# Patient Record
Sex: FEMALE | Race: BLACK OR AFRICAN AMERICAN | NOT HISPANIC OR LATINO | Employment: FULL TIME | URBAN - METROPOLITAN AREA
[De-identification: names, ages, dates, MRNs, and addresses within clinical notes are randomized per-mention and may not be internally consistent; named-entity substitution may affect disease eponyms.]

---

## 2017-01-04 ENCOUNTER — HOSPITAL ENCOUNTER (OUTPATIENT)
Dept: MAMMOGRAPHY | Facility: HOSPITAL | Age: 67
Discharge: HOME | End: 2017-01-04
Attending: OBSTETRICS & GYNECOLOGY | Admitting: OBSTETRICS & GYNECOLOGY

## 2017-01-04 DIAGNOSIS — Z13.9 SCREENING: ICD-10-CM

## 2017-01-04 PROCEDURE — G0202 SCR MAMMO BI INCL CAD: HCPCS

## 2017-01-06 ENCOUNTER — TELEPHONE (OUTPATIENT)
Dept: OBSTETRICS AND GYNECOLOGY | Facility: CLINIC | Age: 67
End: 2017-01-06

## 2017-01-06 NOTE — TELEPHONE ENCOUNTER
----- Message from Salvador Mensah MD sent at 1/4/2017 10:59 AM EST -----  Call patient: Normal mammogram

## 2017-02-24 ENCOUNTER — OFFICE VISIT (OUTPATIENT)
Dept: FAMILY MEDICINE CLINIC | Facility: CLINIC | Age: 67
End: 2017-02-24

## 2017-02-24 VITALS
HEART RATE: 76 BPM | BODY MASS INDEX: 27.47 KG/M2 | OXYGEN SATURATION: 98 % | HEIGHT: 67 IN | DIASTOLIC BLOOD PRESSURE: 76 MMHG | SYSTOLIC BLOOD PRESSURE: 130 MMHG | WEIGHT: 175 LBS | TEMPERATURE: 97.6 F | RESPIRATION RATE: 16 BRPM

## 2017-02-24 DIAGNOSIS — E83.52 SERUM CALCIUM ELEVATED: ICD-10-CM

## 2017-02-24 DIAGNOSIS — Z85.118 HISTORY OF LUNG CANCER: ICD-10-CM

## 2017-02-24 DIAGNOSIS — E78.2 MIXED HYPERLIPIDEMIA: ICD-10-CM

## 2017-02-24 DIAGNOSIS — Z85.038 HISTORY OF COLON CANCER: ICD-10-CM

## 2017-02-24 DIAGNOSIS — R79.89 LFT ELEVATION: Primary | ICD-10-CM

## 2017-02-24 DIAGNOSIS — E01.0 THYROMEGALY: ICD-10-CM

## 2017-02-24 PROCEDURE — 99213 OFFICE O/P EST LOW 20 MIN: CPT | Performed by: PHYSICIAN ASSISTANT

## 2017-02-24 NOTE — PROGRESS NOTES
Subjective   Faviola Issa is a 66 y.o. female.     History of Present Illness   Faviola Issa 66 y.o. female who presents today for f/u labs  she has a history of   Patient Active Problem List   Diagnosis   • Well female exam with routine gynecological exam   • Malignant neoplasm of sigmoid colon   .      Alk phos was done at work and elevated in 200s    I salina do fractionated alk phos    Has hx lung and colon cancer  Based on 2016 lipids, her 2013 AHA (American Heart Association) Cholesterol Risk Ratio Score Goal is <=7.5% and your score is:    7.9% and will see what new labs show.  She wants diet and exercise trial.    Gets yearly CXR with work for + ppd  Also sees Dr Romeo    I do want to have her do thyroid u/s--she will wait until June  The following portions of the patient's history were reviewed and updated as appropriate: allergies, current medications, past family history, past medical history, past social history, past surgical history and problem list.    Review of Systems   Constitutional: Negative for activity change, appetite change and unexpected weight change.   HENT: Positive for voice change. Negative for nosebleeds and trouble swallowing.    Eyes: Negative for pain and visual disturbance.   Respiratory: Positive for shortness of breath (not new). Negative for chest tightness and wheezing.    Cardiovascular: Negative for chest pain and palpitations.   Gastrointestinal: Negative for abdominal pain and blood in stool.   Endocrine: Negative.    Genitourinary: Negative for difficulty urinating and hematuria.   Musculoskeletal: Positive for back pain. Negative for joint swelling.   Skin: Negative for color change and rash.   Allergic/Immunologic: Negative.    Neurological: Negative for syncope and speech difficulty.   Hematological: Negative for adenopathy.   Psychiatric/Behavioral: Negative for agitation and confusion.   All other systems reviewed and are negative.      Objective   Physical  Exam   Constitutional: She is oriented to person, place, and time. She appears well-developed and well-nourished. No distress.   HENT:   Head: Normocephalic and atraumatic.   Eyes: Conjunctivae and EOM are normal. Pupils are equal, round, and reactive to light. Right eye exhibits no discharge. Left eye exhibits no discharge. No scleral icterus.   Neck: Normal range of motion. Neck supple. No tracheal deviation present. Thyromegaly present.   Cardiovascular: Normal rate, regular rhythm, normal heart sounds, intact distal pulses and normal pulses.  Exam reveals no gallop.    No murmur heard.  Pulmonary/Chest: Effort normal and breath sounds normal. No respiratory distress. She has no wheezes. She has no rales.   Musculoskeletal: Normal range of motion.   Neurological: She is alert and oriented to person, place, and time. She exhibits normal muscle tone. Coordination normal.   Skin: Skin is warm. No rash noted. No erythema. No pallor.   Psychiatric: She has a normal mood and affect. Her behavior is normal. Judgment and thought content normal.   Nursing note and vitals reviewed.      Assessment/Plan   Problems Addressed this Visit        Cardiovascular and Mediastinum    Mixed hyperlipidemia       Other    History of lung cancer    History of colon cancer    LFT elevation - Primary

## 2017-02-24 NOTE — PATIENT INSTRUCTIONS
Low glycemic index diet  Exercise 30 minutes most days of the week  Make sure you get results on any labs or tests we ordered today  We discussed medications and how to take them as prescribed  Sleep 6-8 hours each night if possible  If you have not signed up for [employer], please activate your code ASAP from your After Visit Summary today    LDL goal <100  LDL goal if heart disease <70  HDL goal >60  Triglyceride goal <150  BP goal =<130/80  Fasting glucose <100    Needs thyroid u/s

## 2017-02-28 DIAGNOSIS — Z85.118 HISTORY OF LUNG CANCER: ICD-10-CM

## 2017-02-28 DIAGNOSIS — Z85.038 HISTORY OF COLON CANCER: ICD-10-CM

## 2017-02-28 DIAGNOSIS — C18.7 MALIGNANT NEOPLASM OF SIGMOID COLON (HCC): Primary | ICD-10-CM

## 2017-02-28 LAB
25(OH)D3+25(OH)D2 SERPL-MCNC: 7.9 NG/ML (ref 30–100)
ALBUMIN SERPL-MCNC: 4.6 G/DL (ref 3.5–5.2)
ALBUMIN/GLOB SERPL: 1.5 G/DL
ALP BONE CFR SERPL: 58 % (ref 14–68)
ALP INTEST CFR SERPL: 0 % (ref 0–18)
ALP LIVER CFR SERPL: 42 % (ref 18–85)
ALP SERPL-CCNC: 118 U/L (ref 39–117)
ALT SERPL-CCNC: 9 U/L (ref 1–33)
AST SERPL-CCNC: 14 U/L (ref 1–32)
BASOPHILS # BLD AUTO: 0.03 10*3/MM3 (ref 0–0.2)
BASOPHILS NFR BLD AUTO: 0.4 % (ref 0–1.5)
BILIRUB SERPL-MCNC: 0.5 MG/DL (ref 0.1–1.2)
BUN SERPL-MCNC: 12 MG/DL (ref 8–23)
BUN/CREAT SERPL: 16.9 (ref 7–25)
CALCIUM SERPL-MCNC: 10.6 MG/DL (ref 8.6–10.5)
CHLORIDE SERPL-SCNC: 101 MMOL/L (ref 98–107)
CHOLEST SERPL-MCNC: 201 MG/DL (ref 0–200)
CO2 SERPL-SCNC: 25.8 MMOL/L (ref 22–29)
CREAT SERPL-MCNC: 0.71 MG/DL (ref 0.57–1)
EOSINOPHIL # BLD AUTO: 0.04 10*3/MM3 (ref 0–0.7)
EOSINOPHIL NFR BLD AUTO: 0.6 % (ref 0.3–6.2)
ERYTHROCYTE [DISTWIDTH] IN BLOOD BY AUTOMATED COUNT: 14.2 % (ref 11.7–13)
GLOBULIN SER CALC-MCNC: 3 GM/DL
GLUCOSE SERPL-MCNC: 84 MG/DL (ref 65–99)
HCT VFR BLD AUTO: 42.9 % (ref 35.6–45.5)
HDLC SERPL-MCNC: 74 MG/DL (ref 40–60)
HGB BLD-MCNC: 13.7 G/DL (ref 11.9–15.5)
IMM GRANULOCYTES # BLD: 0 10*3/MM3 (ref 0–0.03)
IMM GRANULOCYTES NFR BLD: 0 % (ref 0–0.5)
LDLC SERPL CALC-MCNC: 115 MG/DL (ref 0–100)
LYMPHOCYTES # BLD AUTO: 1.64 10*3/MM3 (ref 0.9–4.8)
LYMPHOCYTES NFR BLD AUTO: 23.8 % (ref 19.6–45.3)
MCH RBC QN AUTO: 28.4 PG (ref 26.9–32)
MCHC RBC AUTO-ENTMCNC: 31.9 G/DL (ref 32.4–36.3)
MCV RBC AUTO: 88.8 FL (ref 80.5–98.2)
MONOCYTES # BLD AUTO: 0.33 10*3/MM3 (ref 0.2–1.2)
MONOCYTES NFR BLD AUTO: 4.8 % (ref 5–12)
NEUTROPHILS # BLD AUTO: 4.85 10*3/MM3 (ref 1.9–8.1)
NEUTROPHILS NFR BLD AUTO: 70.4 % (ref 42.7–76)
PLATELET # BLD AUTO: 319 10*3/MM3 (ref 140–500)
POTASSIUM SERPL-SCNC: 4.5 MMOL/L (ref 3.5–5.2)
PROT SERPL-MCNC: 7.6 G/DL (ref 6–8.5)
RBC # BLD AUTO: 4.83 10*6/MM3 (ref 3.9–5.2)
SODIUM SERPL-SCNC: 141 MMOL/L (ref 136–145)
T4 FREE SERPL-MCNC: 1.28 NG/DL (ref 0.93–1.7)
TRIGL SERPL-MCNC: 62 MG/DL (ref 0–150)
TSH SERPL DL<=0.005 MIU/L-ACNC: 0.75 MIU/ML (ref 0.27–4.2)
VLDLC SERPL CALC-MCNC: 12.4 MG/DL (ref 5–40)
WBC # BLD AUTO: 6.89 10*3/MM3 (ref 4.5–10.7)

## 2017-03-03 LAB
CA-I SERPL ISE-MCNC: 6 MG/DL (ref 4.5–5.6)
PTH-INTACT SERPL-MCNC: 62 PG/ML (ref 15–65)

## 2017-03-06 DIAGNOSIS — C18.7 MALIGNANT NEOPLASM OF SIGMOID COLON (HCC): Primary | ICD-10-CM

## 2017-03-07 ENCOUNTER — CONSULT (OUTPATIENT)
Dept: ONCOLOGY | Facility: CLINIC | Age: 67
End: 2017-03-07

## 2017-03-07 ENCOUNTER — LAB (OUTPATIENT)
Dept: ONCOLOGY | Facility: HOSPITAL | Age: 67
End: 2017-03-07

## 2017-03-07 VITALS
HEIGHT: 67 IN | WEIGHT: 180 LBS | OXYGEN SATURATION: 96 % | BODY MASS INDEX: 28.25 KG/M2 | HEART RATE: 70 BPM | DIASTOLIC BLOOD PRESSURE: 76 MMHG | SYSTOLIC BLOOD PRESSURE: 177 MMHG | TEMPERATURE: 97.7 F | RESPIRATION RATE: 18 BRPM

## 2017-03-07 DIAGNOSIS — C18.7 MALIGNANT NEOPLASM OF SIGMOID COLON (HCC): Primary | ICD-10-CM

## 2017-03-07 DIAGNOSIS — C18.7 MALIGNANT NEOPLASM OF SIGMOID COLON (HCC): ICD-10-CM

## 2017-03-07 LAB
ALBUMIN SERPL-MCNC: 4.3 G/DL (ref 3.5–5.2)
ALBUMIN/GLOB SERPL: 1.2 G/DL
ALP SERPL-CCNC: 124 U/L (ref 39–117)
ALT SERPL W P-5'-P-CCNC: 8 U/L (ref 1–33)
ANION GAP SERPL CALCULATED.3IONS-SCNC: 9.9 MMOL/L
AST SERPL-CCNC: 14 U/L (ref 1–32)
BASOPHILS # BLD AUTO: 0.06 10*3/MM3 (ref 0–0.2)
BASOPHILS NFR BLD AUTO: 0.8 % (ref 0–1.5)
BILIRUB SERPL-MCNC: 0.5 MG/DL (ref 0.1–1.2)
BUN BLD-MCNC: 8 MG/DL (ref 8–23)
BUN/CREAT SERPL: 11.6 (ref 7–25)
CALCIUM SPEC-SCNC: 10.3 MG/DL (ref 8.6–10.5)
CHLORIDE SERPL-SCNC: 103 MMOL/L (ref 98–107)
CO2 SERPL-SCNC: 27.1 MMOL/L (ref 22–29)
CREAT BLD-MCNC: 0.69 MG/DL (ref 0.57–1)
DEPRECATED RDW RBC AUTO: 44.4 FL (ref 37–54)
EOSINOPHIL # BLD AUTO: 0.03 10*3/MM3 (ref 0–0.7)
EOSINOPHIL NFR BLD AUTO: 0.4 % (ref 0.3–6.2)
ERYTHROCYTE [DISTWIDTH] IN BLOOD BY AUTOMATED COUNT: 13.7 % (ref 11.7–13)
GFR SERPL CREATININE-BSD FRML MDRD: 85 ML/MIN/1.73
GLOBULIN UR ELPH-MCNC: 3.5 GM/DL
GLUCOSE BLD-MCNC: 80 MG/DL (ref 65–99)
HCT VFR BLD AUTO: 44.8 % (ref 35.6–45.5)
HGB BLD-MCNC: 14.6 G/DL (ref 11.9–15.5)
HOLD SPECIMEN: NORMAL
IMM GRANULOCYTES # BLD: 0.01 10*3/MM3 (ref 0–0.03)
IMM GRANULOCYTES NFR BLD: 0.1 % (ref 0–0.5)
LYMPHOCYTES # BLD AUTO: 1.4 10*3/MM3 (ref 0.9–4.8)
LYMPHOCYTES NFR BLD AUTO: 19.3 % (ref 19.6–45.3)
MCH RBC QN AUTO: 28.4 PG (ref 26.9–32)
MCHC RBC AUTO-ENTMCNC: 32.6 G/DL (ref 32.4–36.3)
MCV RBC AUTO: 87.2 FL (ref 80.5–98.2)
MONOCYTES # BLD AUTO: 0.42 10*3/MM3 (ref 0.2–1.2)
MONOCYTES NFR BLD AUTO: 5.8 % (ref 5–12)
NEUTROPHILS # BLD AUTO: 5.32 10*3/MM3 (ref 1.9–8.1)
NEUTROPHILS NFR BLD AUTO: 73.6 % (ref 42.7–76)
NRBC BLD MANUAL-RTO: 0 /100 WBC (ref 0–0)
PLATELET # BLD AUTO: 277 10*3/MM3 (ref 140–500)
PMV BLD AUTO: 9.9 FL (ref 6–12)
POTASSIUM BLD-SCNC: 4.3 MMOL/L (ref 3.5–5.2)
PROT SERPL-MCNC: 7.8 G/DL (ref 6–8.5)
RBC # BLD AUTO: 5.14 10*6/MM3 (ref 3.9–5.2)
SODIUM BLD-SCNC: 140 MMOL/L (ref 136–145)
WBC NRBC COR # BLD: 7.24 10*3/MM3 (ref 4.5–10.7)
WHOLE BLOOD HOLD SPECIMEN: NORMAL

## 2017-03-07 PROCEDURE — 80053 COMPREHEN METABOLIC PANEL: CPT | Performed by: INTERNAL MEDICINE

## 2017-03-07 PROCEDURE — 99204 OFFICE O/P NEW MOD 45 MIN: CPT | Performed by: INTERNAL MEDICINE

## 2017-03-07 PROCEDURE — 85025 COMPLETE CBC W/AUTO DIFF WBC: CPT | Performed by: INTERNAL MEDICINE

## 2017-03-07 PROCEDURE — 36415 COLL VENOUS BLD VENIPUNCTURE: CPT

## 2017-03-07 RX ORDER — ACETAMINOPHEN 500 MG
500 TABLET ORAL EVERY 6 HOURS PRN
COMMUNITY
End: 2019-08-02

## 2017-03-07 NOTE — PROGRESS NOTES
Subjective .     REASONS FOR FOLLOWUP:  History of colon cancer and history of rectal cancer    HISTORY OF PRESENT ILLNESS:  The patient is a 66 y.o. year old female  who is here for follow-up with the above-mentioned history.    Last in our office 12/20/11.  Patient decided at that time to follow up as needed only.  She was asked to come back by her PCP due to an elevated calcium.  Calcium 10.6 2/24/17.  Ionized calcium 6 on 3/2/17.  Patient stopped taking calcium supplements.    Alkaline phosphatase mildly elevated at 118, 2/24/17.  Bone/liver alkaline phosphatase fractionation unremarkable.    Denies neurological symptoms.  Denies nausea   Denies weight loss.  Denies pain.      Past Medical History   Diagnosis Date   • Colon cancer 2005     with mets to left lung (resected)   • Eye exam normal 2013   • Hypertension    • Lymphoma 1998     Eyelid, low-grade, treated with radiation   • Post-menopausal    • Rectal cancer 1999     Past Surgical History   Procedure Laterality Date   • Colon surgery       1999 and 11/2011   • Hysterectomy  1999   • Lung lobectomy       ANSELMO 08/2006 and RLL partial 09/2001   • Hemicoloectomy w/ anastomosis Right 11/2011     Adenocarcinoma of cecum       HEMATOLOGIC/ONCOLOGIC HISTORY:  (History from previous dates can be found in the separate document.)    MEDICATIONS    Current Outpatient Prescriptions:   •  acetaminophen (TYLENOL) 500 MG tablet, Take 500 mg by mouth. Take 1/2 tab, Disp: , Rfl:     ALLERGIES:     Allergies   Allergen Reactions   • Adhesive Tape Hives and Itching     BANDAIDS   • Amoxicillin Hives and Itching   • Latex    • Red Dye    • Shellfish-Derived Products        SOCIAL HISTORY:       Social History     Social History   • Marital status:      Spouse name: KEN   • Number of children: 3   • Years of education: College     Occupational History   • Nurse [employer]     Social History Main Topics   • Smoking status: Former  "Smoker     Packs/day: 1.00     Types: Cigarettes     Quit date: 9/11/2001   • Smokeless tobacco: Never Used      Comment: 1ppd x20 yrs   • Alcohol use No   • Drug use: No   • Sexual activity: Yes     Partners: Male     Birth control/ protection: None     Other Topics Concern   • Not on file     Social History Narrative    GYN PATIENT SINCE 2001.         FAMILY HISTORY:  Family History   Problem Relation Age of Onset   • Cancer Sister      \"FEMALE\"   • Hypertension Father    • Breast cancer Daughter 45       REVIEW OF SYSTEMS:  GENERAL: No change in appetite or weight;   No fevers, chills, sweats.    SKIN: No nonhealing lesions.   No rashes.  HEME/LYMPH: No easy bruising, bleeding.   No swollen nodes.   EYES: No vision changes or diplopia.   ENT: No tinnitus, hearing loss, gum bleeding, epistaxis, hoarseness or dysphagia.   RESPIRATORY: No cough, shortness of breath, hemoptysis or wheezing.   CVS: No chest pain, palpitations, orthopnea, dyspnea on exertion or PND.   GI: No melena or hematochezia.   No abdominal pain.  No nausea, vomiting, constipation, diarrhea  : No lower tract obstructive symptoms, dysuria or hematuria.   MUSCULOSKELETAL: No bone pain.  No joint stiffness.   NEUROLOGICAL: No global weakness, loss of consciousness or seizures.   PSYCHIATRIC: No increased nervousness, mood changes or depression.     Objective    Vitals:    03/07/17 1442   BP: 177/76   Pulse: 70   Resp: 18   Temp: 97.7 °F (36.5 °C)   SpO2: 96%   Weight: 180 lb (81.6 kg)   Height: 66.93\" (170 cm)  Comment: new pt   PainSc:   2   PainLoc: Generalized     Current Status 3/7/2017   ECOG score 0      PHYSICAL EXAM:    GENERAL:  Well-developed, well-nourished in no acute distress.   SKIN:  Warm, dry without rashes, purpura or petechiae.  EYES:  Pupils equal, round and reactive to light.  EOMs intact.  Conjunctivae normal.  EARS:  Hearing intact.  NOSE:  Septum midline.  No excoriations or nasal discharge.  MOUTH:  Tongue is " well-papillated; no stomatitis or ulcers.  Lips normal.  THROAT:  Oropharynx without lesions or exudates.  NECK:  Supple with good range of motion; no thyromegaly or masses, no JVD.  LYMPHATICS:  No cervical, supraclavicular, axillary or inguinal adenopathy.  CHEST:  Lungs clear to auscultation. Good airflow.  CARDIAC:  Regular rate and rhythm without murmurs, rubs or gallops. Normal S1,S2.  ABDOMEN:  Soft, nontender with no hepatosplenomegaly or masses.  EXTREMITIES:  No clubbing, cyanosis or edema.  NEUROLOGICAL:  Cranial Nerves II-XII grossly intact.  No focal neurological deficits.  PSYCHIATRIC:  Normal affect and mood.     RECENT LABS:        WBC   Date/Time Value Ref Range Status   03/07/2017 02:24 PM 7.24 4.50 - 10.70 10*3/mm3 Final   02/24/2017 12:33 PM 6.89 4.50 - 10.70 10*3/mm3 Final     HEMOGLOBIN   Date/Time Value Ref Range Status   03/07/2017 02:24 PM 14.6 11.9 - 15.5 g/dL Final   02/24/2017 12:33 PM 13.7 11.9 - 15.5 g/dL Final     PLATELETS   Date/Time Value Ref Range Status   03/07/2017 02:24  140 - 500 10*3/mm3 Final   02/24/2017 12:33  140 - 500 10*3/mm3 Final       Assessment/Plan   Malignant neoplasm of sigmoid colon  - Comprehensive Metabolic Panel   1.  Stage I (pT2,pN0,M0) colon adenocarcinoma. Resected with a right hemicolectomy 11/11/11. Therefore no adjuvant chemotherapy recommended. 12 lymph nodes checked and negative.      2.  Rectal cancer. APR 1999. Adjuvant 5FU and radiation.   Right lower lobectomy 9/12/01 for a solitary recurrence of the rectal cancer.   Left upper lobectomy 8/16/05 for a solitary recurrence of the rectal cancer.     3.  Low grade lymphoma of the eyelid, treated with radiation in 1988. She has had no signs of recurrence of lymphoma.     4.  Recent mild hypercalcemia.  Recent mild elevated alkaline phosphatase.  I discussed this with patient and .  I offered CT scans to evaluate for recurrence.  She declines at this time to avoid paying her high  deductible.  Instead, she preferred I recheck the lab tests.  Calcium today normal at 10.3.  Alkaline phosphatase remains minimally elevated at 124.  It has been almost 12 years since her last recurrence of rectal cancer.  I think it is unlikely recurrence cancer is causing these mild lab abnormalities.  However, I did tell the patient and  recurrence cancer is a possibility.  They voiced understanding.  After discussing this, they would like no further follow-up with us or testing for cancer which I think is reasonable.  She maintains colonoscopies with Dr. Romeo every 2 years per her report.  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  These were new issues for me to address today.  I reviewed PCP records.      PLAN:  No follow-up with us

## 2017-04-05 ENCOUNTER — TELEPHONE (OUTPATIENT)
Dept: FAMILY MEDICINE CLINIC | Facility: CLINIC | Age: 67
End: 2017-04-05

## 2017-04-05 NOTE — TELEPHONE ENCOUNTER
Pt wanted to let you know that she thought about the u/s of thyroid and she does not want to do it at this time.

## 2017-07-11 ENCOUNTER — APPOINTMENT (OUTPATIENT)
Dept: ULTRASOUND IMAGING | Facility: HOSPITAL | Age: 67
End: 2017-07-11

## 2017-09-26 ENCOUNTER — OFFICE VISIT (OUTPATIENT)
Dept: FAMILY MEDICINE CLINIC | Facility: CLINIC | Age: 67
End: 2017-09-26

## 2017-09-26 VITALS
HEIGHT: 67 IN | RESPIRATION RATE: 16 BRPM | DIASTOLIC BLOOD PRESSURE: 76 MMHG | HEART RATE: 67 BPM | TEMPERATURE: 98.4 F | SYSTOLIC BLOOD PRESSURE: 114 MMHG | BODY MASS INDEX: 28.25 KG/M2 | OXYGEN SATURATION: 95 % | WEIGHT: 180 LBS

## 2017-09-26 DIAGNOSIS — Z00.00 ROUTINE GENERAL MEDICAL EXAMINATION AT A HEALTH CARE FACILITY: Primary | ICD-10-CM

## 2017-09-26 PROCEDURE — 90670 PCV13 VACCINE IM: CPT | Performed by: PHYSICIAN ASSISTANT

## 2017-09-26 PROCEDURE — G0009 ADMIN PNEUMOCOCCAL VACCINE: HCPCS | Performed by: PHYSICIAN ASSISTANT

## 2017-09-26 PROCEDURE — 99397 PER PM REEVAL EST PAT 65+ YR: CPT | Performed by: PHYSICIAN ASSISTANT

## 2017-09-26 NOTE — PATIENT INSTRUCTIONS
Low glycemic index diet  Exercise 30 minutes most days of the week  Make sure you get results on any labs or tests we ordered today  We discussed medications and how to take them as prescribed  Sleep 6-8 hours each night if possible  If you have not signed up for [email or web address], please activate your code ASAP from your After Visit Summary today    LDL goal <100  LDL goal if heart disease <70  HDL goal >60  Triglyceride goal <150  BP goal =<130/80  Fasting glucose <100

## 2017-09-26 NOTE — PROGRESS NOTES
Subjective   Faviola Issa is a 67 y.o. female.     History of Present Illness   Faviola Issa 67 y.o. female who presents today for check up and did see DR Code for her elevated Ca+ and slightly elevated alk phos and did repeat labs and he added more labs and were negative.  She is in remission from lung and colon cancer.  Sees Dr Romeo every other year.  2013 AHA (American Heart Association) Cholesterol Risk Ratio Score Goal is <=7.5% and your score is:  Better at 5.5%    she has a history of   Patient Active Problem List   Diagnosis   • Well female exam with routine gynecological exam   • Malignant neoplasm of sigmoid colon   • History of lung cancer   • History of colon cancer   • LFT elevation   • Mixed hyperlipidemia   .    Had labs at work 9-11-17:  Chol 167, HDL 63, glucose 89    Lab Results   Component Value Date    GLUCOSE 80 03/07/2017    BUN 8 03/07/2017    CREATININE 0.69 03/07/2017    EGFRIFNONA 85 03/07/2017    EGFRIFAFRI 100 02/24/2017    BCR 11.6 03/07/2017    K 4.3 03/07/2017    CO2 27.1 03/07/2017    CALCIUM 10.3 03/07/2017    PROTENTOTREF 7.6 02/24/2017    ALBUMIN 4.30 03/07/2017    LABIL2 1.2 03/07/2017    AST 14 03/07/2017    ALT 8 03/07/2017     She is here for her yearly wellness check and labs are current and goals met. BP is great  Gets flu shot at work  Will do Prevnar today    She does exercise daily and walks >10,000 steps daily    The following portions of the patient's history were reviewed and updated as appropriate: allergies, current medications, past family history, past medical history, past social history, past surgical history and problem list.    Review of Systems   Constitutional: Negative for activity change, appetite change and unexpected weight change.   HENT: Negative for nosebleeds and trouble swallowing.    Eyes: Negative for pain and visual disturbance.   Respiratory: Negative for chest tightness, shortness of breath and wheezing.    Cardiovascular: Negative for  chest pain and palpitations.   Gastrointestinal: Negative for abdominal pain and blood in stool.   Endocrine: Negative.    Genitourinary: Negative for difficulty urinating and hematuria.   Musculoskeletal: Negative for joint swelling.   Skin: Negative for color change and rash.   Allergic/Immunologic: Positive for environmental allergies and food allergies.   Neurological: Negative for syncope and speech difficulty.   Hematological: Negative for adenopathy.   Psychiatric/Behavioral: Negative for agitation and confusion.   All other systems reviewed and are negative.      Objective   Physical Exam   Constitutional: She is oriented to person, place, and time. She appears well-developed and well-nourished. No distress.   HENT:   Head: Normocephalic and atraumatic.   Eyes: Conjunctivae and EOM are normal. Pupils are equal, round, and reactive to light. Right eye exhibits no discharge. Left eye exhibits no discharge. No scleral icterus.   Neck: Normal range of motion. Neck supple. No tracheal deviation present. No thyromegaly present.   Cardiovascular: Normal rate, regular rhythm, normal heart sounds, intact distal pulses and normal pulses.  Exam reveals no gallop.    No murmur heard.  Pulmonary/Chest: Effort normal and breath sounds normal. No respiratory distress. She has no wheezes. She has no rales.   Abdominal: Soft.   Musculoskeletal: Normal range of motion.   Ganglion cyst right dorsal wrist 2cm X 8mm   Neurological: She is alert and oriented to person, place, and time. She exhibits normal muscle tone. Coordination normal.   Skin: Skin is warm. No rash noted. No erythema. No pallor.   Psychiatric: She has a normal mood and affect. Her behavior is normal. Judgment and thought content normal.   Nursing note and vitals reviewed.      Assessment/Plan   Problems Addressed this Visit     None      Visit Diagnoses     Routine general medical examination at a health care facility    -  Primary

## 2017-10-02 ENCOUNTER — TELEPHONE (OUTPATIENT)
Dept: FAMILY MEDICINE CLINIC | Facility: CLINIC | Age: 67
End: 2017-10-02

## 2017-10-02 NOTE — TELEPHONE ENCOUNTER
Pt is having some congestion, coughing gray tinted mucus. Her throat is not sore but feels coated and is hoarse. She is wanting to know if you will call her something in. I did let her know that she may need to come in. She can not take levaquin due to joint pain.

## 2017-10-03 ENCOUNTER — OFFICE VISIT (OUTPATIENT)
Dept: FAMILY MEDICINE CLINIC | Facility: CLINIC | Age: 67
End: 2017-10-03

## 2017-10-03 VITALS
WEIGHT: 180 LBS | HEIGHT: 67 IN | TEMPERATURE: 98.5 F | DIASTOLIC BLOOD PRESSURE: 70 MMHG | BODY MASS INDEX: 28.25 KG/M2 | HEART RATE: 74 BPM | OXYGEN SATURATION: 98 % | RESPIRATION RATE: 16 BRPM | SYSTOLIC BLOOD PRESSURE: 110 MMHG

## 2017-10-03 DIAGNOSIS — J01.90 ACUTE SINUSITIS, RECURRENCE NOT SPECIFIED, UNSPECIFIED LOCATION: Primary | ICD-10-CM

## 2017-10-03 PROCEDURE — 99213 OFFICE O/P EST LOW 20 MIN: CPT | Performed by: PHYSICIAN ASSISTANT

## 2017-10-03 RX ORDER — DOXYCYCLINE HYCLATE 100 MG/1
100 CAPSULE ORAL 2 TIMES DAILY
Qty: 20 CAPSULE | Refills: 0 | Status: SHIPPED | OUTPATIENT
Start: 2017-10-03 | End: 2018-12-13

## 2017-10-03 RX ORDER — ALBUTEROL SULFATE 90 UG/1
2 AEROSOL, METERED RESPIRATORY (INHALATION) EVERY 4 HOURS PRN
Qty: 1 INHALER | Refills: 11 | Status: SHIPPED | OUTPATIENT
Start: 2017-10-03 | End: 2019-08-16

## 2017-10-03 NOTE — PROGRESS NOTES
Subjective   Faviola Issa is a 67 y.o. female.     History of Present Illness   Faviola Issa 67 y.o. female who presents for evaluation of upper respiratory congestion, cough, wheezing, shortness of air, fatigue. Symptoms include post nasal drip, cough described as productive of yellow sputum and hoarseness.  Onset of symptoms was 3 days ago, gradually worsening since that time. Patient denies fever.   Evaluation to date: none Treatment to date:  Tylenol.   Had to use Albuterol yesterday;  Has asthma as child  NO Levaquin:  Makes joints hurt  Allergy to PCN  The following portions of the patient's history were reviewed and updated as appropriate: allergies, current medications, past family history, past medical history, past social history, past surgical history and problem list.    Review of Systems   Constitutional: Positive for fatigue. Negative for activity change and unexpected weight change.   HENT: Positive for congestion and postnasal drip. Negative for ear pain and sore throat.    Eyes: Negative for pain and discharge.   Respiratory: Positive for cough, shortness of breath and wheezing. Negative for chest tightness.    Cardiovascular: Negative for chest pain and palpitations.   Gastrointestinal: Negative for abdominal pain, diarrhea and vomiting.   Endocrine: Negative.    Genitourinary: Negative.    Musculoskeletal: Negative for joint swelling.   Skin: Negative for color change, rash and wound.   Allergic/Immunologic: Negative.    Neurological: Negative for seizures and syncope.   Psychiatric/Behavioral: Negative.        Objective   Physical Exam   Constitutional: She is oriented to person, place, and time. She appears well-developed and well-nourished.  Non-toxic appearance. No distress.   HENT:   Head: Normocephalic and atraumatic. Hair is normal.   Right Ear: External ear normal. No drainage, swelling or tenderness. Tympanic membrane is retracted.   Left Ear: External ear normal. No drainage,  swelling or tenderness. Tympanic membrane is retracted.   Nose: Mucosal edema present. No epistaxis.   Mouth/Throat: Uvula is midline and mucous membranes are normal. No oral lesions. No uvula swelling. Posterior oropharyngeal erythema present. No oropharyngeal exudate.   Eyes: Conjunctivae and EOM are normal. Pupils are equal, round, and reactive to light. Right eye exhibits no discharge. Left eye exhibits no discharge. No scleral icterus.   Neck: Normal range of motion. Neck supple. No tracheal deviation present. No thyromegaly present.   Cardiovascular: Normal rate, regular rhythm, normal heart sounds, intact distal pulses and normal pulses.  Exam reveals no gallop.    No murmur heard.  Pulmonary/Chest: Effort normal and breath sounds normal. No stridor. No respiratory distress. She has no wheezes. She has no rales. She exhibits no tenderness.   Abdominal: Soft. There is no tenderness.   Musculoskeletal: Normal range of motion.   Lymphadenopathy:     She has cervical adenopathy.   Neurological: She is alert and oriented to person, place, and time. She exhibits normal muscle tone. Coordination normal.   Skin: Skin is warm and dry. No rash noted. She is not diaphoretic. No erythema. No pallor.   Psychiatric: She has a normal mood and affect. Her behavior is normal. Judgment and thought content normal.   Nursing note and vitals reviewed.      Assessment/Plan   Problems Addressed this Visit     None      Visit Diagnoses     Acute sinusitis, recurrence not specified, unspecified location    -  Primary

## 2017-10-04 ENCOUNTER — TELEPHONE (OUTPATIENT)
Dept: FAMILY MEDICINE CLINIC | Facility: CLINIC | Age: 67
End: 2017-10-04

## 2017-10-04 RX ORDER — METHYLPREDNISOLONE 4 MG/1
TABLET ORAL
Qty: 21 TABLET | Refills: 0 | Status: SHIPPED | OUTPATIENT
Start: 2017-10-04 | End: 2018-12-13

## 2017-10-04 NOTE — TELEPHONE ENCOUNTER
Pt states that since she started taking the doxycycline she has increase wheezing, tightness in the chest. She also woke up this morning vomiting. She did stop the medication.

## 2017-10-04 NOTE — TELEPHONE ENCOUNTER
Sounds like more of acute bronchitis and will send Medrol Dosepak for this and she can take it with Doxy    I called her and sent Medrol---she will start this and take doxy

## 2017-12-07 ENCOUNTER — TRANSCRIBE ORDERS (OUTPATIENT)
Dept: OBSTETRICS AND GYNECOLOGY | Facility: CLINIC | Age: 67
End: 2017-12-07

## 2017-12-07 DIAGNOSIS — Z12.31 VISIT FOR SCREENING MAMMOGRAM: Primary | ICD-10-CM

## 2017-12-22 NOTE — PATIENT INSTRUCTIONS
For throat pain:  Can gargle with 1/2 Maalox and 1/2 Benadryl liquid ---mix, gargle and spit Take Tylenol for fever or aches  Rest as needed  Call not better in 7 days  Increase fluids  Call if worse  Can use generic Afrin nasal spray up to 5 days for nasal congestion  Finish antibiotic course of therapy     Dr. Sanchez NSCU Dr. Isaac

## 2018-01-10 ENCOUNTER — HOSPITAL ENCOUNTER (OUTPATIENT)
Dept: MAMMOGRAPHY | Facility: HOSPITAL | Age: 68
Discharge: HOME | End: 2018-01-10
Attending: OBSTETRICS & GYNECOLOGY | Admitting: OBSTETRICS & GYNECOLOGY

## 2018-01-10 ENCOUNTER — PREP FOR SURGERY (OUTPATIENT)
Dept: OTHER | Facility: HOSPITAL | Age: 68
End: 2018-01-10

## 2018-01-10 ENCOUNTER — TELEPHONE (OUTPATIENT)
Dept: GASTROENTEROLOGY | Facility: CLINIC | Age: 68
End: 2018-01-10

## 2018-01-10 DIAGNOSIS — C18.9 COLON CANCER (HCC): Primary | ICD-10-CM

## 2018-01-10 DIAGNOSIS — Z12.31 VISIT FOR SCREENING MAMMOGRAM: ICD-10-CM

## 2018-01-10 PROCEDURE — 77067 SCR MAMMO BI INCL CAD: CPT

## 2018-01-10 NOTE — TELEPHONE ENCOUNTER
----- Message from Bre Morelos sent at 1/10/2018  9:00 AM EST -----  Regarding: PT CALLED - C/S QUESTION  Contact: [phone]  WHEN SHE HAVE HER NEXT PROCEDURE? PT LAST SCOPE IN 03/2015 IN [facility]. NOT IN RECALL.

## 2018-01-10 NOTE — TELEPHONE ENCOUNTER
Pt had last c/s 03/17/2015 per ed.  C/s and path from [facility] scanned under media tab. Message sent to Dr Romeo regarding when should pt have next c/s done.

## 2018-01-11 ENCOUNTER — TELEPHONE (OUTPATIENT)
Dept: OBSTETRICS AND GYNECOLOGY | Age: 68
End: 2018-01-11

## 2018-01-11 NOTE — TELEPHONE ENCOUNTER
She last had a colonoscopy thru her left abdominal colostomy on 3/17/15 because of her h/o two surgeries for rectal cancer and cecal cancer. At that time I had said that you should have a repeat colonscopy in 2-3 yrs. I think that she is due for a colonoscopy now. If she is OK with that then please schedule it. I ordered it in EP[facility]. [email or web address]

## 2018-01-11 NOTE — TELEPHONE ENCOUNTER
----- Message from Salvador Mensah MD sent at 1/11/2018 12:50 AM EST -----  Call patient: Normal mammogram

## 2018-01-11 NOTE — TELEPHONE ENCOUNTER
Called pt and advised per Dr Romeo that she has her last c/s thru her left abd colostomy on 03/17/2015 be cause of her h/o 2 surgeries for rectal cancer and cecal cancer.  At that point he recommend a repeat c/s in 2-3 yrs.  He recommends a c/s now and has ordered this.     Pt verb understanding and is agreeable to have c/s.  Advised pt that someone from scheduling would be calling her to arrange. Pt verb understanding.

## 2018-01-15 PROBLEM — C18.9 COLON CANCER: Status: ACTIVE | Noted: 2018-01-15

## 2018-05-08 ENCOUNTER — HOSPITAL ENCOUNTER (OUTPATIENT)
Facility: HOSPITAL | Age: 68
Setting detail: HOSPITAL OUTPATIENT SURGERY
Discharge: HOME | End: 2018-05-08
Attending: INTERNAL MEDICINE | Admitting: INTERNAL MEDICINE

## 2018-05-08 ENCOUNTER — ANESTHESIA (OUTPATIENT)
Dept: GASTROENTEROLOGY | Facility: HOSPITAL | Age: 68
End: 2018-05-08

## 2018-05-08 ENCOUNTER — ANESTHESIA EVENT (OUTPATIENT)
Dept: GASTROENTEROLOGY | Facility: HOSPITAL | Age: 68
End: 2018-05-08

## 2018-05-08 VITALS
TEMPERATURE: 97.6 F | DIASTOLIC BLOOD PRESSURE: 64 MMHG | HEART RATE: 63 BPM | HEIGHT: 66 IN | BODY MASS INDEX: 28.45 KG/M2 | SYSTOLIC BLOOD PRESSURE: 149 MMHG | RESPIRATION RATE: 16 BRPM | WEIGHT: 177 LBS | OXYGEN SATURATION: 98 %

## 2018-05-08 PROCEDURE — 45378 DIAGNOSTIC COLONOSCOPY: CPT | Performed by: INTERNAL MEDICINE

## 2018-05-08 PROCEDURE — 25010000002 PROPOFOL 10 MG/ML EMULSION: Performed by: ANESTHESIOLOGY

## 2018-05-08 RX ORDER — LIDOCAINE HYDROCHLORIDE 20 MG/ML
INJECTION, SOLUTION INFILTRATION; PERINEURAL AS NEEDED
Status: DISCONTINUED | OUTPATIENT
Start: 2018-05-08 | End: 2018-05-08 | Stop reason: SURG

## 2018-05-08 RX ORDER — PROPOFOL 10 MG/ML
VIAL (ML) INTRAVENOUS CONTINUOUS PRN
Status: DISCONTINUED | OUTPATIENT
Start: 2018-05-08 | End: 2018-05-08 | Stop reason: SURG

## 2018-05-08 RX ORDER — PROPOFOL 10 MG/ML
VIAL (ML) INTRAVENOUS AS NEEDED
Status: DISCONTINUED | OUTPATIENT
Start: 2018-05-08 | End: 2018-05-08 | Stop reason: SURG

## 2018-05-08 RX ORDER — LIDOCAINE HYDROCHLORIDE 10 MG/ML
0.5 INJECTION, SOLUTION INFILTRATION; PERINEURAL ONCE AS NEEDED
Status: DISCONTINUED | OUTPATIENT
Start: 2018-05-08 | End: 2018-05-08 | Stop reason: HOSPADM

## 2018-05-08 RX ORDER — SODIUM CHLORIDE 0.9 % (FLUSH) 0.9 %
3 SYRINGE (ML) INJECTION AS NEEDED
Status: DISCONTINUED | OUTPATIENT
Start: 2018-05-08 | End: 2018-05-08 | Stop reason: HOSPADM

## 2018-05-08 RX ORDER — SODIUM CHLORIDE, SODIUM LACTATE, POTASSIUM CHLORIDE, CALCIUM CHLORIDE 600; 310; 30; 20 MG/100ML; MG/100ML; MG/100ML; MG/100ML
1000 INJECTION, SOLUTION INTRAVENOUS CONTINUOUS
Status: DISCONTINUED | OUTPATIENT
Start: 2018-05-08 | End: 2018-05-08 | Stop reason: HOSPADM

## 2018-05-08 RX ADMIN — PROPOFOL 150 MG: 10 INJECTION, EMULSION INTRAVENOUS at 10:00

## 2018-05-08 RX ADMIN — SODIUM CHLORIDE, POTASSIUM CHLORIDE, SODIUM LACTATE AND CALCIUM CHLORIDE 1000 ML: 600; 310; 30; 20 INJECTION, SOLUTION INTRAVENOUS at 09:21

## 2018-05-08 RX ADMIN — LIDOCAINE HYDROCHLORIDE 50 MG: 20 INJECTION, SOLUTION INFILTRATION; PERINEURAL at 10:00

## 2018-05-08 RX ADMIN — PROPOFOL 140 MCG/KG/MIN: 10 INJECTION, EMULSION INTRAVENOUS at 10:00

## 2018-05-08 NOTE — ANESTHESIA PREPROCEDURE EVALUATION
Anesthesia Evaluation     Patient summary reviewed                Airway   Mallampati: III  TM distance: >3 FB  Neck ROM: full  No difficulty expected  Dental - normal exam     Pulmonary     breath sounds clear to auscultation  Cardiovascular   Exercise tolerance: good (4-7 METS)    Rhythm: regular  Rate: normal        Neuro/Psych  GI/Hepatic/Renal/Endo      Musculoskeletal     Abdominal    Substance History      OB/GYN          Other                        Anesthesia Plan    ASA 3     MAC     intravenous induction   Anesthetic plan and risks discussed with patient.

## 2018-05-08 NOTE — H&P
"[facility]  Pre Procedure History & Physical    Chief Complaint:   68 yo female with a h/o rectal cancer with abdominalperoneal resection and colostomy placement in 1999. In 11/2011 she had surgery to resect a cecal cancer. She has had two surgeries for bilateral lung cancers. She last had a colonoscopy thru her LLQ abdominal colostomy in 3/15.    Subjective     HPI:   67 y.o. female with a h/o rectal cancer with abdominalperoneal resection and colostomy placement in 1999. In 11/2011 she had surgery to resect a cecal cancer. She has had two surgeries for bilateral lung cancers. She last had a colonoscopy thru her LLQ abdominal colostomy in 3/15.        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 Her smoking use included Cigarettes. She smoked 1.00 pack per day. She has never used smokeless tobacco. She reports that she does not drink alcohol or use drugs.    Medications:   Prescriptions Prior to Admission   Medication Sig Dispense Refill Last Dose   • acetaminophen (TYLENOL) 500 MG tablet Take 500 mg by mouth Every 6 (Six) Hours As Needed. Take 1/2 tab    More than a month at Unknown time   • albuterol (PROVENTIL HFA;VENTOLIN HFA) 108 (90 Base) MCG/ACT inhaler Inhale 2 puffs Every 4 (Four) Hours As Needed for Wheezing. (put on file) 1 inhaler 11 More than a month at Unknown time   • doxycycline (VIBRAMYCIN) 100 MG capsule Take 1 capsule by mouth 2 (Two) Times a Day. For infection 20 capsule 0    • " "MethylPREDNISolone (MEDROL, AD,) 4 MG tablet follow package directions 21 tablet 0        Allergies:  Shellfish-derived products; Adhesive tape; Amoxicillin; Latex; and Red dye    ROS:    Pertinent items are noted in HPI     Objective     Blood pressure 159/75, pulse 68, temperature 98.1 °F (36.7 °C), temperature source Oral, resp. rate 16, height 167.6 cm (66\"), weight 80.3 kg (177 lb), SpO2 100 %, not currently breastfeeding.    Physical Exam   Constitutional: Pt is oriented to person, place, and time and well-developed, well-nourished, and in no distress.   HENT:   Mouth/Throat: Oropharynx is clear and moist.   Neck: Normal range of motion. Neck supple.   Cardiovascular: Normal rate, regular rhythm and normal heart sounds.    Pulmonary/Chest: Effort normal and breath sounds normal. No respiratory distress. No  wheezes.   Abdominal: Soft. Bowel sounds are normal.   Skin: Skin is warm and dry.   Psychiatric: Mood, memory, affect and judgment normal.     Assessment/Plan     Diagnosis:  67 y.o. female with a h/o rectal cancer with abdominalperoneal resection and colostomy placement in 1999. In 11/2011 she had surgery to resect a cecal cancer. She has had two surgeries for bilateral lung cancers. She last had a colonoscopy thru her LLQ abdominal colostomy in 3/15.    Anticipated Surgical Procedure:  Colonoscopy thru her LLQ abdominal colostomy.    The risks, benefits, and alternatives of this procedure have been discussed with the patient or the responsible party- the patient understands and agrees to proceed.                                                                  "

## 2018-05-08 NOTE — ANESTHESIA POSTPROCEDURE EVALUATION
Patient: Faviola Issa    Procedure Summary     Date:  05/08/18 Room / Location:  [facility] ENDOSCOPY 5 /  [facility]    Anesthesia Start:  0952 Anesthesia Stop:  1014    Procedure:  COLONOSCOPY into ileum (N/A ) Diagnosis:       Colon cancer      (Colon cancer [C18.9])    Surgeon:  James Romeo MD Provider:  Braden Vieira MD    Anesthesia Type:  MAC ASA Status:  3          Anesthesia Type: MAC  Last vitals  BP   125/68 (05/08/18 1027)   Temp   36.7 °C (98.1 °F) (05/08/18 0906)   Pulse   66 (05/08/18 1027)   Resp   15 (05/08/18 1027)     SpO2   100 % (05/08/18 1027)     Post Anesthesia Care and Evaluation    Patient location during evaluation: bedside  Patient participation: complete - patient participated  Level of consciousness: awake and alert  Pain score: 0  Pain management: adequate  Airway patency: patent  Anesthetic complications: No anesthetic complications  PONV Status: none  Cardiovascular status: acceptable  Respiratory status: acceptable  Hydration status: acceptable  Post Neuraxial Block status: Motor and sensory function returned to baseline

## 2018-07-31 ENCOUNTER — TELEPHONE (OUTPATIENT)
Dept: GASTROENTEROLOGY | Facility: CLINIC | Age: 68
End: 2018-07-31

## 2018-07-31 NOTE — TELEPHONE ENCOUNTER
Signed DME forms faxed to [facility] @ [phone] - confirmation received (see Media Tab),

## 2018-12-03 ENCOUNTER — TRANSCRIBE ORDERS (OUTPATIENT)
Dept: OBSTETRICS AND GYNECOLOGY | Age: 68
End: 2018-12-03

## 2018-12-03 DIAGNOSIS — Z12.31 VISIT FOR SCREENING MAMMOGRAM: Primary | ICD-10-CM

## 2018-12-13 ENCOUNTER — OFFICE VISIT (OUTPATIENT)
Dept: FAMILY MEDICINE CLINIC | Facility: CLINIC | Age: 68
End: 2018-12-13

## 2018-12-13 VITALS
WEIGHT: 174 LBS | HEIGHT: 66 IN | OXYGEN SATURATION: 98 % | SYSTOLIC BLOOD PRESSURE: 120 MMHG | TEMPERATURE: 97.6 F | RESPIRATION RATE: 16 BRPM | DIASTOLIC BLOOD PRESSURE: 72 MMHG | BODY MASS INDEX: 27.97 KG/M2 | HEART RATE: 76 BPM

## 2018-12-13 DIAGNOSIS — E55.9 VITAMIN D DEFICIENCY: ICD-10-CM

## 2018-12-13 DIAGNOSIS — R79.89 LFT ELEVATION: ICD-10-CM

## 2018-12-13 DIAGNOSIS — E01.0 THYROMEGALY: ICD-10-CM

## 2018-12-13 DIAGNOSIS — Z12.31 ENCOUNTER FOR SCREENING MAMMOGRAM FOR BREAST CANCER: ICD-10-CM

## 2018-12-13 DIAGNOSIS — Z85.118 HISTORY OF LUNG CANCER: ICD-10-CM

## 2018-12-13 DIAGNOSIS — C18.7 MALIGNANT NEOPLASM OF SIGMOID COLON (HCC): ICD-10-CM

## 2018-12-13 DIAGNOSIS — E78.2 MIXED HYPERLIPIDEMIA: Primary | ICD-10-CM

## 2018-12-13 DIAGNOSIS — R06.2 WHEEZING: ICD-10-CM

## 2018-12-13 PROCEDURE — 99214 OFFICE O/P EST MOD 30 MIN: CPT | Performed by: PHYSICIAN ASSISTANT

## 2018-12-13 RX ORDER — ALBUTEROL SULFATE 2.5 MG/3ML
2.5 SOLUTION RESPIRATORY (INHALATION) EVERY 4 HOURS PRN
Qty: 120 VIAL | Refills: 12 | Status: SHIPPED | OUTPATIENT
Start: 2018-12-13 | End: 2019-05-21 | Stop reason: ALTCHOICE

## 2018-12-13 NOTE — PATIENT INSTRUCTIONS
Low glycemic index diet  Exercise 30 minutes most days of the week  Make sure you get results on any labs or tests we ordered today  We discussed medications and how to take them as prescribed  Sleep 6-8 hours each night if possible  If you have not signed up for [employer], please activate your code ASAP from your After Visit Summary today    LDL goal <100  LDL goal if heart disease <70  HDL goal >60  Triglyceride goal <150  BP goal =<130/80  Fasting glucose <100

## 2018-12-13 NOTE — PROGRESS NOTES
Subjective   Faviola Issa is a 68 y.o. female.     History of Present Illness       Faviola Issa 68 y.o. female who presents today for routine follow up check and medication refills.  she has a history of   Patient Active Problem List   Diagnosis   • Well female exam with routine gynecological exam   • Malignant neoplasm of sigmoid colon (CMS/HCC)   • History of lung cancer   • History of colon cancer   • LFT elevation   • Mixed hyperlipidemia   • Colon cancer (CMS/HCC)   .  Since the last visit, she has overall felt well.  She has Hyperlipidemia and working on this with diet and exercise and Vitamin D deficiency and will update labs to confirm level is at goal >30.  she has been compliant with current medications have reviewed them.  The patient denies medication side effects.  Last chol ratio was 5.5%  Malignant neoplasm of sigmoid colon  - Comprehensive Metabolic Panel   1.  Stage I (pT2,pN0,M0) colon adenocarcinoma. Resected with a right hemicolectomy 11/11/11. Therefore no adjuvant chemotherapy recommended. 12 lymph nodes checked and negative.       2.  Rectal cancer. APR 1999. Adjuvant 5FU and radiation.   Right lower lobectomy 9/12/01 for a solitary recurrence of the rectal cancer.   Left upper lobectomy 8/16/05 for a solitary recurrence of the rectal cancer.      3.  Low grade lymphoma of the eyelid, treated with radiation in 1988. She has had no signs of recurrence of lymphoma.      4.  Recent mild hypercalcemia.  Recent mild elevated alkaline phosphatase.  I discussed this with patient and .  I offered CT scans to evaluate for recurrence.  She declines at this time to avoid paying her high deductible.  Instead, she preferred I recheck the lab tests.  Calcium today normal at 10.3.  Alkaline phosphatase remains minimally elevated at 124.  It has been almost 12 years since her last recurrence of rectal cancer.  I think it is unlikely recurrence cancer is causing these mild lab abnormalities.   However, I did tell the patient and  recurrence cancer is a possibility.  They voiced understanding.  After discussing this, they would like no further follow-up with us or testing for cancer which I think is reasonable.  She maintains colonoscopies with Dr. Romeo every 2 years per her report.  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  These were new issues for me to address today.  I reviewed PCP records.      PLAN:  No follow-up with us          Hx lung cancer; she is having wheezing and SOA; has nebulizer and needs solution; needs to see pulm;  Has had resection both lower lobes    Results for orders placed or performed in visit on 03/07/17   Comprehensive Metabolic Panel   Result Value Ref Range    Glucose 80 65 - 99 mg/dL    BUN 8 8 - 23 mg/dL    Creatinine 0.69 0.57 - 1.00 mg/dL    Sodium 140 136 - 145 mmol/L    Potassium 4.3 3.5 - 5.2 mmol/L    Chloride 103 98 - 107 mmol/L    CO2 27.1 22.0 - 29.0 mmol/L    Calcium 10.3 8.6 - 10.5 mg/dL    Total Protein 7.8 6.0 - 8.5 g/dL    Albumin 4.30 3.50 - 5.20 g/dL    ALT (SGPT) 8 1 - 33 U/L    AST (SGOT) 14 1 - 32 U/L    Alkaline Phosphatase 124 (H) 39 - 117 U/L    Total Bilirubin 0.5 0.1 - 1.2 mg/dL    eGFR Non African Amer 85 >60 mL/min/1.73    Globulin 3.5 gm/dL    A/G Ratio 1.2 g/dL    BUN/Creatinine Ratio 11.6 7.0 - 25.0    Anion Gap 9.9 mmol/L     Declines DEXA    She is still seeing Dr Leggett and I sent her there d/t elevated  Ca and alk phos on labs with hx colon cancer and breast cancer.  Dr Romeo still does her colonoscopies  The following portions of the patient's history were reviewed and updated as appropriate: allergies, current medications, past family history, past medical history, past social history, past surgical history and problem list.    Review of Systems   Constitutional: Negative for activity change, appetite change and unexpected weight change.   HENT: Negative for nosebleeds and trouble  swallowing.    Eyes: Negative for pain and visual disturbance.   Respiratory: Negative for chest tightness, shortness of breath and wheezing.    Cardiovascular: Negative for chest pain and palpitations.   Gastrointestinal: Negative for abdominal pain and blood in stool.   Endocrine: Negative.    Genitourinary: Negative for difficulty urinating and hematuria.   Musculoskeletal: Negative for joint swelling.   Skin: Negative for color change and rash.   Allergic/Immunologic: Negative.    Neurological: Negative for syncope and speech difficulty.   Hematological: Negative for adenopathy.   Psychiatric/Behavioral: Negative for agitation and confusion.   All other systems reviewed and are negative.      Objective   Physical Exam   Constitutional: She is oriented to person, place, and time. She appears well-developed and well-nourished. No distress.   HENT:   Head: Normocephalic and atraumatic.   Eyes: Conjunctivae and EOM are normal. Pupils are equal, round, and reactive to light. Right eye exhibits no discharge. Left eye exhibits no discharge. No scleral icterus.   Neck: Normal range of motion. Neck supple. No tracheal deviation present. Thyromegaly present.   Cardiovascular: Normal rate, regular rhythm, normal heart sounds, intact distal pulses and normal pulses. Exam reveals no gallop.   No murmur heard.  Pulmonary/Chest: Effort normal and breath sounds normal. No respiratory distress. She has no wheezes. She has no rales.   Abdominal: Soft.   Musculoskeletal: Normal range of motion.   Ganglion cyst right dorsal wrist 2cm X 8mm   Neurological: She is alert and oriented to person, place, and time. She exhibits normal muscle tone. Coordination normal.   Skin: Skin is warm. No rash noted. No erythema. No pallor.   Psychiatric: She has a normal mood and affect. Her behavior is normal. Judgment and thought content normal.   Nursing note and vitals reviewed.      Assessment/Plan   Faviola was seen today for annual  exam.    Diagnoses and all orders for this visit:    Mixed hyperlipidemia  -     Comprehensive metabolic panel  -     Lipid panel  -     CBC and Differential  -     TSH  -     T3, Free  -     T4, Free  -     Vitamin B12  -     Folate  -     Vitamin D 25 Hydroxy    History of lung cancer  -     Ambulatory Referral to Pulmonology  -     Comprehensive metabolic panel  -     Lipid panel  -     CBC and Differential  -     TSH  -     T3, Free  -     T4, Free  -     Vitamin B12  -     Folate  -     Vitamin D 25 Hydroxy    Encounter for screening mammogram for breast cancer  -     Mammo Screening Bilateral With CAD; Future  -     Comprehensive metabolic panel  -     Lipid panel  -     CBC and Differential  -     TSH  -     T3, Free  -     T4, Free  -     Vitamin B12  -     Folate  -     Vitamin D 25 Hydroxy    Malignant neoplasm of sigmoid colon (CMS/HCC)  -     Comprehensive metabolic panel  -     Lipid panel  -     CBC and Differential  -     TSH  -     T3, Free  -     T4, Free  -     Vitamin B12  -     Folate  -     Vitamin D 25 Hydroxy    LFT elevation  -     Ambulatory Referral to Pulmonology  -     Comprehensive metabolic panel  -     Lipid panel  -     CBC and Differential  -     TSH  -     T3, Free  -     T4, Free  -     Vitamin B12  -     Folate  -     Vitamin D 25 Hydroxy    Wheezing  -     Ambulatory Referral to Pulmonology  -     Comprehensive metabolic panel  -     Lipid panel  -     CBC and Differential  -     TSH  -     T3, Free  -     T4, Free  -     Vitamin B12  -     Folate  -     Vitamin D 25 Hydroxy    Vitamin D deficiency    Thyromegaly  -     Ambulatory Referral to ENT (Otolaryngology)    Other orders  -     albuterol (PROVENTIL) (2.5 MG/3ML) 0.083% nebulizer solution; Take 2.5 mg by nebulization Every 4 (Four) Hours As Needed for Wheezing or Shortness of Air.

## 2019-01-03 LAB
25(OH)D3+25(OH)D2 SERPL-MCNC: 13.2 NG/ML (ref 30–100)
ALBUMIN SERPL-MCNC: 4.4 G/DL (ref 3.5–5.2)
ALBUMIN/GLOB SERPL: 1.2 G/DL
ALP SERPL-CCNC: 126 U/L (ref 39–117)
ALT SERPL-CCNC: 15 U/L (ref 1–33)
AST SERPL-CCNC: 14 U/L (ref 1–32)
BASOPHILS # BLD AUTO: 0.04 10*3/MM3 (ref 0–0.2)
BASOPHILS NFR BLD AUTO: 0.6 % (ref 0–1.5)
BILIRUB SERPL-MCNC: 0.6 MG/DL (ref 0.1–1.2)
BUN SERPL-MCNC: 14 MG/DL (ref 8–23)
BUN/CREAT SERPL: 17.9 (ref 7–25)
CALCIUM SERPL-MCNC: 11 MG/DL (ref 8.6–10.5)
CHLORIDE SERPL-SCNC: 101 MMOL/L (ref 98–107)
CHOLEST SERPL-MCNC: 229 MG/DL (ref 0–200)
CO2 SERPL-SCNC: 29.3 MMOL/L (ref 22–29)
CREAT SERPL-MCNC: 0.78 MG/DL (ref 0.57–1)
EOSINOPHIL # BLD AUTO: 0.1 10*3/MM3 (ref 0–0.7)
EOSINOPHIL NFR BLD AUTO: 1.6 % (ref 0.3–6.2)
ERYTHROCYTE [DISTWIDTH] IN BLOOD BY AUTOMATED COUNT: 14 % (ref 11.7–13)
FOLATE SERPL-MCNC: 13.9 NG/ML (ref 4.78–24.2)
GLOBULIN SER CALC-MCNC: 3.7 GM/DL
GLUCOSE SERPL-MCNC: 88 MG/DL (ref 65–99)
HCT VFR BLD AUTO: 48.4 % (ref 35.6–45.5)
HDLC SERPL-MCNC: 70 MG/DL (ref 40–60)
HGB BLD-MCNC: 15.3 G/DL (ref 11.9–15.5)
IMM GRANULOCYTES # BLD: 0 10*3/MM3 (ref 0–0.03)
IMM GRANULOCYTES NFR BLD: 0 % (ref 0–0.5)
LDLC SERPL CALC-MCNC: 136 MG/DL (ref 0–100)
LYMPHOCYTES # BLD AUTO: 1.59 10*3/MM3 (ref 0.9–4.8)
LYMPHOCYTES NFR BLD AUTO: 25 % (ref 19.6–45.3)
MCH RBC QN AUTO: 29 PG (ref 26.9–32)
MCHC RBC AUTO-ENTMCNC: 31.6 G/DL (ref 32.4–36.3)
MCV RBC AUTO: 91.8 FL (ref 80.5–98.2)
MONOCYTES # BLD AUTO: 0.42 10*3/MM3 (ref 0.2–1.2)
MONOCYTES NFR BLD AUTO: 6.6 % (ref 5–12)
NEUTROPHILS # BLD AUTO: 4.2 10*3/MM3 (ref 1.9–8.1)
NEUTROPHILS NFR BLD AUTO: 66.2 % (ref 42.7–76)
PLATELET # BLD AUTO: 289 10*3/MM3 (ref 140–500)
POTASSIUM SERPL-SCNC: 4.4 MMOL/L (ref 3.5–5.2)
PROT SERPL-MCNC: 8.1 G/DL (ref 6–8.5)
RBC # BLD AUTO: 5.27 10*6/MM3 (ref 3.9–5.2)
SODIUM SERPL-SCNC: 140 MMOL/L (ref 136–145)
T3FREE SERPL-MCNC: 3.1 PG/ML (ref 2–4.4)
T4 FREE SERPL-MCNC: 1.39 NG/DL (ref 0.93–1.7)
TRIGL SERPL-MCNC: 113 MG/DL (ref 0–150)
TSH SERPL DL<=0.005 MIU/L-ACNC: 1.5 MIU/ML (ref 0.27–4.2)
VIT B12 SERPL-MCNC: 243 PG/ML (ref 211–946)
VLDLC SERPL CALC-MCNC: 22.6 MG/DL (ref 5–40)
WBC # BLD AUTO: 6.35 10*3/MM3 (ref 4.5–10.7)

## 2019-01-04 DIAGNOSIS — E83.52 SERUM CALCIUM ELEVATED: Primary | ICD-10-CM

## 2019-01-11 ENCOUNTER — HOSPITAL ENCOUNTER (OUTPATIENT)
Dept: MAMMOGRAPHY | Facility: HOSPITAL | Age: 69
Discharge: HOME | End: 2019-01-11
Attending: OBSTETRICS & GYNECOLOGY | Admitting: OBSTETRICS & GYNECOLOGY

## 2019-01-11 ENCOUNTER — TELEPHONE (OUTPATIENT)
Dept: OBSTETRICS AND GYNECOLOGY | Age: 69
End: 2019-01-11

## 2019-01-11 DIAGNOSIS — Z12.31 VISIT FOR SCREENING MAMMOGRAM: ICD-10-CM

## 2019-01-11 PROCEDURE — 77067 SCR MAMMO BI INCL CAD: CPT

## 2019-01-11 PROCEDURE — 77063 BREAST TOMOSYNTHESIS BI: CPT

## 2019-01-11 NOTE — TELEPHONE ENCOUNTER
----- Message from Salvador Mensah MD sent at 1/11/2019 10:33 AM EST -----  Notify patient:  Mammogram is benign

## 2019-01-19 LAB
CA-I SERPL ISE-MCNC: 6.1 MG/DL (ref 4.5–5.6)
PHOSPHATE SERPL-MCNC: 3.1 MG/DL (ref 2.5–4.5)
PTH-INTACT SERPL-MCNC: 63 PG/ML (ref 15–65)

## 2019-05-06 ENCOUNTER — HOSPITAL ENCOUNTER (INPATIENT)
Facility: HOSPITAL | Age: 69
LOS: 2 days | Discharge: HOME | End: 2019-05-08
Attending: OTOLARYNGOLOGY | Admitting: OTOLARYNGOLOGY

## 2019-05-06 ENCOUNTER — ANESTHESIA EVENT (OUTPATIENT)
Dept: PERIOP | Facility: HOSPITAL | Age: 69
End: 2019-05-06

## 2019-05-06 ENCOUNTER — ANESTHESIA (OUTPATIENT)
Dept: PERIOP | Facility: HOSPITAL | Age: 69
End: 2019-05-06

## 2019-05-06 DIAGNOSIS — E04.9 ENLARGED THYROID: ICD-10-CM

## 2019-05-06 DIAGNOSIS — E21.3 HYPERPARATHYROIDISM (HCC): ICD-10-CM

## 2019-05-06 PROBLEM — E21.0 PRIMARY HYPERPARATHYROIDISM (HCC): Chronic | Status: ACTIVE | Noted: 2019-05-06

## 2019-05-06 LAB
DEPRECATED RDW RBC AUTO: 43.2 FL (ref 37–54)
ERYTHROCYTE [DISTWIDTH] IN BLOOD BY AUTOMATED COUNT: 13.2 % (ref 12.3–15.4)
HCT VFR BLD AUTO: 43.9 % (ref 34–46.6)
HGB BLD-MCNC: 13.8 G/DL (ref 12–15.9)
MCH RBC QN AUTO: 27.9 PG (ref 26.6–33)
MCHC RBC AUTO-ENTMCNC: 31.4 G/DL (ref 31.5–35.7)
MCV RBC AUTO: 88.9 FL (ref 79–97)
PLATELET # BLD AUTO: 257 10*3/MM3 (ref 140–450)
PMV BLD AUTO: 10.3 FL (ref 6–12)
PTH-INTACT SERPL-SCNC: 114.8 PG/ML (ref 15–65)
PTH-INTACT SERPL-SCNC: 19.7 PG/ML (ref 15–65)
RBC # BLD AUTO: 4.94 10*6/MM3 (ref 3.77–5.28)
WBC NRBC COR # BLD: 5.85 10*3/MM3 (ref 3.4–10.8)

## 2019-05-06 PROCEDURE — 25010000002 DEXAMETHASONE PER 1 MG: Performed by: NURSE ANESTHETIST, CERTIFIED REGISTERED

## 2019-05-06 PROCEDURE — 0GTP0ZZ RESECTION OF LEFT INFERIOR PARATHYROID GLAND, OPEN APPROACH: ICD-10-PCS | Performed by: OTOLARYNGOLOGY

## 2019-05-06 PROCEDURE — 25010000002 ONDANSETRON PER 1 MG: Performed by: ANESTHESIOLOGY

## 2019-05-06 PROCEDURE — G0378 HOSPITAL OBSERVATION PER HR: HCPCS

## 2019-05-06 PROCEDURE — 25010000002 PROPOFOL 10 MG/ML EMULSION: Performed by: NURSE ANESTHETIST, CERTIFIED REGISTERED

## 2019-05-06 PROCEDURE — 0GTH0ZZ RESECTION OF RIGHT THYROID GLAND LOBE, OPEN APPROACH: ICD-10-PCS | Performed by: OTOLARYNGOLOGY

## 2019-05-06 PROCEDURE — 93005 ELECTROCARDIOGRAM TRACING: CPT | Performed by: OTOLARYNGOLOGY

## 2019-05-06 PROCEDURE — 31500 INSERT EMERGENCY AIRWAY: CPT

## 2019-05-06 PROCEDURE — 25010000002 ONDANSETRON PER 1 MG: Performed by: NURSE ANESTHETIST, CERTIFIED REGISTERED

## 2019-05-06 PROCEDURE — 0BH17EZ INSERTION OF ENDOTRACHEAL AIRWAY INTO TRACHEA, VIA NATURAL OR ARTIFICIAL OPENING: ICD-10-PCS | Performed by: ANESTHESIOLOGY

## 2019-05-06 PROCEDURE — 25010000002 SUCCINYLCHOLINE PER 20 MG: Performed by: NURSE ANESTHETIST, CERTIFIED REGISTERED

## 2019-05-06 PROCEDURE — 25010000002 DEXAMETHASONE PER 1 MG: Performed by: ANESTHESIOLOGY

## 2019-05-06 PROCEDURE — 94002 VENT MGMT INPAT INIT DAY: CPT

## 2019-05-06 PROCEDURE — 25010000002 PROPOFOL 10 MG/ML EMULSION: Performed by: ANESTHESIOLOGY

## 2019-05-06 PROCEDURE — 25010000002 PHENYLEPHRINE PER 1 ML: Performed by: ANESTHESIOLOGY

## 2019-05-06 PROCEDURE — 0W360ZZ CONTROL BLEEDING IN NECK, OPEN APPROACH: ICD-10-PCS | Performed by: OTOLARYNGOLOGY

## 2019-05-06 PROCEDURE — 94799 UNLISTED PULMONARY SVC/PX: CPT

## 2019-05-06 PROCEDURE — 85027 COMPLETE CBC AUTOMATED: CPT | Performed by: OTOLARYNGOLOGY

## 2019-05-06 PROCEDURE — 0GTG0ZZ RESECTION OF LEFT THYROID GLAND LOBE, OPEN APPROACH: ICD-10-PCS | Performed by: OTOLARYNGOLOGY

## 2019-05-06 PROCEDURE — 25010000002 PROPOFOL 1000 MG/ML EMULSION: Performed by: ANESTHESIOLOGY

## 2019-05-06 PROCEDURE — 25010000002 MIDAZOLAM PER 1 MG: Performed by: ANESTHESIOLOGY

## 2019-05-06 PROCEDURE — 25010000002 HYDROCORTISONE SODIUM SUCCINATE 100 MG RECONSTITUTED SOLUTION: Performed by: INTERNAL MEDICINE

## 2019-05-06 PROCEDURE — 25010000002 HYDROMORPHONE PER 4 MG: Performed by: NURSE ANESTHETIST, CERTIFIED REGISTERED

## 2019-05-06 PROCEDURE — 88307 TISSUE EXAM BY PATHOLOGIST: CPT | Performed by: OTOLARYNGOLOGY

## 2019-05-06 PROCEDURE — 88331 PATH CONSLTJ SURG 1 BLK 1SPC: CPT | Performed by: OTOLARYNGOLOGY

## 2019-05-06 PROCEDURE — 25010000002 PROMETHAZINE PER 50 MG: Performed by: NURSE ANESTHETIST, CERTIFIED REGISTERED

## 2019-05-06 PROCEDURE — 83970 ASSAY OF PARATHORMONE: CPT | Performed by: OTOLARYNGOLOGY

## 2019-05-06 PROCEDURE — 5A1935Z RESPIRATORY VENTILATION, LESS THAN 24 CONSECUTIVE HOURS: ICD-10-PCS | Performed by: ANESTHESIOLOGY

## 2019-05-06 PROCEDURE — 88305 TISSUE EXAM BY PATHOLOGIST: CPT | Performed by: OTOLARYNGOLOGY

## 2019-05-06 PROCEDURE — 93010 ELECTROCARDIOGRAM REPORT: CPT | Performed by: INTERNAL MEDICINE

## 2019-05-06 PROCEDURE — 25010000002 FENTANYL CITRATE (PF) 100 MCG/2ML SOLUTION: Performed by: NURSE ANESTHETIST, CERTIFIED REGISTERED

## 2019-05-06 RX ORDER — PROPOFOL 10 MG/ML
VIAL (ML) INTRAVENOUS AS NEEDED
Status: DISCONTINUED | OUTPATIENT
Start: 2019-05-06 | End: 2019-05-06 | Stop reason: SURG

## 2019-05-06 RX ORDER — ACETAMINOPHEN 325 MG/1
650 TABLET ORAL ONCE AS NEEDED
Status: DISCONTINUED | OUTPATIENT
Start: 2019-05-06 | End: 2019-05-06 | Stop reason: HOSPADM

## 2019-05-06 RX ORDER — HYDROMORPHONE HYDROCHLORIDE 1 MG/ML
0.5 INJECTION, SOLUTION INTRAMUSCULAR; INTRAVENOUS; SUBCUTANEOUS
Status: DISCONTINUED | OUTPATIENT
Start: 2019-05-06 | End: 2019-05-06 | Stop reason: HOSPADM

## 2019-05-06 RX ORDER — FLUMAZENIL 0.1 MG/ML
0.2 INJECTION INTRAVENOUS AS NEEDED
Status: DISCONTINUED | OUTPATIENT
Start: 2019-05-06 | End: 2019-05-06 | Stop reason: HOSPADM

## 2019-05-06 RX ORDER — FENTANYL CITRATE 50 UG/ML
50 INJECTION, SOLUTION INTRAMUSCULAR; INTRAVENOUS
Status: DISCONTINUED | OUTPATIENT
Start: 2019-05-06 | End: 2019-05-06 | Stop reason: HOSPADM

## 2019-05-06 RX ORDER — PROMETHAZINE HYDROCHLORIDE 25 MG/ML
6.25 INJECTION, SOLUTION INTRAMUSCULAR; INTRAVENOUS ONCE AS NEEDED
Status: COMPLETED | OUTPATIENT
Start: 2019-05-06 | End: 2019-05-06

## 2019-05-06 RX ORDER — SODIUM CHLORIDE 0.9 % (FLUSH) 0.9 %
3-10 SYRINGE (ML) INJECTION AS NEEDED
Status: DISCONTINUED | OUTPATIENT
Start: 2019-05-06 | End: 2019-05-08 | Stop reason: HOSPADM

## 2019-05-06 RX ORDER — PROMETHAZINE HYDROCHLORIDE 25 MG/1
25 SUPPOSITORY RECTAL ONCE AS NEEDED
Status: DISCONTINUED | OUTPATIENT
Start: 2019-05-06 | End: 2019-05-06 | Stop reason: HOSPADM

## 2019-05-06 RX ORDER — ROCURONIUM BROMIDE 10 MG/ML
INJECTION, SOLUTION INTRAVENOUS AS NEEDED
Status: DISCONTINUED | OUTPATIENT
Start: 2019-05-06 | End: 2019-05-06 | Stop reason: SURG

## 2019-05-06 RX ORDER — PROMETHAZINE HYDROCHLORIDE 25 MG/ML
12.5 INJECTION, SOLUTION INTRAMUSCULAR; INTRAVENOUS ONCE AS NEEDED
Status: DISCONTINUED | OUTPATIENT
Start: 2019-05-06 | End: 2019-05-06 | Stop reason: HOSPADM

## 2019-05-06 RX ORDER — HYDROCODONE BITARTRATE AND ACETAMINOPHEN 7.5; 325 MG/1; MG/1
1 TABLET ORAL ONCE AS NEEDED
Status: DISCONTINUED | OUTPATIENT
Start: 2019-05-06 | End: 2019-05-06 | Stop reason: HOSPADM

## 2019-05-06 RX ORDER — ONDANSETRON 2 MG/ML
INJECTION INTRAMUSCULAR; INTRAVENOUS AS NEEDED
Status: DISCONTINUED | OUTPATIENT
Start: 2019-05-06 | End: 2019-05-06 | Stop reason: SURG

## 2019-05-06 RX ORDER — MIDAZOLAM HYDROCHLORIDE 1 MG/ML
1 INJECTION INTRAMUSCULAR; INTRAVENOUS
Status: DISCONTINUED | OUTPATIENT
Start: 2019-05-06 | End: 2019-05-06 | Stop reason: HOSPADM

## 2019-05-06 RX ORDER — SODIUM CHLORIDE 0.9 % (FLUSH) 0.9 %
3 SYRINGE (ML) INJECTION EVERY 12 HOURS SCHEDULED
Status: DISCONTINUED | OUTPATIENT
Start: 2019-05-06 | End: 2019-05-08 | Stop reason: HOSPADM

## 2019-05-06 RX ORDER — SODIUM CHLORIDE 9 MG/ML
INJECTION, SOLUTION INTRAVENOUS CONTINUOUS PRN
Status: DISCONTINUED | OUTPATIENT
Start: 2019-05-06 | End: 2019-05-06 | Stop reason: SURG

## 2019-05-06 RX ORDER — MAGNESIUM HYDROXIDE 1200 MG/15ML
LIQUID ORAL AS NEEDED
Status: DISCONTINUED | OUTPATIENT
Start: 2019-05-06 | End: 2019-05-06 | Stop reason: HOSPADM

## 2019-05-06 RX ORDER — HYDRALAZINE HYDROCHLORIDE 20 MG/ML
5 INJECTION INTRAMUSCULAR; INTRAVENOUS
Status: DISCONTINUED | OUTPATIENT
Start: 2019-05-06 | End: 2019-05-06 | Stop reason: HOSPADM

## 2019-05-06 RX ORDER — HYDROMORPHONE HYDROCHLORIDE 1 MG/ML
0.5 INJECTION, SOLUTION INTRAMUSCULAR; INTRAVENOUS; SUBCUTANEOUS EVERY 4 HOURS PRN
Status: ACTIVE | OUTPATIENT
Start: 2019-05-06 | End: 2019-05-07

## 2019-05-06 RX ORDER — PROMETHAZINE HYDROCHLORIDE 25 MG/1
25 TABLET ORAL ONCE AS NEEDED
Status: COMPLETED | OUTPATIENT
Start: 2019-05-06 | End: 2019-05-06

## 2019-05-06 RX ORDER — FENTANYL CITRATE 50 UG/ML
75 INJECTION, SOLUTION INTRAMUSCULAR; INTRAVENOUS
Status: DISCONTINUED | OUTPATIENT
Start: 2019-05-06 | End: 2019-05-07

## 2019-05-06 RX ORDER — NALOXONE HCL 0.4 MG/ML
0.2 VIAL (ML) INJECTION AS NEEDED
Status: DISCONTINUED | OUTPATIENT
Start: 2019-05-06 | End: 2019-05-06 | Stop reason: HOSPADM

## 2019-05-06 RX ORDER — MIDAZOLAM HYDROCHLORIDE 1 MG/ML
2 INJECTION INTRAMUSCULAR; INTRAVENOUS
Status: DISCONTINUED | OUTPATIENT
Start: 2019-05-06 | End: 2019-05-06 | Stop reason: HOSPADM

## 2019-05-06 RX ORDER — PROMETHAZINE HYDROCHLORIDE 25 MG/1
25 TABLET ORAL ONCE AS NEEDED
Status: DISCONTINUED | OUTPATIENT
Start: 2019-05-06 | End: 2019-05-06 | Stop reason: HOSPADM

## 2019-05-06 RX ORDER — SUCCINYLCHOLINE CHLORIDE 20 MG/ML
INJECTION INTRAMUSCULAR; INTRAVENOUS AS NEEDED
Status: DISCONTINUED | OUTPATIENT
Start: 2019-05-06 | End: 2019-05-06 | Stop reason: SURG

## 2019-05-06 RX ORDER — LABETALOL HYDROCHLORIDE 5 MG/ML
5 INJECTION, SOLUTION INTRAVENOUS
Status: DISCONTINUED | OUTPATIENT
Start: 2019-05-06 | End: 2019-05-06 | Stop reason: HOSPADM

## 2019-05-06 RX ORDER — FENTANYL CITRATE 50 UG/ML
INJECTION, SOLUTION INTRAMUSCULAR; INTRAVENOUS AS NEEDED
Status: DISCONTINUED | OUTPATIENT
Start: 2019-05-06 | End: 2019-05-06 | Stop reason: SURG

## 2019-05-06 RX ORDER — EPHEDRINE SULFATE 50 MG/ML
5 INJECTION, SOLUTION INTRAVENOUS ONCE AS NEEDED
Status: DISCONTINUED | OUTPATIENT
Start: 2019-05-06 | End: 2019-05-06 | Stop reason: HOSPADM

## 2019-05-06 RX ORDER — FAMOTIDINE 10 MG/ML
20 INJECTION, SOLUTION INTRAVENOUS ONCE
Status: COMPLETED | OUTPATIENT
Start: 2019-05-06 | End: 2019-05-06

## 2019-05-06 RX ORDER — ALBUTEROL SULFATE 90 UG/1
2 AEROSOL, METERED RESPIRATORY (INHALATION) EVERY 4 HOURS PRN
Status: DISCONTINUED | OUTPATIENT
Start: 2019-05-06 | End: 2019-05-08 | Stop reason: HOSPADM

## 2019-05-06 RX ORDER — ALBUTEROL SULFATE 2.5 MG/3ML
2.5 SOLUTION RESPIRATORY (INHALATION) ONCE AS NEEDED
Status: DISCONTINUED | OUTPATIENT
Start: 2019-05-06 | End: 2019-05-06 | Stop reason: HOSPADM

## 2019-05-06 RX ORDER — PROPOFOL 10 MG/ML
VIAL (ML) INTRAVENOUS
Status: COMPLETED
Start: 2019-05-06 | End: 2019-05-06

## 2019-05-06 RX ORDER — LIDOCAINE HYDROCHLORIDE 20 MG/ML
INJECTION, SOLUTION INFILTRATION; PERINEURAL AS NEEDED
Status: DISCONTINUED | OUTPATIENT
Start: 2019-05-06 | End: 2019-05-06 | Stop reason: SURG

## 2019-05-06 RX ORDER — OXYCODONE AND ACETAMINOPHEN 7.5; 325 MG/1; MG/1
1 TABLET ORAL ONCE AS NEEDED
Status: DISCONTINUED | OUTPATIENT
Start: 2019-05-06 | End: 2019-05-06 | Stop reason: HOSPADM

## 2019-05-06 RX ORDER — LIDOCAINE HYDROCHLORIDE AND EPINEPHRINE 10; 10 MG/ML; UG/ML
INJECTION, SOLUTION INFILTRATION; PERINEURAL AS NEEDED
Status: DISCONTINUED | OUTPATIENT
Start: 2019-05-06 | End: 2019-05-06 | Stop reason: HOSPADM

## 2019-05-06 RX ORDER — DIPHENHYDRAMINE HYDROCHLORIDE 50 MG/ML
12.5 INJECTION INTRAMUSCULAR; INTRAVENOUS
Status: DISCONTINUED | OUTPATIENT
Start: 2019-05-06 | End: 2019-05-06 | Stop reason: HOSPADM

## 2019-05-06 RX ORDER — ONDANSETRON 2 MG/ML
4 INJECTION INTRAMUSCULAR; INTRAVENOUS ONCE AS NEEDED
Status: DISCONTINUED | OUTPATIENT
Start: 2019-05-06 | End: 2019-05-06 | Stop reason: HOSPADM

## 2019-05-06 RX ORDER — CLINDAMYCIN PHOSPHATE 900 MG/50ML
INJECTION INTRAVENOUS AS NEEDED
Status: DISCONTINUED | OUTPATIENT
Start: 2019-05-06 | End: 2019-05-06 | Stop reason: SURG

## 2019-05-06 RX ORDER — LIDOCAINE HYDROCHLORIDE 10 MG/ML
0.5 INJECTION, SOLUTION EPIDURAL; INFILTRATION; INTRACAUDAL; PERINEURAL ONCE AS NEEDED
Status: DISCONTINUED | OUTPATIENT
Start: 2019-05-06 | End: 2019-05-06 | Stop reason: HOSPADM

## 2019-05-06 RX ORDER — ALBUTEROL SULFATE 2.5 MG/3ML
2.5 SOLUTION RESPIRATORY (INHALATION) EVERY 4 HOURS PRN
Status: DISCONTINUED | OUTPATIENT
Start: 2019-05-06 | End: 2019-05-08 | Stop reason: HOSPADM

## 2019-05-06 RX ORDER — PROMETHAZINE HYDROCHLORIDE 25 MG/1
25 SUPPOSITORY RECTAL ONCE AS NEEDED
Status: COMPLETED | OUTPATIENT
Start: 2019-05-06 | End: 2019-05-06

## 2019-05-06 RX ORDER — EPHEDRINE SULFATE 50 MG/ML
INJECTION, SOLUTION INTRAVENOUS AS NEEDED
Status: DISCONTINUED | OUTPATIENT
Start: 2019-05-06 | End: 2019-05-06 | Stop reason: SURG

## 2019-05-06 RX ORDER — ACETAMINOPHEN 500 MG
500 TABLET ORAL EVERY 6 HOURS PRN
Status: DISCONTINUED | OUTPATIENT
Start: 2019-05-06 | End: 2019-05-08 | Stop reason: HOSPADM

## 2019-05-06 RX ORDER — FENTANYL CITRATE 50 UG/ML
INJECTION, SOLUTION INTRAMUSCULAR; INTRAVENOUS
Status: DISPENSED
Start: 2019-05-06 | End: 2019-05-07

## 2019-05-06 RX ORDER — DIPHENHYDRAMINE HCL 25 MG
25 CAPSULE ORAL
Status: DISCONTINUED | OUTPATIENT
Start: 2019-05-06 | End: 2019-05-06 | Stop reason: HOSPADM

## 2019-05-06 RX ORDER — ACETAMINOPHEN 650 MG/1
650 SUPPOSITORY RECTAL ONCE AS NEEDED
Status: DISCONTINUED | OUTPATIENT
Start: 2019-05-06 | End: 2019-05-06 | Stop reason: HOSPADM

## 2019-05-06 RX ORDER — DEXAMETHASONE SODIUM PHOSPHATE 10 MG/ML
INJECTION INTRAMUSCULAR; INTRAVENOUS AS NEEDED
Status: DISCONTINUED | OUTPATIENT
Start: 2019-05-06 | End: 2019-05-06 | Stop reason: SURG

## 2019-05-06 RX ORDER — SODIUM CHLORIDE 0.9 % (FLUSH) 0.9 %
1-10 SYRINGE (ML) INJECTION AS NEEDED
Status: DISCONTINUED | OUTPATIENT
Start: 2019-05-06 | End: 2019-05-06 | Stop reason: HOSPADM

## 2019-05-06 RX ORDER — ONDANSETRON 2 MG/ML
4 INJECTION INTRAMUSCULAR; INTRAVENOUS ONCE AS NEEDED
Status: COMPLETED | OUTPATIENT
Start: 2019-05-06 | End: 2019-05-06

## 2019-05-06 RX ORDER — SODIUM CHLORIDE, SODIUM LACTATE, POTASSIUM CHLORIDE, CALCIUM CHLORIDE 600; 310; 30; 20 MG/100ML; MG/100ML; MG/100ML; MG/100ML
9 INJECTION, SOLUTION INTRAVENOUS CONTINUOUS
Status: DISCONTINUED | OUTPATIENT
Start: 2019-05-06 | End: 2019-05-06

## 2019-05-06 RX ORDER — WOUND DRESSING ADHESIVE - LIQUID
LIQUID MISCELLANEOUS AS NEEDED
Status: DISCONTINUED | OUTPATIENT
Start: 2019-05-06 | End: 2019-05-06 | Stop reason: HOSPADM

## 2019-05-06 RX ORDER — METHYLPREDNISOLONE SODIUM SUCCINATE 125 MG/2ML
INJECTION, POWDER, LYOPHILIZED, FOR SOLUTION INTRAMUSCULAR; INTRAVENOUS
Status: DISPENSED
Start: 2019-05-06 | End: 2019-05-07

## 2019-05-06 RX ADMIN — ONDANSETRON 4 MG: 2 INJECTION INTRAMUSCULAR; INTRAVENOUS at 20:52

## 2019-05-06 RX ADMIN — SODIUM CHLORIDE, POTASSIUM CHLORIDE, SODIUM LACTATE AND CALCIUM CHLORIDE 9 ML/HR: 600; 310; 30; 20 INJECTION, SOLUTION INTRAVENOUS at 09:13

## 2019-05-06 RX ADMIN — FENTANYL CITRATE 25 MCG: 50 INJECTION INTRAMUSCULAR; INTRAVENOUS at 09:52

## 2019-05-06 RX ADMIN — PROPOFOL 40 MG: 10 INJECTION, EMULSION INTRAVENOUS at 10:04

## 2019-05-06 RX ADMIN — PROPOFOL 20 MCG/KG/MIN: 10 INJECTION, EMULSION INTRAVENOUS at 20:10

## 2019-05-06 RX ADMIN — MIDAZOLAM 1 MG: 1 INJECTION INTRAMUSCULAR; INTRAVENOUS at 09:13

## 2019-05-06 RX ADMIN — PROMETHAZINE HYDROCHLORIDE 6.25 MG: 25 INJECTION INTRAMUSCULAR; INTRAVENOUS at 15:49

## 2019-05-06 RX ADMIN — PHENYLEPHRINE HYDROCHLORIDE 100 MCG: 10 INJECTION INTRAVENOUS at 20:40

## 2019-05-06 RX ADMIN — PHENYLEPHRINE HYDROCHLORIDE 100 MCG: 10 INJECTION INTRAVENOUS at 20:36

## 2019-05-06 RX ADMIN — HYDROMORPHONE HYDROCHLORIDE 0.5 MG: 1 INJECTION, SOLUTION INTRAMUSCULAR; INTRAVENOUS; SUBCUTANEOUS at 12:56

## 2019-05-06 RX ADMIN — EPHEDRINE SULFATE 5 MG: 50 INJECTION INTRAMUSCULAR; INTRAVENOUS; SUBCUTANEOUS at 09:56

## 2019-05-06 RX ADMIN — PHENYLEPHRINE HYDROCHLORIDE 100 MCG: 10 INJECTION INTRAVENOUS at 20:35

## 2019-05-06 RX ADMIN — PROPOFOL 100 MG: 10 INJECTION, EMULSION INTRAVENOUS at 20:30

## 2019-05-06 RX ADMIN — CLINDAMYCIN PHOSPHATE 900 MG: 900 INJECTION INTRAVENOUS at 20:38

## 2019-05-06 RX ADMIN — SODIUM CHLORIDE, POTASSIUM CHLORIDE, SODIUM LACTATE AND CALCIUM CHLORIDE: 600; 310; 30; 20 INJECTION, SOLUTION INTRAVENOUS at 11:50

## 2019-05-06 RX ADMIN — DEXAMETHASONE SODIUM PHOSPHATE 8 MG: 10 INJECTION INTRAMUSCULAR; INTRAVENOUS at 09:42

## 2019-05-06 RX ADMIN — PROPOFOL 160 MG: 10 INJECTION, EMULSION INTRAVENOUS at 09:32

## 2019-05-06 RX ADMIN — FENTANYL CITRATE 25 MCG: 50 INJECTION INTRAMUSCULAR; INTRAVENOUS at 10:55

## 2019-05-06 RX ADMIN — ROCURONIUM BROMIDE 5 MG: 10 INJECTION, SOLUTION INTRAVENOUS at 09:32

## 2019-05-06 RX ADMIN — HYDROCORTISONE SODIUM SUCCINATE 100 MG: 100 INJECTION, POWDER, FOR SOLUTION INTRAMUSCULAR; INTRAVENOUS at 19:57

## 2019-05-06 RX ADMIN — EPHEDRINE SULFATE 10 MG: 50 INJECTION INTRAMUSCULAR; INTRAVENOUS; SUBCUTANEOUS at 09:48

## 2019-05-06 RX ADMIN — SUCCINYLCHOLINE CHLORIDE 140 MG: 20 INJECTION, SOLUTION INTRAMUSCULAR; INTRAVENOUS at 09:32

## 2019-05-06 RX ADMIN — DEXAMETHASONE SODIUM PHOSPHATE 8 MG: 10 INJECTION INTRAMUSCULAR; INTRAVENOUS at 20:45

## 2019-05-06 RX ADMIN — FENTANYL CITRATE 50 MCG: 50 INJECTION INTRAMUSCULAR; INTRAVENOUS at 09:30

## 2019-05-06 RX ADMIN — ONDANSETRON 4 MG: 2 INJECTION INTRAMUSCULAR; INTRAVENOUS at 11:35

## 2019-05-06 RX ADMIN — ONDANSETRON 4 MG: 2 INJECTION INTRAMUSCULAR; INTRAVENOUS at 15:48

## 2019-05-06 RX ADMIN — FAMOTIDINE 20 MG: 10 INJECTION INTRAVENOUS at 09:13

## 2019-05-06 RX ADMIN — FENTANYL CITRATE 25 MCG: 50 INJECTION INTRAMUSCULAR; INTRAVENOUS at 12:05

## 2019-05-06 RX ADMIN — LIDOCAINE HYDROCHLORIDE 100 MG: 20 INJECTION, SOLUTION INFILTRATION; PERINEURAL at 09:32

## 2019-05-06 RX ADMIN — FENTANYL CITRATE 25 MCG: 50 INJECTION INTRAMUSCULAR; INTRAVENOUS at 10:12

## 2019-05-06 RX ADMIN — PHENYLEPHRINE HYDROCHLORIDE 100 MCG: 10 INJECTION INTRAVENOUS at 20:42

## 2019-05-06 RX ADMIN — SODIUM CHLORIDE: 9 INJECTION, SOLUTION INTRAVENOUS at 20:30

## 2019-05-06 RX ADMIN — SODIUM CHLORIDE, PRESERVATIVE FREE 3 ML: 5 INJECTION INTRAVENOUS at 22:30

## 2019-05-06 RX ADMIN — HYDROMORPHONE HYDROCHLORIDE 0.5 MG: 1 INJECTION, SOLUTION INTRAMUSCULAR; INTRAVENOUS; SUBCUTANEOUS at 12:26

## 2019-05-06 RX ADMIN — HYDROMORPHONE HYDROCHLORIDE 0.5 MG: 1 INJECTION, SOLUTION INTRAMUSCULAR; INTRAVENOUS; SUBCUTANEOUS at 14:19

## 2019-05-06 RX ADMIN — PHENYLEPHRINE HYDROCHLORIDE 100 MCG: 10 INJECTION INTRAVENOUS at 20:37

## 2019-05-06 NOTE — ANESTHESIA POSTPROCEDURE EVALUATION
"Patient: Faviola Issa    Procedure Summary     Date:  05/06/19 Room / Location:   [facility]  /  [facility]    Anesthesia Start:  0927 Anesthesia Stop:  1207    Procedure:  Left PARATHYROIDECTOMY AND TOTAL THYROIDECTOMY (Bilateral Neck) Diagnosis:      Surgeon:  Maxi Daly MD Provider:  William Mckinnon MD    Anesthesia Type:  general ASA Status:  2          Anesthesia Type: general  Last vitals  BP   153/74 (05/06/19 1330)   Temp   36.5 °C (97.7 °F) (05/06/19 1202)   Pulse   89 (05/06/19 1330)   Resp   16 (05/06/19 1330)     SpO2   97 % (05/06/19 1330)     Post Anesthesia Care and Evaluation    Patient location during evaluation: bedside  Patient participation: complete - patient participated  Level of consciousness: awake  Pain management: adequate  Airway patency: patent  Anesthetic complications: No anesthetic complications    Cardiovascular status: acceptable  Respiratory status: acceptable  Hydration status: acceptable    Comments: /74   Pulse 89   Temp 36.5 °C (97.7 °F) (Oral)   Resp 16   Ht 170.1 cm (66.97\")   Wt 81.6 kg (180 lb)   LMP  (LMP Unknown)   SpO2 97%   BMI 28.22 kg/m²         "

## 2019-05-06 NOTE — ANESTHESIA PREPROCEDURE EVALUATION
Anesthesia Evaluation     history of anesthetic complications: PONV  NPO Solid Status: > 8 hours             Airway   Mallampati: II  Dental      Pulmonary - normal exam   (+) lung cancer, asthma,   Cardiovascular - normal exam    (-) angina      Neuro/Psych  GI/Hepatic/Renal/Endo      Musculoskeletal     Abdominal    Substance History      OB/GYN          Other                        Anesthesia Plan    ASA 2     general     Anesthetic plan, all risks, benefits, and alternatives have been provided, discussed and informed consent has been obtained with: patient.

## 2019-05-06 NOTE — OP NOTE
Total Thyroidectomy Operative Note    Surgeon: Maxi Daly PhD, MD    Assistant: Mitul Kovacs PA-C    Pre-operative Diagnosis:  Thyroid nodule    Post-operative Diagnosis:  Thyroid nodule    Indications: This patient presents with evidence of a thyroid enlargement.     Procedure Details   The risks, benefits, complications, treatment options, and expected outcomes were discussed with the patient. The possibilities of reaction to medication, pulmonary aspiration, bleeding, recurrent infection, possibility of normal findings, recurrent laryngeal nerve damage, the need for additional procedures, failure to diagnose a condition, and creating a complication requiring transfusion or operation were discussed with the patient. The patient concurred with the proposed plan, giving informed consent.  The site of surgery properly noted/marked.     The patient was taken to Operating Room [facility] OR 33, identified as Faviola Issa and staff verified the following Procedure(s) (LRB):  Left PARATHYROIDECTOMY AND TOTAL THYROIDECTOMY (Bilateral).  A Time Out was held and the above information confirmed.    The patient was placed supine.  After induction of a general anesthetic, the neck was placed in extension and a roll was placed between the scapulae and the neck was prepped and draped in standard fashion.  A transverse cervical incision was created within a natural skin fold.  Dissection was carried through the platysma and subplatysmal flaps were elevated. An Amado retractor was placed. The strap muscles were divided at the midline.  Retractors were placed to expose the left side of the neck.  Using meticulous dissection the thyroid lobe was mobilized in a medial direction, exposing the tracheoesophageal groove. The middle thyroid vein was ligated with the harmonic scalpel.  Further dissection posterior revealed a abnormal parathyroid gland along the identified nerve - this was removed and frozen section confirmed  adenoma. Preop  dropped to 25 at 15 minutes. The superior pedicle was mobilized and vessels were ligated with the harmonic scalpel. Parathyroid was maintained superiorly.  The inferior pole was freed with appropriate ligation of feeding vessels. Transection of Berrrys ligament was performed using bipolar cautery. The isthmus was then freed using harmonic scalpel.     Retractors were placed to expose the right side of the neck.  Using meticulous dissection the right  thyroid lobe was mobilized in a medial direction, exposing the tracheoesophageal groove. The middle thyroid vein was ligated with the harmonic scalpel.  Further dissection posterior revealed a normal parathyroid gland along the identified nerve. The superior pedicle was mobilized and vessels were ligated with the harmonic scalpel. Parathyroid was maintained superiorly.  The inferior pole was freed with appropriate ligation of feeding vessels. Transection of Berrrys ligament was performed using bipolar cautery. The isthmus was then freed using harmonic scalpel to complete the dissection and free the gland. Careful preservation of the recurrent laryngeal nerve and both parthyroids was undertaken on the right. The specimen was marked prior to sending for permanent pathologic analysis. Adequate hemostasis was assured.       Closure was performed by reapproximating the strap muscles in the midline with an interrupted 4-0 Vicryl suture.  The platysma was reapproximated with 4-0 Vicryl suture and the skin incision was closed with a 5-0 prolene runing subcuticular closure.  Steri-Strips were applied.    Instrument, sponge, and needle counts were correct prior to closure and at the conclusion of the case.     Findings:  thryoid gland enlargement    Estimated Blood Loss: 25 ml         Drains: none                  Specimens:   Specimens     ID Source Type Tests Collected By Collected At Frozen?      1 Blood, Venous Line Blood · PTH INTRAOPERATIVE   Forwith,  Maxi FITCH MD 5/6/19 1006      Description: 15 minute    Comment: Emilee Sethi CRNA ilan PTH    A Thyroid Tissue · TISSUE PATHOLOGY EXAM   Maxi Daly MD 5/6/19 1002      Description: Total thyroid     B Parathyroid Gland Tissue · TISSUE PATHOLOGY EXAM   Maxi Daly MD 5/6/19 1003 Yes     Description: Left inferior parathyroid    Comment: Ext [id number]                   Complications: none          Disposition: PACU         Condition: stable    Attending Attestation: I was present and scrubbed for the entire operation.

## 2019-05-06 NOTE — ANESTHESIA PROCEDURE NOTES
Airway  Urgency: elective    Airway not difficult    General Information and Staff    Patient location during procedure: OR  Anesthesiologist: Wisam Patton MD  CRNA: Emilee Varghese CRNA    Indications and Patient Condition  Indications for airway management: airway protection    Preoxygenated: yes (pt pre-O2 with 100% O2)  Mask difficulty assessment: 2 - vent by mask + OA or adjuvant +/- NMBA (easy BMV )    Final Airway Details  Final airway type: endotracheal airway      Successful airway: ETT and NIM tube  Cuffed: yes   Successful intubation technique: direct laryngoscopy  Facilitating devices/methods: intubating stylet  Endotracheal tube insertion site: oral  Blade: CMAC  Blade size: D  ETT size (mm): 7.0  Cormack-Lehane Classification: grade I - full view of glottis  Placement verified by: chest auscultation and capnometry   Cuff volume (mL): 7  Measured from: lips  ETT to lips (cm): 22  Number of attempts at approach: 1    Additional Comments  Appears ATOETx1. No change in dentition.

## 2019-05-06 NOTE — H&P
"HISTORY AND PHYSICAL UPDATE     Patient name: Faviola Issa  Age: 68 y.o.  Sex: female  YOB: 1950   MRN: [id number]    Ht 170.1 cm (66.97\")   Wt 81.6 kg (180 lb)   LMP  (LMP Unknown)   BMI 28.22 kg/m²   Patient Active Problem List   Diagnosis   • Well female exam with routine gynecological exam   • Malignant neoplasm of sigmoid colon (CMS/HCC)   • History of lung cancer   • History of colon cancer   • LFT elevation   • Mixed hyperlipidemia   • Colon cancer (CMS/HCC)   • Thyroid goiter   • Primary hyperparathyroidism (CMS/HCC)       Current Meds:   No current facility-administered medications for this encounter.   Allergies:  Allergies   Allergen Reactions   • Adhesive Tape Hives and Itching     BANDAIDS   • Amoxicillin Hives and Itching   • Latex Other (See Comments)     Lips and nose swelled   • Red Dye Nausea And Vomiting   • Shellfish-Derived Products Shortness Of Breath         Patient and family interviewed in pre-op room. All questions answered and wishes to proceed confirmed.     Office H&P updated and no significant changes noted.     Maxi Daly MD  [facility]  [email or web address]  5/6/2019        "

## 2019-05-06 NOTE — PLAN OF CARE
Problem: Patient Care Overview  Goal: Plan of Care Review  Outcome: Ongoing (interventions implemented as appropriate)   05/06/19 1750   Coping/Psychosocial   Plan of Care Reviewed With patient;spouse   Plan of Care Review   Progress no change   OTHER   Outcome Summary Rec'd from [facility] at 1710. Alert,  at bedside. O2 @ 2L, AARON x1, dsng on anterior neck dry and intact. Nausea on arrival from PACU. Will call to get PRN orders.     Goal: Individualization and Mutuality  Outcome: Ongoing (interventions implemented as appropriate)    Goal: Discharge Needs Assessment  Outcome: Ongoing (interventions implemented as appropriate)    Goal: Interprofessional Rounds/Family Conf  Outcome: Ongoing (interventions implemented as appropriate)      Problem: Surgery Nonspecified (Adult)  Goal: Signs and Symptoms of Listed Potential Problems Will be Absent, Minimized or Managed (Surgery Nonspecified)  Outcome: Outcome(s) achieved Date Met: 05/06/19    Goal: Anesthesia/Sedation Recovery  Outcome: Outcome(s) achieved Date Met: 05/06/19

## 2019-05-06 NOTE — SIGNIFICANT NOTE
INfo given to Dr Kovacs regarding rapid response,difficulty breathing and large amt of bleeding--need to go back to OR

## 2019-05-07 ENCOUNTER — APPOINTMENT (OUTPATIENT)
Dept: GENERAL RADIOLOGY | Facility: HOSPITAL | Age: 69
End: 2019-05-07

## 2019-05-07 LAB
ANION GAP SERPL CALCULATED.3IONS-SCNC: 8.8 MMOL/L
BASOPHILS # BLD AUTO: 0.01 10*3/MM3 (ref 0–0.2)
BASOPHILS NFR BLD AUTO: 0.1 % (ref 0–1.5)
BUN BLD-MCNC: 12 MG/DL (ref 8–23)
BUN/CREAT SERPL: 16.9 (ref 7–25)
CALCIUM SPEC-SCNC: 7.8 MG/DL (ref 8.6–10.5)
CALCIUM SPEC-SCNC: 7.9 MG/DL (ref 8.6–10.5)
CHLORIDE SERPL-SCNC: 99 MMOL/L (ref 98–107)
CO2 SERPL-SCNC: 26.2 MMOL/L (ref 22–29)
CREAT BLD-MCNC: 0.71 MG/DL (ref 0.57–1)
CYTO UR: NORMAL
DEPRECATED RDW RBC AUTO: 44.4 FL (ref 37–54)
EOSINOPHIL # BLD AUTO: 0 10*3/MM3 (ref 0–0.4)
EOSINOPHIL NFR BLD AUTO: 0 % (ref 0.3–6.2)
ERYTHROCYTE [DISTWIDTH] IN BLOOD BY AUTOMATED COUNT: 13.6 % (ref 12.3–15.4)
GFR SERPL CREATININE-BSD FRML MDRD: 100 ML/MIN/1.73
GLUCOSE BLD-MCNC: 131 MG/DL (ref 65–99)
GLUCOSE BLDC GLUCOMTR-MCNC: 101 MG/DL (ref 70–130)
GLUCOSE BLDC GLUCOMTR-MCNC: 122 MG/DL (ref 70–130)
GLUCOSE BLDC GLUCOMTR-MCNC: 89 MG/DL (ref 70–130)
GLUCOSE BLDC GLUCOMTR-MCNC: 90 MG/DL (ref 70–130)
HCT VFR BLD AUTO: 37.2 % (ref 34–46.6)
HGB BLD-MCNC: 11.7 G/DL (ref 12–15.9)
IMM GRANULOCYTES # BLD AUTO: 0.09 10*3/MM3 (ref 0–0.05)
IMM GRANULOCYTES NFR BLD AUTO: 0.7 % (ref 0–0.5)
LAB AP CASE REPORT: NORMAL
LAB AP CLINICAL INFORMATION: NORMAL
LYMPHOCYTES # BLD AUTO: 0.64 10*3/MM3 (ref 0.7–3.1)
LYMPHOCYTES NFR BLD AUTO: 4.7 % (ref 19.6–45.3)
Lab: NORMAL
MCH RBC QN AUTO: 28.1 PG (ref 26.6–33)
MCHC RBC AUTO-ENTMCNC: 31.5 G/DL (ref 31.5–35.7)
MCV RBC AUTO: 89.4 FL (ref 79–97)
MONOCYTES # BLD AUTO: 0.51 10*3/MM3 (ref 0.1–0.9)
MONOCYTES NFR BLD AUTO: 3.8 % (ref 5–12)
NEUTROPHILS # BLD AUTO: 12.35 10*3/MM3 (ref 1.7–7)
NEUTROPHILS NFR BLD AUTO: 90.7 % (ref 42.7–76)
NRBC BLD AUTO-RTO: 0 /100 WBC (ref 0–0.2)
PATH REPORT.FINAL DX SPEC: NORMAL
PATH REPORT.GROSS SPEC: NORMAL
PLATELET # BLD AUTO: 257 10*3/MM3 (ref 140–450)
PMV BLD AUTO: 11 FL (ref 6–12)
POTASSIUM BLD-SCNC: 4.3 MMOL/L (ref 3.5–5.2)
PTH-INTACT SERPL-MCNC: 17.1 PG/ML (ref 15–65)
RBC # BLD AUTO: 4.16 10*6/MM3 (ref 3.77–5.28)
SODIUM BLD-SCNC: 134 MMOL/L (ref 136–145)
WBC NRBC COR # BLD: 13.6 10*3/MM3 (ref 3.4–10.8)

## 2019-05-07 PROCEDURE — 25010000002 FENTANYL CITRATE (PF) 100 MCG/2ML SOLUTION: Performed by: INTERNAL MEDICINE

## 2019-05-07 PROCEDURE — 80048 BASIC METABOLIC PNL TOTAL CA: CPT | Performed by: INTERNAL MEDICINE

## 2019-05-07 PROCEDURE — 82962 GLUCOSE BLOOD TEST: CPT

## 2019-05-07 PROCEDURE — 25010000002 CALCIUM GLUCONATE PER 10 ML: Performed by: PHYSICIAN ASSISTANT

## 2019-05-07 PROCEDURE — 71045 X-RAY EXAM CHEST 1 VIEW: CPT

## 2019-05-07 PROCEDURE — 82310 ASSAY OF CALCIUM: CPT | Performed by: PHYSICIAN ASSISTANT

## 2019-05-07 PROCEDURE — 85025 COMPLETE CBC W/AUTO DIFF WBC: CPT | Performed by: INTERNAL MEDICINE

## 2019-05-07 PROCEDURE — 83970 ASSAY OF PARATHORMONE: CPT | Performed by: OTOLARYNGOLOGY

## 2019-05-07 RX ORDER — HYDROCODONE BITARTRATE AND ACETAMINOPHEN 5; 325 MG/1; MG/1
1 TABLET ORAL EVERY 4 HOURS PRN
Status: DISCONTINUED | OUTPATIENT
Start: 2019-05-07 | End: 2019-05-08 | Stop reason: HOSPADM

## 2019-05-07 RX ORDER — ONDANSETRON 2 MG/ML
4 INJECTION INTRAMUSCULAR; INTRAVENOUS EVERY 6 HOURS PRN
Status: DISCONTINUED | OUTPATIENT
Start: 2019-05-07 | End: 2019-05-08 | Stop reason: HOSPADM

## 2019-05-07 RX ORDER — LEVOTHYROXINE SODIUM 112 UG/1
112 TABLET ORAL
Status: COMPLETED | OUTPATIENT
Start: 2019-05-07 | End: 2019-05-08

## 2019-05-07 RX ORDER — CALCITRIOL 0.25 UG/1
0.5 CAPSULE, LIQUID FILLED ORAL EVERY 12 HOURS SCHEDULED
Status: DISCONTINUED | OUTPATIENT
Start: 2019-05-07 | End: 2019-05-07

## 2019-05-07 RX ADMIN — CALCIUM CARBONATE-VITAMIN D TAB 500 MG-200 UNIT 1000 MG: 500-200 TAB at 21:31

## 2019-05-07 RX ADMIN — SODIUM CHLORIDE, PRESERVATIVE FREE 3 ML: 5 INJECTION INTRAVENOUS at 08:51

## 2019-05-07 RX ADMIN — ACETAMINOPHEN 500 MG: 500 TABLET, FILM COATED ORAL at 14:02

## 2019-05-07 RX ADMIN — FENTANYL CITRATE 50 MCG: 50 INJECTION, SOLUTION INTRAMUSCULAR; INTRAVENOUS at 05:31

## 2019-05-07 RX ADMIN — FENTANYL CITRATE 25 MCG: 50 INJECTION, SOLUTION INTRAMUSCULAR; INTRAVENOUS at 01:46

## 2019-05-07 RX ADMIN — ACETAMINOPHEN 500 MG: 500 TABLET, FILM COATED ORAL at 08:49

## 2019-05-07 RX ADMIN — CALCIUM GLUCONATE 1 G: 98 INJECTION, SOLUTION INTRAVENOUS at 11:03

## 2019-05-07 RX ADMIN — LEVOTHYROXINE SODIUM 112 MCG: 112 TABLET ORAL at 08:49

## 2019-05-07 RX ADMIN — ACETAMINOPHEN 500 MG: 500 TABLET, FILM COATED ORAL at 19:19

## 2019-05-07 RX ADMIN — SODIUM CHLORIDE, PRESERVATIVE FREE 3 ML: 5 INJECTION INTRAVENOUS at 21:29

## 2019-05-07 NOTE — PLAN OF CARE
Problem: Patient Care Overview  Goal: Plan of Care Review   05/07/19 8138   Coping/Psychosocial   Plan of Care Reviewed With patient;spouse   OTHER   Outcome Summary recieved from PACU past repair of arterial bleed of thyroid. Sleepy, oriented x3. Voice a whisper, but, clear. Much stronger this morning with occ. hoarseness. Neck with no overt swelling. Breath sounds clear. dressing dry and intact. Nir with dark red drainage. VSS during night. Afebrile. Tolerates up to bsc. Low doses of fentanyl helpful for pain. De-sat O2 to 70's when initially falls asleep with pain medicine. O2 at 3-4L will bring quickly back up to 100%

## 2019-05-07 NOTE — ANESTHESIA PREPROCEDURE EVALUATION
Anesthesia Evaluation     Patient summary reviewed and Nursing notes reviewed   history of anesthetic complications: PONV  NPO Solid Status: > 8 hours  NPO Liquid Status: > 8 hours           Airway   Mallampati: III  TM distance: <3 FB  Neck ROM: limited  Difficult intubation highly probable  Dental      Pulmonary - normal exam    breath sounds clear to auscultation  (+) asthma,   Cardiovascular - normal exam    Rhythm: regular  Rate: normal    (+) hypertension, hyperlipidemia,       Neuro/Psych- negative ROS  GI/Hepatic/Renal/Endo      ROS Comment: S/p thyroidectomy earlier today.  Now with hematoma, increased WOB.    Musculoskeletal (-) negative ROS    Abdominal  - normal exam   Substance History - negative use     OB/GYN negative ob/gyn ROS         Other      history of cancer remission                    Anesthesia Plan    ASA 3 - emergent     general     intravenous induction   Anesthetic plan, all risks, benefits, and alternatives have been provided, discussed and informed consent has been obtained with: patient.

## 2019-05-07 NOTE — PROGRESS NOTES
Discharge Planning Assessment  [facility]     Patient Name: Faviola Issa  MRN: [id number]  Today's Date: 5/7/2019    Admit Date: 5/6/2019    Discharge Needs Assessment     Row Name 05/07/19 8105       Living Environment    Lives With  spouse;grandchild(claude)    Name(s) of Who Lives With Patient  Saul Issa, ; 2 granddtrs and 3 great grandchildren.      Current Living Arrangements  home/apartment/condo    Primary Care Provided by  self    Provides Primary Care For  grandchild(claude)    Family Caregiver if Needed  spouse;grandchild(claude), adult    Quality of Family Relationships  helpful;supportive    Able to Return to Prior Arrangements  yes       Transition Planning    Patient/Family Anticipates Transition to  home with family    Patient/Family Anticipated Services at Transition  none    Transportation Anticipated  family or friend will provide       Discharge Needs Assessment    Readmission Within the Last 30 Days  no previous admission in last 30 days    Concerns to be Addressed  denies needs/concerns at this time    Equipment Currently Used at Home  nebulizer        Discharge Plan     Row Name 05/07/19 5954       Plan    Plan  Plan is to return home with her family upon DC.  CCP will follow and assist as needed.     Plan Comments  Spoke with pt's , Saul - pt taking a bath.  Pt lives in a house with her , 2 granddtrs and and 3 great grandchildren.  She is IADLs and can drive, and works full time.  Her only home DME is a nebulizer, but she doesn't use it.  No hx of  or SNF.  Her pharmacy is [facility] in [facility].  Plan is to return home upon DC.  Denies any DC needs at this time.         Destination      No service coordination in this encounter.      Durable Medical Equipment      No service coordination in this encounter.      Dialysis/Infusion      No service coordination in this encounter.      Home Medical Care      No service coordination in this encounter.      Therapy      No  service coordination in this encounter.      Community Resources      No service coordination in this encounter.          Demographic Summary     Row Name 05/07/19 1554       General Information    Admission Type  inpatient    Arrived From  home    Referral Source  admission list    Reason for Consult  discharge planning    Preferred Language  English        Functional Status     Row Name 05/07/19 1555       Functional Status    Usual Activity Tolerance  good    Current Activity Tolerance  good       Functional Status, IADL    Medications  independent    Meal Preparation  independent    Housekeeping  independent    Laundry  independent    Shopping  independent       Mental Status    General Appearance WDL  WDL       Mental Status Summary    Recent Changes in Mental Status/Cognitive Functioning  no changes       Employment/    Employment Status  employed full time        Psychosocial    No documentation.       Abuse/Neglect    No documentation.       Legal    No documentation.       Substance Abuse    No documentation.       Patient Forms    No documentation.           Carri Cunningham RN

## 2019-05-07 NOTE — NURSING NOTE
Pt brought emergently to pacu to await OR ready for exploration of neck hemotama from prior surgery today.  RT, ICU nurses,  Chantell Lanza all with patient.  Bagging patient on 100% o2.  Connected to monitors.  Propofol started per MD order.  Pt neck with profuse bleeding noted on gauze pads. MD had removed stitches prior to arrival to pacu.  AARON drain remains in place.  Vital signs stable at present time.6.5 cm ETT in place and connected to vent settings 500/16/100%/5 peep.

## 2019-05-07 NOTE — CONSULTS
"Group: [facility]         CONSULT NOTE    Patient Identification:  Faviola Issa  68 y.o.  female  1950  [id number]            Requesting physician: Dr. Daly    Reason for Consultation:  Post op respiratory failure, ICU management    CC: Neck swelling, airway compromise    History of Present Illness:  69 y/o female presents with sudden bleeding after going to the bathroom approximateley 8 hours after completion of a total thyroidectomy.  She developed significant respiratory distress and neck swelling. These were severe, requiring 100% FiO2, required airway protection and immediate intubation on the floor by Anesthesiologist. She was then taken back to OR for revision and ligation of thyroid artery by ENT, Dr Daly. Now on ventilator, in CCU, sedated. No active bleeding.    I discussed case with Dr. Camarillo at bedside.  I reviewed records and summarized results.      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, low-grade, treated with radiation   • PONV (postoperative nausea and vomiting)    • Post-menopausal    • Rectal cancer (CMS/HCC) 1999       Past Surgical History:  Past Surgical History:   Procedure Laterality Date   • COLON SURGERY      1999 and 11/2011   • COLONOSCOPY  2016   • COLONOSCOPY N/A 5/8/2018    Procedure: COLONOSCOPY into ileum;  Surgeon: James Romeo MD;  Location: [facility] ENDOSCOPY;  Service: Gastroenterology   • HEMICOLOECTOMY W/ ANASTOMOSIS Right 11/2011    Adenocarcinoma of cecum   • HYSTERECTOMY  1999   • LUNG LOBECTOMY      ANSELMO 08/2006 and RLL partial 09/2001        Home Meds:  Medications Prior to Admission   Medication Sig Dispense Refill Last Dose   • acetaminophen (TYLENOL) " "500 MG tablet Take 500 mg by mouth Every 6 (Six) Hours As Needed. Take 1/2 tab    2019   • albuterol (PROVENTIL HFA;VENTOLIN HFA) 108 (90 Base) MCG/ACT inhaler Inhale 2 puffs Every 4 (Four) Hours As Needed for Wheezing. (put on file) 1 inhaler 11 More than a month at Unknown time   • albuterol (PROVENTIL) (2.5 MG/3ML) 0.083% nebulizer solution Take 2.5 mg by nebulization Every 4 (Four) Hours As Needed for Wheezing or Shortness of Air. 120 vial 12 More than a month at Unknown time       Allergies:  Allergies   Allergen Reactions   • Adhesive Tape Hives and Itching     BANDAIDS   • Amoxicillin Hives and Itching   • Latex Other (See Comments)     Lips and nose swelled   • Red Dye Nausea And Vomiting   • Shellfish-Derived Products Shortness Of Breath       Social History:   Social History     Socioeconomic History   • Marital status:      Spouse name: KEN   • Number of children: 3   • Years of education: College   • Highest education level: Not on file   Occupational History   • Occupation: Nurse     Employer: [employer]   Tobacco Use   • Smoking status: Former Smoker     Packs/day: 1.00     Types: Cigarettes     Last attempt to quit: 2001     Years since quittin.6   • Smokeless tobacco: Never Used   • Tobacco comment: 1ppd x20 yrs   Substance and Sexual Activity   • Alcohol use: No   • Drug use: No   • Sexual activity: Yes     Partners: Male     Birth control/protection: None   Social History Narrative    GYN PATIENT SINCE .       Family History:  Family History   Problem Relation Age of Onset   • Cancer Sister         \"FEMALE\"   • Hypertension Father    • Breast cancer Daughter 45   • Malig Hyperthermia Neg Hx        Physical Exam:  /64 (BP Location: Right arm, Patient Position: Lying)   Pulse 71   Temp 97.9 °F (36.6 °C) (Oral)   Resp 16   Ht 170.1 cm (66.97\")   Wt 81.6 kg (179 lb 16 oz)   LMP  (LMP Unknown)   SpO2 100%   BMI 28.22 kg/m²  " Body mass index is 28.22 kg/m². 100% 81.6 kg (179 lb 16 oz)  Physical Exam   Constitutional: No distress.   Intubated, sedated   HENT:   Head: Normocephalic.   ETT in place orally   Eyes: Conjunctivae and EOM are normal. No scleral icterus.   Neck:   Swelling anterior neck area, incision covered, drain in place.  Supple   Cardiovascular: Normal rate, regular rhythm and normal heart sounds.   Pulmonary/Chest: No respiratory distress. She has no wheezes. She exhibits no tenderness.   No dullness to perc   Abdominal: She exhibits no distension and no mass. There is no tenderness.   Musculoskeletal: She exhibits no deformity.   Neurological:   Sedated, on vent   Skin: Skin is warm. No rash noted. No erythema.   Psychiatric:   Intubated, sedated       LABS:  Lab Results   Component Value Date    CALCIUM 11.0 (H) 01/02/2019     Results from last 7 days   Lab Units 05/06/19  0829   WBC 10*3/mm3 5.85   HEMOGLOBIN g/dL 13.8   PLATELETS 10*3/mm3 257                         Lab Results   Component Value Date    TSH 1.500 01/02/2019     CrCl cannot be calculated (Patient's most recent lab result is older than the maximum 30 days allowed.).         Imaging: no images available for review. Portable CXR ordered for ETT placement      Assessment:  Acute respiratory failure  Airway compression due to neck hematoma  Thyroid artery hemorrhage causing hematoma neck  Thyroid goiter. S/p thyroidectomy  S/P evacuation of hematoma and control of hemorrhage          Recommendations:  We will provide mechanical ventilation and adjust as needed.  Anticipate decreased swelling in AM. Will try to extubate patient then.  Give IV narcotics and Propofol tonight for sedation.  Order X-ray for ET tube placement  Monitor hemoglobin, urine output and hemodynamics  Avoid anticoagulants or antiplatelets tonight.  One IV injection of Solumedrol given earlier for swelling.    CC 35 min, excluding separately billable procedures        Keegan Colvin,  MD  5/6/2019  11:56 PM      Much of this encounter note is an electronic transcription/translation of spoken language to printed text using Dragon Software.

## 2019-05-07 NOTE — PROGRESS NOTES
[facility]     Mar/chart reviewed  F/u respiratory failure  Extubated, awake, anxious and a little fearful.   Some nausea at times, pain is minimal  Voice good.    Vital Sign Min/Max for last 24 hours  Temp  Min: 96.7 °F (35.9 °C)  Max: 99.2 °F (37.3 °C)   BP  Min: 95/58  Max: 171/90   Pulse  Min: 65  Max: 97   Resp  Min: 12  Max: 18   SpO2  Min: 88 %  Max: 100 %   Flow (L/min)  Min: 2  Max: 4   Weight  Min: 81.4 kg (179 lb 7.3 oz)  Max: 82 kg (180 lb 12.4 oz)     Anxious, alter and oriented  perrl, eomi, no icterus,  mmm, no jvd,surgical dressing, some swelling, neck supple,  chest cta bilaterally, no crackles, no wheezes,   rrr,   soft, nt, nd +bs,  no c/c/ trace edema    Labs: 5/7 reviewed  Na 134  Cr 0.7  Bicarb 26  Wbc 13  hgb 11.7  plts 257    A/P:  1. Acute hypoxemic respiratory failure -- resolved, doing well  2. Airway compression 2/2 neck hematoma -- s/p or 5/6  3. thryoid artery hemorrhage causing hematoma of neck  4. Thyroid goiter s/p thyroidectomy  5. S/p evacuation of hematoma and control of hemorrhage    Doing well, anxious, can move out if ok with ENT

## 2019-05-07 NOTE — PLAN OF CARE
Problem: Patient Care Overview  Goal: Plan of Care Review  Outcome: Ongoing (interventions implemented as appropriate)   05/07/19 1552   Coping/Psychosocial   Plan of Care Reviewed With patient   Plan of Care Review   Progress improving   OTHER   Outcome Summary Pt admitted to CCU overnight to monitor airway following large neck hematoma after thyroid surgery. Vitals stable. Weaned to room air. Up to chair around lunchtime. Walking around room without problems. Good UOP. Tolerating liquid diet well. C/o discomfort around throat and lower back. Treated with tylenol PO. Small amounts of sanguinous drainage from AARON in neck. Neck steri-strips CDI. Orders to transfer to med-surg today. Hopefully home tomorrow. Continue to monitor.      Goal: Individualization and Mutuality  Outcome: Ongoing (interventions implemented as appropriate)   05/07/19 1552   Individualization   Patient Specific Goals (Include Timeframe) Increase mobility. Return to baseline strength/vitals.   Patient Specific Interventions Meds and treatments as ordered.       Problem: Surgery Nonspecified (Adult)  Goal: Signs and Symptoms of Listed Potential Problems Will be Absent, Minimized or Managed (Surgery Nonspecified)  Outcome: Ongoing (interventions implemented as appropriate)   05/07/19 1552   Goal/Outcome Evaluation   Problems Assessed (Surgery) all   Problems Present (Surgery) pain       Problem: Fall Risk (Adult)  Goal: Absence of Fall  Outcome: Ongoing (interventions implemented as appropriate)   05/07/19 1552   Fall Risk (Adult)   Absence of Fall making progress toward outcome       Problem: Skin Injury Risk (Adult)  Goal: Skin Health and Integrity  Outcome: Ongoing (interventions implemented as appropriate)   05/07/19 1552   Skin Injury Risk (Adult)   Skin Health and Integrity making progress toward outcome       Problem: Pain, Acute (Adult)  Goal: Acceptable Pain Control/Comfort Level  Outcome: Ongoing (interventions implemented as  appropriate)   05/07/19 1552   Pain, Acute (Adult)   Acceptable Pain Control/Comfort Level making progress toward outcome

## 2019-05-07 NOTE — ANESTHESIA POSTPROCEDURE EVALUATION
"Patient: Faviola Issa    Procedure Summary     Date:  05/06/19 Room / Location:  [facility] OR 06 / [facility] MAIN OR    Anesthesia Start:  2024 Anesthesia Stop:  2138    Procedure:  EVACUATION OF NECK  HEMATOMA (N/A Neck) Diagnosis:      Surgeon:  Maxi Daly MD Provider:  Ming Cook MD    Anesthesia Type:  general ASA Status:  3 - Emergent          Anesthesia Type: general  Last vitals  BP   133/64 (05/06/19 2205)   Temp   36.6 °C (97.9 °F) (05/06/19 2205)   Pulse   71 (05/06/19 2205)   Resp   16 (05/06/19 2205)     SpO2   100 % (05/06/19 2205)     Post Anesthesia Care and Evaluation    Patient location during evaluation: bedside  Patient participation: complete - patient participated  Level of consciousness: awake and alert  Pain management: adequate  Airway patency: patent  Anesthetic complications: No anesthetic complications    Cardiovascular status: acceptable  Respiratory status: acceptable  Hydration status: acceptable    Comments: /64 (BP Location: Right arm, Patient Position: Lying)   Pulse 71   Temp 36.6 °C (97.9 °F) (Oral)   Resp 16   Ht 170.1 cm (66.97\")   Wt 81.6 kg (179 lb 16 oz)   LMP  (LMP Unknown)   SpO2 100%   BMI 28.22 kg/m²       "

## 2019-05-07 NOTE — NURSING NOTE
Pt reports that she wants to be DNR/DNI. However, specific to this surgery event she wants to be a FULL code. Her  at bedside is in agreement.

## 2019-05-07 NOTE — PROCEDURES
Called to see pt emergently secondary to post-op bleeding after thyroidectomy.  Upon arrival, pt upright in bed.  Anterior neck hemtoma noted.  Nursing reports increased work of breathing and decreased LOC.  Upon recommendation of Dr. Torres, incision opened but no clot expressed. Deciion made to proceed with intubation under general anesthesia.  Pt pre-oxygentated with 100 % O2 per Ambu bag and mask.  In, duction with propofol 100 mg, licodaine 50 mg and succinylcholine 120 mg.  DL performed with CMAC and D blade,.  Unable to identify epiglottis or glottic opening.  DL with MAC 3 blade and epiglottis identified.  Bougie placed blindly.  Tracheal rings felt.  6.5 Hi-Lo Evac tube place over bougie.  + colorimetry.  BBS, equal..  rFederick Daly and Marii at bedside.  Pt transported to holding area with O2 per ETT, Ambu.  Accompanied by nursing and RT.      Thank you.

## 2019-05-07 NOTE — OP NOTE
Operative Procedure Note    Surgeon: Maxi Daly PhD, MD        Pre-operative Diagnosis:   Hematoma     Post-operative Diagnosis:   same    Procedure: evacuation of hematoma and control of hemorrhage    Indications: This patient presents with sudden bleeding after going to the bathroom approximateley 8 hours after completion of a total thyroidectomy. Pt was intubated in hospital room and I opened the wound there to allow for partial decompression of the hematoma.     FINDINGS: right inferior thyroid artery bleed    Procedure Details   The risks, benefits, complications, treatment options, and expected outcomes were discussed with the family. The spouse concurred with the proposed plan, giving informed verbal consent.      The patient was taken urgently to Operating Room [facility] OR , identified as Faviola Dengley and staff verified the following Procedure(s) (LRB):  EVACUATION OF NECK  HEMATOMA (N/A).  A Time Out was held and the above information confirmed.    The patient was placed supine. Adequate plane of anesthesia was already attained and the patient remained intubated. The wound was opened and explored. Several minor areas were bipolar cauterized but the source seemed to be from the right inferior thyroid artery whch was clamped and tied. No further bleeding was noted. Both recurrent nerves were easily seen and great care was taken to avoid damage to these exposed nerves.  Surgicell was liberally placed over the paratrachal gutters. A 15 Armenian suction drain was placed.      Closure was performed by reapproximating the strap muscles in the midline with an interrupted 4-0 Vicryl suture.  The platysma was reapproximated with 4-0 Vicryl suture and the skin incision was closed with a 5-0 prolene runing subcuticular closure.  Steri-Strips were applied.    Instrument, sponge, and needle counts were correct prior to closure and at the conclusion of the case.     Findings:      Estimated Blood Loss: 250 ml          Drains: none                  Specimens: None             Complications: none          Disposition: PACU         Condition: stable    Attending Attestation: I was present and scrubbed for the entire operation.

## 2019-05-08 VITALS
RESPIRATION RATE: 18 BRPM | BODY MASS INDEX: 28.17 KG/M2 | SYSTOLIC BLOOD PRESSURE: 144 MMHG | HEART RATE: 81 BPM | OXYGEN SATURATION: 97 % | TEMPERATURE: 99.2 F | DIASTOLIC BLOOD PRESSURE: 73 MMHG | WEIGHT: 179.45 LBS | HEIGHT: 67 IN

## 2019-05-08 PROBLEM — E04.9 ENLARGED THYROID: Status: RESOLVED | Noted: 2019-05-06 | Resolved: 2019-05-08

## 2019-05-08 PROBLEM — E04.9 THYROID GOITER: Chronic | Status: RESOLVED | Noted: 2019-05-06 | Resolved: 2019-05-08

## 2019-05-08 PROBLEM — E21.0 PRIMARY HYPERPARATHYROIDISM (HCC): Chronic | Status: RESOLVED | Noted: 2019-05-06 | Resolved: 2019-05-08

## 2019-05-08 LAB — PTH-INTACT SERPL-MCNC: 15.1 PG/ML (ref 15–65)

## 2019-05-08 PROCEDURE — 83970 ASSAY OF PARATHORMONE: CPT | Performed by: OTOLARYNGOLOGY

## 2019-05-08 RX ADMIN — CALCIUM CARBONATE-VITAMIN D TAB 500 MG-200 UNIT 1000 MG: 500-200 TAB at 05:49

## 2019-05-08 RX ADMIN — ACETAMINOPHEN 500 MG: 500 TABLET, FILM COATED ORAL at 05:35

## 2019-05-08 RX ADMIN — LEVOTHYROXINE SODIUM 112 MCG: 112 TABLET ORAL at 05:49

## 2019-05-08 NOTE — PROGRESS NOTES
Discharge Planning Assessment  [facility]     Patient Name: Faviola Issa  MRN: [id number]  Today's Date: 5/8/2019    Admit Date: 5/6/2019    Discharge Needs Assessment    No documentation.       Discharge Plan     Row Name 05/08/19 0751       Plan    Final Discharge Disposition Code  01 - home or self-care     Expected Discharge Date and Time     Expected Discharge Date Expected Discharge Time    May 8, 2019        Judy Hess RN

## 2019-05-08 NOTE — PLAN OF CARE
Problem: Patient Care Overview  Goal: Plan of Care Review  Outcome: Ongoing (interventions implemented as appropriate)   05/08/19 6426   Coping/Psychosocial   Plan of Care Reviewed With patient;spouse   OTHER   Outcome Summary temp max 99.2, neck insicion dry & intyact with steri-strips, mehul x 1 draining sero sangenous drainage, drain care & teaching reviewed with pt & , supplies given, no difficulty with breathing or swallowing but does state throat is sore, apprehensive about going home, afraid something may happen, reviewed signs & symptoms to report right away, verbal understanding, doing own colostomy care     Goal: Individualization and Mutuality  Outcome: Ongoing (interventions implemented as appropriate)    Goal: Discharge Needs Assessment  Outcome: Ongoing (interventions implemented as appropriate)    Goal: Interprofessional Rounds/Family Conf  Outcome: Ongoing (interventions implemented as appropriate)      Problem: Surgery Nonspecified (Adult)  Goal: Signs and Symptoms of Listed Potential Problems Will be Absent, Minimized or Managed (Surgery Nonspecified)  Outcome: Ongoing (interventions implemented as appropriate)      Problem: Fall Risk (Adult)  Goal: Identify Related Risk Factors and Signs and Symptoms  Outcome: Ongoing (interventions implemented as appropriate)    Goal: Absence of Fall  Outcome: Ongoing (interventions implemented as appropriate)      Problem: Skin Injury Risk (Adult)  Goal: Identify Related Risk Factors and Signs and Symptoms  Outcome: Ongoing (interventions implemented as appropriate)    Goal: Skin Health and Integrity  Outcome: Ongoing (interventions implemented as appropriate)      Problem: Pain, Acute (Adult)  Goal: Identify Related Risk Factors and Signs and Symptoms  Outcome: Ongoing (interventions implemented as appropriate)

## 2019-05-08 NOTE — BRIEF OP NOTE
Date of Discharge:  5/8/2019    Discharge Diagnosis: Primary hyperparathyroidism, resolved thyroid goiter    Presenting Problem/History of Present Illness  Active Hospital Problems   No active problems to display.      Resolved Hospital Problems    Diagnosis Date Resolved POA   • **Thyroid goiter [E04.9] 05/08/2019 Yes   • Primary hyperparathyroidism (CMS/HCC) [E21.0] 05/08/2019 Yes   • Enlarged thyroid [E04.9] 05/08/2019 Yes        Hospital Course  Patient is a 68 y.o. female presented with enlarging thyroid goiter which was causing tracheal compression as well as a parathyroid adenoma which was causing hyperparathyroidism. A total thyroidectomy and parathyroidectomy was performed successfully. Approximately 8 hours after surgery patient developed a neck hematoma at incision site which was causing dyspnea. Patient was prepped and taken to the OR for hematoma evacuation with success. Postoperatively patient's calcium levels have been low for which IV calcium and tablets have been given with improvement. Patient was instructed to take TUMS supplement while at home.      Procedures Performed    Procedure(s):  EVACUATION OF NECK  HEMATOMA  -------------------    Procedure(s):  EVACUATION OF NECK  HEMATOMA  -------------------  05/06 2004 ANESTHESIA INTUBATION    Consults:   Consults     Date and Time Order Name Status Description    5/6/2019 2321 Inpatient Pulmonology Consult            Pertinent Test Results: PTH 17.1 Ca 7.9 (trending up)     Condition on Discharge:  Patient is stable, no neck hematoma, resolved thyroid goiter    Vital Signs  Temp:  [97.3 °F (36.3 °C)-99.2 °F (37.3 °C)] 99.2 °F (37.3 °C)  Heart Rate:  [] 81  Resp:  [16-20] 18  BP: (126-144)/(55-73) 144/73    Physical Exam:     General Appearance:    Alert, cooperative, in no acute distress   Head:    Normocephalic, without obvious abnormality, atraumatic           Throat:   No oral lesions, no thrush, oral mucosa moist   Neck:   Surgical  incision evident, no hematoma       Lungs:     Clear to auscultation,respirations regular, even and                   unlabored    Heart:    Regular rhythm and normal rate, normal S1 and S2, no            murmur, no gallop, no rub, no click   Breast Exam:    Deferred           Extremities:   Moves all extremities well, no edema, no cyanosis, no              redness                       Discharge Disposition      Discharge Medications     Discharge Medications      ASK your doctor about these medications      Instructions Start Date   acetaminophen 500 MG tablet  Commonly known as:  TYLENOL   500 mg, Oral, Every 6 Hours PRN, Take 1/2 tab      albuterol sulfate  (90 Base) MCG/ACT inhaler  Commonly known as:  PROVENTIL HFA;VENTOLIN HFA;PROAIR HFA   2 puffs, Inhalation, Every 4 Hours PRN, (put on file)      albuterol (2.5 MG/3ML) 0.083% nebulizer solution  Commonly known as:  PROVENTIL   2.5 mg, Nebulization, Every 4 Hours PRN             Discharge Diet:     Activity at Discharge:     Follow-up Appointments  No future appointments.  [unfilled]    Test Results Pending at Discharge  [unfilled]     Maxi Daly MD  05/08/19  7:11 AM    Time: Discharge 45 min      Progress Note    Faviolapablo Issa  5/6/2019    Pre-op Diagnosis:   Hyperparathyroidism  Thyroid Goiter  Tracheal Compression       Post-Op Diagnosis Codes:  Resolved hyperparathyroidism  Resolved Neck Hematoma  Hypocalcemia      Procedure/CPT® Codes:      Procedure(s):  Total thyroidectomy  Parathyroidectomy  EVACUATION OF NECK  HEMATOMA    Surgeon(s):  Maxi Daly MD    Anesthesia: General    Staff:   Circulator: Yumiko Phipps, RN  Scrub Person: Peggy Green    Estimated Blood Loss: <500ml    Specimens:                Thyroid goiter and parathyroid adenoma          Drains:   Closed/Suction Drain 1 Left Neck Bulb 15 Fr. (Active)   Site Description Unable to view 5/8/2019  4:28 AM   Dressing Status Clean;Dry;Intact 5/8/2019   4:28 AM   Drainage Appearance Dark red 5/8/2019  4:28 AM   Status To bulb suction 5/8/2019  4:28 AM   Output (mL) 30 mL 5/7/2019  3:57 PM       Colostomy LLQ (Active)   Stomal Appliance 2 piece;Clean;Dry 5/7/2019  8:54 PM   Stoma Appearance rosebud appearance 5/7/2019 12:15 PM   Peristomal Assessment ELLIE 5/7/2019  8:54 PM   Stool Color brown 5/7/2019  8:54 PM   Output (mL) 5 mL 5/8/2019  5:26 AM       [REMOVED] Closed/Suction Drain 1 Left Neck Bulb 10 Fr. (Removed)   Site Description Reddened 5/6/2019  4:03 PM   Dressing Status New drainage 5/6/2019  8:09 PM   Drainage Appearance Serosanguineous 5/6/2019  8:09 PM   Status To bulb suction 5/6/2019  8:09 PM   Output (mL) 15 mL 5/6/2019  7:29 PM       Findings: Enlarged non toxic multinodular goiter causing tracheal compression, enlarged parathyroid gland representing a parathryoid adenoma.    Complications: Neck hematoma formed post op approx 8 hours for which it was evacuated in the OR.     Plan: Patient to f/u outpatient at our office for drain removal in 2 days. Patient will start calcium supplementation at 1000mg TID and labs checked to ensure proper calcium levels. Wound check and care will be continued. Patient will f/u outpatient for suture removal in one week.     OLENA Todd MD     Date: 5/8/2019  Time: 7:02 AM

## 2019-05-08 NOTE — DISCHARGE SUMMARY
Resolved Hospital Problems     Diagnosis Date Resolved POA   • **Thyroid goiter [E04.9] 05/08/2019 Yes   • Primary hyperparathyroidism (CMS/HCC) [E21.0] 05/08/2019 Yes   • Enlarged thyroid [E04.9] 05/08/2019 Yes                    Hospital Course  Patient is a 68 y.o. female presented with enlarging thyroid goiter which was causing tracheal compression as well as a parathyroid adenoma which was causing hyperparathyroidism. A total thyroidectomy and parathyroidectomy was performed successfully. Approximately 8 hours after surgery patient developed a neck hematoma at incision site which was causing dyspnea. Patient was prepped and taken to the OR for hematoma evacuation with success. Postoperatively patient's calcium levels have been low for which IV calcium and tablets have been given with improvement. Patient was instructed to take TUMS supplement while at home.       Procedures Performed     Procedure(s):  EVACUATION OF NECK  HEMATOMA  -------------------     Procedure(s):  EVACUATION OF NECK  HEMATOMA  -------------------  05/06 2004 ANESTHESIA INTUBATION     Consults:           Consults      Date and Time Order Name Status Description     5/6/2019 2321 Inpatient Pulmonology Consult                 Pertinent Test Results: PTH 17.1 Ca 7.9 (trending up)      Condition on Discharge:  Patient is stable, no neck hematoma, resolved thyroid goiter     Vital Signs  Temp:  [97.3 °F (36.3 °C)-99.2 °F (37.3 °C)] 99.2 °F (37.3 °C)  Heart Rate:  [] 81  Resp:  [16-20] 18  BP: (126-144)/(55-73) 144/73     Physical Exam:                General Appearance:    Alert, cooperative, in no acute distress   Head:    Normocephalic, without obvious abnormality, atraumatic               Throat:   No oral lesions, no thrush, oral mucosa moist   Neck:   Surgical incision evident, no hematoma         Lungs:     Clear to auscultation,respirations regular, even and                   unlabored    Heart:    Regular rhythm and normal rate,  normal S1 and S2, no            murmur, no gallop, no rub, no click   Breast Exam:    Deferred               Extremities:   Moves all extremities well, no edema, no cyanosis, no              redness                                 Discharge Disposition     Discharge Medications           Discharge Medications            ASK your doctor about these medications      Instructions Start Date   acetaminophen 500 MG tablet  Commonly known as:  TYLENOL    500 mg, Oral, Every 6 Hours PRN, Take 1/2 tab        albuterol sulfate  (90 Base) MCG/ACT inhaler  Commonly known as:  PROVENTIL HFA;VENTOLIN HFA;PROAIR HFA    2 puffs, Inhalation, Every 4 Hours PRN, (put on file)        albuterol (2.5 MG/3ML) 0.083% nebulizer solution  Commonly known as:  PROVENTIL    2.5 mg, Nebulization, Every 4 Hours PRN                  Discharge Diet:      Activity at Discharge:      Follow-up Appointments  No future appointments.  [unfilled]     Test Results Pending at Discharge  [unfilled]     Maxi Daly MD  05/08/19  7:11 AM     Time: Discharge 45 min        Progress Note     Faviola Issa  5/6/2019     Pre-op Diagnosis:   Hyperparathyroidism  Thyroid Goiter  Tracheal Compression       Post-Op Diagnosis Codes:  Resolved hyperparathyroidism  Resolved Neck Hematoma  Hypocalcemia        Procedure/CPT® Codes:     Procedure(s):  Total thyroidectomy  Parathyroidectomy  EVACUATION OF NECK  HEMATOMA     Surgeon(s):  Maxi Daly MD     Anesthesia: General     Staff:   Circulator: Yumiko Phipps, RN  Scrub Person: Peggy Green     Estimated Blood Loss: <500ml     Specimens:                Thyroid goiter and parathyroid adenoma            Drains:        Closed/Suction Drain 1 Left Neck Bulb 15 Fr. (Active)   Site Description Unable to view 5/8/2019  4:28 AM   Dressing Status Clean;Dry;Intact 5/8/2019  4:28 AM   Drainage Appearance Dark red 5/8/2019  4:28 AM   Status To bulb suction 5/8/2019  4:28 AM   Output (mL) 30  mL 5/7/2019  3:57 PM       Colostomy LLQ (Active)   Stomal Appliance 2 piece;Clean;Dry 5/7/2019  8:54 PM   Stoma Appearance rosebud appearance 5/7/2019 12:15 PM   Peristomal Assessment ELLIE 5/7/2019  8:54 PM   Stool Color brown 5/7/2019  8:54 PM   Output (mL) 5 mL 5/8/2019  5:26 AM       [REMOVED] Closed/Suction Drain 1 Left Neck Bulb 10 Fr. (Removed)   Site Description Reddened 5/6/2019  4:03 PM   Dressing Status New drainage 5/6/2019  8:09 PM   Drainage Appearance Serosanguineous 5/6/2019  8:09 PM   Status To bulb suction 5/6/2019  8:09 PM   Output (mL) 15 mL 5/6/2019  7:29 PM         Findings: Enlarged non toxic multinodular goiter causing tracheal compression, enlarged parathyroid gland representing a parathryoid adenoma.     Complications: Neck hematoma formed post op approx 8 hours for which it was evacuated in the OR.      Plan: Patient to f/u outpatient at our office for drain removal in 2 days. Patient will start calcium supplementation at 1000mg TID and labs checked to ensure proper calcium levels. Wound check and care will be continued. Patient will f/u outpatient for suture removal in one week.      OLENA Todd MD

## 2019-05-08 NOTE — NURSING NOTE
Transferred from [facility].  Oriented to room.  Neck dressing clean and dry. No swelling of neck, no difficulty swallowing or talking. Hob elevated. mehul patent.

## 2019-05-08 NOTE — PLAN OF CARE
Problem: Patient Care Overview  Goal: Plan of Care Review  Outcome: Ongoing (interventions implemented as appropriate)   05/08/19 0548   Plan of Care Review   Progress improving   OTHER   Outcome Summary mehul x1 with dark red drainage, neck drsg c/d/i, vdg, self c-ostomy care, no difficulty breathing, no numbness or tingling     Goal: Discharge Needs Assessment  Outcome: Ongoing (interventions implemented as appropriate)    Goal: Interprofessional Rounds/Family Conf  Outcome: Ongoing (interventions implemented as appropriate)      Problem: Surgery Nonspecified (Adult)  Goal: Signs and Symptoms of Listed Potential Problems Will be Absent, Minimized or Managed (Surgery Nonspecified)  Outcome: Ongoing (interventions implemented as appropriate)      Problem: Fall Risk (Adult)  Goal: Identify Related Risk Factors and Signs and Symptoms  Outcome: Ongoing (interventions implemented as appropriate)    Goal: Absence of Fall  Outcome: Ongoing (interventions implemented as appropriate)      Problem: Pain, Acute (Adult)  Goal: Identify Related Risk Factors and Signs and Symptoms  Outcome: Ongoing (interventions implemented as appropriate)    Goal: Acceptable Pain Control/Comfort Level  Outcome: Ongoing (interventions implemented as appropriate)

## 2019-05-09 ENCOUNTER — READMISSION MANAGEMENT (OUTPATIENT)
Dept: CALL CENTER | Facility: HOSPITAL | Age: 69
End: 2019-05-09

## 2019-05-09 NOTE — OUTREACH NOTE
Prep Survey      Responses   Facility patient discharged from?  [place]   Is patient eligible?  Yes   Discharge diagnosis  Thyroid goiter  hyperparathyroidism thyroidectomy and parathyroidectomy  OR for hematoma evacuation     Does the patient have one of the following disease processes/diagnoses(primary or secondary)?  General Surgery   Does the patient have Home health ordered?  No   Is there a DME ordered?  No   Prep survey completed?  Yes          Pearl Adams RN

## 2019-05-12 ENCOUNTER — READMISSION MANAGEMENT (OUTPATIENT)
Dept: CALL CENTER | Facility: HOSPITAL | Age: 69
End: 2019-05-12

## 2019-05-12 NOTE — OUTREACH NOTE
General Surgery Week 1 Survey      Responses   Facility patient discharged from?  [place]   Does the patient have one of the following disease processes/diagnoses(primary or secondary)?  General Surgery   Is there a successful TCM telephone encounter documented?  No   Week 1 attempt successful?  Yes   Call start time  0806   Call end time  0812   Discharge diagnosis  Thyroid goiter  hyperparathyroidism thyroidectomy and parathyroidectomy  OR for hematoma evacuation     Is patient permission given to speak with other caregiver?  Yes   List who call center can speak with  , Saul Ignacio reviewed with patient/caregiver?  Yes   Is the patient having any side effects they believe may be caused by any medication additions or changes?  No   Does the patient have all medications related to this admission filled (includes all antibiotics, pain medications, etc.)  Yes   Is the patient taking all medications as directed (includes completed medication regime)?  Yes   Does the patient have a follow up appointment scheduled with their surgeon?  Yes   Has the patient kept scheduled appointments due by today?  Yes   Comments  Had f/u with the surgeon   Has home health visited the patient within 72 hours of discharge?  N/A   Psychosocial issues?  No   Did the patient receive a copy of their discharge instructions?  Yes   Nursing interventions  Reviewed instructions with patient, Educated on MyChart   What is the patient's perception of their health status since discharge?  Improving   Is the patient /caregiver able to teach back basic post-op care?  Practice 'cough and deep breath', No tub bath, swimming, or hot tub until instructed by MD, Keep incision areas clean,dry and protected, Take showers only when approved by MD-sponge bathe until then, Continue use of incentive spirometry at least 1 week post discharge, Do not remove steri-strips, Lifting as instructed by MD in discharge instructions, Drive as instructed by MD  in discharge instructions   Is the patient/caregiver able to teach back signs and symptoms of incisional infection?  Increased redness, swelling or pain at the incisonal site, Increased drainage or bleeding, Incisional warmth, Pus or odor from incision, Fever   Is the patient/caregiver able to teach back steps to recovery at home?  Eat a well-balance diet, Set small, achievable goals for return to baseline health   Is the patient/caregiver able to teach back the hierarchy of who to call/visit for symptoms/problems? PCP, Specialist, Home health nurse, Urgent Care, ED, 911  Yes   Week 1 call completed?  Yes   Wrap up additional comments  Very complimentary of nursing staff on 6th floor          Marta Rodriguez RN

## 2019-05-21 ENCOUNTER — OFFICE VISIT (OUTPATIENT)
Dept: FAMILY MEDICINE CLINIC | Facility: CLINIC | Age: 69
End: 2019-05-21

## 2019-05-21 VITALS
SYSTOLIC BLOOD PRESSURE: 120 MMHG | TEMPERATURE: 98 F | BODY MASS INDEX: 28.41 KG/M2 | DIASTOLIC BLOOD PRESSURE: 80 MMHG | HEART RATE: 74 BPM | HEIGHT: 67 IN | RESPIRATION RATE: 16 BRPM | OXYGEN SATURATION: 98 % | WEIGHT: 181 LBS

## 2019-05-21 DIAGNOSIS — C18.7 MALIGNANT NEOPLASM OF SIGMOID COLON (HCC): ICD-10-CM

## 2019-05-21 DIAGNOSIS — E89.2 STATUS POST PARATHYROIDECTOMY (HCC): ICD-10-CM

## 2019-05-21 DIAGNOSIS — E89.0 POSTOPERATIVE HYPOTHYROIDISM: ICD-10-CM

## 2019-05-21 DIAGNOSIS — Z09 HOSPITAL DISCHARGE FOLLOW-UP: Primary | ICD-10-CM

## 2019-05-21 DIAGNOSIS — R06.00 DYSPNEA, UNSPECIFIED TYPE: ICD-10-CM

## 2019-05-21 DIAGNOSIS — I51.7: ICD-10-CM

## 2019-05-21 PROBLEM — Z98.890 STATUS POST PARATHYROIDECTOMY: Status: ACTIVE | Noted: 2019-05-21

## 2019-05-21 PROBLEM — Z90.89 STATUS POST PARATHYROIDECTOMY: Status: ACTIVE | Noted: 2019-05-21

## 2019-05-21 PROCEDURE — 71046 X-RAY EXAM CHEST 2 VIEWS: CPT | Performed by: PHYSICIAN ASSISTANT

## 2019-05-21 PROCEDURE — 99214 OFFICE O/P EST MOD 30 MIN: CPT | Performed by: PHYSICIAN ASSISTANT

## 2019-05-21 RX ORDER — LEVOTHYROXINE SODIUM 112 UG/1
TABLET ORAL
Refills: 3 | COMMUNITY
Start: 2019-05-06 | End: 2019-07-15 | Stop reason: ALTCHOICE

## 2019-05-21 NOTE — PATIENT INSTRUCTIONS
Low glycemic index diet  Exercise 30 minutes most days of the week  Make sure you get results on any labs or tests we ordered today  We discussed medications and how to take them as prescribed  Sleep 6-8 hours each night if possible  If you have not signed up for [employer], please activate your code ASAP from your After Visit Summary today    LDL goal <100  LDL goal if heart disease <70  HDL goal >60  Triglyceride goal <150  BP goal =<130/80  Fasting glucose <100

## 2019-05-22 ENCOUNTER — APPOINTMENT (OUTPATIENT)
Dept: CT IMAGING | Facility: HOSPITAL | Age: 69
End: 2019-05-22

## 2019-05-22 ENCOUNTER — HOSPITAL ENCOUNTER (INPATIENT)
Facility: HOSPITAL | Age: 69
LOS: 1 days | Discharge: HOME | End: 2019-05-24
Attending: EMERGENCY MEDICINE | Admitting: INTERNAL MEDICINE

## 2019-05-22 DIAGNOSIS — R77.8 ELEVATED TROPONIN: ICD-10-CM

## 2019-05-22 DIAGNOSIS — I26.99 BILATERAL PULMONARY EMBOLISM (HCC): Primary | ICD-10-CM

## 2019-05-22 LAB
25(OH)D3+25(OH)D2 SERPL-MCNC: 14.1 NG/ML (ref 30–100)
ALBUMIN SERPL-MCNC: 4.4 G/DL (ref 3.6–4.8)
ALBUMIN/GLOB SERPL: 1.6 {RATIO} (ref 1.2–2.2)
ALP SERPL-CCNC: 107 IU/L (ref 39–117)
ALT SERPL-CCNC: 8 IU/L (ref 0–32)
ANION GAP SERPL CALCULATED.3IONS-SCNC: 14.6 MMOL/L
APTT PPP: 28.3 SECONDS (ref 22.7–35.4)
AST SERPL-CCNC: 15 IU/L (ref 0–40)
BACTERIA UR QL AUTO: NORMAL /HPF
BASOPHILS # BLD AUTO: 0 X10E3/UL (ref 0–0.2)
BASOPHILS # BLD AUTO: 0.05 10*3/MM3 (ref 0–0.2)
BASOPHILS NFR BLD AUTO: 0.4 % (ref 0–1.5)
BASOPHILS NFR BLD AUTO: 1 %
BILIRUB SERPL-MCNC: 0.4 MG/DL (ref 0–1.2)
BILIRUB UR QL STRIP: NEGATIVE
BUN BLD-MCNC: 12 MG/DL (ref 8–23)
BUN SERPL-MCNC: 9 MG/DL (ref 8–27)
BUN/CREAT SERPL: 13 (ref 12–28)
BUN/CREAT SERPL: 17.6 (ref 7–25)
CA-I SERPL ISE-MCNC: 4.9 MG/DL (ref 4.5–5.6)
CALCIUM SERPL-MCNC: 9.2 MG/DL (ref 8.7–10.3)
CALCIUM SPEC-SCNC: 9.6 MG/DL (ref 8.6–10.5)
CHLORIDE SERPL-SCNC: 102 MMOL/L (ref 98–107)
CHLORIDE SERPL-SCNC: 104 MMOL/L (ref 96–106)
CLARITY UR: CLEAR
CO2 SERPL-SCNC: 23 MMOL/L (ref 20–29)
CO2 SERPL-SCNC: 24.4 MMOL/L (ref 22–29)
COLOR UR: YELLOW
CREAT BLD-MCNC: 0.68 MG/DL (ref 0.57–1)
CREAT SERPL-MCNC: 0.7 MG/DL (ref 0.57–1)
DEPRECATED RDW RBC AUTO: 46.1 FL (ref 37–54)
EOSINOPHIL # BLD AUTO: 0.03 10*3/MM3 (ref 0–0.4)
EOSINOPHIL # BLD AUTO: 0.1 X10E3/UL (ref 0–0.4)
EOSINOPHIL NFR BLD AUTO: 0.2 % (ref 0.3–6.2)
EOSINOPHIL NFR BLD AUTO: 2 %
ERYTHROCYTE [DISTWIDTH] IN BLOOD BY AUTOMATED COUNT: 14.1 % (ref 12.3–15.4)
ERYTHROCYTE [DISTWIDTH] IN BLOOD BY AUTOMATED COUNT: 14.5 % (ref 12.3–15.4)
GFR SERPL CREATININE-BSD FRML MDRD: 105 ML/MIN/1.73
GLOBULIN SER CALC-MCNC: 2.7 G/DL (ref 1.5–4.5)
GLUCOSE BLD-MCNC: 134 MG/DL (ref 65–99)
GLUCOSE SERPL-MCNC: 75 MG/DL (ref 65–99)
GLUCOSE UR STRIP-MCNC: NEGATIVE MG/DL
HCT VFR BLD AUTO: 37.4 % (ref 34–46.6)
HCT VFR BLD AUTO: 39.1 % (ref 34–46.6)
HGB BLD-MCNC: 12.1 G/DL (ref 11.1–15.9)
HGB BLD-MCNC: 12.2 G/DL (ref 12–15.9)
HGB UR QL STRIP.AUTO: ABNORMAL
HYALINE CASTS UR QL AUTO: NORMAL /LPF
IMM GRANULOCYTES # BLD AUTO: 0 X10E3/UL (ref 0–0.1)
IMM GRANULOCYTES # BLD AUTO: 0.05 10*3/MM3 (ref 0–0.05)
IMM GRANULOCYTES NFR BLD AUTO: 0 %
IMM GRANULOCYTES NFR BLD AUTO: 0.4 % (ref 0–0.5)
INR PPP: 1 (ref 0.9–1.1)
KETONES UR QL STRIP: NEGATIVE
LEUKOCYTE ESTERASE UR QL STRIP.AUTO: NEGATIVE
LYMPHOCYTES # BLD AUTO: 0.68 10*3/MM3 (ref 0.7–3.1)
LYMPHOCYTES # BLD AUTO: 1.4 X10E3/UL (ref 0.7–3.1)
LYMPHOCYTES NFR BLD AUTO: 16 %
LYMPHOCYTES NFR BLD AUTO: 5.5 % (ref 19.6–45.3)
MCH RBC QN AUTO: 27.9 PG (ref 26.6–33)
MCH RBC QN AUTO: 28 PG (ref 26.6–33)
MCHC RBC AUTO-ENTMCNC: 31.2 G/DL (ref 31.5–35.7)
MCHC RBC AUTO-ENTMCNC: 32.4 G/DL (ref 31.5–35.7)
MCV RBC AUTO: 87 FL (ref 79–97)
MCV RBC AUTO: 89.3 FL (ref 79–97)
MONOCYTES # BLD AUTO: 0.43 10*3/MM3 (ref 0.1–0.9)
MONOCYTES # BLD AUTO: 0.5 X10E3/UL (ref 0.1–0.9)
MONOCYTES NFR BLD AUTO: 3.5 % (ref 5–12)
MONOCYTES NFR BLD AUTO: 6 %
NEUTROPHILS # BLD AUTO: 11.12 10*3/MM3 (ref 1.7–7)
NEUTROPHILS # BLD AUTO: 6.6 X10E3/UL (ref 1.4–7)
NEUTROPHILS NFR BLD AUTO: 75 %
NEUTROPHILS NFR BLD AUTO: 90 % (ref 42.7–76)
NITRITE UR QL STRIP: NEGATIVE
NRBC BLD AUTO-RTO: 0 /100 WBC (ref 0–0.2)
PH UR STRIP.AUTO: 7.5 [PH] (ref 5–8)
PLATELET # BLD AUTO: 317 10*3/MM3 (ref 140–450)
PLATELET # BLD AUTO: 334 X10E3/UL (ref 150–450)
PMV BLD AUTO: 10.3 FL (ref 6–12)
POTASSIUM BLD-SCNC: 4.5 MMOL/L (ref 3.5–5.2)
POTASSIUM SERPL-SCNC: 4.3 MMOL/L (ref 3.5–5.2)
PROT SERPL-MCNC: 7.1 G/DL (ref 6–8.5)
PROT UR QL STRIP: NEGATIVE
PROTHROMBIN TIME: 12.9 SECONDS (ref 11.7–14.2)
PTH-INTACT SERPL-MCNC: 32 PG/ML (ref 15–65)
RBC # BLD AUTO: 4.32 X10E6/UL (ref 3.77–5.28)
RBC # BLD AUTO: 4.38 10*6/MM3 (ref 3.77–5.28)
RBC # UR: NORMAL /HPF
REF LAB TEST METHOD: NORMAL
SODIUM BLD-SCNC: 141 MMOL/L (ref 136–145)
SODIUM SERPL-SCNC: 143 MMOL/L (ref 134–144)
SP GR UR STRIP: 1.01 (ref 1–1.03)
SQUAMOUS #/AREA URNS HPF: NORMAL /HPF
T3FREE SERPL-MCNC: 2.7 PG/ML (ref 2–4.4)
T4 FREE SERPL-MCNC: 1.7 NG/DL (ref 0.82–1.77)
TROPONIN T SERPL-MCNC: 0.11 NG/ML (ref 0–0.03)
TSH SERPL DL<=0.005 MIU/L-ACNC: 0.99 UIU/ML (ref 0.45–4.5)
UROBILINOGEN UR QL STRIP: ABNORMAL
WBC # BLD AUTO: 8.6 X10E3/UL (ref 3.4–10.8)
WBC NRBC COR # BLD: 12.36 10*3/MM3 (ref 3.4–10.8)
WBC UR QL AUTO: NORMAL /HPF

## 2019-05-22 PROCEDURE — 99285 EMERGENCY DEPT VISIT HI MDM: CPT

## 2019-05-22 PROCEDURE — 85730 THROMBOPLASTIN TIME PARTIAL: CPT | Performed by: EMERGENCY MEDICINE

## 2019-05-22 PROCEDURE — 80048 BASIC METABOLIC PNL TOTAL CA: CPT | Performed by: EMERGENCY MEDICINE

## 2019-05-22 PROCEDURE — 93010 ELECTROCARDIOGRAM REPORT: CPT | Performed by: INTERNAL MEDICINE

## 2019-05-22 PROCEDURE — 81001 URINALYSIS AUTO W/SCOPE: CPT | Performed by: EMERGENCY MEDICINE

## 2019-05-22 PROCEDURE — 85025 COMPLETE CBC W/AUTO DIFF WBC: CPT | Performed by: EMERGENCY MEDICINE

## 2019-05-22 PROCEDURE — 84484 ASSAY OF TROPONIN QUANT: CPT | Performed by: EMERGENCY MEDICINE

## 2019-05-22 PROCEDURE — 71275 CT ANGIOGRAPHY CHEST: CPT

## 2019-05-22 PROCEDURE — 0 IOPAMIDOL PER 1 ML: Performed by: EMERGENCY MEDICINE

## 2019-05-22 PROCEDURE — 85610 PROTHROMBIN TIME: CPT | Performed by: EMERGENCY MEDICINE

## 2019-05-22 PROCEDURE — 93005 ELECTROCARDIOGRAM TRACING: CPT | Performed by: EMERGENCY MEDICINE

## 2019-05-22 PROCEDURE — 93005 ELECTROCARDIOGRAM TRACING: CPT

## 2019-05-22 RX ADMIN — SODIUM CHLORIDE 1000 ML: 9 INJECTION, SOLUTION INTRAVENOUS at 23:21

## 2019-05-22 RX ADMIN — IOPAMIDOL 95 ML: 755 INJECTION, SOLUTION INTRAVENOUS at 23:36

## 2019-05-23 ENCOUNTER — TELEPHONE (OUTPATIENT)
Dept: FAMILY MEDICINE CLINIC | Facility: CLINIC | Age: 69
End: 2019-05-23

## 2019-05-23 ENCOUNTER — APPOINTMENT (OUTPATIENT)
Dept: CARDIOLOGY | Facility: HOSPITAL | Age: 69
End: 2019-05-23

## 2019-05-23 PROBLEM — R77.8 ELEVATED TROPONIN: Status: ACTIVE | Noted: 2019-05-23

## 2019-05-23 PROBLEM — I26.99 BILATERAL PULMONARY EMBOLISM (HCC): Status: ACTIVE | Noted: 2019-05-23

## 2019-05-23 PROBLEM — R79.89 ELEVATED TROPONIN: Status: ACTIVE | Noted: 2019-05-23

## 2019-05-23 PROBLEM — R49.9 VOICE DISTURBANCE: Status: ACTIVE | Noted: 2019-05-23

## 2019-05-23 LAB
ALBUMIN SERPL-MCNC: 3.8 G/DL (ref 3.5–5.2)
ANION GAP SERPL CALCULATED.3IONS-SCNC: 9.4 MMOL/L
AORTIC DIMENSIONLESS INDEX: 0.6 (DI)
APTT PPP: 116.4 SECONDS (ref 22.7–35.4)
APTT PPP: 79 SECONDS (ref 22.7–35.4)
APTT PPP: >200 SECONDS (ref 22.7–35.4)
BH CV ECHO MEAS - AO MAX PG (FULL): 3.5 MMHG
BH CV ECHO MEAS - AO MAX PG: 7.7 MMHG
BH CV ECHO MEAS - AO MEAN PG (FULL): 3 MMHG
BH CV ECHO MEAS - AO MEAN PG: 5 MMHG
BH CV ECHO MEAS - AO ROOT AREA (BSA CORRECTED): 1.3
BH CV ECHO MEAS - AO ROOT AREA: 5.3 CM^2
BH CV ECHO MEAS - AO ROOT DIAM: 2.6 CM
BH CV ECHO MEAS - AO V2 MAX: 139 CM/SEC
BH CV ECHO MEAS - AO V2 MEAN: 99.3 CM/SEC
BH CV ECHO MEAS - AO V2 VTI: 31.1 CM
BH CV ECHO MEAS - ASC AORTA: 2.7 CM
BH CV ECHO MEAS - AVA(I,A): 1.7 CM^2
BH CV ECHO MEAS - AVA(I,D): 1.7 CM^2
BH CV ECHO MEAS - AVA(V,A): 2.1 CM^2
BH CV ECHO MEAS - AVA(V,D): 2.1 CM^2
BH CV ECHO MEAS - BSA(HAYCOCK): 2 M^2
BH CV ECHO MEAS - BSA: 2 M^2
BH CV ECHO MEAS - BZI_BMI: 29 KILOGRAMS/M^2
BH CV ECHO MEAS - BZI_METRIC_HEIGHT: 170 CM
BH CV ECHO MEAS - BZI_METRIC_WEIGHT: 83.9 KG
BH CV ECHO MEAS - EDV(CUBED): 125 ML
BH CV ECHO MEAS - EDV(MOD-SP2): 68 ML
BH CV ECHO MEAS - EDV(MOD-SP4): 53 ML
BH CV ECHO MEAS - EDV(TEICH): 118.2 ML
BH CV ECHO MEAS - EF(CUBED): 73.8 %
BH CV ECHO MEAS - EF(MOD-BP): 60 %
BH CV ECHO MEAS - EF(MOD-SP2): 60.3 %
BH CV ECHO MEAS - EF(MOD-SP4): 64.2 %
BH CV ECHO MEAS - EF(TEICH): 65.4 %
BH CV ECHO MEAS - ESV(CUBED): 32.8 ML
BH CV ECHO MEAS - ESV(MOD-SP2): 27 ML
BH CV ECHO MEAS - ESV(MOD-SP4): 19 ML
BH CV ECHO MEAS - ESV(TEICH): 41 ML
BH CV ECHO MEAS - FS: 36 %
BH CV ECHO MEAS - IVS/LVPW: 0.88
BH CV ECHO MEAS - IVSD: 0.7 CM
BH CV ECHO MEAS - LAT PEAK E' VEL: 10.7 CM/SEC
BH CV ECHO MEAS - LV DIASTOLIC VOL/BSA (35-75): 27.1 ML/M^2
BH CV ECHO MEAS - LV MASS(C)D: 125.1 GRAMS
BH CV ECHO MEAS - LV MASS(C)DI: 64 GRAMS/M^2
BH CV ECHO MEAS - LV MAX PG: 4.2 MMHG
BH CV ECHO MEAS - LV MEAN PG: 2 MMHG
BH CV ECHO MEAS - LV SYSTOLIC VOL/BSA (12-30): 9.7 ML/M^2
BH CV ECHO MEAS - LV V1 MAX: 103 CM/SEC
BH CV ECHO MEAS - LV V1 MEAN: 64.6 CM/SEC
BH CV ECHO MEAS - LV V1 VTI: 18.8 CM
BH CV ECHO MEAS - LVIDD: 5 CM
BH CV ECHO MEAS - LVIDS: 3.2 CM
BH CV ECHO MEAS - LVLD AP2: 7.1 CM
BH CV ECHO MEAS - LVLD AP4: 7.1 CM
BH CV ECHO MEAS - LVLS AP2: 5.9 CM
BH CV ECHO MEAS - LVLS AP4: 6.2 CM
BH CV ECHO MEAS - LVOT AREA (M): 2.8 CM^2
BH CV ECHO MEAS - LVOT AREA: 2.8 CM^2
BH CV ECHO MEAS - LVOT DIAM: 1.9 CM
BH CV ECHO MEAS - LVPWD: 0.8 CM
BH CV ECHO MEAS - MED PEAK E' VEL: 7.4 CM/SEC
BH CV ECHO MEAS - MV A DUR: 197 SEC
BH CV ECHO MEAS - MV A MAX VEL: 59.3 CM/SEC
BH CV ECHO MEAS - MV DEC SLOPE: 249 CM/SEC^2
BH CV ECHO MEAS - MV DEC TIME: 225 SEC
BH CV ECHO MEAS - MV E MAX VEL: 61 CM/SEC
BH CV ECHO MEAS - MV E/A: 1
BH CV ECHO MEAS - MV MAX PG: 2.1 MMHG
BH CV ECHO MEAS - MV MEAN PG: 1 MMHG
BH CV ECHO MEAS - MV P1/2T MAX VEL: 71.9 CM/SEC
BH CV ECHO MEAS - MV P1/2T: 84.6 MSEC
BH CV ECHO MEAS - MV V2 MAX: 72.8 CM/SEC
BH CV ECHO MEAS - MV V2 MEAN: 48.4 CM/SEC
BH CV ECHO MEAS - MV V2 VTI: 23.1 CM
BH CV ECHO MEAS - MVA P1/2T LCG: 3.1 CM^2
BH CV ECHO MEAS - MVA(P1/2T): 2.6 CM^2
BH CV ECHO MEAS - MVA(VTI): 2.3 CM^2
BH CV ECHO MEAS - PA ACC TIME: 0.2 SEC
BH CV ECHO MEAS - PA MAX PG (FULL): 0.63 MMHG
BH CV ECHO MEAS - PA MAX PG: 1.7 MMHG
BH CV ECHO MEAS - PA PR(ACCEL): -9.7 MMHG
BH CV ECHO MEAS - PA V2 MAX: 65.2 CM/SEC
BH CV ECHO MEAS - PULM A REVS DUR: 0.15 SEC
BH CV ECHO MEAS - PULM A REVS VEL: 28.1 CM/SEC
BH CV ECHO MEAS - PULM DIAS VEL: 26.4 CM/SEC
BH CV ECHO MEAS - PULM S/D: 1.4
BH CV ECHO MEAS - PULM SYS VEL: 38 CM/SEC
BH CV ECHO MEAS - PVA(V,A): 3.9 CM^2
BH CV ECHO MEAS - PVA(V,D): 3.9 CM^2
BH CV ECHO MEAS - QP/QS: 1.3
BH CV ECHO MEAS - RAP SYSTOLE: 8 MMHG
BH CV ECHO MEAS - RV MAX PG: 1.1 MMHG
BH CV ECHO MEAS - RV MEAN PG: 0 MMHG
BH CV ECHO MEAS - RV V1 MAX: 51.7 CM/SEC
BH CV ECHO MEAS - RV V1 MEAN: 31.1 CM/SEC
BH CV ECHO MEAS - RV V1 VTI: 14 CM
BH CV ECHO MEAS - RVOT AREA: 4.9 CM^2
BH CV ECHO MEAS - RVOT DIAM: 2.5 CM
BH CV ECHO MEAS - RVSP: 44 MMHG
BH CV ECHO MEAS - SI(AO): 84.5 ML/M^2
BH CV ECHO MEAS - SI(CUBED): 47.2 ML/M^2
BH CV ECHO MEAS - SI(LVOT): 27.3 ML/M^2
BH CV ECHO MEAS - SI(MOD-SP2): 21 ML/M^2
BH CV ECHO MEAS - SI(MOD-SP4): 17.4 ML/M^2
BH CV ECHO MEAS - SI(TEICH): 39.5 ML/M^2
BH CV ECHO MEAS - SV(AO): 165.1 ML
BH CV ECHO MEAS - SV(CUBED): 92.2 ML
BH CV ECHO MEAS - SV(LVOT): 53.3 ML
BH CV ECHO MEAS - SV(MOD-SP2): 41 ML
BH CV ECHO MEAS - SV(MOD-SP4): 34 ML
BH CV ECHO MEAS - SV(RVOT): 68.7 ML
BH CV ECHO MEAS - SV(TEICH): 77.3 ML
BH CV ECHO MEAS - TAPSE (>1.6): 1.9 CM2
BH CV ECHO MEAS - TR MAX VEL: 303 CM/SEC
BH CV ECHO MEASUREMENTS AVERAGE E/E' RATIO: 6.74
BH CV LOWER VASCULAR LEFT COMMON FEMORAL AUGMENT: NORMAL
BH CV LOWER VASCULAR LEFT COMMON FEMORAL COMPETENT: NORMAL
BH CV LOWER VASCULAR LEFT COMMON FEMORAL COMPRESS: NORMAL
BH CV LOWER VASCULAR LEFT COMMON FEMORAL PHASIC: NORMAL
BH CV LOWER VASCULAR LEFT COMMON FEMORAL SPONT: NORMAL
BH CV LOWER VASCULAR LEFT DISTAL FEMORAL COMPRESS: NORMAL
BH CV LOWER VASCULAR LEFT GASTRONEMIUS COMPRESS: NORMAL
BH CV LOWER VASCULAR LEFT GREATER SAPH AK COMPRESS: NORMAL
BH CV LOWER VASCULAR LEFT GREATER SAPH BK COMPRESS: NORMAL
BH CV LOWER VASCULAR LEFT MID FEMORAL AUGMENT: NORMAL
BH CV LOWER VASCULAR LEFT MID FEMORAL COMPETENT: NORMAL
BH CV LOWER VASCULAR LEFT MID FEMORAL COMPRESS: NORMAL
BH CV LOWER VASCULAR LEFT MID FEMORAL PHASIC: NORMAL
BH CV LOWER VASCULAR LEFT MID FEMORAL SPONT: NORMAL
BH CV LOWER VASCULAR LEFT PERONEAL COMPRESS: NORMAL
BH CV LOWER VASCULAR LEFT POPLITEAL AUGMENT: NORMAL
BH CV LOWER VASCULAR LEFT POPLITEAL COMPETENT: NORMAL
BH CV LOWER VASCULAR LEFT POPLITEAL COMPRESS: NORMAL
BH CV LOWER VASCULAR LEFT POPLITEAL PHASIC: NORMAL
BH CV LOWER VASCULAR LEFT POPLITEAL SPONT: NORMAL
BH CV LOWER VASCULAR LEFT POSTERIOR TIBIAL COMPRESS: NORMAL
BH CV LOWER VASCULAR LEFT PROXIMAL FEMORAL COMPRESS: NORMAL
BH CV LOWER VASCULAR LEFT SAPHENOFEMORAL JUNCTION AUGMENT: NORMAL
BH CV LOWER VASCULAR LEFT SAPHENOFEMORAL JUNCTION COMPETENT: NORMAL
BH CV LOWER VASCULAR LEFT SAPHENOFEMORAL JUNCTION COMPRESS: NORMAL
BH CV LOWER VASCULAR LEFT SAPHENOFEMORAL JUNCTION PHASIC: NORMAL
BH CV LOWER VASCULAR LEFT SAPHENOFEMORAL JUNCTION SPONT: NORMAL
BH CV LOWER VASCULAR RIGHT COMMON FEMORAL AUGMENT: NORMAL
BH CV LOWER VASCULAR RIGHT COMMON FEMORAL COMPETENT: NORMAL
BH CV LOWER VASCULAR RIGHT COMMON FEMORAL COMPRESS: NORMAL
BH CV LOWER VASCULAR RIGHT COMMON FEMORAL PHASIC: NORMAL
BH CV LOWER VASCULAR RIGHT COMMON FEMORAL SPONT: NORMAL
BH CV LOWER VASCULAR RIGHT DISTAL FEMORAL COMPRESS: NORMAL
BH CV LOWER VASCULAR RIGHT GASTRONEMIUS COMPRESS: NORMAL
BH CV LOWER VASCULAR RIGHT GREATER SAPH AK COMPRESS: NORMAL
BH CV LOWER VASCULAR RIGHT GREATER SAPH BK COMPRESS: NORMAL
BH CV LOWER VASCULAR RIGHT MID FEMORAL AUGMENT: NORMAL
BH CV LOWER VASCULAR RIGHT MID FEMORAL COMPETENT: NORMAL
BH CV LOWER VASCULAR RIGHT MID FEMORAL COMPRESS: NORMAL
BH CV LOWER VASCULAR RIGHT MID FEMORAL PHASIC: NORMAL
BH CV LOWER VASCULAR RIGHT MID FEMORAL SPONT: NORMAL
BH CV LOWER VASCULAR RIGHT PERONEAL COMPRESS: NORMAL
BH CV LOWER VASCULAR RIGHT POPLITEAL AUGMENT: NORMAL
BH CV LOWER VASCULAR RIGHT POPLITEAL COMPETENT: NORMAL
BH CV LOWER VASCULAR RIGHT POPLITEAL COMPRESS: NORMAL
BH CV LOWER VASCULAR RIGHT POPLITEAL PHASIC: NORMAL
BH CV LOWER VASCULAR RIGHT POPLITEAL SPONT: NORMAL
BH CV LOWER VASCULAR RIGHT POSTERIOR TIBIAL COMPRESS: NORMAL
BH CV LOWER VASCULAR RIGHT PROXIMAL FEMORAL COMPRESS: NORMAL
BH CV LOWER VASCULAR RIGHT SAPHENOFEMORAL JUNCTION AUGMENT: NORMAL
BH CV LOWER VASCULAR RIGHT SAPHENOFEMORAL JUNCTION COMPETENT: NORMAL
BH CV LOWER VASCULAR RIGHT SAPHENOFEMORAL JUNCTION COMPRESS: NORMAL
BH CV LOWER VASCULAR RIGHT SAPHENOFEMORAL JUNCTION PHASIC: NORMAL
BH CV LOWER VASCULAR RIGHT SAPHENOFEMORAL JUNCTION SPONT: NORMAL
BH CV XLRA - RV BASE: 2.95 CM
BH CV XLRA - TDI S': 8.59 CM/SEC
BUN BLD-MCNC: 8 MG/DL (ref 8–23)
BUN/CREAT SERPL: 12.1 (ref 7–25)
CALCIUM SPEC-SCNC: 8.2 MG/DL (ref 8.6–10.5)
CHLORIDE SERPL-SCNC: 106 MMOL/L (ref 98–107)
CO2 SERPL-SCNC: 25.6 MMOL/L (ref 22–29)
CREAT BLD-MCNC: 0.66 MG/DL (ref 0.57–1)
DEPRECATED RDW RBC AUTO: 48.3 FL (ref 37–54)
ERYTHROCYTE [DISTWIDTH] IN BLOOD BY AUTOMATED COUNT: 14.4 % (ref 12.3–15.4)
GFR SERPL CREATININE-BSD FRML MDRD: 108 ML/MIN/1.73
GLUCOSE BLD-MCNC: 98 MG/DL (ref 65–99)
HCT VFR BLD AUTO: 36 % (ref 34–46.6)
HGB BLD-MCNC: 11.3 G/DL (ref 12–15.9)
LEFT ATRIUM VOLUME INDEX: 20.6 ML/M2
LV EF 2D ECHO EST: 60 %
MAXIMAL PREDICTED HEART RATE: 153 BPM
MCH RBC QN AUTO: 28.5 PG (ref 26.6–33)
MCHC RBC AUTO-ENTMCNC: 31.4 G/DL (ref 31.5–35.7)
MCV RBC AUTO: 90.9 FL (ref 79–97)
NT-PROBNP SERPL-MCNC: 636.3 PG/ML (ref 5–900)
PLATELET # BLD AUTO: 268 10*3/MM3 (ref 140–450)
PMV BLD AUTO: 10 FL (ref 6–12)
POTASSIUM BLD-SCNC: 4.1 MMOL/L (ref 3.5–5.2)
RBC # BLD AUTO: 3.96 10*6/MM3 (ref 3.77–5.28)
SODIUM BLD-SCNC: 141 MMOL/L (ref 136–145)
STRESS TARGET HR: 130 BPM
TROPONIN T SERPL-MCNC: 0.09 NG/ML (ref 0–0.03)
WBC NRBC COR # BLD: 9.29 10*3/MM3 (ref 3.4–10.8)

## 2019-05-23 PROCEDURE — 85730 THROMBOPLASTIN TIME PARTIAL: CPT | Performed by: HOSPITALIST

## 2019-05-23 PROCEDURE — 82040 ASSAY OF SERUM ALBUMIN: CPT | Performed by: INTERNAL MEDICINE

## 2019-05-23 PROCEDURE — 25010000002 HEPARIN (PORCINE) PER 1000 UNITS: Performed by: EMERGENCY MEDICINE

## 2019-05-23 PROCEDURE — 93970 EXTREMITY STUDY: CPT

## 2019-05-23 PROCEDURE — 84484 ASSAY OF TROPONIN QUANT: CPT | Performed by: NURSE PRACTITIONER

## 2019-05-23 PROCEDURE — 83880 ASSAY OF NATRIURETIC PEPTIDE: CPT | Performed by: NURSE PRACTITIONER

## 2019-05-23 PROCEDURE — 85027 COMPLETE CBC AUTOMATED: CPT | Performed by: NURSE PRACTITIONER

## 2019-05-23 PROCEDURE — 85730 THROMBOPLASTIN TIME PARTIAL: CPT | Performed by: INTERNAL MEDICINE

## 2019-05-23 PROCEDURE — 99222 1ST HOSP IP/OBS MODERATE 55: CPT | Performed by: INTERNAL MEDICINE

## 2019-05-23 PROCEDURE — 36415 COLL VENOUS BLD VENIPUNCTURE: CPT | Performed by: NURSE PRACTITIONER

## 2019-05-23 PROCEDURE — 93306 TTE W/DOPPLER COMPLETE: CPT

## 2019-05-23 PROCEDURE — 93306 TTE W/DOPPLER COMPLETE: CPT | Performed by: INTERNAL MEDICINE

## 2019-05-23 PROCEDURE — 80048 BASIC METABOLIC PNL TOTAL CA: CPT | Performed by: NURSE PRACTITIONER

## 2019-05-23 RX ORDER — NITROGLYCERIN 0.4 MG/1
0.4 TABLET SUBLINGUAL
Status: DISCONTINUED | OUTPATIENT
Start: 2019-05-23 | End: 2019-05-24 | Stop reason: HOSPADM

## 2019-05-23 RX ORDER — SODIUM CHLORIDE 0.9 % (FLUSH) 0.9 %
3 SYRINGE (ML) INJECTION EVERY 12 HOURS SCHEDULED
Status: DISCONTINUED | OUTPATIENT
Start: 2019-05-23 | End: 2019-05-23

## 2019-05-23 RX ORDER — HEPARIN SODIUM 5000 [USP'U]/ML
40-80 INJECTION, SOLUTION INTRAVENOUS; SUBCUTANEOUS EVERY 6 HOURS PRN
Status: DISCONTINUED | OUTPATIENT
Start: 2019-05-23 | End: 2019-05-24 | Stop reason: HOSPADM

## 2019-05-23 RX ORDER — HEPARIN SODIUM 5000 [USP'U]/ML
80 INJECTION, SOLUTION INTRAVENOUS; SUBCUTANEOUS ONCE
Status: COMPLETED | OUTPATIENT
Start: 2019-05-23 | End: 2019-05-23

## 2019-05-23 RX ORDER — HEPARIN SODIUM 10000 [USP'U]/100ML
17.9 INJECTION, SOLUTION INTRAVENOUS
Status: DISCONTINUED | OUTPATIENT
Start: 2019-05-23 | End: 2019-05-24 | Stop reason: HOSPADM

## 2019-05-23 RX ORDER — ONDANSETRON 2 MG/ML
4 INJECTION INTRAMUSCULAR; INTRAVENOUS EVERY 6 HOURS PRN
Status: DISCONTINUED | OUTPATIENT
Start: 2019-05-23 | End: 2019-05-24 | Stop reason: HOSPADM

## 2019-05-23 RX ORDER — SODIUM CHLORIDE 0.9 % (FLUSH) 0.9 %
1-10 SYRINGE (ML) INJECTION AS NEEDED
Status: DISCONTINUED | OUTPATIENT
Start: 2019-05-23 | End: 2019-05-24 | Stop reason: HOSPADM

## 2019-05-23 RX ORDER — ACETAMINOPHEN 325 MG/1
650 TABLET ORAL EVERY 4 HOURS PRN
Status: DISCONTINUED | OUTPATIENT
Start: 2019-05-23 | End: 2019-05-24 | Stop reason: HOSPADM

## 2019-05-23 RX ORDER — LEVOTHYROXINE SODIUM 112 UG/1
112 TABLET ORAL
Status: DISCONTINUED | OUTPATIENT
Start: 2019-05-23 | End: 2019-05-24 | Stop reason: HOSPADM

## 2019-05-23 RX ORDER — ALBUTEROL SULFATE 2.5 MG/3ML
2.5 SOLUTION RESPIRATORY (INHALATION) EVERY 6 HOURS PRN
Status: DISCONTINUED | OUTPATIENT
Start: 2019-05-23 | End: 2019-05-24 | Stop reason: HOSPADM

## 2019-05-23 RX ORDER — ONDANSETRON 4 MG/1
4 TABLET, FILM COATED ORAL EVERY 6 HOURS PRN
Status: DISCONTINUED | OUTPATIENT
Start: 2019-05-23 | End: 2019-05-24 | Stop reason: HOSPADM

## 2019-05-23 RX ADMIN — HEPARIN SODIUM 17.9 UNITS/KG/HR: 10000 INJECTION, SOLUTION INTRAVENOUS at 00:59

## 2019-05-23 RX ADMIN — LEVOTHYROXINE SODIUM 112 MCG: 0.11 TABLET ORAL at 10:44

## 2019-05-23 RX ADMIN — HEPARIN SODIUM 6700 UNITS: 5000 INJECTION INTRAVENOUS; SUBCUTANEOUS at 00:57

## 2019-05-23 NOTE — TELEPHONE ENCOUNTER
Just called her and sending [id number]; letting her know I saw ER and admit notes and know she had to be scared.  She is better now and spouse with her.  Inpatient.  I asked to to mychart message me if need me.  So thankful she went to ER.  Praying for her.

## 2019-05-23 NOTE — TELEPHONE ENCOUNTER
----- Message from Gema Johnson PA-C sent at 5/22/2019  9:38 PM EDT -----  No pneumonia; slight scoliosis      Notes recorded by Gema Johnson PA-C on 5/22/2019 at 10:32 PM EDT  Heart size noted borderline enlg; I want echo  ------    Notes recorded by Gema Johnson PA-C on 5/22/2019 at 10:07 PM EDT  PTH fine; Ca+ fine;  Thyroid looks okay; fax copy labs DR Daly;  I want him to adjust her Vit D.  He just did surgery on her

## 2019-05-24 VITALS
SYSTOLIC BLOOD PRESSURE: 173 MMHG | OXYGEN SATURATION: 94 % | BODY MASS INDEX: 28.72 KG/M2 | RESPIRATION RATE: 16 BRPM | TEMPERATURE: 98 F | DIASTOLIC BLOOD PRESSURE: 82 MMHG | HEIGHT: 67 IN | HEART RATE: 80 BPM | WEIGHT: 182.98 LBS

## 2019-05-24 LAB
ALBUMIN SERPL-MCNC: 3.6 G/DL (ref 3.5–5.2)
ANION GAP SERPL CALCULATED.3IONS-SCNC: 9.5 MMOL/L
APTT PPP: 79.6 SECONDS (ref 22.7–35.4)
BASOPHILS # BLD AUTO: 0.06 10*3/MM3 (ref 0–0.2)
BASOPHILS NFR BLD AUTO: 0.9 % (ref 0–1.5)
BUN BLD-MCNC: 9 MG/DL (ref 8–23)
BUN/CREAT SERPL: 14.5 (ref 7–25)
CALCIUM SPEC-SCNC: 8.4 MG/DL (ref 8.6–10.5)
CHLORIDE SERPL-SCNC: 106 MMOL/L (ref 98–107)
CO2 SERPL-SCNC: 25.5 MMOL/L (ref 22–29)
CREAT BLD-MCNC: 0.62 MG/DL (ref 0.57–1)
DEPRECATED RDW RBC AUTO: 49.9 FL (ref 37–54)
EOSINOPHIL # BLD AUTO: 0.2 10*3/MM3 (ref 0–0.4)
EOSINOPHIL NFR BLD AUTO: 3.1 % (ref 0.3–6.2)
ERYTHROCYTE [DISTWIDTH] IN BLOOD BY AUTOMATED COUNT: 14.3 % (ref 12.3–15.4)
GFR SERPL CREATININE-BSD FRML MDRD: 116 ML/MIN/1.73
GLUCOSE BLD-MCNC: 80 MG/DL (ref 65–99)
HCT VFR BLD AUTO: 37.4 % (ref 34–46.6)
HGB BLD-MCNC: 11.2 G/DL (ref 12–15.9)
IMM GRANULOCYTES # BLD AUTO: 0.02 10*3/MM3 (ref 0–0.05)
IMM GRANULOCYTES NFR BLD AUTO: 0.3 % (ref 0–0.5)
LYMPHOCYTES # BLD AUTO: 1.31 10*3/MM3 (ref 0.7–3.1)
LYMPHOCYTES NFR BLD AUTO: 20.1 % (ref 19.6–45.3)
MCH RBC QN AUTO: 28 PG (ref 26.6–33)
MCHC RBC AUTO-ENTMCNC: 29.9 G/DL (ref 31.5–35.7)
MCV RBC AUTO: 93.5 FL (ref 79–97)
MONOCYTES # BLD AUTO: 0.46 10*3/MM3 (ref 0.1–0.9)
MONOCYTES NFR BLD AUTO: 7.1 % (ref 5–12)
NEUTROPHILS # BLD AUTO: 4.46 10*3/MM3 (ref 1.7–7)
NEUTROPHILS NFR BLD AUTO: 68.5 % (ref 42.7–76)
NRBC BLD AUTO-RTO: 0 /100 WBC (ref 0–0.2)
PHOSPHATE SERPL-MCNC: 3.6 MG/DL (ref 2.5–4.5)
PLATELET # BLD AUTO: 237 10*3/MM3 (ref 140–450)
PMV BLD AUTO: 10.4 FL (ref 6–12)
POTASSIUM BLD-SCNC: 4 MMOL/L (ref 3.5–5.2)
RBC # BLD AUTO: 4 10*6/MM3 (ref 3.77–5.28)
SODIUM BLD-SCNC: 141 MMOL/L (ref 136–145)
WBC NRBC COR # BLD: 6.51 10*3/MM3 (ref 3.4–10.8)

## 2019-05-24 PROCEDURE — 80069 RENAL FUNCTION PANEL: CPT | Performed by: INTERNAL MEDICINE

## 2019-05-24 PROCEDURE — 99232 SBSQ HOSP IP/OBS MODERATE 35: CPT | Performed by: INTERNAL MEDICINE

## 2019-05-24 PROCEDURE — 85025 COMPLETE CBC W/AUTO DIFF WBC: CPT | Performed by: EMERGENCY MEDICINE

## 2019-05-24 PROCEDURE — 36415 COLL VENOUS BLD VENIPUNCTURE: CPT | Performed by: EMERGENCY MEDICINE

## 2019-05-24 PROCEDURE — 85730 THROMBOPLASTIN TIME PARTIAL: CPT | Performed by: EMERGENCY MEDICINE

## 2019-05-24 RX ADMIN — LEVOTHYROXINE SODIUM 112 MCG: 0.11 TABLET ORAL at 05:45

## 2019-05-25 ENCOUNTER — READMISSION MANAGEMENT (OUTPATIENT)
Dept: CALL CENTER | Facility: HOSPITAL | Age: 69
End: 2019-05-25

## 2019-05-25 NOTE — OUTREACH NOTE
Prep Survey      Responses   Facility patient discharged from?  [place]   Is patient eligible?  Yes   Discharge diagnosis  Bilateral pulmonary embolism   Does the patient have one of the following disease processes/diagnoses(primary or secondary)?  Other   Does the patient have Home health ordered?  No   Is there a DME ordered?  No   Prep survey completed?  Yes          Luz Kang RN

## 2019-05-27 ENCOUNTER — TELEPHONE (OUTPATIENT)
Dept: FAMILY MEDICINE CLINIC | Facility: CLINIC | Age: 69
End: 2019-05-27

## 2019-05-27 ENCOUNTER — READMISSION MANAGEMENT (OUTPATIENT)
Dept: CALL CENTER | Facility: HOSPITAL | Age: 69
End: 2019-05-27

## 2019-05-27 DIAGNOSIS — I26.99 BILATERAL PULMONARY EMBOLISM (HCC): Primary | ICD-10-CM

## 2019-05-27 NOTE — OUTREACH NOTE
Medical Week 1 Survey      Responses   Facility patient discharged from?  [place]   Does the patient have one of the following disease processes/diagnoses(primary or secondary)?  Other   Is there a successful TCM telephone encounter documented?  No   Week 1 attempt successful?  Yes   Call start time  1427   Call end time  1432   Discharge diagnosis  Bilateral pulmonary embolism   Meds reviewed with patient/caregiver?  Yes   Is the patient having any side effects they believe may be caused by any medication additions or changes?  No   Does the patient have all medications ordered at discharge?  Yes   Is the patient taking all medications as directed (includes completed medication regime)?  Yes   Medication comments  States she thinks her synthroid is too strong and it is causing her to have side effects. States she has called her ENT.   Does the patient have a primary care provider?   Yes   Does the patient have an appointment with their PCP within 7 days of discharge?  Yes   Comments regarding PCP  Gema Gonzalez PA-C   Has the patient kept scheduled appointments due by today?  N/A   Has home health visited the patient within 72 hours of discharge?  N/A   Psychosocial issues?  No   Did the patient receive a copy of their discharge instructions?  Yes   Nursing interventions  Reviewed instructions with patient   What is the patient's perception of their health status since discharge?  Improving   Is the patient/caregiver able to teach back signs and symptoms related to disease process for when to call PCP?  Yes   Is the patient/caregiver able to teach back signs and symptoms related to disease process for when to call 911?  Yes   Is the patient/caregiver able to teach back the hierarchy of who to call/visit for symptoms/problems? PCP, Specialist, Home health nurse, Urgent Care, ED, 911  Yes   Week 1 call completed?  Yes   Wrap up additional comments  States she is doing well other than she thinks her synthroid dose is  too high. She is calling ENT again in the morning. Verified she has number for healthline.           Emilee Lowe RN

## 2019-05-27 NOTE — TELEPHONE ENCOUNTER
I called her from home today.  Still SOA but improving; wanting to call Dr. Daly tomorrow and talk about lowering thyroid dose.  Feels like heart races and sweats on this.  Feels poorly.  I told her that I would collaborate with Dr. Fernandez about f/u and duration of anticoagulation.  He may want hematology to decide this and see her.  Pt has been to Dr. Leggett in past when I was concerned about Ca+ high initially.           5-28-19 d/w DR Fernandez and DOES want her to see hematologist for the PE; wants to make sure no other clotting d/o..  I will place referral.

## 2019-06-05 ENCOUNTER — READMISSION MANAGEMENT (OUTPATIENT)
Dept: CALL CENTER | Facility: HOSPITAL | Age: 69
End: 2019-06-05

## 2019-06-05 NOTE — OUTREACH NOTE
Medical Week 2 Survey      Responses   Facility patient discharged from?  [place]   Does the patient have one of the following disease processes/diagnoses(primary or secondary)?  Other   Week 2 attempt successful?  Yes   Rescheduled  Rescheduled-pt requested [Patient not home at this time, rescheduled.]          Marta Rodriguez RN

## 2019-06-07 ENCOUNTER — READMISSION MANAGEMENT (OUTPATIENT)
Dept: CALL CENTER | Facility: HOSPITAL | Age: 69
End: 2019-06-07

## 2019-06-07 NOTE — OUTREACH NOTE
Medical Week 2 Survey      Responses   Facility patient discharged from?  [place]   Does the patient have one of the following disease processes/diagnoses(primary or secondary)?  Other   Week 2 attempt successful?  Yes   Call start time  1540   Discharge diagnosis  Bilateral pulmonary embolism   Call end time  1543   Meds reviewed with patient/caregiver?  Yes   Is the patient having any side effects they believe may be caused by any medication additions or changes?  No   Does the patient have all medications ordered at discharge?  Yes   Is the patient taking all medications as directed (includes completed medication regime)?  Yes   Medication comments  Spoke to her to have PCP or HemOnc to address her questions on whether she needs Eliquis refill soon.   Does the patient have a primary care provider?   Yes   Does the patient have an appointment with their PCP within 7 days of discharge?  Yes   Has the patient kept scheduled appointments due by today?  Yes   Comments  Reviewed future appt.   Has home health visited the patient within 72 hours of discharge?  N/A   Psychosocial issues?  No   Did the patient receive a copy of their discharge instructions?  Yes   Nursing interventions  Reviewed instructions with patient   What is the patient's perception of their health status since discharge?  Improving   Is the patient/caregiver able to teach back signs and symptoms related to disease process for when to call PCP?  Yes   Is the patient/caregiver able to teach back signs and symptoms related to disease process for when to call 911?  Yes   Is the patient/caregiver able to teach back the hierarchy of who to call/visit for symptoms/problems? PCP, Specialist, Home health nurse, Urgent Care, ED, 911  Yes   Week 2 Call Completed?  Yes          Carlos Moran RN

## 2019-06-11 ENCOUNTER — NURSE TRIAGE (OUTPATIENT)
Dept: CALL CENTER | Facility: HOSPITAL | Age: 69
End: 2019-06-11

## 2019-06-11 NOTE — TELEPHONE ENCOUNTER
"Caller needs assistance in finding a new doctor.  Instructed caller to go to [facility] website and go under provider info and also to ask friends and family if they had a recommendation.  Questions answered.    Reason for Disposition  • General information question, no triage required and triager able to answer question    Additional Information  • Negative: [1] Caller is not with the adult (patient) AND [2] reporting urgent symptoms  • Negative: Lab result questions  • Negative: Medication questions  • Negative: Caller cannot be reached by phone  • Negative: Caller has already spoken to PCP or another triager  • Negative: RN needs further essential information from caller in order to complete triage  • Negative: Requesting regular office appointment  • Negative: [1] Caller requesting NON-URGENT health information AND [2] PCP's office is the best resource  • Negative: Health Information question, no triage required and triager able to answer question    Answer Assessment - Initial Assessment Questions  1. REASON FOR CALL or QUESTION: \"What is your reason for calling today?\" or \"How can I best help you?\" or \"What question do you have that I can help answer?\"      I need help finding a new doctor.    Protocols used: INFORMATION ONLY CALL-ADULT-      "

## 2019-06-11 NOTE — TELEPHONE ENCOUNTER
"Called pt - notified that she needs hospital f/u.  Pt states that you are \"milking her dry\" as she has to do labs for Dr. Daly and she does not understand why she needs to be seen by you.    I explained that [facility] paperwork [facility].  Transferred pt to scheduling at her request.  "

## 2019-06-12 ENCOUNTER — TRANSCRIBE ORDERS (OUTPATIENT)
Dept: ADMINISTRATIVE | Facility: HOSPITAL | Age: 69
End: 2019-06-12

## 2019-06-12 DIAGNOSIS — Z12.31 SCREENING MAMMOGRAM, ENCOUNTER FOR: Primary | ICD-10-CM

## 2019-06-14 ENCOUNTER — OFFICE VISIT (OUTPATIENT)
Dept: RETAIL CLINIC | Facility: CLINIC | Age: 69
End: 2019-06-14

## 2019-06-14 ENCOUNTER — READMISSION MANAGEMENT (OUTPATIENT)
Dept: CALL CENTER | Facility: HOSPITAL | Age: 69
End: 2019-06-14

## 2019-06-14 ENCOUNTER — NURSE TRIAGE (OUTPATIENT)
Dept: CALL CENTER | Facility: HOSPITAL | Age: 69
End: 2019-06-14

## 2019-06-14 ENCOUNTER — TELEPHONE (OUTPATIENT)
Dept: FAMILY MEDICINE CLINIC | Facility: CLINIC | Age: 69
End: 2019-06-14

## 2019-06-14 VITALS
TEMPERATURE: 98 F | OXYGEN SATURATION: 98 % | HEART RATE: 82 BPM | SYSTOLIC BLOOD PRESSURE: 156 MMHG | DIASTOLIC BLOOD PRESSURE: 86 MMHG

## 2019-06-14 DIAGNOSIS — I10 HYPERTENSION, UNSPECIFIED TYPE: ICD-10-CM

## 2019-06-14 DIAGNOSIS — R31.9 HEMATURIA, UNSPECIFIED TYPE: Primary | ICD-10-CM

## 2019-06-14 LAB
BILIRUB BLD-MCNC: NEGATIVE MG/DL
CLARITY, POC: CLEAR
COLOR UR: YELLOW
GLUCOSE UR STRIP-MCNC: NEGATIVE MG/DL
KETONES UR QL: NEGATIVE
LEUKOCYTE EST, POC: NEGATIVE
NITRITE UR-MCNC: NEGATIVE MG/ML
PH UR: 7 [PH] (ref 5–8)
PROT UR STRIP-MCNC: NEGATIVE MG/DL
RBC # UR STRIP: ABNORMAL /UL
SP GR UR: 1.01 (ref 1–1.03)
UROBILINOGEN UR QL: ABNORMAL

## 2019-06-14 PROCEDURE — 81003 URINALYSIS AUTO W/O SCOPE: CPT | Performed by: NURSE PRACTITIONER

## 2019-06-14 PROCEDURE — 99213 OFFICE O/P EST LOW 20 MIN: CPT | Performed by: NURSE PRACTITIONER

## 2019-06-14 RX ORDER — LISINOPRIL 10 MG/1
10 TABLET ORAL DAILY
Qty: 30 TABLET | Refills: 0 | Status: SHIPPED | OUTPATIENT
Start: 2019-06-14 | End: 2019-07-01

## 2019-06-14 NOTE — TELEPHONE ENCOUNTER
"She has been on Eliquis since 5/22 and last night started having pink urine, told her to be seen this morning call PCP or be seen in ER.     Reason for Disposition  • Taking Coumadin (warfarin) or other strong blood thinner, or known bleeding disorder (e.g., thrombocytopenia)    Additional Information  • Negative: Shock suspected (e.g., cold/pale/clammy skin, too weak to stand, low BP, rapid pulse)  • Negative: Sounds like a life-threatening emergency to the triager  • Negative: Urinary catheter, questions about  • Negative: Recent back or abdominal injury  • Negative: Recent genital injury  • Negative: [1] Unable to urinate (or only a few drops) > 4 hours AND [2] bladder feels very full (e.g., palpable bladder or strong urge to urinate)  • Negative: Passing pure blood or large blood clots (i.e., size > a dime) (Exception: reginald or small strands)  • Negative: Fever > 100.5 F (38.1 C)  • Negative: Patient sounds very sick or weak to the triager  • Negative: Known sickle cell disease    Answer Assessment - Initial Assessment Questions  1. COLOR of URINE: \"Describe the color of the urine.\"  (e.g., tea-colored, pink, red, blood clots, bloody)      pink  2. ONSET: \"When did the bleeding start?\"       Yesterday 6/13  3. EPISODES: \"How many times has there been blood in the urine?\" or \"How many times today?\"      4 times  4. PAIN with URINATION: \"Is there any pain with passing your urine?\" If so, ask: \"How bad is the pain?\"  (Scale 1-10; or mild, moderate, severe)     - MILD - complains slightly about urination hurting     - MODERATE - interferes with normal activities       - SEVERE - excruciating, unwilling or unable to urinate because of the pain       mild  5. FEVER: \"Do you have a fever?\" If so, ask: \"What is your temperature, how was it measured, and when did it start?\"      no  6. ASSOCIATED SYMPTOMS: \"Are you passing urine more frequently than usual?\"      no  7. OTHER SYMPTOMS: \"Do you have any other symptoms?\" " "(e.g., back/flank pain, abdominal pain, vomiting)      no  8. PREGNANCY: \"Is there any chance you are pregnant?\" \"When was your last menstrual period?\"      no    Protocols used: URINE - BLOOD IN-ADULT-      "

## 2019-06-14 NOTE — TELEPHONE ENCOUNTER
Pt called stating that she is concerned because her urine was pink last night.  Pt states that she had drank orange gatorade yesterday, and that her urine is clear today.  Pt is concerned as she is taking Eliquis 5 mg BID.  Pt denies having petechiae.  Advised pt to go to Urgent Care to be checked out.

## 2019-06-14 NOTE — OUTREACH NOTE
Medical Week 3 Survey      Responses   Facility patient discharged from?  [place]   Does the patient have one of the following disease processes/diagnoses(primary or secondary)?  Other   Week 3 attempt successful?  No   Unsuccessful attempts  Attempt 1          Melissa Walker RN

## 2019-06-14 NOTE — PROGRESS NOTES
Subjective:     Faviola Issa is a 69 y.o.     Patient presents c/o pink urine. She was recently started on thyroid medication related to a thyroidectomy. She then developed pulmonary embolism's and was started on eliquis 05/23. She present today concerned about pink urine, headache and dizziness. She is also c/o numbness tingling in her fingers.          The following portions of the patient's history were reviewed and updated as appropriate: allergies, current medications, past family history, past medical history, past social history, past surgical history and problem list.      Review of Systems   Respiratory: Positive for shortness of breath.         Hx:hypertension   Cardiovascular: Negative.    Genitourinary: Positive for frequency and urgency.   Neurological: Positive for dizziness and headaches.         Objective:      Physical Exam   Constitutional: She is oriented to person, place, and time. She appears well-developed and well-nourished.   Cardiovascular: Normal rate, regular rhythm, S1 normal, S2 normal and normal heart sounds.   Pulmonary/Chest: Effort normal. Decreased breath sounds: mildly decreased.   Neurological: She is alert and oriented to person, place, and time.   Vitals reviewed.          Diagnoses and all orders for this visit:    Hematuria, unspecified type  -     POC Urinalysis Dipstick, Automated    Hypertension, unspecified type  -     POC Urinalysis Dipstick, Automated    Other orders  -     lisinopril (PRINIVIL,ZESTRIL) 10 MG tablet; Take 1 tablet by mouth Daily for 30 days.      
2-3 times/wk

## 2019-06-15 ENCOUNTER — TELEPHONE (OUTPATIENT)
Dept: RETAIL CLINIC | Facility: CLINIC | Age: 69
End: 2019-06-15

## 2019-06-15 ENCOUNTER — READMISSION MANAGEMENT (OUTPATIENT)
Dept: CALL CENTER | Facility: HOSPITAL | Age: 69
End: 2019-06-15

## 2019-06-15 NOTE — OUTREACH NOTE
Medical Week 3 Survey      Responses   Facility patient discharged from?  [place]   Does the patient have one of the following disease processes/diagnoses(primary or secondary)?  Other   Week 3 attempt successful?  No   Unsuccessful attempts  Attempt 2          Melissa Walker RN

## 2019-06-15 NOTE — TELEPHONE ENCOUNTER
Patient called with blood pressure numbers post lisinopril as requested. systoloc ranging 114-138 diastolic 64 to 74. Patient to call tomorrow with recheck. Explained to her that her PCP may want to change bp med. Make asap appointment with PCP.

## 2019-06-21 ENCOUNTER — TELEPHONE (OUTPATIENT)
Dept: GASTROENTEROLOGY | Facility: CLINIC | Age: 69
End: 2019-06-21

## 2019-06-21 NOTE — TELEPHONE ENCOUNTER
Faxed request received from [facility] for ostomy supplies.  Form to Dr Romeo.

## 2019-06-21 NOTE — PROGRESS NOTES
"  Subjective .     REASONS FOR FOLLOWUP:  History of colon cancer and history of rectal cancer    HISTORY OF PRESENT ILLNESS:  The patient is a 69 y.o. year old female  who is here for follow-up with the above-mentioned history.    On review of the discharge summary 5/24/2019: Total thyroidectomy and partial left parathyroidectomy 5/6/2019.  Postoperative hematoma caused respiratory compromise.  Evacuation occurred.  Admitted due to sudden onset shortness of breath.    Leg pain which began a few days after discharge home from surgery.  Leg pain resolved on its own.  Placed on Eliquis and discharged.    She states while in the hospital she only got up to use the restroom which was roughly every 2-3 hours.  She states she was in the hospital about 4 days or so.  After going home she thinks she was up every hour to urinate.  However, patient and  states she was not as mobile as usual.    No prior clotting events.    Family history includes a niece with a pulmonary embolism.    No bleeding problems on Eliquis.    States she remains off work which she states is due to shortness of breath.  This is improving.  She is beginning to exercise some.    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, low-grade, treated with radiation   • PONV (postoperative nausea and vomiting)    • Post-menopausal    • Rectal cancer (CMS/HCC) 1999     Past Surgical History:   Procedure Laterality Date   • COLON SURGERY      1999 and 11/2011   • COLONOSCOPY  2016   • COLONOSCOPY N/A 5/8/2018    Procedure: COLONOSCOPY into ileum;  Surgeon: James Romeo MD;  Location: [facility] ENDOSCOPY;  Service: Gastroenterology   • HEMICOLOECTOMY W/ ANASTOMOSIS Right 11/2011    Adenocarcinoma of " cecum   • HYSTERECTOMY  1999   • LUNG LOBECTOMY      ANSELMO 08/2006 and RLL partial 09/2001   • THYROIDECTOMY Bilateral 5/6/2019    Procedure: Left PARATHYROIDECTOMY AND TOTAL THYROIDECTOMY;  Surgeon: Maxi Daly MD;  Location: [facility] OR [facility];  Service: ENT   • TRACHEOSTOMY N/A 5/6/2019    Procedure: EVACUATION OF NECK  HEMATOMA;  Surgeon: Maxi Daly MD;  Location: [facility] OR;  Service: ENT       HEMATOLOGIC/ONCOLOGIC HISTORY:  (History from previous dates can be found in the separate document.)    MEDICATIONS    Current Outpatient Medications:   •  acetaminophen (TYLENOL) 500 MG tablet, Take 500 mg by mouth Every 6 (Six) Hours As Needed. Take 1/2 tab , Disp: , Rfl:   •  albuterol (PROVENTIL HFA;VENTOLIN HFA) 108 (90 Base) MCG/ACT inhaler, Inhale 2 puffs Every 4 (Four) Hours As Needed for Wheezing. (put on file), Disp: 1 inhaler, Rfl: 11  •  apixaban (ELIQUIS) 5 MG tablet tablet, Take 1 tablet by mouth Every 12 (Twelve) Hours. For the hx PE, Disp: 60 tablet, Rfl: 1  •  calcium citrate-vitamin d (CITRACAL) 200-250 MG-UNIT tablet tablet, Take  by mouth 2 (Two) Times a Day., Disp: , Rfl:   •  levothyroxine (SYNTHROID, LEVOTHROID) 112 MCG tablet, , Disp: , Rfl: 3  •  lisinopril (PRINIVIL,ZESTRIL) 10 MG tablet, Take 1 tablet by mouth Daily for 30 days., Disp: 30 tablet, Rfl: 0    ALLERGIES:     Allergies   Allergen Reactions   • Adhesive Tape Hives and Itching     BANDAIDS   • Amoxicillin Hives and Itching   • Latex Other (See Comments)     Lips and nose swelled   • Red Dye Nausea And Vomiting   • Shellfish-Derived Products Shortness Of Breath   • Lactose Intolerance (Gi) Nausea And Vomiting       SOCIAL HISTORY:       Social History     Socioeconomic History   • Marital status:      Spouse name: KEN   • Number of children: 3   • Years of education: College   • Highest education level: Not on file   Occupational History   • Occupation: Nurse     Employer: [employer]  "[employer]   Tobacco Use   • Smoking status: Former Smoker     Packs/day: 1.00     Types: Cigarettes     Last attempt to quit: 2001     Years since quittin.8   • Smokeless tobacco: Never Used   • Tobacco comment: 1ppd x20 yrs   Substance and Sexual Activity   • Alcohol use: No   • Drug use: No   • Sexual activity: Yes     Partners: Male     Birth control/protection: None   Social History Narrative    GYN PATIENT SINCE .         FAMILY HISTORY:  Family History   Problem Relation Age of Onset   • Cancer Sister         \"FEMALE\"   • Hypertension Father    • Breast cancer Daughter 45   • Deep vein thrombosis Niece    • Malig Hyperthermia Neg Hx        REVIEW OF SYSTEMS:  Review of Systems   Constitutional: Negative for activity change.   HENT: Negative for nosebleeds and trouble swallowing.    Respiratory: Negative for shortness of breath and wheezing.    Cardiovascular: Negative for chest pain and palpitations.   Gastrointestinal: Negative for constipation, diarrhea and nausea.   Genitourinary: Negative for dysuria and hematuria.   Musculoskeletal: Negative for arthralgias and myalgias.   Skin: Negative for rash and wound.   Neurological: Negative for seizures and syncope.   Hematological: Negative for adenopathy. Does not bruise/bleed easily.   Psychiatric/Behavioral: Negative for confusion.        Objective    Vitals:    19 0856   BP: 159/90   Pulse: 70   Resp: 16   Temp: 97.7 °F (36.5 °C)   SpO2: 100%   Weight: 80.9 kg (178 lb 6.4 oz)   Height: 168 cm (66.14\")  Comment: new ht.   PainSc:   6   PainLoc: Comment: back and left hip     Current Status 2019   ECOG score 0      PHYSICAL EXAM:    CONSTITUTIONAL:  Vital signs reviewed.  No distress, looks comfortable.  EYES:  Conjunctiva and lids unremarkable.  PERRLA  EARS,NOSE,MOUTH,THROAT:  Ears and nose appear unremarkable.  Lips, teeth, gums appear unremarkable.  RESPIRATORY:  Normal respiratory effort.  Lungs clear to auscultation " bilaterally.  CARDIOVASCULAR:  Normal S1, S2.  No murmurs rubs or gallops.  No significant lower extremity edema.  GASTROINTESTINAL: Abdomen appears unremarkable.  Nontender.  No hepatomegaly.  No splenomegaly.  LYMPHATIC:  No cervical, supraclavicular, axillary lymphadenopathy.  SKIN:  Warm.  No rashes.  PSYCHIATRIC:  Normal judgment and insight.  Normal mood and affect.    RECENT LABS:        WBC   Date/Time Value Ref Range Status   06/27/2019 08:37 AM 6.09 3.40 - 10.80 10*3/mm3 Final   05/21/2019 11:47 AM 8.6 3.4 - 10.8 x10E3/uL Final     Hemoglobin   Date/Time Value Ref Range Status   06/27/2019 08:37 AM 13.6 12.0 - 15.9 g/dL Final     Platelets   Date/Time Value Ref Range Status   06/27/2019 08:37  140 - 450 10*3/mm3 Final       Assessment/Plan   Malignant neoplasm of colon, unspecified part of colon (CMS/HCC)  - Ferritin  - Iron Profile  - Retic With IRF & RET-He  - Factor 5 Leiden  - Factor II, DNA Analysis  - Antithrombin III  - Protein C Activity  - Protein S Functional  - Protein S Antigen, Free  - Anticardiolipin Antibody, IgG / M, Qn  - Lupus Anticoagulant Reflex  - Beta-2 Glycoprotein Antibodies  - Ferritin  - Iron Profile  - Retic With IRF & RET-He  - CBC & Differential    Bilateral pulmonary embolism (CMS/HCC)  - Ferritin  - Iron Profile  - Retic With IRF & RET-He  - Factor 5 Leiden  - Factor II, DNA Analysis  - Antithrombin III  - Protein C Activity  - Protein S Functional  - Protein S Antigen, Free  - Anticardiolipin Antibody, IgG / M, Qn  - Lupus Anticoagulant Reflex  - Beta-2 Glycoprotein Antibodies  - Ferritin  - Iron Profile  - Retic With IRF & RET-He  - CBC & Differential       *Bilateral pulmonary emboli diagnosed 5/22/2019.  (Normal bilateral leg Doppler 5/23/2019).  On Eliquis.    *Risk factors for PE:   · Total thyroidectomy and partial left parathyroidectomy 5/6/2019.  Postoperative hematoma.  Leg pain which began a few days after discharge home from surgery.  Leg pain resolved on  its own.  Decreased mobility after surgery compared to her baseline.  · Hypercoagulable labs have not been checked.  · Family history: Niece (sister's daughter) significantly overweight with decreased mobility.  PE at age 38.    *Length of anticoagulation.  I recommend Eliquis 5 mg twice per day for 6 months at least.  Then, could continue 5 mg twice per day indefinitely or decrease to prophylactic dosin.5 mg twice per day, or stop Eliquis and change to aspirin 81 mg daily.  Await hypercoagulable labs to help make this decision.    *Borderline iron deficiency.  Due to anemia with Hb 11.2 on 2019, labs today, 2019: Normal Hb but ferritin 33 with 20% saturation.  · She declines trying oral iron to see if fatigue will improve.  · I recommended she see Dr. Romeo to consider an EGD to look for a source of malignancy or premalignant condition as the cause of iron deficiency.  She declines.  (Notably she did have a postoperative bleed).    *  Stage I (pT2,pN0,M0) colon adenocarcinoma. Resected with a right hemicolectomy 11. Therefore no adjuvant chemotherapy recommended. 12 lymph nodes checked and negative.   · Last colonoscopy 2018: Unremarkable.  Dr. Romeo plans next 3 years later.    * Rectal cancer. 1999. Adjuvant 5FU and radiation.   Right lower lobectomy 01 for a solitary recurrence of the rectal cancer.   Left upper lobectomy 05 for a solitary recurrence of the rectal cancer.     * Low grade lymphoma of the eyelid, treated with radiation in . She has had no signs of recurrence of lymphoma.     * Previous mild hypercalcemia.  Previous mild elevated alkaline phosphatase.  I discussed this with patient and .  She declines CT scans to evaluate for recurrence of prior cancer due to avoiding paying high deductible.         PLAN:  · Hypercoagulable labs today  · MD 2 months.  1 week prior: Iron labs, CBC  · At the next visit we will review hypercoagulable labs and to see if  iron has improved with diet alone (and time away from surgery).  · She declined trying oral iron.      assisted with history.  Records reviewed and summarized.

## 2019-06-27 ENCOUNTER — OFFICE VISIT (OUTPATIENT)
Dept: ONCOLOGY | Facility: CLINIC | Age: 69
End: 2019-06-27

## 2019-06-27 ENCOUNTER — LAB (OUTPATIENT)
Dept: OTHER | Facility: HOSPITAL | Age: 69
End: 2019-06-27

## 2019-06-27 VITALS
HEIGHT: 66 IN | RESPIRATION RATE: 16 BRPM | DIASTOLIC BLOOD PRESSURE: 90 MMHG | WEIGHT: 178.4 LBS | HEART RATE: 70 BPM | OXYGEN SATURATION: 100 % | BODY MASS INDEX: 28.67 KG/M2 | TEMPERATURE: 97.7 F | SYSTOLIC BLOOD PRESSURE: 159 MMHG

## 2019-06-27 DIAGNOSIS — C18.7 MALIGNANT NEOPLASM OF SIGMOID COLON (HCC): ICD-10-CM

## 2019-06-27 DIAGNOSIS — C18.9 MALIGNANT NEOPLASM OF COLON, UNSPECIFIED PART OF COLON (HCC): Primary | ICD-10-CM

## 2019-06-27 DIAGNOSIS — I26.99 BILATERAL PULMONARY EMBOLISM (HCC): ICD-10-CM

## 2019-06-27 DIAGNOSIS — I26.99 BILATERAL PULMONARY EMBOLISM (HCC): Primary | ICD-10-CM

## 2019-06-27 LAB
BASOPHILS # BLD AUTO: 0.05 10*3/MM3 (ref 0–0.2)
BASOPHILS NFR BLD AUTO: 0.8 % (ref 0–1.5)
DEPRECATED RDW RBC AUTO: 44.3 FL (ref 37–54)
EOSINOPHIL # BLD AUTO: 0.09 10*3/MM3 (ref 0–0.4)
EOSINOPHIL NFR BLD AUTO: 1.5 % (ref 0.3–6.2)
ERYTHROCYTE [DISTWIDTH] IN BLOOD BY AUTOMATED COUNT: 13.5 % (ref 12.3–15.4)
F5 GENE MUT ANL BLD/T: NORMAL
FACTOR II, DNA ANALYSIS: NORMAL
FERRITIN SERPL-MCNC: 33.5 NG/ML (ref 13–150)
HCT VFR BLD AUTO: 42.4 % (ref 34–46.6)
HGB BLD-MCNC: 13.6 G/DL (ref 12–15.9)
HGB RETIC QN AUTO: 31.1 PG (ref 29.8–36.1)
IMM GRANULOCYTES # BLD AUTO: 0.02 10*3/MM3 (ref 0–0.05)
IMM GRANULOCYTES NFR BLD AUTO: 0.3 % (ref 0–0.5)
IMM RETICS NFR: 7.8 % (ref 3–15.8)
IRON 24H UR-MRATE: 76 MCG/DL (ref 37–145)
IRON SATN MFR SERPL: 20 % (ref 20–50)
LYMPHOCYTES # BLD AUTO: 1.31 10*3/MM3 (ref 0.7–3.1)
LYMPHOCYTES NFR BLD AUTO: 21.5 % (ref 19.6–45.3)
MCH RBC QN AUTO: 28.3 PG (ref 26.6–33)
MCHC RBC AUTO-ENTMCNC: 32.1 G/DL (ref 31.5–35.7)
MCV RBC AUTO: 88.1 FL (ref 79–97)
MONOCYTES # BLD AUTO: 0.46 10*3/MM3 (ref 0.1–0.9)
MONOCYTES NFR BLD AUTO: 7.6 % (ref 5–12)
NEUTROPHILS # BLD AUTO: 4.16 10*3/MM3 (ref 1.7–7)
NEUTROPHILS NFR BLD AUTO: 68.3 % (ref 42.7–76)
NRBC BLD AUTO-RTO: 0 /100 WBC (ref 0–0.2)
PLATELET # BLD AUTO: 280 10*3/MM3 (ref 140–450)
PMV BLD AUTO: 9.9 FL (ref 6–12)
RBC # BLD AUTO: 4.81 10*6/MM3 (ref 3.77–5.28)
RETICS/RBC NFR AUTO: 1.23 % (ref 0.7–1.9)
TIBC SERPL-MCNC: 380 MCG/DL (ref 298–536)
TRANSFERRIN SERPL-MCNC: 255 MG/DL (ref 200–360)
WBC NRBC COR # BLD: 6.09 10*3/MM3 (ref 3.4–10.8)

## 2019-06-27 PROCEDURE — 86146 BETA-2 GLYCOPROTEIN ANTIBODY: CPT | Performed by: INTERNAL MEDICINE

## 2019-06-27 PROCEDURE — 85613 RUSSELL VIPER VENOM DILUTED: CPT | Performed by: INTERNAL MEDICINE

## 2019-06-27 PROCEDURE — 85303 CLOT INHIBIT PROT C ACTIVITY: CPT | Performed by: INTERNAL MEDICINE

## 2019-06-27 PROCEDURE — 36415 COLL VENOUS BLD VENIPUNCTURE: CPT

## 2019-06-27 PROCEDURE — 85025 COMPLETE CBC W/AUTO DIFF WBC: CPT | Performed by: INTERNAL MEDICINE

## 2019-06-27 PROCEDURE — 81241 F5 GENE: CPT | Performed by: INTERNAL MEDICINE

## 2019-06-27 PROCEDURE — 82728 ASSAY OF FERRITIN: CPT | Performed by: INTERNAL MEDICINE

## 2019-06-27 PROCEDURE — 83540 ASSAY OF IRON: CPT | Performed by: INTERNAL MEDICINE

## 2019-06-27 PROCEDURE — 86147 CARDIOLIPIN ANTIBODY EA IG: CPT | Performed by: INTERNAL MEDICINE

## 2019-06-27 PROCEDURE — 85300 ANTITHROMBIN III ACTIVITY: CPT | Performed by: INTERNAL MEDICINE

## 2019-06-27 PROCEDURE — 85732 THROMBOPLASTIN TIME PARTIAL: CPT | Performed by: INTERNAL MEDICINE

## 2019-06-27 PROCEDURE — 85046 RETICYTE/HGB CONCENTRATE: CPT | Performed by: INTERNAL MEDICINE

## 2019-06-27 PROCEDURE — 85306 CLOT INHIBIT PROT S FREE: CPT | Performed by: INTERNAL MEDICINE

## 2019-06-27 PROCEDURE — 84466 ASSAY OF TRANSFERRIN: CPT | Performed by: INTERNAL MEDICINE

## 2019-06-27 PROCEDURE — 99215 OFFICE O/P EST HI 40 MIN: CPT | Performed by: INTERNAL MEDICINE

## 2019-06-27 PROCEDURE — 81240 F2 GENE: CPT | Performed by: INTERNAL MEDICINE

## 2019-06-28 LAB
AT III PPP CHRO-ACNC: 105 % (ref 90–134)
CARDIOLIPIN IGG SER IA-ACNC: <9 GPL U/ML (ref 0–14)
CARDIOLIPIN IGM SER IA-ACNC: 11 MPL U/ML (ref 0–12)
PROT C ACT/NOR PPP: 200 % (ref 86–163)
PROT S ACT/NOR PPP: 84 % (ref 70–127)
PROT S FREE PPP-ACNC: 74 % (ref 49–138)

## 2019-06-29 LAB
B2 GLYCOPROT1 IGA SER-ACNC: <9 GPI IGA UNITS (ref 0–25)
B2 GLYCOPROT1 IGG SER-ACNC: <9 GPI IGG UNITS (ref 0–20)
B2 GLYCOPROT1 IGM SER-ACNC: <9 GPI IGM UNITS (ref 0–32)
DRVVT IMM 1:2 NP PPP: 47.3 SEC (ref 0–47)
LA NT PLATELET PPP: 39.5 SEC (ref 0–51.9)
LUPUS ANTICOAGULANT REFLEX: ABNORMAL
SCREEN DRVVT: 1.1 RATIO (ref 0.8–1.2)
SCREEN DRVVT: 61.6 SEC (ref 0–47)

## 2019-07-01 ENCOUNTER — OFFICE VISIT (OUTPATIENT)
Dept: FAMILY MEDICINE CLINIC | Facility: CLINIC | Age: 69
End: 2019-07-01

## 2019-07-01 VITALS
TEMPERATURE: 98.4 F | RESPIRATION RATE: 16 BRPM | DIASTOLIC BLOOD PRESSURE: 80 MMHG | HEART RATE: 63 BPM | BODY MASS INDEX: 28.45 KG/M2 | OXYGEN SATURATION: 100 % | HEIGHT: 66 IN | SYSTOLIC BLOOD PRESSURE: 150 MMHG | WEIGHT: 177 LBS

## 2019-07-01 DIAGNOSIS — T46.4X5A ACE-INHIBITOR COUGH: ICD-10-CM

## 2019-07-01 DIAGNOSIS — I10 BENIGN ESSENTIAL HTN: Primary | ICD-10-CM

## 2019-07-01 DIAGNOSIS — E78.2 MIXED HYPERLIPIDEMIA: ICD-10-CM

## 2019-07-01 DIAGNOSIS — R05.8 ACE-INHIBITOR COUGH: ICD-10-CM

## 2019-07-01 DIAGNOSIS — F41.8 SITUATIONAL ANXIETY: ICD-10-CM

## 2019-07-01 DIAGNOSIS — E89.2 STATUS POST PARATHYROIDECTOMY (HCC): ICD-10-CM

## 2019-07-01 DIAGNOSIS — E89.0 POSTOPERATIVE HYPOTHYROIDISM: ICD-10-CM

## 2019-07-01 DIAGNOSIS — I26.99 BILATERAL PULMONARY EMBOLISM (HCC): ICD-10-CM

## 2019-07-01 PROCEDURE — 99214 OFFICE O/P EST MOD 30 MIN: CPT | Performed by: PHYSICIAN ASSISTANT

## 2019-07-01 RX ORDER — VALSARTAN 80 MG/1
80 TABLET ORAL DAILY
Qty: 30 TABLET | Refills: 1 | Status: SHIPPED | OUTPATIENT
Start: 2019-07-01 | End: 2019-07-15

## 2019-07-01 NOTE — PROGRESS NOTES
Subjective   Faviola Issa is a 69 y.o. female.     History of Present Illness   Faviola Issa 69 y.o. female who presents today for routine follow up check and medication refills. Also f/u from .  she has a history of   Patient Active Problem List   Diagnosis   • Well female exam with routine gynecological exam   • Malignant neoplasm of sigmoid colon with mets to the lung (CMS/HCC)   • History of lung cancer   • History of colon cancer   • LFT elevation   • Mixed hyperlipidemia   • Colon cancer (CMS/HCC)   • Status post thyroidectomy and left partial parathyroidectomy (CMS/HCC)   • Postoperative hypothyroidism   • Bilateral pulmonary embolism (CMS/HCC)   • Voice disturbance   • Elevated troponin   .  Since the last visit, she has overall felt tired and SOA; she is slowly improving and starting to exercise.  She has been anxious and refuses Rx.  I also agree with Dr Leggett about f/u GI--Dr Romeo and see if needs EGD. I do see her last hgb was much improved at 13.6 alk phos was back in range ---she has hx lung and colon cancer.  She has Hyperlipidemia and working on this with diet and exercise and new DX HTN and here for f/u from starting Lisinopril.  She has some cough and will change to ARB.  She is also AA and do better on ARB.  She insists on starting with lower dose and will do this with f/u 2 weeks..  she has been compliant with current medications have reviewed them.  The patient has occas cough and will stop ACE  Recent Dx HTN UC and was having high bp readings and h/a's.  This is her first f/u since PE admission; I know she had  5-6-19 initially had thyroidectomy and parathyroidectomy with Dr. Daly; 8 hours later developed hematoma and had to be surgically drained by Dr Daly same day.  5-22-19 bilat PE.  She does have anxiety and declines PRN and daily Rx;  Has not been back to work and not ready; from the anxiety to still some SOA  4 minutes on bike and treadmill; up from 2.5  minutes    Results for orders placed or performed in visit on 06/27/19   Ferritin   Result Value Ref Range    Ferritin 33.50 13.00 - 150.00 ng/mL   Iron Profile   Result Value Ref Range    Iron 76 37 - 145 mcg/dL    Iron Saturation 20 20 - 50 %    Transferrin 255 200 - 360 mg/dL    TIBC 380 298 - 536 mcg/dL   Retic With IRF & RET-He   Result Value Ref Range    Immature Reticulocyte Fraction 7.8 3.0 - 15.8 %    Reticulocyte % 1.23 0.70 - 1.90 %    Reticulocyte Hgb 31.1 29.8 - 36.1 pg   Factor 5 Leiden   Result Value Ref Range    Factor V Leiden Normal Normal   Factor II, DNA Analysis   Result Value Ref Range    Factor II, DNA Analysis Normal Normal   Antithrombin III   Result Value Ref Range    Antithrombin Activity 105 90 - 134 %   Protein C Activity   Result Value Ref Range    Protein C Activity 200 (H) 86 - 163 %   Protein S Functional   Result Value Ref Range    Protein S Functional 84 70 - 127 %   Protein S Antigen, Free   Result Value Ref Range    Protein S Ag, Free 74.0 49.0 - 138.0 %   Anticardiolipin Antibody, IgG / M, Qn   Result Value Ref Range    Anticardiolipin IgG <9 0 - 14 GPL U/mL    Anticardiolipin IgM 11 0 - 12 MPL U/mL   Lupus Anticoagulant Reflex   Result Value Ref Range    PTT Lupus Anticoagulant 39.5 0.0 - 51.9 sec    Dilute Viper Venom Time 61.6 (H) 0.0 - 47.0 sec    Lupus Anticoagulant Reflex Comment:    Beta-2 Glycoprotein Antibodies   Result Value Ref Range    Beta-2 Glyco 1 IgG <9 0 - 20 GPI IgG units    Beta-2 Glyco 1 IgA <9 0 - 25 GPI IgA units    Beta-2 Glyco 1 IgM <9 0 - 32 GPI IgM units   Reflexed DRVVT Mix   Result Value Ref Range    Dilute Viper Venom 1:1 Mix 47.3 (H) 0.0 - 47.0 sec   Reflexed DRVVT Confirm   Result Value Ref Range    Dilute Viper Venom Time 1.1 0.8 - 1.2 ratio     Lab Results   Component Value Date    CHLPL 229 (H) 01/02/2019    CHLPL 201 (H) 02/24/2017     Lab Results   Component Value Date    TRIG 113 01/02/2019    TRIG 62 02/24/2017     Lab Results   Component  Value Date    HDL 70 (H) 01/02/2019    HDL 74 (H) 02/24/2017     Lab Results   Component Value Date     (H) 01/02/2019     (H) 02/24/2017     Her last chol score was 6.0%;  I will talk to her about updating lipids next visit in 2 weeks.    She is still SOA  Had normal echo and this was done for the PE    She is having anxiety  She just saw Dr Leggett for f/u PE and hypercoagulation labs are pending. He will continue Eliquis if + after 6 mos on prophylaxis dose; she has f/u appt    She feels like her thyroid med makes her anxious and is trying brand name for 5 days---see if tolerates better  SOA since surgery but improving slowly  She is f/u with Dr Daly for the thyroid and parathyroid    Uses Albuterol with acute resp illness and environmental exposures---not routinely using.  The following portions of the patient's history were reviewed and updated as appropriate: allergies, current medications, past family history, past medical history, past social history, past surgical history and problem list.    Review of Systems   Constitutional: Positive for fatigue. Negative for activity change, appetite change and unexpected weight change.   HENT: Negative for nosebleeds and trouble swallowing.    Eyes: Negative for pain and visual disturbance.   Respiratory: Positive for shortness of breath. Negative for chest tightness and wheezing.    Cardiovascular: Negative for chest pain and palpitations.   Gastrointestinal: Negative for abdominal pain and blood in stool.   Endocrine: Negative.    Genitourinary: Negative for difficulty urinating and hematuria.   Musculoskeletal: Negative for joint swelling.   Skin: Negative for color change and rash.   Allergic/Immunologic: Negative.    Neurological: Negative for syncope and speech difficulty.   Hematological: Negative for adenopathy.   Psychiatric/Behavioral: Negative for agitation, confusion and dysphoric mood. The patient is nervous/anxious.    All other systems  reviewed and are negative.      Objective   Physical Exam   Constitutional: She is oriented to person, place, and time. She appears well-developed and well-nourished. No distress.   HENT:   Head: Normocephalic and atraumatic.   Eyes: Conjunctivae and EOM are normal. Pupils are equal, round, and reactive to light. Right eye exhibits no discharge. Left eye exhibits no discharge. No scleral icterus.   Neck: Normal range of motion. Neck supple. No tracheal deviation present. No thyromegaly present.   Cardiovascular: Normal rate, regular rhythm, normal heart sounds, intact distal pulses and normal pulses. Exam reveals no gallop.   No murmur heard.  Pulmonary/Chest: Effort normal and breath sounds normal. No respiratory distress. She has no wheezes. She has no rales.   Musculoskeletal: Normal range of motion.   Neurological: She is alert and oriented to person, place, and time. She exhibits normal muscle tone. Coordination normal.   Skin: Skin is warm. No rash noted. No erythema. No pallor.   Neck incision healed   Psychiatric: She has a normal mood and affect. Her behavior is normal. Judgment and thought content normal.   Nursing note and vitals reviewed.      Assessment/Plan   Faviola was seen today for hypertension.    Diagnoses and all orders for this visit:    Benign essential HTN    Mixed hyperlipidemia    Postoperative hypothyroidism    Status post thyroidectomy and left partial parathyroidectomy (CMS/HCC)    Bilateral pulmonary embolism (CMS/HCC)    Situational anxiety    ACE-inhibitor cough    Other orders  -     valsartan (DIOVAN) 80 MG tablet; Take 1 tablet by mouth Daily. For BP and this replaces Lisinopril    concern about ACE cough and BP still up on exam today; will change to ARB and she wants low dose ---see me f/u 2 weeks  Will want to update lipids again  F/u hematologist and ENT=====for PE and f/u on the thyroid and parathyroid  Declines Rx anxiety

## 2019-07-01 NOTE — PATIENT INSTRUCTIONS

## 2019-07-15 ENCOUNTER — OFFICE VISIT (OUTPATIENT)
Dept: FAMILY MEDICINE CLINIC | Facility: CLINIC | Age: 69
End: 2019-07-15

## 2019-07-15 VITALS
TEMPERATURE: 97.8 F | OXYGEN SATURATION: 98 % | SYSTOLIC BLOOD PRESSURE: 120 MMHG | BODY MASS INDEX: 28.93 KG/M2 | WEIGHT: 180 LBS | HEIGHT: 66 IN | DIASTOLIC BLOOD PRESSURE: 70 MMHG | HEART RATE: 67 BPM | RESPIRATION RATE: 16 BRPM

## 2019-07-15 DIAGNOSIS — I10 BENIGN ESSENTIAL HTN: Primary | ICD-10-CM

## 2019-07-15 DIAGNOSIS — R49.9 VOICE DISTURBANCE: ICD-10-CM

## 2019-07-15 DIAGNOSIS — E89.2 STATUS POST PARATHYROIDECTOMY (HCC): ICD-10-CM

## 2019-07-15 DIAGNOSIS — Z85.118 HISTORY OF LUNG CANCER: ICD-10-CM

## 2019-07-15 DIAGNOSIS — I26.99 BILATERAL PULMONARY EMBOLISM (HCC): ICD-10-CM

## 2019-07-15 DIAGNOSIS — Z85.038 HISTORY OF COLON CANCER: ICD-10-CM

## 2019-07-15 PROCEDURE — 99213 OFFICE O/P EST LOW 20 MIN: CPT | Performed by: PHYSICIAN ASSISTANT

## 2019-07-15 RX ORDER — TELMISARTAN 20 MG/1
20 TABLET ORAL DAILY
Qty: 30 TABLET | Refills: 5 | Status: SHIPPED | OUTPATIENT
Start: 2019-07-15 | End: 2019-07-17

## 2019-07-15 RX ORDER — LEVOTHYROXINE SODIUM 100 MCG
100 TABLET ORAL DAILY
Refills: 3 | COMMUNITY
Start: 2019-07-05 | End: 2019-08-12 | Stop reason: SDUPTHER

## 2019-07-15 NOTE — PROGRESS NOTES
Subjective   Faviola Issa is a 69 y.o. female.     History of Present Illness   Faviola Issa 69 y.o. female who presents today for routine follow up check and medication refills.  she has a history of   Patient Active Problem List   Diagnosis   • Well female exam with routine gynecological exam   • Malignant neoplasm of sigmoid colon with mets to the lung (CMS/HCC)   • History of lung cancer   • History of colon cancer   • LFT elevation   • Mixed hyperlipidemia   • Colon cancer (CMS/HCC)   • Status post thyroidectomy and left partial parathyroidectomy (CMS/HCC)   • Postoperative hypothyroidism   • Bilateral pulmonary embolism (CMS/HCC)   • Voice disturbance   • Elevated troponin   • Benign essential HTN   .  Since the last visit, she has overall felt slowly improving.  She has Hyperlipidemia and working on this with diet and exercise and here for bp f/u and changed her med.  she has been compliant with current medications have reviewed them.  The patient denies medication side effects.    Results for orders placed or performed in visit on 06/27/19   Ferritin   Result Value Ref Range    Ferritin 33.50 13.00 - 150.00 ng/mL   Iron Profile   Result Value Ref Range    Iron 76 37 - 145 mcg/dL    Iron Saturation 20 20 - 50 %    Transferrin 255 200 - 360 mg/dL    TIBC 380 298 - 536 mcg/dL   Retic With IRF & RET-He   Result Value Ref Range    Immature Reticulocyte Fraction 7.8 3.0 - 15.8 %    Reticulocyte % 1.23 0.70 - 1.90 %    Reticulocyte Hgb 31.1 29.8 - 36.1 pg   Factor 5 Leiden   Result Value Ref Range    Factor V Leiden Normal Normal   Factor II, DNA Analysis   Result Value Ref Range    Factor II, DNA Analysis Normal Normal   Antithrombin III   Result Value Ref Range    Antithrombin Activity 105 90 - 134 %   Protein C Activity   Result Value Ref Range    Protein C Activity 200 (H) 86 - 163 %   Protein S Functional   Result Value Ref Range    Protein S Functional 84 70 - 127 %   Protein S Antigen, Free    Result Value Ref Range    Protein S Ag, Free 74.0 49.0 - 138.0 %   Anticardiolipin Antibody, IgG / M, Qn   Result Value Ref Range    Anticardiolipin IgG <9 0 - 14 GPL U/mL    Anticardiolipin IgM 11 0 - 12 MPL U/mL   Lupus Anticoagulant Reflex   Result Value Ref Range    PTT Lupus Anticoagulant 39.5 0.0 - 51.9 sec    Dilute Viper Venom Time 61.6 (H) 0.0 - 47.0 sec    Lupus Anticoagulant Reflex Comment:    Beta-2 Glycoprotein Antibodies   Result Value Ref Range    Beta-2 Glyco 1 IgG <9 0 - 20 GPI IgG units    Beta-2 Glyco 1 IgA <9 0 - 25 GPI IgA units    Beta-2 Glyco 1 IgM <9 0 - 32 GPI IgM units   Reflexed DRVVT Mix   Result Value Ref Range    Dilute Viper Venom 1:1 Mix 47.3 (H) 0.0 - 47.0 sec   Reflexed DRVVT Confirm   Result Value Ref Range    Dilute Viper Venom Time 1.1 0.8 - 1.2 ratio     Home bp down 123/60s; 130/60-70  She wants to change Diovan to non recalled Rx  She is still having some SOA and slowly improving; will cough some with eating; overall cough same  She has been to lung doc---DR Mujica ---she declines referral   Has f/u ENT and hematologist  Last chol score Jan was 6.0%  The following portions of the patient's history were reviewed and updated as appropriate: allergies, current medications, past family history, past medical history, past social history, past surgical history and problem list.    Review of Systems   Constitutional: Positive for fatigue. Negative for activity change, appetite change and unexpected weight change.   HENT: Negative for nosebleeds and trouble swallowing.    Eyes: Negative for pain and visual disturbance.   Respiratory: Positive for shortness of breath. Negative for chest tightness and wheezing.    Cardiovascular: Negative for chest pain and palpitations.   Gastrointestinal: Negative for abdominal pain and blood in stool.   Endocrine: Negative.    Genitourinary: Negative for difficulty urinating and hematuria.   Musculoskeletal: Negative for joint swelling.   Skin:  Negative for color change and rash.   Allergic/Immunologic: Negative.    Neurological: Negative for syncope and speech difficulty.   Hematological: Negative for adenopathy.   Psychiatric/Behavioral: Negative for agitation, confusion and dysphoric mood. The patient is nervous/anxious.    All other systems reviewed and are negative.      Objective   Physical Exam   Constitutional: She is oriented to person, place, and time. She appears well-developed and well-nourished. No distress.   HENT:   Head: Normocephalic and atraumatic.   Eyes: Conjunctivae and EOM are normal. Pupils are equal, round, and reactive to light. Right eye exhibits no discharge. Left eye exhibits no discharge. No scleral icterus.   Neck: Normal range of motion. Neck supple. No tracheal deviation present. No thyromegaly present.   Cardiovascular: Normal rate, regular rhythm, normal heart sounds, intact distal pulses and normal pulses. Exam reveals no gallop.   No murmur heard.  Trace pedal edema = bilat     Pulmonary/Chest: Effort normal and breath sounds normal. No respiratory distress. She has no wheezes. She has no rales.   Musculoskeletal: Normal range of motion.   Neurological: She is alert and oriented to person, place, and time. She exhibits normal muscle tone. Coordination normal.   Skin: Skin is warm. No erythema. No pallor.   Neck incision healed   Psychiatric: She has a normal mood and affect. Her behavior is normal. Judgment and thought content normal.   Nursing note and vitals reviewed.      Assessment/Plan   Problems Addressed this Visit        Cardiovascular and Mediastinum    Bilateral pulmonary embolism (CMS/HCC)    Benign essential HTN - Primary    Relevant Medications    telmisartan (MICARDIS) 20 MG tablet       Other    History of lung cancer    History of colon cancer    Status post thyroidectomy and left partial parathyroidectomy (CMS/HCC)    Voice disturbance          Here for bp f/u and changed ACE to ARB--wants to get off  Valsartan--will change to Micardis  See ENT f/u parathyroid and thyroid  Consider seeing pulm

## 2019-07-15 NOTE — PATIENT INSTRUCTIONS

## 2019-07-17 RX ORDER — VALSARTAN 80 MG/1
80 TABLET ORAL DAILY
Qty: 30 TABLET | Refills: 5 | Status: SHIPPED | OUTPATIENT
Start: 2019-07-17 | End: 2019-08-12 | Stop reason: SDUPTHER

## 2019-07-23 DIAGNOSIS — R06.02 SHORTNESS OF BREATH: Primary | ICD-10-CM

## 2019-08-01 ENCOUNTER — TELEPHONE (OUTPATIENT)
Dept: FAMILY MEDICINE CLINIC | Facility: CLINIC | Age: 69
End: 2019-08-01

## 2019-08-01 NOTE — TELEPHONE ENCOUNTER
Pt called asking about Valsartan Rx - notiified that it was sent to [facility] 07/17/19.  Pt wanted me to relay message that she is not feeling well and feeling like noone listens to her.  Pt states that her breathing is worsening and she is seeing pulmonology tomorrow.   Pt is asking if you will give her a note for work as she has been off since May.

## 2019-08-02 ENCOUNTER — OFFICE VISIT (OUTPATIENT)
Dept: OBSTETRICS AND GYNECOLOGY | Age: 69
End: 2019-08-02

## 2019-08-02 ENCOUNTER — TRANSCRIBE ORDERS (OUTPATIENT)
Dept: ADMINISTRATIVE | Facility: HOSPITAL | Age: 69
End: 2019-08-02

## 2019-08-02 ENCOUNTER — HOSPITAL ENCOUNTER (OUTPATIENT)
Dept: CT IMAGING | Facility: HOSPITAL | Age: 69
Discharge: HOME | End: 2019-08-02
Admitting: INTERNAL MEDICINE

## 2019-08-02 VITALS
SYSTOLIC BLOOD PRESSURE: 156 MMHG | HEIGHT: 67 IN | WEIGHT: 181 LBS | DIASTOLIC BLOOD PRESSURE: 92 MMHG | BODY MASS INDEX: 28.41 KG/M2

## 2019-08-02 DIAGNOSIS — R06.02 SOB (SHORTNESS OF BREATH): Primary | ICD-10-CM

## 2019-08-02 DIAGNOSIS — I10 BENIGN ESSENTIAL HTN: ICD-10-CM

## 2019-08-02 DIAGNOSIS — Z01.419 WELL FEMALE EXAM WITH ROUTINE GYNECOLOGICAL EXAM: Primary | ICD-10-CM

## 2019-08-02 DIAGNOSIS — Z13.89 SCREENING FOR BLOOD OR PROTEIN IN URINE: ICD-10-CM

## 2019-08-02 DIAGNOSIS — C18.7 MALIGNANT NEOPLASM OF SIGMOID COLON (HCC): ICD-10-CM

## 2019-08-02 DIAGNOSIS — C18.9 MALIGNANT NEOPLASM OF COLON, UNSPECIFIED PART OF COLON (HCC): ICD-10-CM

## 2019-08-02 DIAGNOSIS — R06.02 SOB (SHORTNESS OF BREATH): ICD-10-CM

## 2019-08-02 PROBLEM — Z85.038 HISTORY OF COLON CANCER: Status: RESOLVED | Noted: 2017-02-24 | Resolved: 2019-08-02

## 2019-08-02 PROBLEM — R79.89 LFT ELEVATION: Status: RESOLVED | Noted: 2017-02-24 | Resolved: 2019-08-02

## 2019-08-02 PROBLEM — Z85.118 HISTORY OF LUNG CANCER: Status: RESOLVED | Noted: 2017-02-24 | Resolved: 2019-08-02

## 2019-08-02 LAB
BILIRUB BLD-MCNC: NEGATIVE MG/DL
CLARITY, POC: CLEAR
COLOR UR: YELLOW
CREAT BLDA-MCNC: 0.8 MG/DL (ref 0.6–1.3)
GLUCOSE UR STRIP-MCNC: NEGATIVE MG/DL
KETONES UR QL: NEGATIVE
LEUKOCYTE EST, POC: NEGATIVE
NITRITE UR-MCNC: NEGATIVE MG/ML
PH UR: 7 [PH] (ref 5–8)
PROT UR STRIP-MCNC: NEGATIVE MG/DL
RBC # UR STRIP: NEGATIVE /UL
SP GR UR: 1.01 (ref 1–1.03)
UROBILINOGEN UR QL: NORMAL

## 2019-08-02 PROCEDURE — 81002 URINALYSIS NONAUTO W/O SCOPE: CPT | Performed by: OBSTETRICS & GYNECOLOGY

## 2019-08-02 PROCEDURE — 71275 CT ANGIOGRAPHY CHEST: CPT

## 2019-08-02 PROCEDURE — 0 IOPAMIDOL PER 1 ML: Performed by: INTERNAL MEDICINE

## 2019-08-02 PROCEDURE — 82565 ASSAY OF CREATININE: CPT

## 2019-08-02 PROCEDURE — 99397 PER PM REEVAL EST PAT 65+ YR: CPT | Performed by: OBSTETRICS & GYNECOLOGY

## 2019-08-02 RX ADMIN — IOPAMIDOL 95 ML: 755 INJECTION, SOLUTION INTRAVENOUS at 15:22

## 2019-08-02 NOTE — NURSING NOTE
Dr. Daniels read CTA PE as negative. This was called to Dr. Compa Álvarez and he stated patient may go home. Ambulated to car with spouse. No distress noted.

## 2019-08-02 NOTE — PROGRESS NOTES
"  Routine Annual Visit    2019    Patient: Faviola Issa          MR#:[id number]      History of Present Illness    Chief Complaint   Patient presents with   • Gynecologic Exam     Annual.  Last pap 10/31/16, negative. last mammo 19, benign. Colonoscopy 18.        69 y.o. female  who presents for annual exam.    The patient presents for routine gynecologic exam without related complaints to the exam.  The patient reports other problems related to thyroidectomy and some shortness of breath after having partial lobectomy of her lung secondary to metastasis of colon cancer.    The patient has an appointment with her pulmonologist later today for evaluation.    No LMP recorded (lmp unknown). Patient has had a hysterectomy.  Obstetric History:  OB History      Para Term  AB Living    4 3 3   1 3    SAB TAB Ectopic Molar Multiple Live Births        1     3         Menstrual History:     No LMP recorded (lmp unknown). Patient has had a hysterectomy.       Sexual History:       ________________________________________  Patient Active Problem List   Diagnosis   • Well female exam with routine gynecological exam   • Malignant neoplasm of sigmoid colon with mets to the lung (CMS/HCC)   • Mixed hyperlipidemia   • Colon cancer (CMS/HCC)   • Status post thyroidectomy and left partial parathyroidectomy (CMS/HCC)   • Postoperative hypothyroidism   • Bilateral pulmonary embolism (CMS/HCC)   • Voice disturbance   • Elevated troponin   • Benign essential HTN       Past Medical History:   Diagnosis Date   • Arthritis    • Asthma    • Colon cancer (CMS/HCC)     with mets to left lung (resected)   • Enlarged thyroid gland    • Eye exam normal    • History of prior pregnancies     x4   • Hypertension     \"white coat syndrome\"   • Lymphoma (CMS/HCC)     Eyelid, low-grade, treated with radiation   • PONV (postoperative nausea and vomiting)    • Post-menopausal    • Rectal cancer (CMS/HCC) " "       Past Surgical History:   Procedure Laterality Date   • COLON SURGERY       and 2011   • COLONOSCOPY  2016   • COLONOSCOPY N/A 2018    Procedure: COLONOSCOPY into ileum;  Surgeon: James Romeo MD;  Location: [facility] ENDOSCOPY;  Service: Gastroenterology   • HEMICOLOECTOMY W/ ANASTOMOSIS Right 2011    Adenocarcinoma of cecum   • HYSTERECTOMY     • LUNG LOBECTOMY      ANSELMO 2006 and RLL partial 2001 metastatic colon cancer    • THYROIDECTOMY Bilateral 2019    Procedure: Left PARATHYROIDECTOMY AND TOTAL THYROIDECTOMY;  Surgeon: Maxi Daly MD;  Location: [facility] OR OSC;  Service: ENT   • TRACHEOSTOMY N/A 2019    Procedure: EVACUATION OF NECK  HEMATOMA;  Surgeon: Maxi Daly MD;  Location: [facility] MAIN OR;  Service: ENT       Social History     Tobacco Use   Smoking Status Former Smoker   • Packs/day: 1.00   • Years: 15.00   • Pack years: 15.00   • Types: Cigarettes   • Last attempt to quit: 2001   • Years since quittin.9   Smokeless Tobacco Never Used   Tobacco Comment    1ppd x20 yrs       Family History   Problem Relation Age of Onset   • Cancer Sister         \"FEMALE\"   • Hypertension Father    • Breast cancer Daughter 45   • Deep vein thrombosis Niece    • Malig Hyperthermia Neg Hx        Prior to Admission medications    Medication Sig Start Date End Date Taking? Authorizing Provider   albuterol (PROVENTIL HFA;VENTOLIN HFA) 108 (90 Base) MCG/ACT inhaler Inhale 2 puffs Every 4 (Four) Hours As Needed for Wheezing. (put on file) 10/3/17  Yes Gema Johnson PA-C   apixaban (ELIQUIS) 5 MG tablet tablet Take 1 tablet by mouth Every 12 (Twelve) Hours. For the hx PE 6/10/19  Yes Gema Johnson PA-C   calcium citrate-vitamin d (CITRACAL) 200-250 MG-UNIT tablet tablet Take  by mouth 2 (Two) Times a Day.   Yes ProviderArlyn MD   SYNTHROID 100 MCG tablet Take 100 mcg by mouth Daily. 19  Yes Arlyn Saleh MD   valsartan (DIOVAN) 80 MG tablet Take 1 " "tablet by mouth Daily. For BP 7/17/19  Yes Gema Johnson, OLENA   acetaminophen (TYLENOL) 500 MG tablet Take 500 mg by mouth Every 6 (Six) Hours As Needed. Take 1/2 tab   8/2/19  Provider, MD Arlyn     ________________________________________    Current contraception: status post hysterectomy  History of abnormal Pap smear: no  Family history of uterine or ovarian cancer: no  Family History of colon cancer/colon polyps: no  History of abnormal mammogram: no  History of abnormal lipids: yes - management per primary MD    The following portions of the patient's history were reviewed and updated as appropriate: allergies, current medications, past family history, past medical history, past social history, past surgical history and problem list.    Review of Systems    Pertinent items are noted in HPI.     Objective   Physical Exam    /92   Ht 170.2 cm (67\")   Wt 82.1 kg (181 lb)   LMP  (LMP Unknown)   Breastfeeding? No   BMI 28.35 kg/m²    BP Readings from Last 3 Encounters:   08/02/19 156/92   07/15/19 120/70   07/01/19 150/80      Wt Readings from Last 3 Encounters:   08/02/19 82.1 kg (181 lb)   07/15/19 81.6 kg (180 lb)   07/01/19 80.3 kg (177 lb)        BMI: Estimated body mass index is 28.35 kg/m² as calculated from the following:    Height as of this encounter: 170.2 cm (67\").    Weight as of this encounter: 82.1 kg (181 lb).       General: alert, appears stated age, cooperative and no distress   Heart: regular rate and rhythm, S1, S2 normal, no murmur, click, rub or gallop   Lungs: clear to auscultation bilaterally   Abdomen: soft, non-tender, without masses, no organomegaly   Breast: inspection negative, no nipple discharge or bleeding, no masses or nodularity palpable   Vulva: normal, bartholin's, Urethra, [place]'s normal and severe atrophy   Vagina: normal mucosa, normal discharge, atrophic appearance    Cervix: absent   Uterus: absent    Adnexa: exam limited by habitus     As part of wellness " and prevention, the following topics were discussed with the patient:     []  Nutrition  []  Physical activity/regular exercise   []  Healthy weight  []  Injury prevention  []  Smoking cessation  []  Substance misuse/abuse  []  Sexual behavior  []  STD prevention  []  Contaception  []  Dental health  []  Mental health  []  Immunization  [x]  Encouraged SBE        Assessment:    Faviola was seen today for gynecologic exam.    Diagnoses and all orders for this visit:    Screening for blood or protein in urine  -     POC Urinalysis Dipstick    Well female exam with routine gynecological exam  -     POC Urinalysis Dipstick    Benign essential HTN    Malignant neoplasm of colon, unspecified part of colon (CMS/HCC)    Malignant neoplasm of sigmoid colon with mets to the lung (CMS/HCC)          Plan:  Return in about 2 years (around 8/2/2021) for Annual GYN exam.      Salvador Mensah MD  8/2/2019 9:26 AM

## 2019-08-09 ENCOUNTER — LAB (OUTPATIENT)
Dept: OTHER | Facility: HOSPITAL | Age: 69
End: 2019-08-09

## 2019-08-09 ENCOUNTER — TELEPHONE (OUTPATIENT)
Dept: FAMILY MEDICINE CLINIC | Facility: CLINIC | Age: 69
End: 2019-08-09

## 2019-08-09 DIAGNOSIS — I26.99 BILATERAL PULMONARY EMBOLISM (HCC): ICD-10-CM

## 2019-08-09 DIAGNOSIS — C18.9 MALIGNANT NEOPLASM OF COLON, UNSPECIFIED PART OF COLON (HCC): ICD-10-CM

## 2019-08-09 DIAGNOSIS — I10 BENIGN ESSENTIAL HTN: Primary | ICD-10-CM

## 2019-08-09 DIAGNOSIS — E89.0 POSTOPERATIVE HYPOTHYROIDISM: ICD-10-CM

## 2019-08-09 LAB
BASOPHILS # BLD AUTO: 0.05 10*3/MM3 (ref 0–0.2)
BASOPHILS NFR BLD AUTO: 0.7 % (ref 0–1.5)
DEPRECATED RDW RBC AUTO: 44.8 FL (ref 37–54)
EOSINOPHIL # BLD AUTO: 0.08 10*3/MM3 (ref 0–0.4)
EOSINOPHIL NFR BLD AUTO: 1.1 % (ref 0.3–6.2)
ERYTHROCYTE [DISTWIDTH] IN BLOOD BY AUTOMATED COUNT: 14.1 % (ref 12.3–15.4)
FERRITIN SERPL-MCNC: 26.2 NG/ML (ref 13–150)
HCT VFR BLD AUTO: 43.3 % (ref 34–46.6)
HGB BLD-MCNC: 14 G/DL (ref 12–15.9)
HGB RETIC QN AUTO: 31.1 PG (ref 29.8–36.1)
IMM GRANULOCYTES # BLD AUTO: 0.02 10*3/MM3 (ref 0–0.05)
IMM GRANULOCYTES NFR BLD AUTO: 0.3 % (ref 0–0.5)
IMM RETICS NFR: 15.4 % (ref 3–15.8)
IRON 24H UR-MRATE: 80 MCG/DL (ref 37–145)
IRON SATN MFR SERPL: 21 % (ref 20–50)
LYMPHOCYTES # BLD AUTO: 1.45 10*3/MM3 (ref 0.7–3.1)
LYMPHOCYTES NFR BLD AUTO: 20 % (ref 19.6–45.3)
MCH RBC QN AUTO: 28 PG (ref 26.6–33)
MCHC RBC AUTO-ENTMCNC: 32.3 G/DL (ref 31.5–35.7)
MCV RBC AUTO: 86.6 FL (ref 79–97)
MONOCYTES # BLD AUTO: 0.5 10*3/MM3 (ref 0.1–0.9)
MONOCYTES NFR BLD AUTO: 6.9 % (ref 5–12)
NEUTROPHILS # BLD AUTO: 5.14 10*3/MM3 (ref 1.7–7)
NEUTROPHILS NFR BLD AUTO: 71 % (ref 42.7–76)
NRBC BLD AUTO-RTO: 0 /100 WBC (ref 0–0.2)
PLATELET # BLD AUTO: 284 10*3/MM3 (ref 140–450)
PMV BLD AUTO: 9.5 FL (ref 6–12)
RBC # BLD AUTO: 5 10*6/MM3 (ref 3.77–5.28)
RETICS/RBC NFR AUTO: 1.58 % (ref 0.7–1.9)
TIBC SERPL-MCNC: 389 MCG/DL (ref 298–536)
TRANSFERRIN SERPL-MCNC: 261 MG/DL (ref 200–360)
WBC NRBC COR # BLD: 7.24 10*3/MM3 (ref 3.4–10.8)

## 2019-08-09 PROCEDURE — 82728 ASSAY OF FERRITIN: CPT | Performed by: INTERNAL MEDICINE

## 2019-08-09 PROCEDURE — 85046 RETICYTE/HGB CONCENTRATE: CPT | Performed by: INTERNAL MEDICINE

## 2019-08-09 PROCEDURE — 85025 COMPLETE CBC W/AUTO DIFF WBC: CPT | Performed by: INTERNAL MEDICINE

## 2019-08-09 PROCEDURE — 36415 COLL VENOUS BLD VENIPUNCTURE: CPT

## 2019-08-09 PROCEDURE — 83540 ASSAY OF IRON: CPT | Performed by: INTERNAL MEDICINE

## 2019-08-09 PROCEDURE — 84466 ASSAY OF TRANSFERRIN: CPT | Performed by: INTERNAL MEDICINE

## 2019-08-09 NOTE — TELEPHONE ENCOUNTER
[facility] called stating that they have been requesting refills for Valsartan, Eloquis, and Synthroid.    Called pt and she does want refills sent to mail order pharm.

## 2019-08-12 RX ORDER — LEVOTHYROXINE SODIUM 100 MCG
100 TABLET ORAL DAILY
Qty: 90 TABLET | Refills: 1 | Status: SHIPPED | OUTPATIENT
Start: 2019-08-12 | End: 2019-11-02

## 2019-08-12 RX ORDER — VALSARTAN 80 MG/1
80 TABLET ORAL DAILY
Qty: 90 TABLET | Refills: 1 | Status: SHIPPED | OUTPATIENT
Start: 2019-08-12 | End: 2019-08-15

## 2019-08-15 ENCOUNTER — OFFICE VISIT (OUTPATIENT)
Dept: INTERNAL MEDICINE | Facility: CLINIC | Age: 69
End: 2019-08-15

## 2019-08-15 VITALS
HEART RATE: 60 BPM | WEIGHT: 185.3 LBS | BODY MASS INDEX: 29.08 KG/M2 | RESPIRATION RATE: 16 BRPM | OXYGEN SATURATION: 99 % | HEIGHT: 67 IN | DIASTOLIC BLOOD PRESSURE: 76 MMHG | TEMPERATURE: 98.2 F | SYSTOLIC BLOOD PRESSURE: 140 MMHG

## 2019-08-15 DIAGNOSIS — E89.0 POSTOPERATIVE HYPOTHYROIDISM: ICD-10-CM

## 2019-08-15 DIAGNOSIS — E78.2 MIXED HYPERLIPIDEMIA: Primary | ICD-10-CM

## 2019-08-15 DIAGNOSIS — I10 BENIGN ESSENTIAL HTN: ICD-10-CM

## 2019-08-15 PROCEDURE — 99214 OFFICE O/P EST MOD 30 MIN: CPT | Performed by: FAMILY MEDICINE

## 2019-08-15 RX ORDER — VALSARTAN 80 MG/1
80 TABLET ORAL DAILY
Qty: 90 TABLET | Refills: 1 | Status: SHIPPED | OUTPATIENT
Start: 2019-08-15 | End: 2020-06-01 | Stop reason: SDUPTHER

## 2019-08-15 NOTE — PROGRESS NOTES
"  Subjective .     REASONS FOR FOLLOWUP:  History of colon cancer and history of rectal cancer and pulmonary emboli    HISTORY OF PRESENT ILLNESS:  The patient is a 69 y.o. year old female  who is here for follow-up with the above-mentioned history.    Since last visit no new problems.  Denies bleeding.  No chest pain or shortness of breath.    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, low-grade, treated with radiation   • PONV (postoperative nausea and vomiting)    • Post-menopausal    • Rectal cancer (CMS/HCC) 1999     Past Surgical History:   Procedure Laterality Date   • COLON SURGERY      1999 and 11/2011   • COLONOSCOPY  2016   • COLONOSCOPY N/A 5/8/2018    Procedure: COLONOSCOPY into ileum;  Surgeon: James Romeo MD;  Location: [facility] ENDOSCOPY;  Service: Gastroenterology   • HEMICOLOECTOMY W/ ANASTOMOSIS Right 11/2011    Adenocarcinoma of cecum   • HYSTERECTOMY  1999   • LUNG LOBECTOMY      ANSELMO 08/2006 and RLL partial 09/2001 metastatic colon cancer    • THYROIDECTOMY Bilateral 5/6/2019    Procedure: Left PARATHYROIDECTOMY AND TOTAL THYROIDECTOMY;  Surgeon: Maxi Daly MD;  Location: [facility] OR OSC;  Service: ENT   • TRACHEOSTOMY N/A 5/6/2019    Procedure: EVACUATION OF NECK  HEMATOMA;  Surgeon: Maxi Daly MD;  Location: [facility] OR;  Service: ENT       HEMATOLOGIC/ONCOLOGIC HISTORY:  (History from previous dates can be found in the separate document.)    MEDICATIONS    Current Outpatient Medications:   •  apixaban (ELIQUIS) 5 MG tablet tablet, Take 1 tablet by mouth Every 12 (Twelve) Hours. For the hx PE, Disp: 180 tablet, Rfl: 1  •  CALCIUM CITRATE-VITAMIN D PO, Take 750 tablets by mouth 2 (Two) Times a Day., Disp: , Rfl:   •  " "Fluticasone Furoate-Vilanterol (BREO ELLIPTA IN), Inhale Daily., Disp: , Rfl:   •  SYNTHROID 100 MCG tablet, Take 1 tablet by mouth Daily., Disp: 90 tablet, Rfl: 1  •  TOBRADEX 0.3-0.1 % ophthalmic ointment, APPLY OINTMENT INTO RIGHT EYE 4 TIMES DAILY FOR 4 DAYS THEN APPLY TWICE DAILY FOR 4 DAYS, Disp: , Rfl: 0  •  valsartan (DIOVAN) 80 MG tablet, Take 1 tablet by mouth Daily. For BP, Disp: 90 tablet, Rfl: 1    ALLERGIES:     Allergies   Allergen Reactions   • Adhesive Tape Hives and Itching     BANDAIDS   • Amoxicillin Hives and Itching   • Latex Other (See Comments)     Lips and nose swelled   • Red Dye Nausea And Vomiting   • Shellfish-Derived Products Shortness Of Breath   • Lactose Intolerance (Gi) Nausea And Vomiting       SOCIAL HISTORY:       Social History     Socioeconomic History   • Marital status:      Spouse name: KEN   • Number of children: 3   • Years of education: College   • Highest education level: Not on file   Occupational History   • Occupation: Nurse     Employer: [employer]   Tobacco Use   • Smoking status: Former Smoker     Packs/day: 1.00     Years: 15.00     Pack years: 15.00     Types: Cigarettes     Last attempt to quit: 2001     Years since quittin.9   • Smokeless tobacco: Never Used   • Tobacco comment: 1ppd x20 yrs   Substance and Sexual Activity   • Alcohol use: No   • Drug use: No   • Sexual activity: Yes     Partners: Male     Birth control/protection: Post-menopausal, Surgical   Social History Narrative    GYN PATIENT SINCE .         FAMILY HISTORY:  Family History   Problem Relation Age of Onset   • Cancer Sister         \"FEMALE\"   • Hypertension Father    • Breast cancer Daughter 45   • Deep vein thrombosis Niece    • Malig Hyperthermia Neg Hx        REVIEW OF SYSTEMS:  Review of Systems   Constitutional: Negative for activity change.   HENT: Negative for nosebleeds and trouble swallowing.    Respiratory: Negative for " "shortness of breath and wheezing.    Cardiovascular: Negative for chest pain and palpitations.   Gastrointestinal: Negative for constipation, diarrhea and nausea.   Genitourinary: Negative for dysuria and hematuria.   Musculoskeletal: Negative for arthralgias and myalgias.   Skin: Negative for rash and wound.   Neurological: Negative for seizures and syncope.   Hematological: Negative for adenopathy. Does not bruise/bleed easily.   Psychiatric/Behavioral: Negative for confusion.        Objective    Vitals:    08/16/19 0904   BP: 159/75   Pulse: 67   Resp: 16   Temp: 97.5 °F (36.4 °C)   SpO2: 100%   Weight: 84.2 kg (185 lb 11.2 oz)   Height: 168 cm (66.14\")   PainSc:   7   PainLoc: Hip  Comment: right hip and back     Current Status 8/16/2019   ECOG score 0      PHYSICAL EXAM:    CONSTITUTIONAL:  Vital signs reviewed.  No distress, looks comfortable.  EYES:  Conjunctiva and lids unremarkable.  PERRLA  EARS,NOSE,MOUTH,THROAT:  Ears and nose appear unremarkable.  Lips, teeth, gums appear unremarkable.  RESPIRATORY:  Normal respiratory effort.  Lungs clear to auscultation bilaterally.  CARDIOVASCULAR:  Normal S1, S2.  No murmurs rubs or gallops.  No significant lower extremity edema.  GASTROINTESTINAL: Abdomen appears unremarkable.  Nontender.  No hepatomegaly.  No splenomegaly.  LYMPHATIC:  No cervical, supraclavicular, axillary lymphadenopathy.  SKIN:  Warm.  No rashes.  PSYCHIATRIC:  Normal judgment and insight.  Normal mood and affect.      RECENT LABS:        WBC   Date/Time Value Ref Range Status   08/09/2019 10:00 AM 7.24 3.40 - 10.80 10*3/mm3 Final   05/21/2019 11:47 AM 8.6 3.4 - 10.8 x10E3/uL Final     Hemoglobin   Date/Time Value Ref Range Status   08/09/2019 10:00 AM 14.0 12.0 - 15.9 g/dL Final     Platelets   Date/Time Value Ref Range Status   08/09/2019 10:00  140 - 450 10*3/mm3 Final       Assessment/Plan   Malignant neoplasm of colon, unspecified part of colon (CMS/HCC)  - Ferritin  - Iron " Profile  - Retic With IRF & RET-He  - CBC & Differential       *Bilateral pulmonary emboli diagnosed 2019.  (Normal bilateral leg Doppler 2019).  On Eliquis.    *Risk factors for PE:   · Total thyroidectomy and partial left parathyroidectomy 2019.  Postoperative hematoma.  Leg pain which began a few days after discharge home from surgery.  Leg pain resolved on its own.  Decreased mobility after surgery compared to her baseline.  · Unremarkable: [id number], lupus anticoag, anticardiolipan, pro S free, S activity, C activity, FVL, [id number].  · Family history: Niece (sister's daughter) significantly overweight with decreased mobility.  PE at age 38.    *Length of anticoagulation.  I recommend Eliquis 5 mg twice per day for 6 months at least.  Then, could continue 5 mg twice per day indefinitely or decrease to prophylactic dosin.5 mg twice per day, or stop Eliquis and change to aspirin 81 mg daily.  · With negative hypercoagulable labs, patient and  agree: Stop Eliquis and change to aspirin 81 mg daily after 6 months of Eliquis (around 2019)    *iron deficiency.  Due to anemia with Hb 11.2 on 2019, labs, 2019: Normal Hb but ferritin 33 with 20% saturation.  · She declines trying oral iron to see if fatigue will improve.  · On 19, ferritin 26, from 34 and 21% sat from 20% on 19.  · She agrees to try ferrous sulfate 1 tablet daily, today, 2019.  She hesitates to try more than this due to typical problems tolerating medicines.    *Source of iron deficiency.  · History of colon cancer.  Next colonoscopy planned 2021  · I recommended she see Dr. Romeo to consider an EGD to look for a source of malignancy or premalignant condition as the cause of iron deficiency.  She again declines.  (Notably she did have a postoperative bleed).    *  Stage I (pT2,pN0,M0) colon adenocarcinoma. Resected with a right hemicolectomy 11. Therefore no adjuvant chemotherapy  recommended. 12 lymph nodes checked and negative.   · Last colonoscopy 5/8/2018: Unremarkable.  Dr. Romeo plans next 3 years later.  No clinical signs of recurrence.    * Rectal cancer. APR 1999. Adjuvant 5FU and radiation.   Right lower lobectomy 9/12/01 for a solitary recurrence of the rectal cancer.   Left upper lobectomy 8/16/05 for a solitary recurrence of the rectal cancer.   No clinical signs of recurrence.    * Low grade lymphoma of the eyelid, treated with radiation in 1988. She has had no signs of recurrence of lymphoma.   No evidence of recurrence today.    * Previous mild hypercalcemia.  Previous mild elevated alkaline phosphatase.  I discussed this with patient and .  She declines CT scans to evaluate for recurrence of prior cancer due to avoiding paying high deductible.         PLAN:  · Begin ferrous sulfate 1 tablet daily  · MD 3 months.  1 week prior: Iron labs  · At the upcoming visit in November, change Eliquis to aspirin 81 mg daily is that we will complete around 6 months of anticoagulation with Eliquis  · No need for follow-up Doppler since initial Doppler was negative for DVT  · She declines EGD referral but states if she changes her mind she will call Dr. Romeo for EGD.      assisted with history.

## 2019-08-15 NOTE — PROGRESS NOTES
Subjective   Faviola Issa is a 69 y.o. female.     Chief Complaint   Patient presents with   • mixed hyperlipidemia         History of Present Illness     Patient is a past medical history for hyperlipidemia.  Patient cannot take any medications for this.  She is on a monitor diet and exercise.      She has a past medical history for benign essential hypertension.  Patient blood pressure is 140/76.  She is currently taking valsartan 80 mg daily, and she denies any side effects of the medication.    Patient does have hypothyroidism which is secondary to post operation.  Patient is currently taking Synthroid 100 mg daily.  She denies any side effects of the medication.    The following portions of the patient's history were reviewed and updated as appropriate: allergies, current medications, past family history, past medical history, past social history, past surgical history and problem list.    Review of Systems   Constitutional: Negative for chills and fever.   HENT: Negative for congestion, rhinorrhea, sinus pain and sore throat.    Eyes: Negative for photophobia and visual disturbance.   Respiratory: Negative for cough, chest tightness and shortness of breath.    Cardiovascular: Negative for chest pain and palpitations.   Gastrointestinal: Negative for diarrhea, nausea and vomiting.   Genitourinary: Negative for dysuria, frequency and urgency.   Skin: Negative for rash and wound.   Neurological: Negative for dizziness and syncope.   Psychiatric/Behavioral: Negative for behavioral problems and confusion.       Objective   Physical Exam   Constitutional: She is oriented to person, place, and time. She appears well-developed and well-nourished.   HENT:   Head: Normocephalic and atraumatic.   Right Ear: External ear normal.   Left Ear: External ear normal.   Eyes: EOM are normal.   Neck: Normal range of motion. Neck supple.   Cardiovascular: Normal rate, regular rhythm and normal heart sounds.    Pulmonary/Chest: Effort normal and breath sounds normal. No respiratory distress.   Musculoskeletal: Normal range of motion.   Lymphadenopathy:     She has no cervical adenopathy.   Neurological: She is alert and oriented to person, place, and time.   Skin: Skin is warm.   Psychiatric: She has a normal mood and affect. Her behavior is normal.   Nursing note and vitals reviewed.      Assessment/Plan   Faviola was seen today for mixed hyperlipidemia.    Diagnoses and all orders for this visit:    Mixed hyperlipidemia  -     Lipid Panel With LDL / HDL Ratio  -     Continue to monitor with diet and exercise.    Benign essential HTN  -     Comprehensive Metabolic Panel  -     valsartan (DIOVAN) 80 MG tablet; Take 1 tablet by mouth Daily. For BP    Postoperative hypothyroidism  -     Thyroid Panel With TSH  -     Continue Synthroid 100 mg daily.          No Follow-up on file.    Dictated utilizing Dragon Voice Recognition Software

## 2019-08-16 ENCOUNTER — APPOINTMENT (OUTPATIENT)
Dept: OTHER | Facility: HOSPITAL | Age: 69
End: 2019-08-16

## 2019-08-16 ENCOUNTER — OFFICE VISIT (OUTPATIENT)
Dept: ONCOLOGY | Facility: CLINIC | Age: 69
End: 2019-08-16

## 2019-08-16 VITALS
DIASTOLIC BLOOD PRESSURE: 75 MMHG | OXYGEN SATURATION: 100 % | SYSTOLIC BLOOD PRESSURE: 159 MMHG | HEIGHT: 66 IN | WEIGHT: 185.7 LBS | TEMPERATURE: 97.5 F | BODY MASS INDEX: 29.84 KG/M2 | RESPIRATION RATE: 16 BRPM | HEART RATE: 67 BPM

## 2019-08-16 DIAGNOSIS — C18.9 MALIGNANT NEOPLASM OF COLON, UNSPECIFIED PART OF COLON (HCC): Primary | ICD-10-CM

## 2019-08-16 PROCEDURE — 99214 OFFICE O/P EST MOD 30 MIN: CPT | Performed by: INTERNAL MEDICINE

## 2019-08-16 PROCEDURE — G0463 HOSPITAL OUTPT CLINIC VISIT: HCPCS | Performed by: INTERNAL MEDICINE

## 2019-09-04 ENCOUNTER — TELEPHONE (OUTPATIENT)
Dept: FAMILY MEDICINE CLINIC | Facility: CLINIC | Age: 69
End: 2019-09-04

## 2019-09-04 NOTE — TELEPHONE ENCOUNTER
Pt called stating that her cholesterol was 600 at recent biometric screening.  Pt is asking if you want to recheck.  Please advise.

## 2019-09-04 NOTE — TELEPHONE ENCOUNTER
I can.  She just saw another primary care doc in a different group. ? If should defer to them or will do if wants me to. Can't have 2 captains

## 2019-09-30 ENCOUNTER — HOSPITAL ENCOUNTER (EMERGENCY)
Facility: HOSPITAL | Age: 69
Discharge: HOME | End: 2019-09-30
Attending: EMERGENCY MEDICINE | Admitting: EMERGENCY MEDICINE

## 2019-09-30 ENCOUNTER — APPOINTMENT (OUTPATIENT)
Dept: CT IMAGING | Facility: HOSPITAL | Age: 69
End: 2019-09-30

## 2019-09-30 VITALS
DIASTOLIC BLOOD PRESSURE: 86 MMHG | BODY MASS INDEX: 28.72 KG/M2 | HEART RATE: 55 BPM | RESPIRATION RATE: 14 BRPM | WEIGHT: 183 LBS | OXYGEN SATURATION: 100 % | TEMPERATURE: 97.9 F | HEIGHT: 67 IN | SYSTOLIC BLOOD PRESSURE: 179 MMHG

## 2019-09-30 DIAGNOSIS — I10 HYPERTENSION, UNSPECIFIED TYPE: ICD-10-CM

## 2019-09-30 DIAGNOSIS — R20.2 PARESTHESIA OF BOTH FEET: ICD-10-CM

## 2019-09-30 DIAGNOSIS — R20.2 PARESTHESIAS IN LEFT HAND: Primary | ICD-10-CM

## 2019-09-30 LAB
ALBUMIN SERPL-MCNC: 4.2 G/DL (ref 3.5–5.2)
ALBUMIN/GLOB SERPL: 1.2 G/DL
ALP SERPL-CCNC: 79 U/L (ref 39–117)
ALT SERPL W P-5'-P-CCNC: 7 U/L (ref 1–33)
ANION GAP SERPL CALCULATED.3IONS-SCNC: 9.7 MMOL/L (ref 5–15)
AST SERPL-CCNC: 16 U/L (ref 1–32)
BASOPHILS # BLD AUTO: 0.06 10*3/MM3 (ref 0–0.2)
BASOPHILS NFR BLD AUTO: 1 % (ref 0–1.5)
BILIRUB SERPL-MCNC: 0.6 MG/DL (ref 0.2–1.2)
BUN BLD-MCNC: 8 MG/DL (ref 8–23)
BUN/CREAT SERPL: 11 (ref 7–25)
CALCIUM SPEC-SCNC: 8.9 MG/DL (ref 8.6–10.5)
CHLORIDE SERPL-SCNC: 98 MMOL/L (ref 98–107)
CO2 SERPL-SCNC: 28.3 MMOL/L (ref 22–29)
CREAT BLD-MCNC: 0.73 MG/DL (ref 0.57–1)
DEPRECATED RDW RBC AUTO: 46.1 FL (ref 37–54)
EOSINOPHIL # BLD AUTO: 0.07 10*3/MM3 (ref 0–0.4)
EOSINOPHIL NFR BLD AUTO: 1.2 % (ref 0.3–6.2)
ERYTHROCYTE [DISTWIDTH] IN BLOOD BY AUTOMATED COUNT: 14.6 % (ref 12.3–15.4)
GFR SERPL CREATININE-BSD FRML MDRD: 96 ML/MIN/1.73
GLOBULIN UR ELPH-MCNC: 3.5 GM/DL
GLUCOSE BLD-MCNC: 85 MG/DL (ref 65–99)
HCT VFR BLD AUTO: 42.5 % (ref 34–46.6)
HGB BLD-MCNC: 13.6 G/DL (ref 12–15.9)
HOLD SPECIMEN: NORMAL
HOLD SPECIMEN: NORMAL
IMM GRANULOCYTES # BLD AUTO: 0.03 10*3/MM3 (ref 0–0.05)
IMM GRANULOCYTES NFR BLD AUTO: 0.5 % (ref 0–0.5)
LYMPHOCYTES # BLD AUTO: 1.08 10*3/MM3 (ref 0.7–3.1)
LYMPHOCYTES NFR BLD AUTO: 18 % (ref 19.6–45.3)
MCH RBC QN AUTO: 27.5 PG (ref 26.6–33)
MCHC RBC AUTO-ENTMCNC: 32 G/DL (ref 31.5–35.7)
MCV RBC AUTO: 86 FL (ref 79–97)
MONOCYTES # BLD AUTO: 0.45 10*3/MM3 (ref 0.1–0.9)
MONOCYTES NFR BLD AUTO: 7.5 % (ref 5–12)
NEUTROPHILS # BLD AUTO: 4.3 10*3/MM3 (ref 1.7–7)
NEUTROPHILS NFR BLD AUTO: 71.8 % (ref 42.7–76)
NRBC BLD AUTO-RTO: 0 /100 WBC (ref 0–0.2)
PLATELET # BLD AUTO: 291 10*3/MM3 (ref 140–450)
PMV BLD AUTO: 10.1 FL (ref 6–12)
POTASSIUM BLD-SCNC: 3.9 MMOL/L (ref 3.5–5.2)
PROT SERPL-MCNC: 7.7 G/DL (ref 6–8.5)
RBC # BLD AUTO: 4.94 10*6/MM3 (ref 3.77–5.28)
SODIUM BLD-SCNC: 136 MMOL/L (ref 136–145)
T4 FREE SERPL-MCNC: 1.37 NG/DL (ref 0.93–1.7)
TROPONIN T SERPL-MCNC: <0.01 NG/ML (ref 0–0.03)
TSH SERPL DL<=0.05 MIU/L-ACNC: 3.54 UIU/ML (ref 0.27–4.2)
WBC NRBC COR # BLD: 5.99 10*3/MM3 (ref 3.4–10.8)
WHOLE BLOOD HOLD SPECIMEN: NORMAL
WHOLE BLOOD HOLD SPECIMEN: NORMAL

## 2019-09-30 PROCEDURE — 99283 EMERGENCY DEPT VISIT LOW MDM: CPT

## 2019-09-30 PROCEDURE — 93010 ELECTROCARDIOGRAM REPORT: CPT | Performed by: INTERNAL MEDICINE

## 2019-09-30 PROCEDURE — 85025 COMPLETE CBC W/AUTO DIFF WBC: CPT | Performed by: EMERGENCY MEDICINE

## 2019-09-30 PROCEDURE — 70450 CT HEAD/BRAIN W/O DYE: CPT

## 2019-09-30 PROCEDURE — 84439 ASSAY OF FREE THYROXINE: CPT | Performed by: EMERGENCY MEDICINE

## 2019-09-30 PROCEDURE — 36415 COLL VENOUS BLD VENIPUNCTURE: CPT

## 2019-09-30 PROCEDURE — 84484 ASSAY OF TROPONIN QUANT: CPT | Performed by: EMERGENCY MEDICINE

## 2019-09-30 PROCEDURE — 84443 ASSAY THYROID STIM HORMONE: CPT | Performed by: EMERGENCY MEDICINE

## 2019-09-30 PROCEDURE — 80053 COMPREHEN METABOLIC PANEL: CPT | Performed by: EMERGENCY MEDICINE

## 2019-09-30 PROCEDURE — 93005 ELECTROCARDIOGRAM TRACING: CPT | Performed by: EMERGENCY MEDICINE

## 2019-10-14 ENCOUNTER — CLINICAL SUPPORT (OUTPATIENT)
Dept: INTERNAL MEDICINE | Facility: CLINIC | Age: 69
End: 2019-10-14

## 2019-10-14 DIAGNOSIS — Z23 NEED FOR IMMUNIZATION AGAINST INFLUENZA: Primary | ICD-10-CM

## 2019-10-14 PROCEDURE — 90471 IMMUNIZATION ADMIN: CPT | Performed by: FAMILY MEDICINE

## 2019-10-14 PROCEDURE — 90653 IIV ADJUVANT VACCINE IM: CPT | Performed by: FAMILY MEDICINE

## 2019-11-01 ENCOUNTER — LAB (OUTPATIENT)
Dept: OTHER | Facility: HOSPITAL | Age: 69
End: 2019-11-01

## 2019-11-01 DIAGNOSIS — C18.9 MALIGNANT NEOPLASM OF COLON, UNSPECIFIED PART OF COLON (HCC): ICD-10-CM

## 2019-11-01 LAB
ALBUMIN SERPL-MCNC: 4.3 G/DL (ref 3.5–5.2)
ALBUMIN/GLOB SERPL: 1.3 G/DL
ALP SERPL-CCNC: 79 U/L (ref 39–117)
ALT SERPL W P-5'-P-CCNC: 8 U/L (ref 1–33)
ANION GAP SERPL CALCULATED.3IONS-SCNC: 7.2 MMOL/L (ref 5–15)
AST SERPL-CCNC: 14 U/L (ref 1–32)
BASOPHILS # BLD AUTO: 0.04 10*3/MM3 (ref 0–0.2)
BASOPHILS NFR BLD AUTO: 0.7 % (ref 0–1.5)
BILIRUB SERPL-MCNC: 0.5 MG/DL (ref 0.1–1.2)
BUN BLD-MCNC: 11 MG/DL (ref 8–23)
BUN/CREAT SERPL: 12.5 (ref 7–25)
CALCIUM SPEC-SCNC: 9 MG/DL (ref 8.6–10.5)
CHLORIDE SERPL-SCNC: 103 MMOL/L (ref 98–107)
CO2 SERPL-SCNC: 30.8 MMOL/L (ref 22–29)
CREAT BLD-MCNC: 0.88 MG/DL (ref 0.57–1)
DEPRECATED RDW RBC AUTO: 46.6 FL (ref 37–54)
EOSINOPHIL # BLD AUTO: 0.08 10*3/MM3 (ref 0–0.4)
EOSINOPHIL NFR BLD AUTO: 1.4 % (ref 0.3–6.2)
ERYTHROCYTE [DISTWIDTH] IN BLOOD BY AUTOMATED COUNT: 14.6 % (ref 12.3–15.4)
FERRITIN SERPL-MCNC: 46.2 NG/ML (ref 13–150)
GFR SERPL CREATININE-BSD FRML MDRD: 77 ML/MIN/1.73
GLOBULIN UR ELPH-MCNC: 3.4 GM/DL
GLUCOSE BLD-MCNC: 88 MG/DL (ref 65–99)
HCT VFR BLD AUTO: 41.1 % (ref 34–46.6)
HGB BLD-MCNC: 13.1 G/DL (ref 12–15.9)
HGB RETIC QN AUTO: 32.2 PG (ref 29.8–36.1)
IMM GRANULOCYTES # BLD AUTO: 0.01 10*3/MM3 (ref 0–0.05)
IMM GRANULOCYTES NFR BLD AUTO: 0.2 % (ref 0–0.5)
IMM RETICS NFR: 10.7 % (ref 3–15.8)
IRON 24H UR-MRATE: 101 MCG/DL (ref 37–145)
IRON SATN MFR SERPL: 26 % (ref 20–50)
LYMPHOCYTES # BLD AUTO: 1.42 10*3/MM3 (ref 0.7–3.1)
LYMPHOCYTES NFR BLD AUTO: 24.1 % (ref 19.6–45.3)
MCH RBC QN AUTO: 28 PG (ref 26.6–33)
MCHC RBC AUTO-ENTMCNC: 31.9 G/DL (ref 31.5–35.7)
MCV RBC AUTO: 87.8 FL (ref 79–97)
MONOCYTES # BLD AUTO: 0.35 10*3/MM3 (ref 0.1–0.9)
MONOCYTES NFR BLD AUTO: 6 % (ref 5–12)
NEUTROPHILS # BLD AUTO: 3.98 10*3/MM3 (ref 1.7–7)
NEUTROPHILS NFR BLD AUTO: 67.6 % (ref 42.7–76)
NRBC BLD AUTO-RTO: 0 /100 WBC (ref 0–0.2)
PLATELET # BLD AUTO: 280 10*3/MM3 (ref 140–450)
PMV BLD AUTO: 10.1 FL (ref 6–12)
POTASSIUM BLD-SCNC: 4.2 MMOL/L (ref 3.5–5.2)
PROT SERPL-MCNC: 7.7 G/DL (ref 6–8.5)
RBC # BLD AUTO: 4.68 10*6/MM3 (ref 3.77–5.28)
RETICS/RBC NFR AUTO: 1.54 % (ref 0.7–1.9)
SODIUM BLD-SCNC: 141 MMOL/L (ref 136–145)
TIBC SERPL-MCNC: 387 MCG/DL (ref 298–536)
TRANSFERRIN SERPL-MCNC: 260 MG/DL (ref 200–360)
WBC NRBC COR # BLD: 5.88 10*3/MM3 (ref 3.4–10.8)

## 2019-11-01 PROCEDURE — 80053 COMPREHEN METABOLIC PANEL: CPT | Performed by: FAMILY MEDICINE

## 2019-11-01 PROCEDURE — 84436 ASSAY OF TOTAL THYROXINE: CPT | Performed by: FAMILY MEDICINE

## 2019-11-01 PROCEDURE — 85025 COMPLETE CBC W/AUTO DIFF WBC: CPT | Performed by: INTERNAL MEDICINE

## 2019-11-01 PROCEDURE — 85046 RETICYTE/HGB CONCENTRATE: CPT | Performed by: INTERNAL MEDICINE

## 2019-11-01 PROCEDURE — 82728 ASSAY OF FERRITIN: CPT | Performed by: INTERNAL MEDICINE

## 2019-11-01 PROCEDURE — 84479 ASSAY OF THYROID (T3 OR T4): CPT | Performed by: FAMILY MEDICINE

## 2019-11-01 PROCEDURE — 36415 COLL VENOUS BLD VENIPUNCTURE: CPT

## 2019-11-01 PROCEDURE — 84443 ASSAY THYROID STIM HORMONE: CPT | Performed by: FAMILY MEDICINE

## 2019-11-01 PROCEDURE — 80061 LIPID PANEL: CPT | Performed by: FAMILY MEDICINE

## 2019-11-01 PROCEDURE — 84466 ASSAY OF TRANSFERRIN: CPT | Performed by: INTERNAL MEDICINE

## 2019-11-01 PROCEDURE — 83540 ASSAY OF IRON: CPT | Performed by: INTERNAL MEDICINE

## 2019-11-02 DIAGNOSIS — I26.99 BILATERAL PULMONARY EMBOLISM (HCC): ICD-10-CM

## 2019-11-02 DIAGNOSIS — E89.0 POSTOPERATIVE HYPOTHYROIDISM: Primary | ICD-10-CM

## 2019-11-02 LAB
CHOLEST SERPL-MCNC: 209 MG/DL (ref 100–199)
FT4I SERPL CALC-MCNC: 2.2 (ref 1.2–4.9)
HDLC SERPL-MCNC: 65 MG/DL
LDLC SERPL CALC-MCNC: 128 MG/DL (ref 0–99)
LDLC/HDLC SERPL: 2 RATIO (ref 0–3.2)
T3RU NFR SERPL: 28 % (ref 24–39)
T4 SERPL-MCNC: 7.7 UG/DL (ref 4.5–12)
TRIGL SERPL-MCNC: 80 MG/DL (ref 0–149)
TSH SERPL-ACNC: 7.39 UIU/ML (ref 0.45–4.5)
VLDLC SERPL-MCNC: 16 MG/DL (ref 5–40)

## 2019-11-02 RX ORDER — LEVOTHYROXINE SODIUM 125 MCG
125 TABLET ORAL DAILY
Qty: 90 TABLET | Refills: 3 | Status: SHIPPED | OUTPATIENT
Start: 2019-11-02 | End: 2019-11-13

## 2019-11-08 ENCOUNTER — APPOINTMENT (OUTPATIENT)
Dept: OTHER | Facility: HOSPITAL | Age: 69
End: 2019-11-08

## 2019-11-08 ENCOUNTER — OFFICE VISIT (OUTPATIENT)
Dept: ONCOLOGY | Facility: CLINIC | Age: 69
End: 2019-11-08

## 2019-11-08 VITALS
BODY MASS INDEX: 30.01 KG/M2 | HEIGHT: 66 IN | DIASTOLIC BLOOD PRESSURE: 72 MMHG | WEIGHT: 186.7 LBS | OXYGEN SATURATION: 100 % | RESPIRATION RATE: 16 BRPM | SYSTOLIC BLOOD PRESSURE: 138 MMHG | TEMPERATURE: 97.3 F | HEART RATE: 60 BPM

## 2019-11-08 DIAGNOSIS — C18.9 MALIGNANT NEOPLASM OF COLON, UNSPECIFIED PART OF COLON (HCC): Primary | ICD-10-CM

## 2019-11-08 PROCEDURE — 99214 OFFICE O/P EST MOD 30 MIN: CPT | Performed by: INTERNAL MEDICINE

## 2019-11-08 PROCEDURE — G0463 HOSPITAL OUTPT CLINIC VISIT: HCPCS | Performed by: INTERNAL MEDICINE

## 2019-11-08 RX ORDER — ALBUTEROL SULFATE 90 UG/1
2 AEROSOL, METERED RESPIRATORY (INHALATION) EVERY 4 HOURS PRN
Refills: 3 | COMMUNITY
Start: 2019-10-18 | End: 2021-04-16

## 2019-11-08 NOTE — PROGRESS NOTES
"  Subjective .     REASONS FOR FOLLOWUP:  History of colon cancer and history of rectal cancer and pulmonary emboli    HISTORY OF PRESENT ILLNESS:  The patient is a 69 y.o. year old female  who is here for follow-up with the above-mentioned history.    Could not tolerate oral iron due to diarrhea.  Only took it for a few days.  Has been trying to eat more iron rich foods.    Denies bleeding.  Taking Eliquis.    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, low-grade, treated with radiation   • PONV (postoperative nausea and vomiting)    • Post-menopausal    • Rectal cancer (CMS/HCC) 1999     Past Surgical History:   Procedure Laterality Date   • COLON SURGERY      1999 and 11/2011   • COLONOSCOPY  2016   • COLONOSCOPY N/A 5/8/2018    Procedure: COLONOSCOPY into ileum;  Surgeon: James Romeo MD;  Location: [facility] ENDOSCOPY;  Service: Gastroenterology   • HEMICOLOECTOMY W/ ANASTOMOSIS Right 11/2011    Adenocarcinoma of cecum   • HYSTERECTOMY  1999   • LUNG LOBECTOMY      ANSELMO 08/2006 and RLL partial 09/2001 metastatic colon cancer    • THYROIDECTOMY Bilateral 5/6/2019    Procedure: Left PARATHYROIDECTOMY AND TOTAL THYROIDECTOMY;  Surgeon: Maxi Daly MD;  Location: [facility] OR [facility]SC;  Service: ENT   • TRACHEOSTOMY N/A 5/6/2019    Procedure: EVACUATION OF NECK  HEMATOMA;  Surgeon: Maxi Daly MD;  Location: [facility] OR;  Service: ENT       HEMATOLOGIC/ONCOLOGIC HISTORY:  (History from previous dates can be found in the separate document.)    MEDICATIONS    Current Outpatient Medications:   •  apixaban (ELIQUIS) 5 MG tablet tablet, Take 1 tablet by mouth Every 12 (Twelve) Hours. For the hx PE, Disp: 180 tablet, Rfl: 1  •  CALCIUM CITRATE-VITAMIN D PO, " "Take 750 tablets by mouth 2 (Two) Times a Day., Disp: , Rfl:   •  SYNTHROID 125 MCG tablet, Take 1 tablet by mouth Daily., Disp: 90 tablet, Rfl: 3  •  valsartan (DIOVAN) 80 MG tablet, Take 1 tablet by mouth Daily. For BP, Disp: 90 tablet, Rfl: 1  •  VENTOLIN  (90 Base) MCG/ACT inhaler, Inhale 2 puffs Every 4 (Four) Hours As Needed., Disp: , Rfl: 3    ALLERGIES:     Allergies   Allergen Reactions   • Adhesive Tape Hives and Itching     BANDAIDS   • Amoxicillin Hives and Itching   • Latex Other (See Comments)     Lips and nose swelled   • Red Dye Nausea And Vomiting   • Shellfish-Derived Products Shortness Of Breath   • Lactose Intolerance (Gi) Nausea And Vomiting       SOCIAL HISTORY:       Social History     Socioeconomic History   • Marital status:      Spouse name: KEN   • Number of children: 3   • Years of education: College   • Highest education level: Not on file   Occupational History   • Occupation: Nurse     Employer: [employer]   Tobacco Use   • Smoking status: Former Smoker     Packs/day: 1.00     Years: 15.00     Pack years: 15.00     Types: Cigarettes     Last attempt to quit: 2001     Years since quittin.1   • Smokeless tobacco: Never Used   • Tobacco comment: 1ppd x20 yrs   Substance and Sexual Activity   • Alcohol use: No   • Drug use: No   • Sexual activity: Yes     Partners: Male     Birth control/protection: Post-menopausal, Surgical   Social History Narrative    GYN PATIENT SINCE .         FAMILY HISTORY:  Family History   Problem Relation Age of Onset   • Cancer Sister         \"FEMALE\"   • Hypertension Father    • Breast cancer Daughter 45   • Deep vein thrombosis Niece    • Malig Hyperthermia Neg Hx        REVIEW OF SYSTEMS:  Review of Systems   Constitutional: Negative for activity change.   HENT: Negative for nosebleeds and trouble swallowing.    Respiratory: Negative for shortness of breath and wheezing.    Cardiovascular: " "Negative for chest pain and palpitations.   Gastrointestinal: Negative for constipation, diarrhea and nausea.   Genitourinary: Negative for dysuria and hematuria.   Musculoskeletal: Negative for arthralgias and myalgias.   Skin: Negative for rash and wound.   Neurological: Negative for seizures and syncope.   Hematological: Negative for adenopathy. Does not bruise/bleed easily.   Psychiatric/Behavioral: Negative for confusion.        Objective    Vitals:    11/08/19 0952   BP: 138/72   Pulse: 60   Resp: 16   Temp: 97.3 °F (36.3 °C)   SpO2: 100%   Weight: 84.7 kg (186 lb 11.2 oz)   Height: 168 cm (66.14\")   PainSc:   6   PainLoc: Comment: left leg and rt. hip     Current Status 11/8/2019   ECOG score 0      PHYSICAL EXAM:    CONSTITUTIONAL:  Vital signs reviewed.  No distress, looks comfortable.  EYES:  Conjunctiva and lids unremarkable.  PERRLA  EARS,NOSE,MOUTH,THROAT:  Ears and nose appear unremarkable.  Lips, teeth, gums appear unremarkable.  RESPIRATORY:  Normal respiratory effort.  Lungs clear to auscultation bilaterally.  CARDIOVASCULAR:  Normal S1, S2.  No murmurs rubs or gallops.  No significant lower extremity edema.  GASTROINTESTINAL: Abdomen appears unremarkable.  Nontender.  No hepatomegaly.  No splenomegaly.  LYMPHATIC:  No cervical, supraclavicular, axillary lymphadenopathy.  SKIN:  Warm.  No rashes.  PSYCHIATRIC:  Normal judgment and insight.  Normal mood and affect.      RECENT LABS:        WBC   Date/Time Value Ref Range Status   11/01/2019 10:17 AM 5.88 3.40 - 10.80 10*3/mm3 Final   05/21/2019 11:47 AM 8.6 3.4 - 10.8 x10E3/uL Final     Hemoglobin   Date/Time Value Ref Range Status   11/01/2019 10:17 AM 13.1 12.0 - 15.9 g/dL Final     Platelets   Date/Time Value Ref Range Status   11/01/2019 10:17  140 - 450 10*3/mm3 Final       Assessment/Plan   There are no diagnoses linked to this encounter.     *Bilateral pulmonary emboli diagnosed 5/22/2019.  (Normal bilateral leg Doppler 5/23/2019).  On " Eliquis.  At this point, I think it is reasonable to stop Eliquis and begin aspirin 81 mg daily.  She states in the past she has had stomach pain with aspirin.  I told her to try coated aspirin.  I told her if she does not tolerate coated aspirin 81 mg daily, she can stop aspirin.    *Risk factors for PE:   · Total thyroidectomy and partial left parathyroidectomy 2019.  Postoperative hematoma.  Leg pain which began a few days after discharge home from surgery.  Leg pain resolved on its own.  Decreased mobility after surgery compared to her baseline.  · Unremarkable: [id number], lupus anticoag, anticardiolipan, pro S free, S activity, C activity, FVL, [id number].  · Family history: Niece (sister's daughter) significantly overweight with decreased mobility.  PE at age 38.    *Length of anticoagulation.  I recommend Eliquis 5 mg twice per day for 6 months at least.  Then, could continue 5 mg twice per day indefinitely or decrease to prophylactic dosin.5 mg twice per day, or stop Eliquis and change to aspirin 81 mg daily.  · With negative hypercoagulable labs, patient and  agree: Stop Eliquis and change to aspirin 81 mg daily (she has completed 6 months of therapy)    *iron deficiency.  Due to anemia with Hb 11.2 on 2019, labs, 2019: Normal Hb but ferritin 33 with 20% saturation.  · She declines trying oral iron to see if fatigue will improve.  · On 19, ferritin 26, from 34 and 21% sat from 20% on 19.  · She agreed to ferrous sulfate 1 tablet daily, 2019.  She hesitates to try more than this due to typical problems tolerating medicines.  · However, she only took a few days of the oral iron due to diarrhea.  She tried to eat more iron rich foods.  · On 2019, ferritin 46, 26% saturation, from 26 and 21% saturation.  Hb 13.1, from 14.  Today, 2019, patient states he never had fatigue.  Therefore, she does not want to take more iron to try and improve fatigue because  fatigue is not an issue.    *Source of iron deficiency.  · History of colon cancer.  Next colonoscopy planned 5/8/2021  · I recommended she see Dr. Romeo to consider an EGD to look for a source of malignancy or premalignant condition as the cause of iron deficiency.  She declined.  (Notably she did have a postoperative bleed).    *  Stage I (pT2,pN0,M0) colon adenocarcinoma. Resected with a right hemicolectomy 11/11/11. Therefore no adjuvant chemotherapy recommended. 12 lymph nodes checked and negative.   · Last colonoscopy 5/8/2018: Unremarkable.  Dr. Romeo plans next 3 years later.  No clinical signs of recurrence.    * Rectal cancer. APR 1999. Adjuvant 5FU and radiation.   Right lower lobectomy 9/12/01 for a solitary recurrence of the rectal cancer.   Left upper lobectomy 8/16/05 for a solitary recurrence of the rectal cancer.   No clinical signs of recurrence.    * Low grade lymphoma of the eyelid, treated with radiation in 1988. She has had no signs of recurrence of lymphoma.   No evidence of recurrence today.    * Previous mild hypercalcemia.  Previous mild elevated alkaline phosphatase.  I discussed this with patient and .  She declines CT scans to evaluate for recurrence of prior cancer due to avoiding paying high deductible.         PLAN:  · She declines follow-up here which is reasonable.  · Her PCP will follow lab work.  If she is found to be iron deficient along with anemia, she can be referred back to consider IV iron.  She is not interested in replacing iron unless she becomes significantly anemic.  · Stop Eliquis.  Try to take aspirin 81 mg daily, coated.  · She declines EGD referral but states if she changes her mind she will call Dr. Romeo for EGD.      assisted with history.      Send this note to Dr. Beard

## 2019-11-13 ENCOUNTER — OFFICE VISIT (OUTPATIENT)
Dept: INTERNAL MEDICINE | Facility: CLINIC | Age: 69
End: 2019-11-13

## 2019-11-13 VITALS
DIASTOLIC BLOOD PRESSURE: 84 MMHG | WEIGHT: 186 LBS | SYSTOLIC BLOOD PRESSURE: 140 MMHG | OXYGEN SATURATION: 99 % | HEIGHT: 66 IN | RESPIRATION RATE: 18 BRPM | HEART RATE: 61 BPM | BODY MASS INDEX: 29.89 KG/M2

## 2019-11-13 DIAGNOSIS — Z13.1 SCREENING FOR DIABETES MELLITUS: ICD-10-CM

## 2019-11-13 DIAGNOSIS — E89.0 POSTOPERATIVE HYPOTHYROIDISM: ICD-10-CM

## 2019-11-13 DIAGNOSIS — E78.5 HYPERLIPIDEMIA, UNSPECIFIED HYPERLIPIDEMIA TYPE: ICD-10-CM

## 2019-11-13 DIAGNOSIS — Z13.29 SCREENING FOR THYROID DISORDER: ICD-10-CM

## 2019-11-13 DIAGNOSIS — Z00.00 WELL WOMAN EXAM (NO GYNECOLOGICAL EXAM): Primary | ICD-10-CM

## 2019-11-13 DIAGNOSIS — Z13.220 SCREENING FOR LIPID DISORDERS: ICD-10-CM

## 2019-11-13 PROCEDURE — 99214 OFFICE O/P EST MOD 30 MIN: CPT | Performed by: FAMILY MEDICINE

## 2019-11-13 PROCEDURE — 99397 PER PM REEVAL EST PAT 65+ YR: CPT | Performed by: FAMILY MEDICINE

## 2019-11-13 RX ORDER — SIMVASTATIN 10 MG
10 TABLET ORAL NIGHTLY
Qty: 90 TABLET | Refills: 3 | Status: SHIPPED | OUTPATIENT
Start: 2019-11-13 | End: 2019-11-18

## 2019-11-13 RX ORDER — LEVOTHYROXINE SODIUM 112 UG/1
112 CAPSULE ORAL DAILY
Qty: 30 CAPSULE | Refills: 11 | Status: SHIPPED | OUTPATIENT
Start: 2019-11-13 | End: 2019-12-06

## 2019-11-13 NOTE — PROGRESS NOTES
"Subjective   Faviola Issa is a 69 y.o. female and is here for a comprehensive physical exam. The patient reports no problems.    Pt is UTD with annual gyn exam and mammo     Patient has a past medical history of hypothyroidism.  This appears to be secondary to postoperative, which happened back in May.  Patient states that she still having trouble getting her thyroid well regulated.  Her TSH came back at 7.39.  Patient states that she has been on different medications which include a Synthroid of 100 mcg, and has been recently switched to Tirosint 100 mcg.  At the present day she states that she continues to still have symptoms.  She currently has a cold feeling.  Otherwise she states that she does feel good.    Patient is total cholesterol is mildly elevated, with her LDL being elevated at 128.  Patient is open to starting a low-dose cholesterol medication.  Patient states that she is trying to eat a lot healthier.      Social History:   Social History     Socioeconomic History   • Marital status:      Spouse name: KEN   • Number of children: 3   • Years of education: College   • Highest education level: Not on file   Occupational History   • Occupation: Nurse     Employer: [employer]   Tobacco Use   • Smoking status: Former Smoker     Packs/day: 1.00     Years: 15.00     Pack years: 15.00     Types: Cigarettes     Last attempt to quit: 2001     Years since quittin.1   • Smokeless tobacco: Never Used   • Tobacco comment: 1ppd x20 yrs   Substance and Sexual Activity   • Alcohol use: No   • Drug use: No   • Sexual activity: Yes     Partners: Male     Birth control/protection: Post-menopausal, Surgical   Social History Narrative    GYN PATIENT SINCE .       Family History:   Family History   Problem Relation Age of Onset   • Cancer Sister         \"FEMALE\"   • Hypertension Father    • Breast cancer Daughter 45   • Deep vein thrombosis Niece    • Malig " "Hyperthermia Neg Hx        Past Medical History:   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, low-grade, treated with radiation   • PONV (postoperative nausea and vomiting)    • Post-menopausal    • Rectal cancer (CMS/HCC) 1999       The following portions of the patient's history were reviewed and updated as appropriate: allergies, current medications, past family history, past medical history, past social history, past surgical history and problem list.    Review of Systems    Review of Systems   Constitutional: Negative for chills and fever.   HENT: Negative for congestion, rhinorrhea, sinus pain and sore throat.    Eyes: Negative for photophobia and visual disturbance.   Respiratory: Negative for cough, chest tightness and shortness of breath.    Cardiovascular: Negative for chest pain and palpitations.   Gastrointestinal: Negative for diarrhea, nausea and vomiting.   Genitourinary: Negative for dysuria, frequency and urgency.   Skin: Negative for rash and wound.   Neurological: Negative for dizziness and syncope.   Psychiatric/Behavioral: Negative for behavioral problems and confusion.       Objective   Physical Exam   Constitutional: She is oriented to person, place, and time. She appears well-developed and well-nourished.   HENT:   Head: Normocephalic and atraumatic.   Right Ear: External ear normal.   Left Ear: External ear normal.   Mouth/Throat: Oropharynx is clear and moist.   Eyes: EOM are normal.   Neck: Normal range of motion. Neck supple.   Cardiovascular: Normal rate, regular rhythm and normal heart sounds.   Pulmonary/Chest: Effort normal and breath sounds normal. No respiratory distress.   Abdominal: Soft. There is no tenderness. There " is no guarding.   Musculoskeletal: Normal range of motion.   Lymphadenopathy:     She has no cervical adenopathy.   Neurological: She is alert and oriented to person, place, and time.   Skin: Skin is warm.   Psychiatric: She has a normal mood and affect. Her behavior is normal.   Nursing note and vitals reviewed.      Medications:   Current Outpatient Medications:   •  CALCIUM CITRATE-VITAMIN D PO, Take 750 tablets by mouth 2 (Two) Times a Day., Disp: , Rfl:   •  valsartan (DIOVAN) 80 MG tablet, Take 1 tablet by mouth Daily. For BP, Disp: 90 tablet, Rfl: 1  •  VENTOLIN  (90 Base) MCG/ACT inhaler, Inhale 2 puffs Every 4 (Four) Hours As Needed., Disp: , Rfl: 3  •  simvastatin (ZOCOR) 10 MG tablet, Take 1 tablet by mouth Every Night., Disp: 90 tablet, Rfl: 3  •  TIROSINT 112 MCG capsule, Take 1 capsule by mouth Daily., Disp: 30 capsule, Rfl: 11       Assessment/Plan   Healthy female exam.      1. Healthcare Maintenance:  2. Patient Counseling:  --Nutrition: Stressed importance of moderation in sodium/caffeine intake, saturated fat and cholesterol, caloric balance, sufficient intake of fresh fruits, vegetables, fiber, calcium and vit D  --Exercise: Recommended 30 minutes of exercise daily.  --Immunizations reviewed.      Diagnoses and all orders for this visit:    Well woman exam (no gynecological exam)  -     CBC & Differential; Future  -     Comprehensive Metabolic Panel; Future  -     CBC & Differential  -     Comprehensive Metabolic Panel    Postoperative hypothyroidism  -     TIROSINT 112 MCG capsule; Take 1 capsule by mouth Daily.  -     Thyroid Panel With TSH  -      I will increase patient is to resent to 112 mcg daily.  I would like to reevaluate her in 1 month.    Hyperlipidemia, unspecified hyperlipidemia type  -     simvastatin (ZOCOR) 10 MG tablet; Take 1 tablet by mouth Every Night.  -     Lipid Panel With LDL / HDL Ratio  -     We will start patient on simvastatin 10 mg nightly.    Screening for  lipid disorders  -     Lipid Panel With LDL / HDL Ratio; Future  -     Lipid Panel With LDL / HDL Ratio    Screening for thyroid disorder  -     Thyroid Panel With TSH; Future  -     Thyroid Panel With TSH    Screening for diabetes mellitus  -     Hemoglobin A1c; Future  -     Hemoglobin A1c    I have spent greater than 25 minutes with patient at today's office visit, with more than half the time spent in counseling patient about disease pathology as well as a pharmacology used to treat the disease.      No Follow-up on file.             Dictated utilizing Dragon Voice Recognition Software

## 2019-11-14 ENCOUNTER — RESULTS ENCOUNTER (OUTPATIENT)
Dept: INTERNAL MEDICINE | Facility: CLINIC | Age: 69
End: 2019-11-14

## 2019-11-14 DIAGNOSIS — E89.0 POSTOPERATIVE HYPOTHYROIDISM: ICD-10-CM

## 2019-11-14 LAB
ALBUMIN SERPL-MCNC: 4.6 G/DL (ref 3.5–5.2)
ALBUMIN/GLOB SERPL: 1.4 G/DL
ALP SERPL-CCNC: 88 U/L (ref 39–117)
ALT SERPL-CCNC: 7 U/L (ref 1–33)
AST SERPL-CCNC: 14 U/L (ref 1–32)
BASOPHILS # BLD AUTO: 0.04 10*3/MM3 (ref 0–0.2)
BASOPHILS NFR BLD AUTO: 0.6 % (ref 0–1.5)
BILIRUB SERPL-MCNC: 0.4 MG/DL (ref 0.2–1.2)
BUN SERPL-MCNC: 10 MG/DL (ref 8–23)
BUN/CREAT SERPL: 13.2 (ref 7–25)
CALCIUM SERPL-MCNC: 9.1 MG/DL (ref 8.6–10.5)
CHLORIDE SERPL-SCNC: 101 MMOL/L (ref 98–107)
CHOLEST SERPL-MCNC: 210 MG/DL (ref 0–200)
CO2 SERPL-SCNC: 27.2 MMOL/L (ref 22–29)
CREAT SERPL-MCNC: 0.76 MG/DL (ref 0.57–1)
EOSINOPHIL # BLD AUTO: 0.05 10*3/MM3 (ref 0–0.4)
EOSINOPHIL NFR BLD AUTO: 0.8 % (ref 0.3–6.2)
ERYTHROCYTE [DISTWIDTH] IN BLOOD BY AUTOMATED COUNT: 13.2 % (ref 12.3–15.4)
FT4I SERPL CALC-MCNC: 2.6 (ref 1.2–4.9)
GLOBULIN SER CALC-MCNC: 3.2 GM/DL
GLUCOSE SERPL-MCNC: 79 MG/DL (ref 65–99)
HBA1C MFR BLD: 5.3 % (ref 4.8–5.6)
HCT VFR BLD AUTO: 41.7 % (ref 34–46.6)
HDLC SERPL-MCNC: 78 MG/DL (ref 40–60)
HGB BLD-MCNC: 13.9 G/DL (ref 12–15.9)
IMM GRANULOCYTES # BLD AUTO: 0.02 10*3/MM3 (ref 0–0.05)
IMM GRANULOCYTES NFR BLD AUTO: 0.3 % (ref 0–0.5)
LDLC SERPL CALC-MCNC: 119 MG/DL (ref 0–100)
LDLC/HDLC SERPL: 1.52 {RATIO}
LYMPHOCYTES # BLD AUTO: 1.39 10*3/MM3 (ref 0.7–3.1)
LYMPHOCYTES NFR BLD AUTO: 21 % (ref 19.6–45.3)
MCH RBC QN AUTO: 28.9 PG (ref 26.6–33)
MCHC RBC AUTO-ENTMCNC: 33.3 G/DL (ref 31.5–35.7)
MCV RBC AUTO: 86.7 FL (ref 79–97)
MONOCYTES # BLD AUTO: 0.42 10*3/MM3 (ref 0.1–0.9)
MONOCYTES NFR BLD AUTO: 6.3 % (ref 5–12)
NEUTROPHILS # BLD AUTO: 4.7 10*3/MM3 (ref 1.7–7)
NEUTROPHILS NFR BLD AUTO: 71 % (ref 42.7–76)
NRBC BLD AUTO-RTO: 0 /100 WBC (ref 0–0.2)
PLATELET # BLD AUTO: 301 10*3/MM3 (ref 140–450)
POTASSIUM SERPL-SCNC: 4.5 MMOL/L (ref 3.5–5.2)
PROT SERPL-MCNC: 7.8 G/DL (ref 6–8.5)
RBC # BLD AUTO: 4.81 10*6/MM3 (ref 3.77–5.28)
SODIUM SERPL-SCNC: 142 MMOL/L (ref 136–145)
T3RU NFR SERPL: 30 % (ref 24–39)
T4 SERPL-MCNC: 8.7 UG/DL (ref 4.5–12)
TRIGL SERPL-MCNC: 67 MG/DL (ref 0–150)
TSH SERPL DL<=0.005 MIU/L-ACNC: 3.99 UIU/ML (ref 0.45–4.5)
VLDLC SERPL CALC-MCNC: 13.4 MG/DL
WBC # BLD AUTO: 6.62 10*3/MM3 (ref 3.4–10.8)

## 2019-11-18 ENCOUNTER — TRANSCRIBE ORDERS (OUTPATIENT)
Dept: ADMINISTRATIVE | Facility: HOSPITAL | Age: 69
End: 2019-11-18

## 2019-11-18 DIAGNOSIS — E78.5 HYPERLIPIDEMIA, UNSPECIFIED HYPERLIPIDEMIA TYPE: Primary | ICD-10-CM

## 2019-11-18 DIAGNOSIS — Z12.31 BREAST CANCER SCREENING BY MAMMOGRAM: Primary | ICD-10-CM

## 2019-11-18 RX ORDER — SIMVASTATIN 20 MG
20 TABLET ORAL NIGHTLY
Qty: 90 TABLET | Refills: 3 | Status: SHIPPED | OUTPATIENT
Start: 2019-11-18 | End: 2020-10-02

## 2019-11-18 NOTE — PROGRESS NOTES
The labs were reviewed. Please inform patient that labs were normal.  Cholesterol panel is still elevated, increase simvastatin from 10 to 20mg daily.

## 2019-11-22 ENCOUNTER — TELEPHONE (OUTPATIENT)
Dept: INTERNAL MEDICINE | Facility: CLINIC | Age: 69
End: 2019-11-22

## 2019-11-22 NOTE — TELEPHONE ENCOUNTER
Patient wasn't able to make in today due your schedule being full. She has broken out into a rash on her face, she stated it's getting close to her eyes. She wanted to know if you could call her something in.. Please advise.

## 2019-11-22 NOTE — TELEPHONE ENCOUNTER
I cannot diagnose the rash over the phone.  Her best option is to go to the urgent care to have it evaluated.  All rashes need to be physically seen, in order to differentiate between infection and other causes.

## 2019-12-06 ENCOUNTER — TELEPHONE (OUTPATIENT)
Dept: INTERNAL MEDICINE | Facility: CLINIC | Age: 69
End: 2019-12-06

## 2019-12-06 DIAGNOSIS — E89.0 POSTOPERATIVE HYPOTHYROIDISM: Primary | ICD-10-CM

## 2019-12-06 RX ORDER — LEVOTHYROXINE SODIUM 112 MCG
112 TABLET ORAL DAILY
Qty: 90 TABLET | Refills: 3 | Status: SHIPPED | OUTPATIENT
Start: 2019-12-06 | End: 2019-12-23

## 2019-12-06 NOTE — TELEPHONE ENCOUNTER
"Pharmacy called stating insurance will not pay for \"Tirosint 112MCG\" capsule.. They would like to change it to synthroid.. Please advise.....  "

## 2019-12-17 LAB
ALBUMIN SERPL-MCNC: 4.3 G/DL (ref 3.6–4.8)
ALBUMIN/GLOB SERPL: 1.5 {RATIO} (ref 1.2–2.2)
ALP SERPL-CCNC: 84 IU/L (ref 39–117)
ALT SERPL-CCNC: 8 IU/L (ref 0–32)
AST SERPL-CCNC: 14 IU/L (ref 0–40)
BILIRUB SERPL-MCNC: 0.4 MG/DL (ref 0–1.2)
BUN SERPL-MCNC: 14 MG/DL (ref 8–27)
BUN/CREAT SERPL: 18 (ref 12–28)
CALCIUM SERPL-MCNC: 9.2 MG/DL (ref 8.7–10.3)
CHLORIDE SERPL-SCNC: 104 MMOL/L (ref 96–106)
CHOLEST SERPL-MCNC: 154 MG/DL (ref 100–199)
CO2 SERPL-SCNC: 25 MMOL/L (ref 20–29)
CREAT SERPL-MCNC: 0.78 MG/DL (ref 0.57–1)
FT4I SERPL CALC-MCNC: 3 (ref 1.2–4.9)
GLOBULIN SER CALC-MCNC: 2.9 G/DL (ref 1.5–4.5)
GLUCOSE SERPL-MCNC: 87 MG/DL (ref 65–99)
HDLC SERPL-MCNC: 64 MG/DL
LDLC SERPL CALC-MCNC: 77 MG/DL (ref 0–99)
LDLC/HDLC SERPL: 1.2 RATIO (ref 0–3.2)
POTASSIUM SERPL-SCNC: 4.5 MMOL/L (ref 3.5–5.2)
PROT SERPL-MCNC: 7.2 G/DL (ref 6–8.5)
SODIUM SERPL-SCNC: 143 MMOL/L (ref 134–144)
T3RU NFR SERPL: 31 % (ref 24–39)
T4 SERPL-MCNC: 9.6 UG/DL (ref 4.5–12)
TRIGL SERPL-MCNC: 66 MG/DL (ref 0–149)
TSH SERPL DL<=0.005 MIU/L-ACNC: 1.12 UIU/ML (ref 0.45–4.5)
VLDLC SERPL CALC-MCNC: 13 MG/DL (ref 5–40)

## 2019-12-23 ENCOUNTER — OFFICE VISIT (OUTPATIENT)
Dept: INTERNAL MEDICINE | Facility: CLINIC | Age: 69
End: 2019-12-23

## 2019-12-23 VITALS
DIASTOLIC BLOOD PRESSURE: 86 MMHG | OXYGEN SATURATION: 99 % | HEART RATE: 65 BPM | HEIGHT: 66 IN | WEIGHT: 189 LBS | SYSTOLIC BLOOD PRESSURE: 140 MMHG | BODY MASS INDEX: 30.37 KG/M2 | RESPIRATION RATE: 18 BRPM

## 2019-12-23 DIAGNOSIS — E89.0 POSTOPERATIVE HYPOTHYROIDISM: Primary | ICD-10-CM

## 2019-12-23 PROCEDURE — 99213 OFFICE O/P EST LOW 20 MIN: CPT | Performed by: FAMILY MEDICINE

## 2019-12-23 RX ORDER — LEVOTHYROXINE SODIUM 100 UG/1
100 CAPSULE ORAL DAILY
Qty: 30 CAPSULE | Refills: 11 | Status: SHIPPED | OUTPATIENT
Start: 2019-12-23 | End: 2021-04-16

## 2019-12-26 NOTE — PROGRESS NOTES
Subjective   Faviola Issa is a 69 y.o. female.     Chief Complaint   Patient presents with   • 1 month f/u   • Knee Pain         History of Present Illness     Patient states that she has seen her ENT, and was readjusted on her thyroid medication.  She is currently taking only 100 mcg of Tirosint.  Patient states that she is down from the 112 which was prescribed.  Patient denies any side effects of the medication.  Patient states that she would like to get this thyroid panel rechecked.    The following portions of the patient's history were reviewed and updated as appropriate: allergies, current medications, past family history, past medical history, past social history, past surgical history and problem list.    Review of Systems   Constitutional: Negative.    HENT: Negative.    Respiratory: Negative.    Cardiovascular: Negative.    Gastrointestinal: Negative.    Musculoskeletal: Negative.    Psychiatric/Behavioral: Negative.        Objective   Physical Exam   Constitutional: She is oriented to person, place, and time. She appears well-developed and well-nourished.   HENT:   Head: Normocephalic and atraumatic.   Eyes: EOM are normal.   Neck: Normal range of motion. Neck supple.   Cardiovascular: Normal rate, regular rhythm and normal heart sounds.   Pulmonary/Chest: Effort normal and breath sounds normal. No respiratory distress.   Neurological: She is alert and oriented to person, place, and time.   Psychiatric: She has a normal mood and affect. Her behavior is normal.   Nursing note and vitals reviewed.      Vitals:    12/23/19 1137   BP: 140/86   Pulse: 65   Resp: 18   SpO2: 99%     Body mass index is 30.37 kg/m².      Assessment/Plan   Faviola was seen today for 1 month f/u and knee pain.    Diagnoses and all orders for this visit:    Postoperative hypothyroidism  -     Thyroid Panel With TSH  -     levothyroxine sodium (TIROSINT) 100 MCG capsule; Take 1 capsule by mouth Daily.  -     Thyroid Panel  With TSH; Standing  -     We will continue 100 mcg of Tirosint.          No follow-ups on file.    Dictated utilizing Dragon Voice Recognition Software

## 2019-12-27 ENCOUNTER — HOSPITAL ENCOUNTER (EMERGENCY)
Facility: HOSPITAL | Age: 69
Discharge: HOME | End: 2019-12-27
Attending: EMERGENCY MEDICINE | Admitting: EMERGENCY MEDICINE

## 2019-12-27 ENCOUNTER — APPOINTMENT (OUTPATIENT)
Dept: CARDIOLOGY | Facility: HOSPITAL | Age: 69
End: 2019-12-27

## 2019-12-27 ENCOUNTER — TELEPHONE (OUTPATIENT)
Dept: INTERNAL MEDICINE | Facility: CLINIC | Age: 69
End: 2019-12-27

## 2019-12-27 VITALS
OXYGEN SATURATION: 98 % | BODY MASS INDEX: 29.6 KG/M2 | SYSTOLIC BLOOD PRESSURE: 190 MMHG | RESPIRATION RATE: 18 BRPM | TEMPERATURE: 98.2 F | DIASTOLIC BLOOD PRESSURE: 83 MMHG | HEIGHT: 67 IN | HEART RATE: 81 BPM

## 2019-12-27 DIAGNOSIS — S86.911A KNEE STRAIN, RIGHT, INITIAL ENCOUNTER: Primary | ICD-10-CM

## 2019-12-27 PROCEDURE — 93971 EXTREMITY STUDY: CPT

## 2019-12-27 PROCEDURE — 99284 EMERGENCY DEPT VISIT MOD MDM: CPT

## 2019-12-27 RX ORDER — ACETAMINOPHEN 500 MG
1000 TABLET ORAL ONCE
Status: COMPLETED | OUTPATIENT
Start: 2019-12-27 | End: 2019-12-27

## 2019-12-27 RX ORDER — ASPIRIN 81 MG/1
81 TABLET, CHEWABLE ORAL DAILY
COMMUNITY
End: 2021-04-16

## 2019-12-27 RX ADMIN — ACETAMINOPHEN 1000 MG: 500 TABLET, FILM COATED ORAL at 20:03

## 2019-12-27 NOTE — TELEPHONE ENCOUNTER
Patient called stating she can't walk on her right leg, it tightens up when she walks.. Patient also stated, she's still in unbareable pain.. She's tried putting ice on it, nothing has helped. Please advise...         [phone]

## 2019-12-28 LAB
BH CV LOWER VASCULAR LEFT COMMON FEMORAL AUGMENT: NORMAL
BH CV LOWER VASCULAR LEFT COMMON FEMORAL COMPETENT: NORMAL
BH CV LOWER VASCULAR LEFT COMMON FEMORAL COMPRESS: NORMAL
BH CV LOWER VASCULAR LEFT COMMON FEMORAL PHASIC: NORMAL
BH CV LOWER VASCULAR LEFT COMMON FEMORAL SPONT: NORMAL
BH CV LOWER VASCULAR RIGHT COMMON FEMORAL AUGMENT: NORMAL
BH CV LOWER VASCULAR RIGHT COMMON FEMORAL COMPETENT: NORMAL
BH CV LOWER VASCULAR RIGHT COMMON FEMORAL COMPRESS: NORMAL
BH CV LOWER VASCULAR RIGHT COMMON FEMORAL PHASIC: NORMAL
BH CV LOWER VASCULAR RIGHT COMMON FEMORAL SPONT: NORMAL
BH CV LOWER VASCULAR RIGHT DISTAL FEMORAL COMPRESS: NORMAL
BH CV LOWER VASCULAR RIGHT GASTRONEMIUS COMPRESS: NORMAL
BH CV LOWER VASCULAR RIGHT GREATER SAPH AK COMPRESS: NORMAL
BH CV LOWER VASCULAR RIGHT GREATER SAPH BK COMPRESS: NORMAL
BH CV LOWER VASCULAR RIGHT MID FEMORAL AUGMENT: NORMAL
BH CV LOWER VASCULAR RIGHT MID FEMORAL COMPETENT: NORMAL
BH CV LOWER VASCULAR RIGHT MID FEMORAL COMPRESS: NORMAL
BH CV LOWER VASCULAR RIGHT MID FEMORAL PHASIC: NORMAL
BH CV LOWER VASCULAR RIGHT MID FEMORAL SPONT: NORMAL
BH CV LOWER VASCULAR RIGHT PERONEAL COMPRESS: NORMAL
BH CV LOWER VASCULAR RIGHT POPLITEAL AUGMENT: NORMAL
BH CV LOWER VASCULAR RIGHT POPLITEAL COMPETENT: NORMAL
BH CV LOWER VASCULAR RIGHT POPLITEAL COMPRESS: NORMAL
BH CV LOWER VASCULAR RIGHT POPLITEAL PHASIC: NORMAL
BH CV LOWER VASCULAR RIGHT POPLITEAL SPONT: NORMAL
BH CV LOWER VASCULAR RIGHT POSTERIOR TIBIAL COMPRESS: NORMAL
BH CV LOWER VASCULAR RIGHT PROFUNDA FEMORAL COMPRESS: NORMAL
BH CV LOWER VASCULAR RIGHT PROXIMAL FEMORAL COMPRESS: NORMAL
BH CV LOWER VASCULAR RIGHT SAPHENOFEMORAL JUNCTION COMPRESS: NORMAL

## 2019-12-30 NOTE — TELEPHONE ENCOUNTER
She needs to ice, take some NSAIDs with ibuprofen, rest, keep it elevated.  She does need to be seen and evaluated.  She can try to see me sometime later this week or she will need to go to the urgent care.

## 2020-01-13 ENCOUNTER — HOSPITAL ENCOUNTER (OUTPATIENT)
Dept: MAMMOGRAPHY | Facility: HOSPITAL | Age: 70
Discharge: HOME | End: 2020-01-13
Admitting: FAMILY MEDICINE

## 2020-01-13 DIAGNOSIS — Z12.31 BREAST CANCER SCREENING BY MAMMOGRAM: ICD-10-CM

## 2020-01-13 PROCEDURE — 77067 SCR MAMMO BI INCL CAD: CPT

## 2020-01-13 PROCEDURE — 77063 BREAST TOMOSYNTHESIS BI: CPT

## 2020-01-17 ENCOUNTER — RESULTS ENCOUNTER (OUTPATIENT)
Dept: INTERNAL MEDICINE | Facility: CLINIC | Age: 70
End: 2020-01-17

## 2020-01-17 DIAGNOSIS — E89.0 POSTOPERATIVE HYPOTHYROIDISM: ICD-10-CM

## 2020-01-24 ENCOUNTER — TELEPHONE (OUTPATIENT)
Dept: INTERNAL MEDICINE | Facility: CLINIC | Age: 70
End: 2020-01-24

## 2020-01-24 NOTE — TELEPHONE ENCOUNTER
Patient called stating that she is feeling sluggish and tired following a switch of her thyroid medication. She mentioned that she was taking a liquid and went back to taking a capsule per her ENT. She would like to know if this tiredness is related to the change. Please contact patient to check status and advise.

## 2020-01-26 DIAGNOSIS — R53.83 TIRED: Primary | ICD-10-CM

## 2020-01-27 ENCOUNTER — RESULTS ENCOUNTER (OUTPATIENT)
Dept: INTERNAL MEDICINE | Facility: CLINIC | Age: 70
End: 2020-01-27

## 2020-01-27 DIAGNOSIS — R53.83 TIRED: ICD-10-CM

## 2020-01-27 NOTE — TELEPHONE ENCOUNTER
I have ordered labs for patient, have her do the labs and see me this week. I am not sure if her symptoms are due to medications switched by her ENT. But will like to discuss further with patient in office.

## 2020-01-29 LAB
25(OH)D3+25(OH)D2 SERPL-MCNC: 17.4 NG/ML (ref 30–100)
ALBUMIN SERPL-MCNC: 4.9 G/DL (ref 3.8–4.8)
ALBUMIN/GLOB SERPL: 1.6 {RATIO} (ref 1.2–2.2)
ALP SERPL-CCNC: 98 IU/L (ref 39–117)
ALT SERPL-CCNC: 9 IU/L (ref 0–32)
AST SERPL-CCNC: 16 IU/L (ref 0–40)
BASOPHILS # BLD AUTO: 0 X10E3/UL (ref 0–0.2)
BASOPHILS NFR BLD AUTO: 0 %
BILIRUB SERPL-MCNC: 0.6 MG/DL (ref 0–1.2)
BUN SERPL-MCNC: 10 MG/DL (ref 8–27)
BUN/CREAT SERPL: 15 (ref 12–28)
CALCIUM SERPL-MCNC: 9.4 MG/DL (ref 8.7–10.3)
CHLORIDE SERPL-SCNC: 101 MMOL/L (ref 96–106)
CO2 SERPL-SCNC: 23 MMOL/L (ref 20–29)
CREAT SERPL-MCNC: 0.68 MG/DL (ref 0.57–1)
EOSINOPHIL # BLD AUTO: 0.1 X10E3/UL (ref 0–0.4)
EOSINOPHIL NFR BLD AUTO: 1 %
ERYTHROCYTE [DISTWIDTH] IN BLOOD BY AUTOMATED COUNT: 14.6 % (ref 11.7–15.4)
FT4I SERPL CALC-MCNC: 3 (ref 1.2–4.9)
GLOBULIN SER CALC-MCNC: 3 G/DL (ref 1.5–4.5)
GLUCOSE SERPL-MCNC: 75 MG/DL (ref 65–99)
HCT VFR BLD AUTO: 42.2 % (ref 34–46.6)
HGB BLD-MCNC: 13.8 G/DL (ref 11.1–15.9)
IMM GRANULOCYTES # BLD AUTO: 0 X10E3/UL (ref 0–0.1)
IMM GRANULOCYTES NFR BLD AUTO: 0 %
LYMPHOCYTES # BLD AUTO: 1.3 X10E3/UL (ref 0.7–3.1)
LYMPHOCYTES NFR BLD AUTO: 18 %
MCH RBC QN AUTO: 28.5 PG (ref 26.6–33)
MCHC RBC AUTO-ENTMCNC: 32.7 G/DL (ref 31.5–35.7)
MCV RBC AUTO: 87 FL (ref 79–97)
MONOCYTES # BLD AUTO: 0.5 X10E3/UL (ref 0.1–0.9)
MONOCYTES NFR BLD AUTO: 7 %
NEUTROPHILS # BLD AUTO: 5.2 X10E3/UL (ref 1.4–7)
NEUTROPHILS NFR BLD AUTO: 74 %
PLATELET # BLD AUTO: 286 X10E3/UL (ref 150–450)
POTASSIUM SERPL-SCNC: 4.4 MMOL/L (ref 3.5–5.2)
PROT SERPL-MCNC: 7.9 G/DL (ref 6–8.5)
RBC # BLD AUTO: 4.84 X10E6/UL (ref 3.77–5.28)
SODIUM SERPL-SCNC: 142 MMOL/L (ref 134–144)
T3RU NFR SERPL: 30 % (ref 24–39)
T4 SERPL-MCNC: 10.1 UG/DL (ref 4.5–12)
TSH SERPL DL<=0.005 MIU/L-ACNC: 1.66 UIU/ML (ref 0.45–4.5)
WBC # BLD AUTO: 7.1 X10E3/UL (ref 3.4–10.8)

## 2020-01-29 NOTE — PROGRESS NOTES
Please inform the patient of the following abnormal results.  Vitamin D continues to stay low.  Patient needs to do 50,000 IUs weekly.  We will recheck her vitamin D levels at her next visit.

## 2020-01-30 ENCOUNTER — TELEPHONE (OUTPATIENT)
Dept: INTERNAL MEDICINE | Facility: CLINIC | Age: 70
End: 2020-01-30

## 2020-01-30 NOTE — TELEPHONE ENCOUNTER
Called to give patient lab results.. Patient stated she's still having trouble with her knee. she's been off work since Tuesday, due to her not being able to stand on it. Her brother has had problems in the pass with taking simvastatin (ZOCOR). She recently went from 10 to 20MG. She wants to know, could her problems with her knee come from her taking simvastatin (ZOCOR) 20 MG tablet ? Please advise.....

## 2020-02-03 ENCOUNTER — OFFICE VISIT (OUTPATIENT)
Dept: INTERNAL MEDICINE | Facility: CLINIC | Age: 70
End: 2020-02-03

## 2020-02-03 VITALS
WEIGHT: 185 LBS | OXYGEN SATURATION: 98 % | HEART RATE: 61 BPM | DIASTOLIC BLOOD PRESSURE: 86 MMHG | SYSTOLIC BLOOD PRESSURE: 150 MMHG | RESPIRATION RATE: 18 BRPM | HEIGHT: 67 IN | BODY MASS INDEX: 29.03 KG/M2

## 2020-02-03 DIAGNOSIS — M25.561 ACUTE PAIN OF RIGHT KNEE: Primary | ICD-10-CM

## 2020-02-03 PROCEDURE — 99213 OFFICE O/P EST LOW 20 MIN: CPT | Performed by: FAMILY MEDICINE

## 2020-02-03 NOTE — TELEPHONE ENCOUNTER
I do not believe the problems the patient has is currently related to the statin medication.  I believe that something may be going on with her knee, and she needs imaging done of the knee.  It appears that she is seeing orthopedics on Thursday.

## 2020-02-08 NOTE — PROGRESS NOTES
Subjective   Faviola Issa is a 69 y.o. female.     Chief Complaint   Patient presents with   • Knee Pain         History of Present Illness     Patient notes that she continues to have pain in her right knee.  Patient states that she is concerned about this knee pain is been going on for well over a week.  Patient states that she has an appointment with orthopedics later this week.  Patient states that he feels as if her knee is getting feel as if they are clicking or locking when she is walking or going upstairs.  Patient states that the pain is throbbing.  She denies any particular activities which cause trauma or any activities which might have caused her to have any injuries to her knee.    The following portions of the patient's history were reviewed and updated as appropriate: allergies, current medications, past family history, past medical history, past social history, past surgical history and problem list.    Review of Systems   Constitutional: Negative.    HENT: Negative.    Respiratory: Negative.    Cardiovascular: Negative.    Musculoskeletal: Negative.    Psychiatric/Behavioral: Negative.        Objective   Physical Exam   Constitutional: She is oriented to person, place, and time. She appears well-developed and well-nourished.   HENT:   Head: Normocephalic and atraumatic.   Eyes: EOM are normal.   Neck: Normal range of motion. Neck supple.   Cardiovascular: Normal rate, regular rhythm and normal heart sounds.   Pulmonary/Chest: Effort normal and breath sounds normal. No respiratory distress.   Musculoskeletal:        Right knee: She exhibits decreased range of motion and swelling. Tenderness found.   Neurological: She is alert and oriented to person, place, and time.   Psychiatric: She has a normal mood and affect. Her behavior is normal.   Nursing note and vitals reviewed.      Vitals:    02/03/20 0811   BP: 150/86   Pulse: 61   Resp: 18   SpO2: 98%     Body mass index is 28.97  kg/m².      Assessment/Plan   Faviola was seen today for knee pain.    Diagnoses and all orders for this visit:    Acute pain of right knee  -     Diclofenac Sodium (PENNSAID) 2 % solution; Place 2 application on the skin as directed by provider 2 (Two) Times a Day As Needed (PAIN/SWELLING).  -     Start patient on Pennsaid.  Recommend patient to follow-up with orthopedics.  Patient most likely going to need a an MRI of her knee.          No follow-ups on file.    Dictated utilizing Dragon Voice Recognition Software

## 2020-02-14 ENCOUNTER — RESULTS ENCOUNTER (OUTPATIENT)
Dept: INTERNAL MEDICINE | Facility: CLINIC | Age: 70
End: 2020-02-14

## 2020-02-14 DIAGNOSIS — E89.0 POSTOPERATIVE HYPOTHYROIDISM: ICD-10-CM

## 2020-03-12 ENCOUNTER — TELEPHONE (OUTPATIENT)
Dept: INTERNAL MEDICINE | Facility: CLINIC | Age: 70
End: 2020-03-12

## 2020-03-12 NOTE — TELEPHONE ENCOUNTER
Pt called asking if you could write her a prescription for a mask and tubing for her nebulizer machine.. Please advise..

## 2020-03-13 ENCOUNTER — RESULTS ENCOUNTER (OUTPATIENT)
Dept: INTERNAL MEDICINE | Facility: CLINIC | Age: 70
End: 2020-03-13

## 2020-03-13 DIAGNOSIS — E89.0 POSTOPERATIVE HYPOTHYROIDISM: ICD-10-CM

## 2020-04-03 ENCOUNTER — TELEPHONE (OUTPATIENT)
Dept: INTERNAL MEDICINE | Facility: CLINIC | Age: 70
End: 2020-04-03

## 2020-04-03 NOTE — TELEPHONE ENCOUNTER
Patient called requesting a letter for work stating that she should not be working during this time due to health condition. She would like this to be mailed to her or faxed to her home @ [phone].

## 2020-04-10 ENCOUNTER — RESULTS ENCOUNTER (OUTPATIENT)
Dept: INTERNAL MEDICINE | Facility: CLINIC | Age: 70
End: 2020-04-10

## 2020-04-10 DIAGNOSIS — E89.0 POSTOPERATIVE HYPOTHYROIDISM: ICD-10-CM

## 2020-05-08 ENCOUNTER — RESULTS ENCOUNTER (OUTPATIENT)
Dept: INTERNAL MEDICINE | Facility: CLINIC | Age: 70
End: 2020-05-08

## 2020-05-08 DIAGNOSIS — E89.0 POSTOPERATIVE HYPOTHYROIDISM: ICD-10-CM

## 2020-05-13 ENCOUNTER — RESULTS ENCOUNTER (OUTPATIENT)
Dept: INTERNAL MEDICINE | Facility: CLINIC | Age: 70
End: 2020-05-13

## 2020-05-13 DIAGNOSIS — Z00.00 WELL WOMAN EXAM (NO GYNECOLOGICAL EXAM): ICD-10-CM

## 2020-05-13 DIAGNOSIS — Z13.1 SCREENING FOR DIABETES MELLITUS: ICD-10-CM

## 2020-05-13 DIAGNOSIS — Z13.29 SCREENING FOR THYROID DISORDER: ICD-10-CM

## 2020-05-13 DIAGNOSIS — Z13.220 SCREENING FOR LIPID DISORDERS: ICD-10-CM

## 2020-05-21 ENCOUNTER — TELEPHONE (OUTPATIENT)
Dept: INTERNAL MEDICINE | Facility: CLINIC | Age: 70
End: 2020-05-21

## 2020-05-21 NOTE — TELEPHONE ENCOUNTER
----- Message from Faviola Issa sent at 5/21/2020  2:14 PM EDT -----  Regarding: Prescription Question  Contact: [phone]  I have stopped taking my  prescription simastatin because of extreme joint pain I have been calling  but to no avail. I have been taking a supplement to lower cholesterol . I wanted you to know if that is okay please let me know

## 2020-05-22 ENCOUNTER — TELEPHONE (OUTPATIENT)
Dept: INTERNAL MEDICINE | Facility: CLINIC | Age: 70
End: 2020-05-22

## 2020-05-22 NOTE — TELEPHONE ENCOUNTER
----- Message from Faviola Issa sent at 5/22/2020  3:58 PM EDT -----  Regarding: RE: Prescription Question  Contact: [phone]  The name of the supplement is cholestoff plus made by nature made  ----- Message -----  From: POORNIMA MCKENZIE  Sent: 5/22/2020 10:03 AM EDT  To: Faviola Issa  Subject: RE: Prescription Question  What's the name of the supplement?    ----- Message -----  From: Faviola Issa  Sent: 5/21/2020   2:14 PM EDT  To: [facility]  Subject: Prescription Question                            I have stopped taking my  prescription simastatin because of extreme joint pain I have been calling  but to no avail. I have been taking a supplement to lower cholesterol . I wanted you to know if that is okay please let me know

## 2020-05-26 NOTE — TELEPHONE ENCOUNTER
That is fine she can hold off on the statin medication.  We can discuss this at the next office visit we do not have an appointment within a month, please make an appointment so we can discuss further.

## 2020-06-01 DIAGNOSIS — I10 BENIGN ESSENTIAL HTN: ICD-10-CM

## 2020-06-01 RX ORDER — VALSARTAN 80 MG/1
80 TABLET ORAL DAILY
Qty: 90 TABLET | Refills: 1 | Status: SHIPPED | OUTPATIENT
Start: 2020-06-01 | End: 2020-09-18

## 2020-06-05 ENCOUNTER — RESULTS ENCOUNTER (OUTPATIENT)
Dept: INTERNAL MEDICINE | Facility: CLINIC | Age: 70
End: 2020-06-05

## 2020-06-05 DIAGNOSIS — E89.0 POSTOPERATIVE HYPOTHYROIDISM: ICD-10-CM

## 2020-06-17 LAB
FT4I SERPL CALC-MCNC: 3.6 (ref 1.2–4.9)
T3RU NFR SERPL: 35 % (ref 24–39)
T4 SERPL-MCNC: 10.3 UG/DL (ref 4.5–12)
TSH SERPL DL<=0.005 MIU/L-ACNC: 0.39 UIU/ML (ref 0.45–4.5)

## 2020-07-03 ENCOUNTER — RESULTS ENCOUNTER (OUTPATIENT)
Dept: INTERNAL MEDICINE | Facility: CLINIC | Age: 70
End: 2020-07-03

## 2020-07-03 DIAGNOSIS — E89.0 POSTOPERATIVE HYPOTHYROIDISM: ICD-10-CM

## 2020-07-14 ENCOUNTER — TELEPHONE (OUTPATIENT)
Dept: INTERNAL MEDICINE | Facility: CLINIC | Age: 70
End: 2020-07-14

## 2020-07-14 NOTE — TELEPHONE ENCOUNTER
"PATIENT IS CALLING ABOUT TAKING valsartan (DIOVAN) 80 MG tablet      PATIENT WHEN SHE TAKES A FULL PILL SHE HAS \"HEART FLUTTERS'    PATIENT HAS BEEN CUTTING THE PILL IN HALF AND ONLY TAKING HALF OF THE PILL A DAY    PATIENT REPORTS SHE HAS BEEN EXERCISING     PATIENT REPORTS THAT HER BLOOD PRESSURE 130/65 THIS MORNING 7/14  AND IN THE EVENING IT HAS BEEN RUNNING ABOUT 117/64     PATIENT CAN BE REACHED AT   [phone]  "

## 2020-07-31 ENCOUNTER — RESULTS ENCOUNTER (OUTPATIENT)
Dept: INTERNAL MEDICINE | Facility: CLINIC | Age: 70
End: 2020-07-31

## 2020-07-31 DIAGNOSIS — E89.0 POSTOPERATIVE HYPOTHYROIDISM: ICD-10-CM

## 2020-08-12 LAB
FT4I SERPL CALC-MCNC: 2.9 (ref 1.2–4.9)
T3RU NFR SERPL: 31 % (ref 24–39)
T4 SERPL-MCNC: 9.2 UG/DL (ref 4.5–12)
TSH SERPL DL<=0.005 MIU/L-ACNC: 0.95 UIU/ML (ref 0.45–4.5)

## 2020-08-28 ENCOUNTER — RESULTS ENCOUNTER (OUTPATIENT)
Dept: INTERNAL MEDICINE | Facility: CLINIC | Age: 70
End: 2020-08-28

## 2020-08-28 DIAGNOSIS — E89.0 POSTOPERATIVE HYPOTHYROIDISM: ICD-10-CM

## 2020-09-01 ENCOUNTER — TELEPHONE (OUTPATIENT)
Dept: INTERNAL MEDICINE | Facility: CLINIC | Age: 70
End: 2020-09-01

## 2020-09-01 NOTE — TELEPHONE ENCOUNTER
She reports this has been going on for months, no chest pain, SOB, just palpitations.  Appt made for 9/16/20

## 2020-09-01 NOTE — TELEPHONE ENCOUNTER
"Patient called and stated after halving the  valsartan (DIOVAN) 80 MG tablet she had an episode of \"heart fluttering\" this morning after taking it. Patient stated that she became very anxious and set down. Patient stated after sitting down it calmed down. Patient states after taking the pill every morning she gets this \"fluttering\" feeling but this morning it was very bad     Please advise     [phone]  "

## 2020-09-16 ENCOUNTER — HOSPITAL ENCOUNTER (OUTPATIENT)
Dept: CARDIOLOGY | Facility: HOSPITAL | Age: 70
Discharge: HOME | End: 2020-09-16

## 2020-09-16 ENCOUNTER — OFFICE VISIT (OUTPATIENT)
Dept: INTERNAL MEDICINE | Facility: CLINIC | Age: 70
End: 2020-09-16

## 2020-09-16 VITALS
DIASTOLIC BLOOD PRESSURE: 86 MMHG | HEART RATE: 67 BPM | BODY MASS INDEX: 31.12 KG/M2 | OXYGEN SATURATION: 98 % | WEIGHT: 198.3 LBS | HEIGHT: 67 IN | SYSTOLIC BLOOD PRESSURE: 196 MMHG

## 2020-09-16 VITALS
HEIGHT: 67 IN | HEART RATE: 76 BPM | WEIGHT: 198 LBS | TEMPERATURE: 97.2 F | OXYGEN SATURATION: 99 % | DIASTOLIC BLOOD PRESSURE: 91 MMHG | BODY MASS INDEX: 31.08 KG/M2 | SYSTOLIC BLOOD PRESSURE: 205 MMHG

## 2020-09-16 VITALS
HEIGHT: 67 IN | BODY MASS INDEX: 31.08 KG/M2 | DIASTOLIC BLOOD PRESSURE: 86 MMHG | HEART RATE: 80 BPM | SYSTOLIC BLOOD PRESSURE: 196 MMHG | WEIGHT: 198 LBS

## 2020-09-16 DIAGNOSIS — R00.2 PALPITATIONS: ICD-10-CM

## 2020-09-16 DIAGNOSIS — R94.31 ABNORMAL EKG: ICD-10-CM

## 2020-09-16 DIAGNOSIS — I10 ESSENTIAL HYPERTENSION: ICD-10-CM

## 2020-09-16 DIAGNOSIS — I10 ESSENTIAL HYPERTENSION: Primary | ICD-10-CM

## 2020-09-16 PROBLEM — I26.99 BILATERAL PULMONARY EMBOLISM: Status: RESOLVED | Noted: 2019-05-23 | Resolved: 2020-09-16

## 2020-09-16 PROBLEM — R77.8 ELEVATED TROPONIN: Status: RESOLVED | Noted: 2019-05-23 | Resolved: 2020-09-16

## 2020-09-16 PROBLEM — R79.89 ELEVATED TROPONIN: Status: RESOLVED | Noted: 2019-05-23 | Resolved: 2020-09-16

## 2020-09-16 LAB
ALBUMIN SERPL-MCNC: 4.3 G/DL (ref 3.5–5.2)
ALBUMIN/GLOB SERPL: 1.1 G/DL
ALP SERPL-CCNC: 99 U/L (ref 39–117)
ALT SERPL W P-5'-P-CCNC: 9 U/L (ref 1–33)
ANION GAP SERPL CALCULATED.3IONS-SCNC: 11.6 MMOL/L (ref 5–15)
AST SERPL-CCNC: 14 U/L (ref 1–32)
BASOPHILS # BLD AUTO: 0.04 10*3/MM3 (ref 0–0.2)
BASOPHILS NFR BLD AUTO: 0.6 % (ref 0–1.5)
BILIRUB SERPL-MCNC: 0.4 MG/DL (ref 0–1.2)
BUN SERPL-MCNC: 10 MG/DL (ref 8–23)
BUN/CREAT SERPL: 12.2 (ref 7–25)
CALCIUM SPEC-SCNC: 9.1 MG/DL (ref 8.6–10.5)
CHLORIDE SERPL-SCNC: 103 MMOL/L (ref 98–107)
CO2 SERPL-SCNC: 26.4 MMOL/L (ref 22–29)
CREAT SERPL-MCNC: 0.82 MG/DL (ref 0.57–1)
D DIMER PPP FEU-MCNC: 0.6 MCGFEU/ML (ref 0–0.49)
DEPRECATED RDW RBC AUTO: 44.1 FL (ref 37–54)
EOSINOPHIL # BLD AUTO: 0.08 10*3/MM3 (ref 0–0.4)
EOSINOPHIL NFR BLD AUTO: 1.2 % (ref 0.3–6.2)
ERYTHROCYTE [DISTWIDTH] IN BLOOD BY AUTOMATED COUNT: 13.5 % (ref 12.3–15.4)
GFR SERPL CREATININE-BSD FRML MDRD: 84 ML/MIN/1.73
GLOBULIN UR ELPH-MCNC: 3.8 GM/DL
GLUCOSE SERPL-MCNC: 88 MG/DL (ref 65–99)
HCT VFR BLD AUTO: 45.6 % (ref 34–46.6)
HGB BLD-MCNC: 14.5 G/DL (ref 12–15.9)
IMM GRANULOCYTES # BLD AUTO: 0.03 10*3/MM3 (ref 0–0.05)
IMM GRANULOCYTES NFR BLD AUTO: 0.4 % (ref 0–0.5)
LYMPHOCYTES # BLD AUTO: 1.06 10*3/MM3 (ref 0.7–3.1)
LYMPHOCYTES NFR BLD AUTO: 15.7 % (ref 19.6–45.3)
MCH RBC QN AUTO: 28.3 PG (ref 26.6–33)
MCHC RBC AUTO-ENTMCNC: 31.8 G/DL (ref 31.5–35.7)
MCV RBC AUTO: 88.9 FL (ref 79–97)
MONOCYTES # BLD AUTO: 0.41 10*3/MM3 (ref 0.1–0.9)
MONOCYTES NFR BLD AUTO: 6.1 % (ref 5–12)
NEUTROPHILS NFR BLD AUTO: 5.14 10*3/MM3 (ref 1.7–7)
NEUTROPHILS NFR BLD AUTO: 76 % (ref 42.7–76)
NRBC BLD AUTO-RTO: 0 /100 WBC (ref 0–0.2)
NT-PROBNP SERPL-MCNC: 119.8 PG/ML (ref 0–900)
PLATELET # BLD AUTO: 301 10*3/MM3 (ref 140–450)
PMV BLD AUTO: 9.8 FL (ref 6–12)
POTASSIUM SERPL-SCNC: 4 MMOL/L (ref 3.5–5.2)
PROT SERPL-MCNC: 8.1 G/DL (ref 6–8.5)
RBC # BLD AUTO: 5.13 10*6/MM3 (ref 3.77–5.28)
SODIUM SERPL-SCNC: 141 MMOL/L (ref 136–145)
TROPONIN T SERPL-MCNC: <0.01 NG/ML (ref 0–0.03)
WBC # BLD AUTO: 6.76 10*3/MM3 (ref 3.4–10.8)

## 2020-09-16 PROCEDURE — 84484 ASSAY OF TROPONIN QUANT: CPT | Performed by: INTERNAL MEDICINE

## 2020-09-16 PROCEDURE — 94760 N-INVAS EAR/PLS OXIMETRY 1: CPT

## 2020-09-16 PROCEDURE — 93308 TTE F-UP OR LMTD: CPT

## 2020-09-16 PROCEDURE — 85025 COMPLETE CBC W/AUTO DIFF WBC: CPT | Performed by: INTERNAL MEDICINE

## 2020-09-16 PROCEDURE — 85379 FIBRIN DEGRADATION QUANT: CPT | Performed by: INTERNAL MEDICINE

## 2020-09-16 PROCEDURE — 93325 DOPPLER ECHO COLOR FLOW MAPG: CPT

## 2020-09-16 PROCEDURE — 99214 OFFICE O/P EST MOD 30 MIN: CPT | Performed by: FAMILY MEDICINE

## 2020-09-16 PROCEDURE — 36415 COLL VENOUS BLD VENIPUNCTURE: CPT

## 2020-09-16 PROCEDURE — 93325 DOPPLER ECHO COLOR FLOW MAPG: CPT | Performed by: INTERNAL MEDICINE

## 2020-09-16 PROCEDURE — 80053 COMPREHEN METABOLIC PANEL: CPT | Performed by: INTERNAL MEDICINE

## 2020-09-16 PROCEDURE — 83880 ASSAY OF NATRIURETIC PEPTIDE: CPT | Performed by: INTERNAL MEDICINE

## 2020-09-16 PROCEDURE — 93000 ELECTROCARDIOGRAM COMPLETE: CPT | Performed by: FAMILY MEDICINE

## 2020-09-16 PROCEDURE — 93321 DOPPLER ECHO F-UP/LMTD STD: CPT

## 2020-09-16 PROCEDURE — 93321 DOPPLER ECHO F-UP/LMTD STD: CPT | Performed by: INTERNAL MEDICINE

## 2020-09-16 PROCEDURE — 99204 OFFICE O/P NEW MOD 45 MIN: CPT | Performed by: INTERNAL MEDICINE

## 2020-09-16 PROCEDURE — 93308 TTE F-UP OR LMTD: CPT | Performed by: INTERNAL MEDICINE

## 2020-09-16 RX ORDER — CHLORHEXIDINE GLUCONATE 0.12 MG/ML
RINSE ORAL SEE ADMIN INSTRUCTIONS
COMMUNITY
Start: 2020-06-22 | End: 2021-04-16

## 2020-09-16 RX ORDER — UBIDECARENONE 50 MG
50 CAPSULE ORAL DAILY
COMMUNITY
End: 2020-09-16

## 2020-09-16 RX ORDER — CHOLECALCIFEROL (VITAMIN D3) 125 MCG
5000 CAPSULE ORAL DAILY
COMMUNITY
Start: 2020-01-30 | End: 2021-04-16

## 2020-09-16 RX ORDER — SODIUM CHLORIDE 0.9 % (FLUSH) 0.9 %
10 SYRINGE (ML) INJECTION AS NEEDED
Status: DISCONTINUED | OUTPATIENT
Start: 2020-09-16 | End: 2020-09-18

## 2020-09-16 RX ORDER — NITROGLYCERIN 0.4 MG/1
0.4 TABLET SUBLINGUAL
Status: DISCONTINUED | OUTPATIENT
Start: 2020-09-16 | End: 2020-09-18

## 2020-09-16 NOTE — PROGRESS NOTES
Subjective   Faviola Issa is a 70 y.o. female.     Chief Complaint   Patient presents with   • palpations         History of Present Illness     Patient presents at today's office visit for concern for palpitation.  Her blood pressure today's office is 196/86.  She states that she is only taking valsartan 40 mg daily.  Patient states that when she takes her medication she states that she is flushed and she has some palpitations.  On today's office visit I did get an EKG which does show some moderate ST depression, and when compared to his EKG from last year there is no indication of the ST depression.  I discussed with patient this is concerning, and needs to have further evaluation.  She has no chest pain or shortness of breath at today's visit.  She does believe that occasional shortness of breath is happening due to this mask that we have to wear, however I have discussed with her that the shortness of breath should be very minimal if related to the mass.    ECG 12 Lead    Date/Time: 9/16/2020 8:44 AM  Performed by: Roger Beard MD  Authorized by: Roger Beard MD   Comparison: compared with previous ECG from 9/30/2019  Rhythm: sinus rhythm  Rate: normal  ST Depression: all  QRS axis: normal  Other: no other findings    Clinical impression: abnormal EKG              The following portions of the patient's history were reviewed and updated as appropriate: allergies, current medications, past family history, past medical history, past social history, past surgical history and problem list.    Review of Systems   Constitutional: Negative for chills and fever.   HENT: Negative for congestion, rhinorrhea, sinus pain and sore throat.    Eyes: Negative for photophobia and visual disturbance.   Respiratory: Negative for cough, chest tightness and shortness of breath.    Cardiovascular: Negative for chest pain and palpitations.   Gastrointestinal: Negative for diarrhea, nausea and vomiting.    Genitourinary: Negative for dysuria, frequency and urgency.   Skin: Negative for rash and wound.   Neurological: Negative for dizziness and syncope.   Psychiatric/Behavioral: Negative for behavioral problems and confusion.       Objective   Physical Exam  Vitals signs and nursing note reviewed.   Constitutional:       Appearance: She is well-developed.   HENT:      Head: Normocephalic and atraumatic.      Right Ear: External ear normal.      Left Ear: External ear normal.   Neck:      Musculoskeletal: Normal range of motion and neck supple.   Cardiovascular:      Rate and Rhythm: Normal rate and regular rhythm.      Heart sounds: Normal heart sounds.   Pulmonary:      Effort: Pulmonary effort is normal. No respiratory distress.      Breath sounds: Normal breath sounds.   Musculoskeletal: Normal range of motion.   Lymphadenopathy:      Cervical: No cervical adenopathy.   Skin:     General: Skin is warm.   Neurological:      Mental Status: She is alert and oriented to person, place, and time.   Psychiatric:         Behavior: Behavior normal.         Vitals:    09/16/20 0805   BP: (!) 196/86   Pulse: 67   SpO2: 98%     Body mass index is 31.05 kg/m².      Assessment/Plan   Faviola was seen today for palpations.    Diagnoses and all orders for this visit:    Essential hypertension    Abnormal EKG    Palpitations    Other orders  -     ECG 12 Lead      Due to patient's blood pressure continues to elevated, even on a recheck of her systolic was over 200, I discussed with her that the ST depression is concerning.  We have reached out to [facility], for patient to be seen straight from the office.  I discussed with her that these are concerning, and needs further evaluation.  Patient understood and agreed with plan.    No follow-ups on file.    Dictated utilizing Dragon Voice Recognition Software

## 2020-09-17 LAB
AORTIC ARCH: 1.9 CM
ASCENDING AORTA: 2.6 CM
BH CV ECHO MEAS - ACS: 1.8 CM
BH CV ECHO MEAS - AO MAX PG (FULL): 1.7 MMHG
BH CV ECHO MEAS - AO MAX PG: 6.6 MMHG
BH CV ECHO MEAS - AO MEAN PG (FULL): 1 MMHG
BH CV ECHO MEAS - AO MEAN PG: 3 MMHG
BH CV ECHO MEAS - AO V2 MAX: 128 CM/SEC
BH CV ECHO MEAS - AO V2 MEAN: 78.8 CM/SEC
BH CV ECHO MEAS - AO V2 VTI: 32.4 CM
BH CV ECHO MEAS - ASC AORTA: 2.6 CM
BH CV ECHO MEAS - BSA(HAYCOCK): 2.1 M^2
BH CV ECHO MEAS - BSA: 2 M^2
BH CV ECHO MEAS - BZI_BMI: 31 KILOGRAMS/M^2
BH CV ECHO MEAS - BZI_METRIC_HEIGHT: 170.2 CM
BH CV ECHO MEAS - BZI_METRIC_WEIGHT: 89.8 KG
BH CV ECHO MEAS - EDV(MOD-SP2): 66 ML
BH CV ECHO MEAS - EDV(MOD-SP4): 49 ML
BH CV ECHO MEAS - EDV(TEICH): 92.4 ML
BH CV ECHO MEAS - EF(CUBED): 70.4 %
BH CV ECHO MEAS - EF(MOD-BP): 59 %
BH CV ECHO MEAS - EF(MOD-SP2): 56.1 %
BH CV ECHO MEAS - EF(MOD-SP4): 57.1 %
BH CV ECHO MEAS - EF(TEICH): 62.1 %
BH CV ECHO MEAS - ESV(MOD-SP2): 29 ML
BH CV ECHO MEAS - ESV(MOD-SP4): 21 ML
BH CV ECHO MEAS - ESV(TEICH): 35 ML
BH CV ECHO MEAS - FS: 33.3 %
BH CV ECHO MEAS - IVS/LVPW: 1
BH CV ECHO MEAS - IVSD: 0.9 CM
BH CV ECHO MEAS - LAT PEAK E' VEL: 8.5 CM/SEC
BH CV ECHO MEAS - LV DIASTOLIC VOL/BSA (35-75): 24.3 ML/M^2
BH CV ECHO MEAS - LV MASS(C)D: 132.8 GRAMS
BH CV ECHO MEAS - LV MASS(C)DI: 66 GRAMS/M^2
BH CV ECHO MEAS - LV MAX PG: 4.8 MMHG
BH CV ECHO MEAS - LV MEAN PG: 2 MMHG
BH CV ECHO MEAS - LV SYSTOLIC VOL/BSA (12-30): 10.4 ML/M^2
BH CV ECHO MEAS - LV V1 MAX: 110 CM/SEC
BH CV ECHO MEAS - LV V1 MEAN: 69.7 CM/SEC
BH CV ECHO MEAS - LV V1 VTI: 26.7 CM
BH CV ECHO MEAS - LVIDD: 4.5 CM
BH CV ECHO MEAS - LVIDS: 3 CM
BH CV ECHO MEAS - LVLD AP2: 6.8 CM
BH CV ECHO MEAS - LVLD AP4: 6 CM
BH CV ECHO MEAS - LVLS AP2: 5.4 CM
BH CV ECHO MEAS - LVLS AP4: 5.5 CM
BH CV ECHO MEAS - LVPWD: 0.9 CM
BH CV ECHO MEAS - MED PEAK E' VEL: 8.7 CM/SEC
BH CV ECHO MEAS - MR MAX PG: 51.3 MMHG
BH CV ECHO MEAS - MR MAX VEL: 358 CM/SEC
BH CV ECHO MEAS - MV A DUR: 0.14 SEC
BH CV ECHO MEAS - MV A MAX VEL: 64 CM/SEC
BH CV ECHO MEAS - MV DEC SLOPE: 340 CM/SEC^2
BH CV ECHO MEAS - MV DEC TIME: 0.18 SEC
BH CV ECHO MEAS - MV E MAX VEL: 72.3 CM/SEC
BH CV ECHO MEAS - MV E/A: 1.1
BH CV ECHO MEAS - MV MAX PG: 5.3 MMHG
BH CV ECHO MEAS - MV MEAN PG: 2 MMHG
BH CV ECHO MEAS - MV P1/2T MAX VEL: 69.8 CM/SEC
BH CV ECHO MEAS - MV P1/2T: 60.1 MSEC
BH CV ECHO MEAS - MV V2 MAX: 115 CM/SEC
BH CV ECHO MEAS - MV V2 MEAN: 68.3 CM/SEC
BH CV ECHO MEAS - MV V2 VTI: 34.8 CM
BH CV ECHO MEAS - MVA P1/2T LCG: 3.2 CM^2
BH CV ECHO MEAS - MVA(P1/2T): 3.7 CM^2
BH CV ECHO MEAS - PA MAX PG (FULL): 0.66 MMHG
BH CV ECHO MEAS - PA MAX PG: 3.3 MMHG
BH CV ECHO MEAS - PA V2 MAX: 90.9 CM/SEC
BH CV ECHO MEAS - PULM A REVS DUR: 0.1 SEC
BH CV ECHO MEAS - PULM A REVS VEL: 26.2 CM/SEC
BH CV ECHO MEAS - PULM DIAS VEL: 45.6 CM/SEC
BH CV ECHO MEAS - PULM S/D: 1.3
BH CV ECHO MEAS - PULM SYS VEL: 60.6 CM/SEC
BH CV ECHO MEAS - PVA(V,A): 2 CM^2
BH CV ECHO MEAS - PVA(V,D): 2 CM^2
BH CV ECHO MEAS - RV MAX PG: 2.6 MMHG
BH CV ECHO MEAS - RV MEAN PG: 1 MMHG
BH CV ECHO MEAS - RV V1 MAX: 81.3 CM/SEC
BH CV ECHO MEAS - RV V1 MEAN: 55.4 CM/SEC
BH CV ECHO MEAS - RV V1 VTI: 17.3 CM
BH CV ECHO MEAS - RVOT AREA: 2.3 CM^2
BH CV ECHO MEAS - RVOT DIAM: 1.7 CM
BH CV ECHO MEAS - SI(CUBED): 31.8 ML/M^2
BH CV ECHO MEAS - SI(MOD-SP2): 18.4 ML/M^2
BH CV ECHO MEAS - SI(MOD-SP4): 13.9 ML/M^2
BH CV ECHO MEAS - SI(TEICH): 28.5 ML/M^2
BH CV ECHO MEAS - SUP REN AO DIAM: 1.9 CM
BH CV ECHO MEAS - SV(CUBED): 64.1 ML
BH CV ECHO MEAS - SV(MOD-SP2): 37 ML
BH CV ECHO MEAS - SV(MOD-SP4): 28 ML
BH CV ECHO MEAS - SV(RVOT): 39.3 ML
BH CV ECHO MEAS - SV(TEICH): 57.4 ML
BH CV ECHO MEAS - TAPSE (>1.6): 3 CM
BH CV ECHO MEASUREMENTS AVERAGE E/E' RATIO: 8.41
BH CV XLRA - RV BASE: 3.3 CM
BH CV XLRA - RV LENGTH: 6 CM
BH CV XLRA - RV MID: 2.5 CM
BH CV XLRA - TDI S': 11.7 CM/SEC
LEFT ATRIUM VOLUME INDEX: 22 ML/M2
MAXIMAL PREDICTED HEART RATE: 151 BPM
SINUS: 2.9 CM
STJ: 2.2 CM
STRESS TARGET HR: 128 BPM

## 2020-09-17 NOTE — PROGRESS NOTES
"Date of Hospital Visit: 2020  Encounter Provider: Nahum Benavides MD  Place of Service: [facility] CARDIOLOGY  Patient Name: Faviola Issa  :1950  Referral Provider: Roger Beard MD    Chief complaint: palpitations    History of Present Illness    Ms. Issa is a pleasant 69yo woman who was referred to [facility] by Dr Beard for an abnormal EKG.    She has known hypertension which is well controlled at home but is always very high in medical settings.  She was previously on lisinopril, and her BP was very well controlled.  It was changed to valsartan out of concerns of ethnic differences in medication effects.  Since then, she has had palpitations every time she takes it.  She cut it in half on her own, which has helped, but they still happen.  About one hour after she takes it, her heart beats irregularly and fast and then steadily resolves over minutes or hours.    She denies chest pain, dyspnea, orthopnea, PND, lightheadedness, or syncope. She had a provoked PE in  after a prolonged recovery from thyroid/parathyroid surgery, which was complicated by a neck hematoma.      She had an EKG today and was told that it was abnormal.  She and her  are extremely scared and anxious about this.    Her SBP at home is always 140s in the AM and 110s in the afternoons.     Past Medical History:   Diagnosis Date   • Arthritis    • Asthma    • Bilateral pulmonary embolism (CMS/HCC)     provoked, after surgery , took 3 mos of OAC   • Colorectal cancer (CMS/HCC)     s/p surgery (colostomy), radiation, and chemotherapy, with mets to lung s/p surgical resection   • Enlarged thyroid gland     s/p total thyroidectomy    • Eye exam normal    • History of prior pregnancies     x4   • Hx of radiation therapy     for colon cancer   • Hyperparathyroidism (CMS/HCC)     s/p removal of a single adenoma 2019 c/b bleeding/hematoma   • Hypertension     \"white coat syndrome\" "   • 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, low-grade, treated with radiation   • PONV (postoperative nausea and vomiting)    • Post-menopausal    • Postoperative hypothyroidism 5/21/2019       Past Surgical History:   Procedure Laterality Date   • COLON SURGERY      1999 and 11/2011   • COLONOSCOPY  2016   • COLONOSCOPY N/A 5/8/2018    Procedure: COLONOSCOPY into ileum;  Surgeon: James Romeo MD;  Location: [facility] ENDOSCOPY;  Service: Gastroenterology   • HEMICOLOECTOMY W/ ANASTOMOSIS Right 11/2011    Adenocarcinoma of cecum   • HYSTERECTOMY  1999   • LUNG LOBECTOMY      ANSELMO 08/2006 and RLL partial 09/2001 metastatic colon cancer    • THYROIDECTOMY Bilateral 5/6/2019    Procedure: Left PARATHYROIDECTOMY AND TOTAL THYROIDECTOMY;  Surgeon: Maxi Daly MD;  Location:  [facility];  Service: ENT   • TRACHEOSTOMY N/A 5/6/2019    Procedure: EVACUATION OF NECK  HEMATOMA;  Surgeon: Maxi Daly MD;  Location: [facility] MAIN OR;  Service: ENT       Prior to Admission medications    Medication Sig Start Date End Date Taking? Authorizing Provider   Anoro Ellipta 62.5-25 MCG/INH aerosol powder  inhaler Inhale 1 puff Daily As Needed. 7/7/20  Yes Arlyn Saleh MD   aspirin 81 MG chewable tablet Chew 81 mg Daily.   Yes Arlyn Saleh MD   CALCIUM CITRATE-VITAMIN D PO Take 750 tablets by mouth 2 (Two) Times a Day.   Yes Arlyn Saleh MD   chlorhexidine (PERIDEX) 0.12 % solution See Admin Instructions. 6/22/20  Yes Arlyn Saleh MD   Cholecalciferol (Vitamin D) 125 MCG (5000 UT) capsule capsule Take 5,000 Units by mouth Daily. 1/30/20  Yes Arlyn Saleh MD   levothyroxine sodium (TIROSINT) 100 MCG capsule Take 1 capsule by mouth Daily. 12/23/19  Yes Roger Beard MD   valsartan (DIOVAN) 80 MG tablet Take 1 tablet by mouth Daily. For BP  Patient taking differently: Take 40 mg by mouth Daily. For BP 6/1/20  Yes Roger Beard MD   VENTOLIN  (90 Base) MCG/ACT inhaler Inhale 2  "puffs Every 4 (Four) Hours As Needed. 10/18/19  Yes Provider, MD Arlyn   simvastatin (ZOCOR) 20 MG tablet Take 1 tablet by mouth Every Night. 19   Roger Beard MD       Social History     Socioeconomic History   • Marital status:      Spouse name: KEN   • Number of children: 3   • Years of education: College   • Highest education level: Not on file   Occupational History   • Occupation: Nurse     Employer: [employer]   Tobacco Use   • Smoking status: Former Smoker     Packs/day: 1.00     Years: 15.00     Pack years: 15.00     Types: Cigarettes     Quit date: 2001     Years since quittin.0   • Smokeless tobacco: Never Used   • Tobacco comment: 1ppd x20 yrs   Substance and Sexual Activity   • Alcohol use: No   • Drug use: No   • Sexual activity: Yes     Partners: Male     Birth control/protection: Post-menopausal, Surgical   Social History Narrative    GYN PATIENT SINCE .       Family History   Problem Relation Age of Onset   • Cancer Sister         \"FEMALE\"   • Hypertension Father    • Breast cancer Daughter 45   • Deep vein thrombosis Niece    • Malig Hyperthermia Neg Hx        Review of Systems   Cardiovascular: Positive for palpitations.   Psychiatric/Behavioral: The patient is nervous/anxious.    All other systems reviewed and are negative.       Objective:     Vitals:    20 0941   BP: (!) 205/91   BP Location: Right arm   Patient Position: Sitting   Pulse: 76   Temp: 97.2 °F (36.2 °C)   TempSrc: Oral   SpO2: 99%   Weight: 89.8 kg (198 lb)   Height: 170.2 cm (67\")     Body mass index is 31.01 kg/m².      Physical Exam  Vitals signs reviewed.   Constitutional:       Appearance: She is well-developed.   HENT:      Head: Normocephalic.      Nose: Nose normal.   Eyes:      Conjunctiva/sclera: Conjunctivae normal.      Pupils: Pupils are equal, round, and reactive to light.   Neck:      Musculoskeletal: Normal range of motion.      " Vascular: No JVD.   Cardiovascular:      Rate and Rhythm: Normal rate and regular rhythm.      Heart sounds: Normal heart sounds.   Pulmonary:      Effort: Pulmonary effort is normal.      Breath sounds: Normal breath sounds.   Abdominal:      Palpations: Abdomen is soft.      Tenderness: There is no abdominal tenderness.   Musculoskeletal: Normal range of motion.   Skin:     General: Skin is warm and dry.      Findings: No erythema.   Neurological:      Mental Status: She is alert and oriented to person, place, and time.      Cranial Nerves: No cranial nerve deficit.   Psychiatric:         Behavior: Behavior normal.         Thought Content: Thought content normal.         Judgment: Judgment normal.                 Lab Review:                Results from last 7 days   Lab Units 09/16/20  0955   SODIUM mmol/L 141   POTASSIUM mmol/L 4.0   CHLORIDE mmol/L 103   CO2 mmol/L 26.4   BUN mg/dL 10   CREATININE mg/dL 0.82   GLUCOSE mg/dL 88   CALCIUM mg/dL 9.1     Results from last 7 days   Lab Units 09/16/20  0955   TROPONIN T ng/mL <0.010     Results from last 7 days   Lab Units 09/16/20  0955   WBC 10*3/mm3 6.76   HEMOGLOBIN g/dL 14.5   HEMATOCRIT % 45.6   PLATELETS 10*3/mm3 301         Lab Results   Component Value Date    TSH 0.947 08/11/2020     Lab Results   Component Value Date    DDIMERQUANT 0.60 (H) 09/16/2020     Lab Results   Component Value Date    PROBNP 119.8 09/16/2020     EKG --   I have personally reviewed EKG on 09/16/2020 and my interpretation of the tracing is as follows: NSR, LVH, diffuse NSST , no change from prior    Assessment/Plan:     1. Palpitations    2. Abnormal EKG    3. Essential hypertension      She reports palpitations every time she takes valsartan.  I have recommended a Holter to see what these are.  It sounds like we just need to switch her from valsartan back to lisinopril, as it worked, and she was tolerating it fine.  Her EKG merely shows LVH, which was present on a previous EKG.  She  has no anginal symptoms and her Tn is normal.  An echo was performed and revealed mild LVH and was otherwise unremarkable.      Her BP at home is well controlled.  It looks like she does have significant white coat hypertension, as well.     Her d-dimer is minimally elevated, but I have no clinical suspicion for recurrent PE.

## 2020-09-18 RX ORDER — LISINOPRIL 40 MG/1
40 TABLET ORAL DAILY
Qty: 90 TABLET | Refills: 3 | Status: SHIPPED | OUTPATIENT
Start: 2020-09-18 | End: 2021-02-23

## 2020-09-23 ENCOUNTER — CLINICAL SUPPORT (OUTPATIENT)
Dept: INTERNAL MEDICINE | Facility: CLINIC | Age: 70
End: 2020-09-23

## 2020-09-23 DIAGNOSIS — Z23 NEEDS FLU SHOT: Primary | ICD-10-CM

## 2020-09-23 PROCEDURE — G0008 ADMIN INFLUENZA VIRUS VAC: HCPCS | Performed by: FAMILY MEDICINE

## 2020-09-23 PROCEDURE — 90694 VACC AIIV4 NO PRSRV 0.5ML IM: CPT | Performed by: FAMILY MEDICINE

## 2020-09-25 ENCOUNTER — RESULTS ENCOUNTER (OUTPATIENT)
Dept: INTERNAL MEDICINE | Facility: CLINIC | Age: 70
End: 2020-09-25

## 2020-09-25 DIAGNOSIS — E89.0 POSTOPERATIVE HYPOTHYROIDISM: ICD-10-CM

## 2020-10-02 ENCOUNTER — OFFICE VISIT (OUTPATIENT)
Dept: CARDIOLOGY | Facility: CLINIC | Age: 70
End: 2020-10-02

## 2020-10-02 VITALS
SYSTOLIC BLOOD PRESSURE: 150 MMHG | HEART RATE: 62 BPM | HEIGHT: 67 IN | BODY MASS INDEX: 30.86 KG/M2 | DIASTOLIC BLOOD PRESSURE: 80 MMHG | WEIGHT: 196.6 LBS

## 2020-10-02 DIAGNOSIS — I49.8 FLUTTERING HEART: ICD-10-CM

## 2020-10-02 DIAGNOSIS — E89.2 STATUS POST PARATHYROIDECTOMY (HCC): ICD-10-CM

## 2020-10-02 DIAGNOSIS — E89.0 POSTOPERATIVE HYPOTHYROIDISM: ICD-10-CM

## 2020-10-02 DIAGNOSIS — R00.2 PALPITATIONS: Primary | ICD-10-CM

## 2020-10-02 DIAGNOSIS — I10 ESSENTIAL HYPERTENSION: ICD-10-CM

## 2020-10-02 DIAGNOSIS — F41.9 ANXIETY: ICD-10-CM

## 2020-10-02 PROCEDURE — 93000 ELECTROCARDIOGRAM COMPLETE: CPT | Performed by: NURSE PRACTITIONER

## 2020-10-02 PROCEDURE — 99214 OFFICE O/P EST MOD 30 MIN: CPT | Performed by: NURSE PRACTITIONER

## 2020-10-02 NOTE — PROGRESS NOTES
Date of Office Visit: 10/02/2020  Encounter Provider: IGGY Andersen  Place of Service: [facility] CARDIOLOGY  Patient Name: Faviola Issa  :1950  Primary Cardiologist: Dr. Nahum Benavides     Chief Complaint   Patient presents with   • Palpitations   • Follow-up   :     Dear Dr. Beard,     HPI: Faviola Issa is a pleasant 70 y.o. female who presents today for cardiac follow up. She is a new patient to me and her previous records have been reviewed.    She has a known history of hypertension.  She states it is always been very well controlled at home, but high in medical settings.  She was previously on lisinopril and her blood pressure was very well controlled.  She was changed to valsartan out of concerns of ethnic differences and medication effects.  Since during the valsartan she felt like she developed palpitations in which she described a fluttering sensation.    On 2020, she followed up in the office with her PCP to report the palpitations.  She was referred to our [facility] department for evaluation.  Dr. Benavides saw her.  Her d-dimer was mildly elevated but there was low suspicion about pulmonary embolism.  Thyroid studies within normal limits.  Echocardiogram that day showed an EF of 59%, normal atrial sizes, and trace to mild mitral regurgitation.  Dr. Benavides recommended that she wear a Holter monitor.    On 2020, she wore the Holter monitor for 24 hours which showed normal sinus rhythm, average heart rate of 73, and rare APCs and PVCs.  She did not have any palpitations while wearing the monitor.  Dr. Benavides stopped her valsartan and changed her back to lisinopril.    Today she presents for follow-up with her  accompanying her.  She explains that in May 2019 she underwent parathyroid surgery and has had complications since then including a neck pulling sensation, fatigue when talking, and voice changes.  She has had a lot of anxiety over this medical  "condition.  She continues to experience intermittent fluttering sensations, not daily and it improves when sitting down.  They will typically last a few minutes with no other associated symptoms.  She reports occasional shortness of breath and she has a history of asthma and chronic bronchitis.  Her  also said she had previous lung surgery in the past.  She denies chest pain, PND, orthopnea, edema, dizziness, or syncope.  She admits that she feels quite anxious a lot of the times.  She loves doing hobbies such as crafting, sewing, and gardening.  She says when she does those hobbies she forgets about her anxiety.  She also exercises.  I can tell her  is quite concerned about her anxiety.  Her blood pressure is elevated today and this morning at home it was 124/69.    Past Medical History:   Diagnosis Date   • Arthritis    • Asthma    • Bilateral pulmonary embolism (CMS/HCC)     provoked, after surgery 2019, took 3 mos of OAC   • Colorectal cancer (CMS/HCC)     s/p surgery (colostomy), radiation, and chemotherapy, with mets to lung s/p surgical resection   • Enlarged thyroid gland     s/p total thyroidectomy 2019   • Eye exam normal 2013   • History of prior pregnancies     x4   • Hx of radiation therapy 2000    for colon cancer   • Hyperparathyroidism (CMS/HCC)     s/p removal of a single adenoma 5/2019 c/b bleeding/hematoma   • Hypertension     \"white coat syndrome\"   • 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, low-grade, treated with radiation   • PONV (postoperative nausea and vomiting)    • Post-menopausal    • Postoperative hypothyroidism 5/21/2019       Past Surgical History:   Procedure Laterality Date   • COLON SURGERY      1999 and 11/2011   • COLONOSCOPY  2016   • COLONOSCOPY N/A 5/8/2018    Procedure: COLONOSCOPY into ileum;  Surgeon: James Romeo MD;  Location: [facility] ENDOSCOPY;  Service: Gastroenterology   • HEMICOLOECTOMY W/ ANASTOMOSIS Right 11/2011    Adenocarcinoma of cecum   • HYSTERECTOMY  " "   • LUNG LOBECTOMY      ANSELMO 2006 and RLL partial 2001 metastatic colon cancer    • THYROIDECTOMY Bilateral 2019    Procedure: Left PARATHYROIDECTOMY AND TOTAL THYROIDECTOMY;  Surgeon: Maxi Daly MD;  [place] [facility] [place] [facility]  Service: ENT   • TRACHEOSTOMY N/A 2019    Procedure: EVACUATION OF NECK  HEMATOMA;  Surgeon: Maxi Daly MD;  Location: [facility] MAIN OR;  Service: ENT       Social History     Socioeconomic History   • Marital status:      Spouse name: KEN   • Number of children: 3   • Years of education: College   • Highest education level: Not on file   Occupational History   • Occupation: Nurse     Employer: [employer]   Tobacco Use   • Smoking status: Former Smoker     Packs/day: 1.00     Years: 15.00     Pack years: 15.00     Types: Cigarettes     Quit date: 2001     Years since quittin.0   • Smokeless tobacco: Never Used   • Tobacco comment: 1ppd x20 yrs   Substance and Sexual Activity   • Alcohol use: No     Comment: Caffeine use: Tea 2 cups daily   • Drug use: No   • Sexual activity: Yes     Partners: Male     Birth control/protection: Post-menopausal, Surgical   Social History Narrative    GYN PATIENT SINCE .       Family History   Problem Relation Age of Onset   • Cancer Sister         \"FEMALE\"   • Hypertension Father    • Breast cancer Daughter 45   • Deep vein thrombosis Niece    • Malig Hyperthermia Neg Hx        The following portion of the patient's history were reviewed and updated as appropriate: past medical history, past surgical history, past social history, past family history, allergies, current medications, and problem list.    Review of Systems   Constitution: Positive for weight gain. Negative for chills, diaphoresis, fever, malaise/fatigue, night sweats and weight loss.   HENT: Negative for hearing loss, nosebleeds, sore throat and tinnitus.    Eyes: Negative for blurred vision, double vision, " pain and visual disturbance.   Cardiovascular: Positive for dyspnea on exertion. Negative for chest pain, claudication, cyanosis, irregular heartbeat, leg swelling, near-syncope, orthopnea, palpitations, paroxysmal nocturnal dyspnea and syncope.   Respiratory: Negative for cough, hemoptysis, snoring and wheezing.    Endocrine: Negative for cold intolerance, heat intolerance and polyuria.   Hematologic/Lymphatic: Negative for bleeding problem. Does not bruise/bleed easily.   Skin: Negative for color change, dry skin, flushing and itching.   Musculoskeletal: Negative for falls, joint pain, joint swelling, muscle cramps, muscle weakness and myalgias.   Gastrointestinal: Negative for abdominal pain, constipation, heartburn, melena, nausea and vomiting.   Genitourinary: Negative for dysuria and hematuria.   Neurological: Negative for excessive daytime sleepiness, dizziness, light-headedness, loss of balance, numbness, paresthesias, seizures and vertigo.   Psychiatric/Behavioral: Negative for altered mental status, depression, memory loss and substance abuse. The patient is nervous/anxious. The patient does not have insomnia.    Allergic/Immunologic: Negative for environmental allergies.       Allergies   Allergen Reactions   • Adhesive Tape Hives and Itching     BANDAIDS   • Amoxicillin Hives and Itching   • Latex Other (See Comments)     Lips and nose swelled   • Red Dye Nausea And Vomiting   • Shellfish-Derived Products Shortness Of Breath   • Pennsaid [Diclofenac Sodium] Dizziness   • Simvastatin Other (See Comments)     Joint pain   • Lactose Intolerance (Gi) Nausea And Vomiting         Current Outpatient Medications:   •  Anoro Ellipta 62.5-25 MCG/INH aerosol powder  inhaler, Inhale 1 puff Daily As Needed., Disp: , Rfl:   •  aspirin 81 MG chewable tablet, Chew 81 mg Daily., Disp: , Rfl:   •  CALCIUM CITRATE-VITAMIN D PO, Take 750 tablets by mouth 2 (Two) Times a Day., Disp: , Rfl:   •  chlorhexidine (PERIDEX) 0.12 %  "solution, See Admin Instructions., Disp: , Rfl:   •  Cholecalciferol (Vitamin D) 125 MCG (5000 UT) capsule capsule, Take 5,000 Units by mouth Daily., Disp: , Rfl:   •  levothyroxine sodium (TIROSINT) 100 MCG capsule, Take 1 capsule by mouth Daily., Disp: 30 capsule, Rfl: 11  •  lisinopril (PRINIVIL,ZESTRIL) 40 MG tablet, Take 1 tablet by mouth Daily., Disp: 90 tablet, Rfl: 3  •  VENTOLIN  (90 Base) MCG/ACT inhaler, Inhale 2 puffs Every 4 (Four) Hours As Needed., Disp: , Rfl: 3        Objective:     Vitals:    10/02/20 0858 10/02/20 0859   BP: 150/80 150/80   BP Location: Left arm Right arm   Pulse: 62    Weight: 89.2 kg (196 lb 9.6 oz)    Height: 170.2 cm (67\")      Body mass index is 30.79 kg/m².    PHYSICAL EXAM:    Vitals Reviewed.   General Appearance: No acute distress, well developed and well nourished. Obese.   Eyes: Conjunctiva and lids: No erythema, swelling, or discharge. Sclera non-icteric.   HENT: Atraumatic, normocephalic. External eyes, ears, and nose normal. No hearing loss noted. Mucous membranes normal. Lips not cyanotic. Neck supple with no tenderness.  Respiratory: No signs of respiratory distress. Respiration rhythm and depth normal.   Clear to auscultation. No rales, crackles, rhonchi, or wheezing auscultated.   Cardiovascular:  Jugular Venous Pressure: Normal  Heart Rate and Rhythm: Normal, Heart Sounds: Normal S1 and S2. No S3 or S4 noted.  Murmurs: No murmurs noted. No rubs, thrills, or gallops.   Lower Extremities: No edema noted.  Gastrointestinal:  Abdomen soft, non-distended, non-tender. Normal bowel sounds.    Musculoskeletal: Normal movement of extremities  Skin and Nails: General appearance normal. No pallor, cyanosis, diaphoresis. Skin temperature normal. No clubbing of fingernails.   Psychiatric: Patient alert and oriented to person, place, and time. Speech and behavior appropriate. Normal mood and affect.       ECG 12 Lead    Date/Time: 10/2/2020 8:55 AM  Performed by: " Veronica Deutsch APRN  Authorized by: Veronica Deutsch, IGGY   Comparison: compared with previous ECG from 9/16/2020  Similar to previous ECG  Rhythm: sinus rhythm  Rate: normal  BPM: 62  Conduction: conduction normal  T Waves: T waves normal  QRS axis: normal  Other findings: non-specific ST-T wave changes    Clinical impression: non-specific ECG              Assessment:       Diagnosis Plan   1. Palpitations  Holter Monitor - 72 Hour Up To 21 Days   2. Fluttering heart  Holter Monitor - 72 Hour Up To 21 Days   3. Anxiety     4. Postoperative hypothyroidism     5. Status post thyroidectomy and left partial parathyroidectomy (CMS/[facility])     6. Essential hypertension            Plan:       1/2.  Palpitations/fluttering Heart sensation: She wore a Holter monitor which was normal and did not have any symptoms while wearing the monitor.  I recommended a 14-day Holter monitor.  We discussed how possible anxiety could be contributing to her symptoms.    3.  Anxiety: She admits that she is anxious a lot of the time.  She enjoys doing hobbies such as crafting, sewing, and gardening.  I recommended that she discuss with Dr. Beard on a possible low-dose antidepressant that may just help her to take a little bit of the edge off.    4/5.  Hypothyroidism: Status post thyroidectomy and left partial parathyroidectomy May 2019.  She had a lot of anxiety since that surgery and some mild complications.  Her recent TSH level was normal.    6.  Hypertension: Her blood pressure was elevated today, but she says it is very well controlled at home.  She is doing well on the lisinopril.    7.  Further recommendations to follow after Holter monitor.    As always, it has been a pleasure to participate in your patient's care. Thank you.       Sincerely,       IGGY Fine  [facility] Card[facility]      · COVID-19 Precautions - Patient was compliant in wearing a mask. When I saw the patient, I used appropriate  personal protective equipment (PPE) including mask (standard procedure).  Additionally, I used gown, gloves, and eye shield if indicated.  Hand hygiene was completed before and after seeing the patient.  · Dictated utilizing Dragon Dictation

## 2020-10-04 ENCOUNTER — TELEPHONE (OUTPATIENT)
Dept: CARDIOLOGY | Facility: CLINIC | Age: 70
End: 2020-10-04

## 2020-10-04 NOTE — TELEPHONE ENCOUNTER
Patient called the service on Sunday to inform us that she has taken her monitor off. She has had it on for 2 says and breaking out from it so she took it off. She has not had any symptoms while she was wearing it.

## 2020-10-05 NOTE — TELEPHONE ENCOUNTER
Notified pt of Veronica's recommendations. She verbalized understanding.    Thank you,    Adeola Russell RN  Triage [facility]

## 2020-10-05 NOTE — TELEPHONE ENCOUNTER
Pt's family member answered. Pt was not available. Will try her again tomorrow.    Thank you,    Adeola Russell RN  [place] [facility]

## 2020-10-05 NOTE — TELEPHONE ENCOUNTER
Please let patient know that I received her message.  I am sorry that she had a reaction from the electrode.  We will await to see what the monitor results today.

## 2020-10-12 ENCOUNTER — TELEPHONE (OUTPATIENT)
Dept: CARDIOLOGY | Facility: CLINIC | Age: 70
End: 2020-10-12

## 2020-10-13 NOTE — TELEPHONE ENCOUNTER
Called and spoke with pt. Went over results. She verbalized understanding. She said she did not have palpitations much while wearing the monitor. She has recently started exercising and feels the palpitations have gotten much better since then. She said she rarely feels them now.    Adeola Russell RN  Triage [facility]

## 2020-10-13 NOTE — TELEPHONE ENCOUNTER
Please call patient and let her know that the monitor she wore for 48 hours was normal.     1. Did she feel fluttering while wearing the monitor? If so, then everything was normal.     2. If she did not feel the palpitations/fluttering, then the monitor is inconclusive.     Please let me know. Thanks.

## 2020-10-13 NOTE — TELEPHONE ENCOUNTER
Please call patient back.  Let her know that I am glad that she is feeling better.  If she continues to have the palpitations, the next step would be a 14-day Holter monitor.  Please arrange a 6-9-month follow-up appointment with Dr. Nahum Benavides.  Thank you

## 2020-10-14 NOTE — TELEPHONE ENCOUNTER
Spoke with patient. Went over results and recommendations. Scheduled to see Dr. Benavides in May 2021. She verbalized understanding.    Thank you,    Adeola Russell, RN  Triage [facility] [place]

## 2020-10-23 ENCOUNTER — RESULTS ENCOUNTER (OUTPATIENT)
Dept: INTERNAL MEDICINE | Facility: CLINIC | Age: 70
End: 2020-10-23

## 2020-10-23 DIAGNOSIS — E89.0 POSTOPERATIVE HYPOTHYROIDISM: ICD-10-CM

## 2020-11-09 ENCOUNTER — TELEPHONE (OUTPATIENT)
Dept: INTERNAL MEDICINE | Facility: CLINIC | Age: 70
End: 2020-11-09

## 2020-11-09 NOTE — TELEPHONE ENCOUNTER
PATIENT CALLED AND STATED THAT SHE GOT A NOTICE ON HER HEALTH INSURANCE PORTAL THAT IT WAS TIME FOR HER ANNUAL PHYSICAL/CHECKUP. THE PATIENT IS CONCERNED ABOUT GOING INTO THE OFFICE RIGHT NOW, SHE PREFERS A MYCHART VISIT DUE TO CORONAVIRUS. PATIENT ISN'T SURE IF THIS IS NECESSARY OR NOT.      THE PATIENT LAST HAD A PHYSICAL 11/13/19 WITH DR. BRENNER.    THE PATIENT ALSO WANTED TO CHECK IF HER CHOLESTEROL HAD BEEN CHECKED ALONG WITH HER PREVIOUS BLOOD WORK DONE RECENTLY.     PATIENT CAN BE REACHED .[phone] FOR FURTHER INSTRUCTION

## 2020-11-09 NOTE — TELEPHONE ENCOUNTER
We cannot do physicals over the phone, I reassured her we can't see acutely sick people in person and we clean very well but she doesn't want to come in.

## 2020-11-16 ENCOUNTER — TELEPHONE (OUTPATIENT)
Dept: INTERNAL MEDICINE | Facility: CLINIC | Age: 70
End: 2020-11-16

## 2020-11-16 ENCOUNTER — OFFICE VISIT (OUTPATIENT)
Dept: INTERNAL MEDICINE | Facility: CLINIC | Age: 70
End: 2020-11-16

## 2020-11-16 VITALS
HEART RATE: 83 BPM | DIASTOLIC BLOOD PRESSURE: 100 MMHG | TEMPERATURE: 98.4 F | OXYGEN SATURATION: 98 % | BODY MASS INDEX: 31.03 KG/M2 | HEIGHT: 67 IN | RESPIRATION RATE: 16 BRPM | WEIGHT: 197.7 LBS | SYSTOLIC BLOOD PRESSURE: 192 MMHG

## 2020-11-16 DIAGNOSIS — Z00.00 HEALTHCARE MAINTENANCE: ICD-10-CM

## 2020-11-16 DIAGNOSIS — F41.9 ANXIETY: ICD-10-CM

## 2020-11-16 DIAGNOSIS — Z00.00 MEDICARE ANNUAL WELLNESS VISIT, INITIAL: Primary | ICD-10-CM

## 2020-11-16 DIAGNOSIS — I10 ESSENTIAL HYPERTENSION: ICD-10-CM

## 2020-11-16 PROCEDURE — 99213 OFFICE O/P EST LOW 20 MIN: CPT | Performed by: FAMILY MEDICINE

## 2020-11-16 PROCEDURE — G0438 PPPS, INITIAL VISIT: HCPCS | Performed by: FAMILY MEDICINE

## 2020-11-16 RX ORDER — VORTIOXETINE 5 MG/1
5 TABLET, FILM COATED ORAL
Qty: 30 TABLET | Refills: 11 | COMMUNITY
Start: 2020-11-16 | End: 2021-04-16

## 2020-11-16 NOTE — TELEPHONE ENCOUNTER
HUB: If pt calls in regarding her labs let her know I am still on hold and to leave a fax number if she is at the lab.  Thank you.

## 2020-11-16 NOTE — PROGRESS NOTES
The ABCs of the Annual Wellness Visit  Initial Medicare Wellness Visit    Chief Complaint   Patient presents with   • Medicare Wellness-Initial Visit       Subjective   History of Present Illness:  Faviola Issa is a 70 y.o. female who presents for an Initial Medicare Wellness Visit.    Patient past medical history for essential hypertension.  She currently takes lisinopril 40 mg daily.  Blood pressure at today's office visit 192/100.  On a recheck it is similar.  Patient notes that she has significant white coat's syndrome, and that her anxiety and stress levels start couple days before even coming into the office.  Patient even notes that her cardiologist has discussed with her to help her with her stress levels, however today's office visit patient is unsure whether she needs to be treated for anxiety.     HEALTH RISK ASSESSMENT    Recent Hospitalizations:  No hospitalization(s) within the last year.    Current Medical Providers:  Patient Care Team:  Roger Beard MD as PCP - General (Family Medicine)  James Romeo MD as Consulting Physician (Gastroenterology)  Ajith Leggett II, MD as Consulting Physician (Hematology and Oncology)    Smoking Status:  Social History     Tobacco Use   Smoking Status Former Smoker   • Packs/day: 1.00   • Years: 15.00   • Pack years: 15.00   • Types: Cigarettes   • Quit date: 2001   • Years since quittin.1   Smokeless Tobacco Never Used   Tobacco Comment    1ppd x20 yrs       Alcohol Consumption:  Social History     Substance and Sexual Activity   Alcohol Use No    Comment: Caffeine use: Tea 2 cups daily       Depression Screen:   PHQ-2/PHQ-9 Depression Screening 2020   Little interest or pleasure in doing things 0   Feeling down, depressed, or hopeless 0   Trouble falling or staying asleep, or sleeping too much -   Feeling tired or having little energy -   Poor appetite or overeating -   Feeling bad about yourself - or that you are a failure or have let  yourself or your family down -   Trouble concentrating on things, such as reading the newspaper or watching television -   Moving or speaking so slowly that other people could have noticed. Or the opposite - being so fidgety or restless that you have been moving around a lot more than usual -   Thoughts that you would be better off dead, or of hurting yourself in some way -   Total Score 0       Fall Risk Screen:  LESLEY Fall Risk Assessment has not been completed.    Health Habits and Functional and Cognitive Screening:  Functional & Cognitive Status 11/16/2020   Do you have difficulty preparing food and eating? No   Do you have difficulty bathing yourself, getting dressed or grooming yourself? No   Do you have difficulty using the toilet? No   Do you have difficulty moving around from place to place? No   Do you have trouble with steps or getting out of a bed or a chair? No   Current Diet Limited Junk Food   Dental Exam Up to date   Eye Exam Up to date   Exercise (times per week) 5 times per week   Current Exercise Activities Include Walking   Do you need help using the phone?  No   Are you deaf or do you have serious difficulty hearing?  No   Do you need help with transportation? No   Do you need help shopping? No   Do you need help preparing meals?  No   Do you need help with housework?  No   Do you need help with laundry? No   Do you need help taking your medications? No   Do you need help managing money? No   Do you ever drive or ride in a car without wearing a seat belt? No   Have you felt unusual stress, anger or loneliness in the last month? No   Who do you live with? Spouse   If you need help, do you have trouble finding someone available to you? No   Have you been bothered in the last four weeks by sexual problems? No   Do you have difficulty concentrating, remembering or making decisions? No         Does the patient have evidence of cognitive impairment? No    Asprin use counseling:Taking ASA  appropriately as indicated    Age-appropriate Screening Schedule:  Refer to the list below for future screening recommendations based on patient's age, sex and/or medical conditions. Orders for these recommended tests are listed in the plan section. The patient has been provided with a written plan.    Health Maintenance   Topic Date Due   • LIPID PANEL  12/16/2020   • COLONOSCOPY  05/08/2021   • MAMMOGRAM  01/13/2022   • TDAP/TD VACCINES (2 - Td) 05/09/2024   • INFLUENZA VACCINE  Completed   • ZOSTER VACCINE  Discontinued          The following portions of the patient's history were reviewed and updated as appropriate: allergies, current medications, past family history, past medical history, past social history, past surgical history and problem list.    Outpatient Medications Prior to Visit   Medication Sig Dispense Refill   • Anoro Ellipta 62.5-25 MCG/INH aerosol powder  inhaler Inhale 1 puff Daily As Needed.     • aspirin 81 MG chewable tablet Chew 81 mg Daily.     • CALCIUM CITRATE-VITAMIN D PO Take 750 tablets by mouth 2 (Two) Times a Day.     • chlorhexidine (PERIDEX) 0.12 % solution See Admin Instructions.     • Cholecalciferol (Vitamin D) 125 MCG (5000 UT) capsule capsule Take 5,000 Units by mouth Daily.     • levothyroxine sodium (TIROSINT) 100 MCG capsule Take 1 capsule by mouth Daily. 30 capsule 11   • lisinopril (PRINIVIL,ZESTRIL) 40 MG tablet Take 1 tablet by mouth Daily. 90 tablet 3   • VENTOLIN  (90 Base) MCG/ACT inhaler Inhale 2 puffs Every 4 (Four) Hours As Needed.  3     No facility-administered medications prior to visit.        Patient Active Problem List   Diagnosis   • Well female exam with routine gynecological exam   • Malignant neoplasm of sigmoid colon with mets to the lung (CMS/HCC)   • Mixed hyperlipidemia   • Colon cancer (CMS/HCC)   • Status post thyroidectomy and left partial parathyroidectomy (CMS/HCC)   • Postoperative hypothyroidism   • Voice disturbance   • Anxiety   •  "Palpitations   • Hypertension       Advanced Care Planning:  ACP discussion was held with the patient during this visit. Patient has an advance directive in EMR which is still valid.     Review of Systems   Constitutional: Negative for chills and fever.   HENT: Negative for congestion, rhinorrhea, sinus pain and sore throat.    Eyes: Negative for photophobia and visual disturbance.   Respiratory: Negative for cough, chest tightness and shortness of breath.    Cardiovascular: Negative for chest pain and palpitations.   Gastrointestinal: Negative for diarrhea, nausea and vomiting.   Genitourinary: Negative for dysuria, frequency and urgency.   Skin: Negative for rash and wound.   Neurological: Negative for dizziness and syncope.   Psychiatric/Behavioral: Negative for behavioral problems and confusion.       Compared to one year ago, the patient feels her physical health is better.  Compared to one year ago, the patient feels her mental health is better.    Reviewed chart for potential of high risk medication in the elderly: yes  Reviewed chart for potential of harmful drug interactions in the elderly:yes    Objective         Vitals:    11/16/20 1041   BP: (!) 192/100   Pulse: 83   Resp: 16   Temp: 98.4 °F (36.9 °C)   TempSrc: Infrared   SpO2: 98%   Weight: 89.7 kg (197 lb 11.2 oz)   Height: 170.2 cm (67.01\")       Body mass index is 30.96 kg/m².  Discussed the patient's BMI with her. The BMI is in the acceptable range.    Physical Exam  Vitals signs and nursing note reviewed.   Constitutional:       Appearance: She is well-developed.   HENT:      Head: Normocephalic and atraumatic.      Right Ear: External ear normal.      Left Ear: External ear normal.   Neck:      Musculoskeletal: Normal range of motion and neck supple.   Cardiovascular:      Rate and Rhythm: Normal rate and regular rhythm.      Heart sounds: Normal heart sounds.   Pulmonary:      Effort: Pulmonary effort is normal. No respiratory distress.      " Breath sounds: Normal breath sounds.   Musculoskeletal: Normal range of motion.   Lymphadenopathy:      Cervical: No cervical adenopathy.   Skin:     General: Skin is warm.   Neurological:      Mental Status: She is alert and oriented to person, place, and time.   Psychiatric:         Behavior: Behavior normal.               Assessment/Plan   Medicare Risks and Personalized Health Plan  CMS Preventative Services Quick Reference  Cardiovascular risk    The above risks/problems have been discussed with the patient.  Pertinent information has been shared with the patient in the After Visit Summary.  Follow up plans and orders are seen below in the Assessment/Plan Section.    Diagnoses and all orders for this visit:    1. Medicare annual wellness visit, initial (Primary)    2. Anxiety  -     Vortioxetine HBr (Trintellix) 5 MG tablet; Take 5 mg by mouth Daily With Breakfast.  Dispense: 30 tablet; Refill: 11  -     We will start patient on Trintellix 5 mg daily.    3. Essential hypertension        -     Patient attributes her hypertension to whitecoat syndrome.  It is elevated 192/100, on recheck was very similar.  She is currently taking lisinopril 40 mg daily.  She does state that she is very nervous currently.  I have discussed with her that maybe her anxiety is playing a role.  I would like to have her recheck in few weeks.  Patient is to  check her blood pressure regularly, if her blood pressure continue stay at an elevated level, she may need to go to the emergency room for further evaluation and treatment.  Patient understood and agreed with plan.    4. Healthcare maintenance  -     CBC & Differential  -     Comprehensive Metabolic Panel  -     Hemoglobin A1c  -     Thyroid Panel With TSH  -     Lipid Panel With LDL / HDL Ratio      Follow Up:  No follow-ups on file.     An After Visit Summary and PPPS were given to the patient.

## 2020-11-20 ENCOUNTER — RESULTS ENCOUNTER (OUTPATIENT)
Dept: INTERNAL MEDICINE | Facility: CLINIC | Age: 70
End: 2020-11-20

## 2020-11-20 DIAGNOSIS — E89.0 POSTOPERATIVE HYPOTHYROIDISM: ICD-10-CM

## 2020-12-04 ENCOUNTER — TRANSCRIBE ORDERS (OUTPATIENT)
Dept: ADMINISTRATIVE | Facility: HOSPITAL | Age: 70
End: 2020-12-04

## 2020-12-04 DIAGNOSIS — Z12.31 VISIT FOR SCREENING MAMMOGRAM: Primary | ICD-10-CM

## 2020-12-04 LAB
FT4I SERPL CALC-MCNC: 3 (ref 1.2–4.9)
T3RU NFR SERPL: 31 % (ref 24–39)
T4 SERPL-MCNC: 9.7 UG/DL (ref 4.5–12)
TSH SERPL DL<=0.005 MIU/L-ACNC: 1.29 UIU/ML (ref 0.45–4.5)

## 2021-02-15 ENCOUNTER — PATIENT MESSAGE (OUTPATIENT)
Dept: INTERNAL MEDICINE | Facility: CLINIC | Age: 71
End: 2021-02-15

## 2021-02-17 RX ORDER — EPINEPHRINE 0.3 MG/.3ML
0.3 INJECTION SUBCUTANEOUS ONCE
Qty: 1 EACH | Refills: 0 | Status: SHIPPED | OUTPATIENT
Start: 2021-02-17 | End: 2021-02-17

## 2021-02-19 ENCOUNTER — TELEPHONE (OUTPATIENT)
Dept: CARDIOLOGY | Facility: CLINIC | Age: 71
End: 2021-02-19

## 2021-02-19 ENCOUNTER — TELEPHONE (OUTPATIENT)
Dept: GASTROENTEROLOGY | Facility: CLINIC | Age: 71
End: 2021-02-19

## 2021-02-19 NOTE — TELEPHONE ENCOUNTER
----- Message from Richar Chapman sent at 2/19/2021 10:03 AM EST -----  Regarding: oa scope  Contact: [phone]  Please call to schedule oa scope. Thank you

## 2021-02-23 ENCOUNTER — TELEPHONE (OUTPATIENT)
Dept: GASTROENTEROLOGY | Facility: CLINIC | Age: 71
End: 2021-02-23

## 2021-02-23 ENCOUNTER — TELEPHONE (OUTPATIENT)
Dept: CARDIOLOGY | Facility: CLINIC | Age: 71
End: 2021-02-23

## 2021-02-23 RX ORDER — LEVOTHYROXINE SODIUM 100 MCG
100 TABLET ORAL DAILY
COMMUNITY
Start: 2021-02-12

## 2021-02-23 RX ORDER — EPINEPHRINE 0.3 MG/.3ML
INJECTION SUBCUTANEOUS
COMMUNITY
Start: 2021-02-18 | End: 2021-04-16

## 2021-02-23 RX ORDER — VALSARTAN 160 MG/1
160 TABLET ORAL DAILY
Qty: 30 TABLET | Refills: 11 | Status: SHIPPED | OUTPATIENT
Start: 2021-02-23 | End: 2021-02-24

## 2021-02-23 NOTE — TELEPHONE ENCOUNTER
Last colonoscopy 5---personal hx polyps--no fm hx colon CA or polyps--yes ASA 81 mg--no blood thinners--meds in epic    Open access history scanned under media tab

## 2021-02-23 NOTE — TELEPHONE ENCOUNTER
----- Message from Linsey Wyman MA sent at 2/23/2021  7:46 AM EST -----  Regarding: FW: Prescription Question  Contact: [phone]    ----- Message -----  From: Faviola Issa  Sent: 2/22/2021   8:52 PM EST  To: Wendi alphonse [facility]  Subject: Prescription Question                            I am on the prescription lisinipril and ihave developed a cough where as I was told to notify the DrDomenica if that was to occur. Will there be a change in medication?  Please let me know

## 2021-02-23 NOTE — TELEPHONE ENCOUNTER
----- Message from Richar Johnson sent at 2/17/2021  9:58 AM EST -----  Regarding: routine scope  Contact: [phone]  PT is calling to schedule routine .

## 2021-02-23 NOTE — TELEPHONE ENCOUNTER
I called patient to discuss. She is experiencing a dry cough and irritation in her throat for 2 months. Her BP is running 109-130/50-60s.     I have recommended stopping lisinopril.  I have recommended starting valsartan 160 mg 1 tablet daily.  She will call me with an update in 4 to 6 weeks on her blood pressure and cough.

## 2021-02-24 ENCOUNTER — TELEPHONE (OUTPATIENT)
Dept: CARDIOLOGY | Facility: CLINIC | Age: 71
End: 2021-02-24

## 2021-02-24 RX ORDER — LOSARTAN POTASSIUM 25 MG/1
25 TABLET ORAL DAILY
Qty: 30 TABLET | Refills: 1 | Status: SHIPPED | OUTPATIENT
Start: 2021-02-24 | End: 2021-04-29 | Stop reason: SDUPTHER

## 2021-02-24 NOTE — TELEPHONE ENCOUNTER
Pt called concerned about her Valsartan dosage. She states she has never been on this many mg of valsartan and is concerned with the dose and medication. She would like to speak with you regarding this.

## 2021-02-24 NOTE — TELEPHONE ENCOUNTER
"  I just spoke with patient again.  She did not explain anything about the fluttering sensation that she had on valsartan.  Were going to not take the valsartan and she will start losartan 25 mg daily.  She wants to start the lowest dosage possible because she is \"exercising\".  Currently her blood pressure is running systolic 130s/70s.  She will call with an update in a month with her blood pressure and heart rates.  "

## 2021-02-27 ENCOUNTER — PREP FOR SURGERY (OUTPATIENT)
Dept: OTHER | Facility: HOSPITAL | Age: 71
End: 2021-02-27

## 2021-02-27 DIAGNOSIS — K63.5 COLON POLYPS: Primary | ICD-10-CM

## 2021-03-08 PROBLEM — K63.5 COLON POLYPS: Status: ACTIVE | Noted: 2021-03-08

## 2021-03-18 ENCOUNTER — HOSPITAL ENCOUNTER (OUTPATIENT)
Dept: MAMMOGRAPHY | Facility: HOSPITAL | Age: 71
Discharge: HOME | End: 2021-03-18
Admitting: FAMILY MEDICINE

## 2021-03-18 DIAGNOSIS — Z12.31 VISIT FOR SCREENING MAMMOGRAM: ICD-10-CM

## 2021-03-18 PROCEDURE — 77063 BREAST TOMOSYNTHESIS BI: CPT

## 2021-03-18 PROCEDURE — 77067 SCR MAMMO BI INCL CAD: CPT

## 2021-03-19 ENCOUNTER — BULK ORDERING (OUTPATIENT)
Dept: CASE MANAGEMENT | Facility: OTHER | Age: 71
End: 2021-03-19

## 2021-03-19 DIAGNOSIS — Z23 IMMUNIZATION DUE: ICD-10-CM

## 2021-04-16 ENCOUNTER — TELEPHONE (OUTPATIENT)
Dept: CARDIOLOGY | Facility: CLINIC | Age: 71
End: 2021-04-16

## 2021-04-16 ENCOUNTER — OFFICE VISIT (OUTPATIENT)
Dept: OBSTETRICS AND GYNECOLOGY | Age: 71
End: 2021-04-16

## 2021-04-16 VITALS
WEIGHT: 197 LBS | SYSTOLIC BLOOD PRESSURE: 126 MMHG | HEIGHT: 67 IN | DIASTOLIC BLOOD PRESSURE: 78 MMHG | BODY MASS INDEX: 30.92 KG/M2

## 2021-04-16 DIAGNOSIS — C18.7 MALIGNANT NEOPLASM OF SIGMOID COLON (HCC): ICD-10-CM

## 2021-04-16 DIAGNOSIS — Z01.419 WELL FEMALE EXAM WITH ROUTINE GYNECOLOGICAL EXAM: Primary | ICD-10-CM

## 2021-04-16 DIAGNOSIS — Z13.89 SCREENING FOR BLOOD OR PROTEIN IN URINE: ICD-10-CM

## 2021-04-16 DIAGNOSIS — I10 ESSENTIAL HYPERTENSION: ICD-10-CM

## 2021-04-16 PROBLEM — C18.9 COLON CANCER (HCC): Status: RESOLVED | Noted: 2018-01-15 | Resolved: 2021-04-16

## 2021-04-16 PROBLEM — K63.5 COLON POLYPS: Status: RESOLVED | Noted: 2021-03-08 | Resolved: 2021-04-16

## 2021-04-16 PROBLEM — R49.9 VOICE DISTURBANCE: Status: RESOLVED | Noted: 2019-05-23 | Resolved: 2021-04-16

## 2021-04-16 LAB
BILIRUB BLD-MCNC: NEGATIVE MG/DL
CLARITY, POC: CLEAR
COLOR UR: YELLOW
GLUCOSE UR STRIP-MCNC: NEGATIVE MG/DL
KETONES UR QL: NEGATIVE
LEUKOCYTE EST, POC: NEGATIVE
NITRITE UR-MCNC: NEGATIVE MG/ML
PH UR: 6.5 [PH] (ref 5–8)
PROT UR STRIP-MCNC: NEGATIVE MG/DL
RBC # UR STRIP: ABNORMAL /UL
SP GR UR: 1.02 (ref 1–1.03)
UROBILINOGEN UR QL: NORMAL

## 2021-04-16 PROCEDURE — 81002 URINALYSIS NONAUTO W/O SCOPE: CPT | Performed by: OBSTETRICS & GYNECOLOGY

## 2021-04-16 PROCEDURE — G0101 CA SCREEN;PELVIC/BREAST EXAM: HCPCS | Performed by: OBSTETRICS & GYNECOLOGY

## 2021-04-16 NOTE — TELEPHONE ENCOUNTER
I just called patient to check on her. She continues to have occasional flutters. She thinks the losartan is causing her to have flutters. I recommended stopping the losartan and starting metoprolol 25 mg 1 tab twice per day. She will follow up with Dr. Benavides in July.     I asked to call me with in one month with update on BP and HR.

## 2021-04-16 NOTE — PROGRESS NOTES
Routine Annual Visit    2021    Patient: Faviola Issa          MR#:[id number]    History of Present Illness    Chief Complaint   Patient presents with   • Gynecologic Exam     hyst; last mg 3/2021       70 y.o. female  who presents for annual exam.    The patient was diagnosed with lung cancer with pulmonary metastasis.  She is responded to therapy and reports doing well.  The patient was hospitalized sometime back for leg swelling that turned out to be a deep venous thrombosis.  The patient had a pulmonary embolus as a result of the DVT and was hospitalized for a long period.  The patient is recovered and started some exercise and is feeling well    Discussed the Pap smears are no longer indicated.    Studies reviewed:  Mammo Screening Digital Tomosynthesis Bilateral With CAD (2021 08:58)      No LMP recorded (lmp unknown). Patient has had a hysterectomy.  Obstetric History:  OB History        4    Para   3    Term   3            AB   1    Living   3       SAB        TAB        Ectopic   1    Molar        Multiple        Live Births   3               Menstrual History:     No LMP recorded (lmp unknown). Patient has had a hysterectomy.       Sexual History:       ________________________________________  Patient Active Problem List   Diagnosis   • Well female exam with routine gynecological exam   • Malignant neoplasm of sigmoid colon with mets to the lung (CMS/HCC)   • Mixed hyperlipidemia   • Status post thyroidectomy and left partial parathyroidectomy (CMS/HCC)   • Postoperative hypothyroidism   • Anxiety   • Palpitations   • Hypertension     Past Medical History:   Diagnosis Date   • Arthritis    • Asthma    • Bilateral pulmonary embolism (CMS/HCC)     provoked, after surgery , took 3 mos of OAC   • Colorectal cancer (CMS/HCC)     s/p surgery (colostomy), radiation, and chemotherapy, with mets to lung s/p surgical resection   • Enlarged thyroid gland     s/p total  "thyroidectomy    • Eye exam normal    • History of prior pregnancies     x4   • Hx of radiation therapy     for colon cancer   • Hyperparathyroidism (CMS/HCC)     s/p removal of a single adenoma 2019 c/b bleeding/hematoma   • Hypertension     \"white coat syndrome\"   • 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, low-grade, treated with radiation   • PONV (postoperative nausea and vomiting)    • Post-menopausal    • Postoperative hypothyroidism 2019     Past Surgical History:   Procedure Laterality Date   • COLON SURGERY       and 2011   • COLONOSCOPY     • COLONOSCOPY N/A 2018    Procedure: COLONOSCOPY into ileum;  Surgeon: James Romeo MD;  Location: [facility] ENDOSCOPY;  Service: Gastroenterology   • HEMICOLOECTOMY W/ ANASTOMOSIS Right 2011    Adenocarcinoma of cecum   • HYSTERECTOMY     • LUNG LOBECTOMY      ANSELMO 2006 and RLL partial 2001 metastatic colon cancer    • THYROIDECTOMY Bilateral 2019    Procedure: Left PARATHYROIDECTOMY AND TOTAL THYROIDECTOMY;  Surgeon: Maxi Daly MD;  Location:  [facility];  Service: ENT   • TRACHEOSTOMY N/A 2019    Procedure: EVACUATION OF NECK  HEMATOMA;  Surgeon: Maxi Daly MD;  Location: [facility] MAIN OR;  Service: ENT     Social History     Tobacco Use   Smoking Status Former Smoker   • Packs/day: 1.00   • Years: 15.00   • Pack years: 15.00   • Types: Cigarettes   • Quit date: 2001   • Years since quittin.6   Smokeless Tobacco Never Used   Tobacco Comment    1ppd x20 yrs     Family History   Problem Relation Age of Onset   • Cancer Sister         \"FEMALE\"   • Hypertension Father    • Breast cancer Daughter 45   • Deep vein thrombosis Niece    • Malig Hyperthermia Neg Hx      Prior to Admission medications    Medication Sig Start Date End Date Taking? Authorizing Provider   Anoro Ellipta 62.5-25 MCG/INH aerosol powder  inhaler Inhale 1 puff Daily As Needed. 20  Yes Provider, MD Arlyn   losartan (Cozaar) " 25 MG tablet Take 1 tablet by mouth Daily. She doesn't want to take the valsartan 2/24/21  Yes Veronica Deutsch APRN   Synthroid 100 MCG tablet Take 100 mcg by mouth Daily. FOR 30 DAYS 2/12/21  Yes Arlyn Saleh MD   aspirin 81 MG chewable tablet Chew 81 mg Daily.  4/16/21  Arlyn Saleh MD   CALCIUM CITRATE-VITAMIN D PO Take 750 tablets by mouth 2 (Two) Times a Day.  4/16/21  Arlyn Saleh MD   chlorhexidine (PERIDEX) 0.12 % solution See Admin Instructions. 6/22/20 4/16/21  Arlyn Saleh MD   Cholecalciferol (Vitamin D) 125 MCG (5000 UT) capsule capsule Take 5,000 Units by mouth Daily. 1/30/20 4/16/21  Arlyn Saleh MD   EPINEPHrine (EPIPEN) 0.3 MG/0.3ML solution auto-injector injection INJECT 0.3 ML UNDER THE SKIN INTO THE APPROPRIATE AREA AS DIRECTED 1 TIME FOR 1 DOSE. DOSE MAY BE REPEATED IF REACTION WORSENS 2/18/21 4/16/21  Arlyn Saleh MD   levothyroxine sodium (TIROSINT) 100 MCG capsule Take 1 capsule by mouth Daily. 12/23/19 4/16/21  Roger Beard MD   VENTOLIN  (90 Base) MCG/ACT inhaler Inhale 2 puffs Every 4 (Four) Hours As Needed. 10/18/19 4/16/21  Arlyn Saleh MD   Vortioxetine HBr (Trintellix) 5 MG tablet Take 5 mg by mouth Daily With Breakfast. 11/16/20 4/16/21  Roger Beard MD     ________________________________________    Current contraception: status post hysterectomy  History of abnormal Pap smear: no  Family history of uterine or ovarian cancer: no  Family History of colon cancer/colon polyps: yes - + colon cancer  History of abnormal mammogram: no  History of abnormal lipids: no    The following portions of the patient's history were reviewed and updated as appropriate: allergies, current medications, past family history, past medical history, past social history, past surgical history and problem list.    Review of Systems    Pertinent items are noted in HPI.       Objective   Physical Exam    /78   Ht 170.2 cm  "(67\")   Wt 89.4 kg (197 lb)   LMP  (LMP Unknown)   Breastfeeding No   BMI 30.85 kg/m²    BP Readings from Last 3 Encounters:   04/16/21 126/78   11/16/20 (!) 192/100   10/02/20 150/80      Wt Readings from Last 3 Encounters:   04/16/21 89.4 kg (197 lb)   11/16/20 89.7 kg (197 lb 11.2 oz)   10/02/20 89.2 kg (196 lb 9.6 oz)        BMI: Estimated body mass index is 30.85 kg/m² as calculated from the following:    Height as of this encounter: 170.2 cm (67\").    Weight as of this encounter: 89.4 kg (197 lb).       General: alert, appears stated age and cooperative   Heart: regular rate and rhythm, S1, S2 normal, no murmur, click, rub or gallop   Lungs: clear to auscultation bilaterally   Abdomen: Ostomy in place and soft, non-tender, without masses, no organomegaly   Breast: inspection negative, no nipple discharge or bleeding, no masses or nodularity palpable   External genitalia/Vulva: moderate atrophy, External genitalia including bartholin's glands, Urethra, [place]'s gland and urethra meatus are normal, Perineum, rectum and anus appear normal  and Bladder appears normal without significant prolapse    Vagina: normal mucosa, normal discharge   Cervix: absent   Uterus: absent    Adnexa: exam limited by habitus     As part of wellness and prevention, the following topics were discussed with the patient:  Encouraged self breast exam  Physical activity and regular exercised encouraged.       Assessment:    Diagnoses and all orders for this visit:    1. Well female exam with routine gynecological exam (Primary)    2. Screening for blood or protein in urine  -     POC Urinalysis Dipstick    3. Essential hypertension    4. Malignant neoplasm of sigmoid colon with mets to the lung (CMS/HCC)        Plan:  Return in about 2 years (around 4/16/2023) for Annual GYN exam.      Salvador Mensah MD  4/16/2021 09:32 EDT  "

## 2021-04-29 RX ORDER — LOSARTAN POTASSIUM 25 MG/1
25 TABLET ORAL DAILY
Qty: 30 TABLET | Refills: 11 | Status: SHIPPED | OUTPATIENT
Start: 2021-04-29 | End: 2022-01-24 | Stop reason: SDUPTHER

## 2021-04-29 NOTE — TELEPHONE ENCOUNTER
I called patient back. She never started the metoprolol and stayed on the losartan. She fluttering sensation has improved. Her blood pressure is running 120/60-70s.    She will continue to monitor and call with any concerns. She will follow up with Dr. Benavides in July.

## 2021-04-29 NOTE — ADDENDUM NOTE
Addended by: CHANG COOPER on: 4/29/2021 04:04 PM     Modules accepted: Orders     Telephone Encounter by Pam Mahoney at 08/03/17 02:33 PM     Author:  Pam Mahoney Service:  (none) Author Type:       Filed:  08/03/17 02:34 PM Encounter Date:  6/5/2017 Status:  Signed     :  Pam Mahoney ()            Patient calling states she has been having some swelling again. Patient requesting to speak to a nurse.,[AS1.1M] Call connected to[AS1.1T] CALL CENTER RN[AS1.1M]   Routed to[AS1.1T] CALL CENTER RN[AS1.1M] pool[AS1.1T]        Revision History        User Key Date/Time User Provider Type Action    > AS1.1 08/03/17 02:34 PM Pam Mahoney  Sign    M - Manual, T - Template

## 2021-07-19 ENCOUNTER — OFFICE VISIT (OUTPATIENT)
Dept: CARDIOLOGY | Facility: CLINIC | Age: 71
End: 2021-07-19

## 2021-07-19 VITALS
BODY MASS INDEX: 31.23 KG/M2 | HEIGHT: 67 IN | WEIGHT: 199 LBS | HEART RATE: 61 BPM | SYSTOLIC BLOOD PRESSURE: 120 MMHG | DIASTOLIC BLOOD PRESSURE: 75 MMHG

## 2021-07-19 DIAGNOSIS — I10 ESSENTIAL HYPERTENSION: Primary | ICD-10-CM

## 2021-07-19 DIAGNOSIS — R00.2 PALPITATIONS: ICD-10-CM

## 2021-07-19 PROCEDURE — 93000 ELECTROCARDIOGRAM COMPLETE: CPT | Performed by: INTERNAL MEDICINE

## 2021-07-19 PROCEDURE — 99213 OFFICE O/P EST LOW 20 MIN: CPT | Performed by: INTERNAL MEDICINE

## 2021-07-19 RX ORDER — ALBUTEROL SULFATE 90 UG/1
2 AEROSOL, METERED RESPIRATORY (INHALATION) EVERY 4 HOURS PRN
COMMUNITY
Start: 2021-05-10 | End: 2022-09-29

## 2021-07-19 RX ORDER — TOBRAMYCIN AND DEXAMETHASONE 3; 1 MG/ML; MG/ML
SUSPENSION/ DROPS OPHTHALMIC
COMMUNITY
Start: 2021-05-03 | End: 2021-07-19

## 2021-07-19 NOTE — PROGRESS NOTES
"Date of Office Visit: 21  Encounter Provider: Nahum Benavides MD  Place of Service: [facility] CARDIOLOGY  Patient Name: Faviola Issa  :1950    Chief Complaint   Patient presents with   • Palpitations   :   HPI:     Ms. Issa is 71 y.o. and presents today to follow up. I have reviewed prior notes and there are no changes except for any new updates described below. I have also reviewed any information entered into the medical record by the patient or by ancillary staff.     She has a history of hypertension and white coat hypertension. She has a history of palpitations.  She had a provoked PE in 2019 after a prolonged recovery from thyroid/parathyroid surgery, which was complicated by a neck hematoma.      In , she reportedly had an \"abnormal EKG\" and was experiencing palpitations. Her EKG showed nonspecific ST flattening. An echo was normal, as was a 24 hour Holter. A 14-day monitor was ordered, but she could only tolerate the adhesive for two days; it was a normal study during that time.       Her SBP is well controlled at home.  She has occasional isolated palpitations but nothing sustained. She denies chest pain. She has chronic stable dyspnea. She denies leg swelling or syncope.    Past Medical History:   Diagnosis Date   • Arthritis    • Asthma    • Bilateral pulmonary embolism (CMS/HCC)     provoked, after surgery , took 3 mos of OAC   • Colorectal cancer (CMS/HCC)     s/p surgery (colostomy), radiation, and chemotherapy, with mets to lung s/p surgical resection   • Enlarged thyroid gland     s/p total thyroidectomy    • Eye exam normal    • History of prior pregnancies     x4   • Hx of radiation therapy     for colon cancer   • Hyperparathyroidism (CMS/HCC)     s/p removal of a single adenoma 2019 c/b bleeding/hematoma   • Hypertension     \"white coat syndrome\"   • Lymphoma (CMS/HCC)     Eyelid, low-grade, treated with radiation   • PONV " "(postoperative nausea and vomiting)    • Post-menopausal    • Postoperative hypothyroidism 2019       Past Surgical History:   Procedure Laterality Date   • COLON SURGERY       and 2011   • COLONOSCOPY  2016   • COLONOSCOPY N/A 2018    Procedure: COLONOSCOPY into ileum;  Surgeon: James Romeo MD;  Location: [facility] ENDOSCOPY;  Service: Gastroenterology   • HEMICOLOECTOMY W/ ANASTOMOSIS Right 2011    Adenocarcinoma of cecum   • HYSTERECTOMY     • LUNG LOBECTOMY      ANSELMO 2006 and RLL partial 2001 metastatic colon cancer    • THYROIDECTOMY Bilateral 2019    Procedure: Left PARATHYROIDECTOMY AND TOTAL THYROIDECTOMY;  Surgeon: Maxi Daly MD;  Location: [facility] OR [facility];  Service: ENT   • TRACHEOSTOMY N/A 2019    Procedure: EVACUATION OF NECK  HEMATOMA;  Surgeon: Maxi Daly MD;  Location: [facility] MAIN OR;  Service: ENT       Social History     Socioeconomic History   • Marital status:      Spouse name: KEN   • Number of children: 3   • Years of education: College   • Highest education level: Not on file   Tobacco Use   • Smoking status: Former Smoker     Packs/day: 1.00     Years: 15.00     Pack years: 15.00     Types: Cigarettes     Quit date: 2001     Years since quittin.8   • Smokeless tobacco: Never Used   • Tobacco comment: 1ppd x20 yrs   Vaping Use   • Vaping Use: Never used   Substance and Sexual Activity   • Alcohol use: No     Comment: Caffeine use: Tea 2 cups daily   • Drug use: No   • Sexual activity: Yes     Partners: Male     Birth control/protection: Post-menopausal, Surgical       Family History   Problem Relation Age of Onset   • Cancer Sister         \"FEMALE\"   • Hypertension Father    • Breast cancer Daughter 45   • Deep vein thrombosis Niece    • Malig Hyperthermia Neg Hx        Review of Systems   Constitutional: Positive for malaise/fatigue and weight gain.   Cardiovascular: Positive for palpitations.   Respiratory: Positive for " "shortness of breath and wheezing.    Musculoskeletal: Positive for joint pain.   All other systems reviewed and are negative.      Allergies   Allergen Reactions   • Adhesive Tape Hives and Itching     BANDAIDS   • Amoxicillin Hives and Itching   • Latex Other (See Comments)     Lips and nose swelled   • Red Dye Nausea And Vomiting   • Shellfish-Derived Products Shortness Of Breath   • Pennsaid [Diclofenac Sodium] Dizziness   • Simvastatin Other (See Comments)     Joint pain   • Lactose Intolerance (Gi) Nausea And Vomiting         Current Outpatient Medications:   •  Anoro Ellipta 62.5-25 MCG/INH aerosol powder  inhaler, Inhale 1 puff Daily As Needed., Disp: , Rfl:   •  losartan (Cozaar) 25 MG tablet, Take 1 tablet by mouth Daily., Disp: 30 tablet, Rfl: 11  •  polyethyl glycol-propyl glycol (SYSTANE) 0.4-0.3 % solution ophthalmic solution, Daily., Disp: , Rfl:   •  Synthroid 100 MCG tablet, Take 100 mcg by mouth Daily. FOR 30 DAYS, Disp: , Rfl:   •  Ventolin  (90 Base) MCG/ACT inhaler, Inhale 2 puffs Every 4 (Four) Hours As Needed., Disp: , Rfl:       Objective:     Vitals:    07/19/21 1448 07/19/21 1521   BP: 136/78 120/75   BP Location: Left arm    Pulse: 61    Weight: 90.3 kg (199 lb)    Height: 170.2 cm (67\")      Body mass index is 31.17 kg/m².    Vitals reviewed.   Constitutional:       Appearance: Healthy appearance. Well-developed and not in distress.   Eyes:      Conjunctiva/sclera: Conjunctivae normal.   HENT:      Head: Normocephalic.      Nose: Nose normal.         Comments: masked  Neck:      Vascular: No JVD. JVD normal.      Lymphadenopathy: No cervical adenopathy.   Pulmonary:      Effort: Pulmonary effort is normal.      Breath sounds: Normal breath sounds.   Cardiovascular:      Normal rate. Regular rhythm.      Murmurs: There is no murmur.   Pulses:     Intact distal pulses.   Edema:     Peripheral edema absent.   Abdominal:      Palpations: Abdomen is soft.      Tenderness: There is no " abdominal tenderness.   Musculoskeletal: Normal range of motion.      Cervical back: Normal range of motion. Skin:     General: Skin is warm and dry.      Findings: No rash.   Neurological:      General: No focal deficit present.      Mental Status: Alert, oriented to person, place, and time and oriented to person, place and time.      Cranial Nerves: No cranial nerve deficit.   Psychiatric:         Behavior: Behavior normal.         Thought Content: Thought content normal.         Judgment: Judgment normal.           ECG 12 Lead    Date/Time: 7/19/2021 3:07 PM  Performed by: Nahum Benavides MD  Authorized by: Nahum Benavides MD   Comparison: compared with previous ECG   Similar to previous ECG  Rhythm: sinus rhythm  Conduction: conduction normal  ST Flattening: III, II and aVF  T Waves: T waves normal  QRS axis: normal  Other: no other findings    Clinical impression: non-specific ECG              Assessment:       Diagnosis Plan   1. Essential hypertension  ECG 12 Lead   2. Palpitations            Plan:       Her BP is well controlled on losartan. Her palpitations are mild and infrequent and do not require further workup. She had a normal echo.     We'll see her back in six months.     Sincerely,       Nahum Benavides MD

## 2021-07-26 PROBLEM — K63.5 COLON POLYPS: Status: ACTIVE | Noted: 2021-03-08

## 2021-09-01 ENCOUNTER — TELEPHONE (OUTPATIENT)
Dept: GASTROENTEROLOGY | Facility: CLINIC | Age: 71
End: 2021-09-01

## 2021-10-07 ENCOUNTER — TELEPHONE (OUTPATIENT)
Dept: GASTROENTEROLOGY | Facility: CLINIC | Age: 71
End: 2021-10-07

## 2021-10-08 NOTE — TELEPHONE ENCOUNTER
Left message for patient that Agueda has no availability for 2021. Advised her to call in late November to schedule for next year.

## 2021-11-24 ENCOUNTER — PRE-PROCEDURE SCREENING (OUTPATIENT)
Dept: GASTROENTEROLOGY | Facility: CLINIC | Age: 71
End: 2021-11-24

## 2021-11-29 ENCOUNTER — TELEPHONE (OUTPATIENT)
Dept: GASTROENTEROLOGY | Facility: CLINIC | Age: 71
End: 2021-11-29

## 2021-11-29 NOTE — TELEPHONE ENCOUNTER
Good Afternoon,    Patient called trying to reschedule her procedure with Dr. Romeo. Can you call her and schedule this procedure.

## 2022-01-24 ENCOUNTER — OFFICE VISIT (OUTPATIENT)
Dept: CARDIOLOGY | Facility: CLINIC | Age: 72
End: 2022-01-24

## 2022-01-24 VITALS
DIASTOLIC BLOOD PRESSURE: 80 MMHG | BODY MASS INDEX: 30.92 KG/M2 | WEIGHT: 197 LBS | SYSTOLIC BLOOD PRESSURE: 170 MMHG | HEART RATE: 63 BPM | HEIGHT: 67 IN

## 2022-01-24 DIAGNOSIS — R00.2 PALPITATIONS: ICD-10-CM

## 2022-01-24 DIAGNOSIS — I10 WHITE COAT SYNDROME WITH DIAGNOSIS OF HYPERTENSION: Primary | ICD-10-CM

## 2022-01-24 PROCEDURE — 99214 OFFICE O/P EST MOD 30 MIN: CPT | Performed by: NURSE PRACTITIONER

## 2022-01-24 PROCEDURE — 93000 ELECTROCARDIOGRAM COMPLETE: CPT | Performed by: NURSE PRACTITIONER

## 2022-01-24 RX ORDER — ASPIRIN 81 MG/1
81 TABLET ORAL NIGHTLY
COMMUNITY
End: 2022-11-29 | Stop reason: HOSPADM

## 2022-01-24 RX ORDER — ERGOCALCIFEROL 1.25 MG/1
50000 CAPSULE ORAL WEEKLY
COMMUNITY
Start: 2022-01-10

## 2022-01-24 RX ORDER — LOSARTAN POTASSIUM 25 MG/1
25 TABLET ORAL DAILY
Qty: 90 TABLET | Refills: 3 | Status: SHIPPED | OUTPATIENT
Start: 2022-01-24 | End: 2022-04-27 | Stop reason: SDUPTHER

## 2022-01-24 RX ORDER — TIOTROPIUM BROMIDE AND OLODATEROL 3.124; 2.736 UG/1; UG/1
2 SPRAY, METERED RESPIRATORY (INHALATION) DAILY
COMMUNITY
Start: 2022-01-19

## 2022-01-24 RX ORDER — AZELAIC ACID 0.15 G/G
GEL TOPICAL
COMMUNITY
Start: 2022-01-13 | End: 2022-11-01 | Stop reason: ALTCHOICE

## 2022-01-24 NOTE — PROGRESS NOTES
Date of Office Visit: 2022  Encounter Provider: IGGY Andersen  Place of Service: [facility] CARDIOLOGY  Patient Name: Faviola Issa  :1950  Primary Cardiologist: Dr. Nahum Benavides     Chief Complaint   Patient presents with   • Hypertension   • Palpitations   • Follow-up       HPI: Faviola Issa is a pleasant 71 y.o. female who presents today for follow-up on palpitations and hypertension.  I have reviewed her medical records.    She has a known history of hypertension.  She has reported that she feels that she has whitecoat syndrome as her blood pressures have been well controlled at home in the past.  At one point she was on lisinopril which controlled her blood pressure well, but she was changed to valsartan.  In May 2019, she underwent parathyroid surgery.    In 2020, she was seen in our [facility] for evaluation of palpitations.  Blood work was unremarkable.  Echocardiogram showed normal LVEF and trace to mild mitral regurgitation.  Holter monitor showed normal sinus rhythm with an average heart rate of 73 bpm and rare ectopy.  She did not have palpitations while wearing the monitor.  Her valsartan was discontinued and she was changed to lisinopril.  Despite stopping the valsartan, she continued to have some anxiety and palpitations at times.  She was eventually changed to losartan.  In 2021, she followed up with Dr. Nahum Benavides and was doing well at that time.    Today is her annual follow-up visit.  Her blood pressure is elevated today and she says is running 130s/70s at home.  She denies chest pain, shortness of air, edema, dizziness, syncope, or bleeding.  On a rare occasion she will experience a brief fluttering sensation for a few seconds.      Past Medical History:   Diagnosis Date   • Arthritis    • Asthma    • Bilateral pulmonary embolism (HCC)     provoked, after surgery , took 3 mos of OAC   • Colorectal cancer (HCC)     s/p surgery  "(colostomy), radiation, and chemotherapy, with mets to lung s/p surgical resection   • Enlarged thyroid gland     s/p total thyroidectomy    • Eye exam normal    • History of prior pregnancies     x4   • Hx of radiation therapy     for colon cancer   • Hyperparathyroidism (HCC)     s/p removal of a single adenoma 2019 c/b bleeding/hematoma   • Hypertension     \"white coat syndrome\"   • 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, low-grade, treated with radiation   • PONV (postoperative nausea and vomiting)    • Post-menopausal    • Postoperative hypothyroidism 2019       Past Surgical History:   Procedure Laterality Date   • COLON SURGERY       and 2011   • COLONOSCOPY     • COLONOSCOPY N/A 2018    Procedure: COLONOSCOPY into ileum;  Surgeon: James Romeo MD;  Location: [facility] ENDOSCOPY;  Service: Gastroenterology   • HEMICOLOECTOMY W/ ANASTOMOSIS Right 2011    Adenocarcinoma of cecum   • HYSTERECTOMY     • LUNG LOBECTOMY      ANSELMO 2006 and RLL partial 2001 metastatic colon cancer    • THYROIDECTOMY Bilateral 2019    Procedure: Left PARATHYROIDECTOMY AND TOTAL THYROIDECTOMY;  Surgeon: Maxi Daly MD;  Location: [facility] OR [facility];  Service: ENT   • TRACHEOSTOMY N/A 2019    Procedure: EVACUATION OF NECK  HEMATOMA;  Surgeon: Maxi Daly MD;  Location: [facility] MAIN OR;  Service: ENT       Social History     Socioeconomic History   • Marital status:      Spouse name: KEN   • Number of children: 3   • Years of education: College   Tobacco Use   • Smoking status: Former Smoker     Packs/day: 1.00     Years: 15.00     Pack years: 15.00     Types: Cigarettes     Start date: 1978     Quit date: 2001     Years since quittin.4   • Smokeless tobacco: Never Used   • Tobacco comment: 1ppd x20 yrs   Vaping Use   • Vaping Use: Never used   Substance and Sexual Activity   • Alcohol use: No     Comment: Caffeine use: Tea 2 cups daily   • Drug use: No   • Sexual " "activity: Yes     Partners: Male     Birth control/protection: Post-menopausal, Surgical       Family History   Problem Relation Age of Onset   • Cancer Sister         \"FEMALE\"   • Hypertension Father    • Breast cancer Daughter 45   • Deep vein thrombosis Niece    • Malig Hyperthermia Neg Hx        The following portion of the patient's history were reviewed and updated as appropriate: past medical history, past surgical history, past social history, past family history, allergies, current medications, and problem list.    Review of Systems   Constitutional: Negative for chills, diaphoresis, fever, malaise/fatigue, night sweats, weight gain and weight loss.   HENT: Negative for hearing loss, nosebleeds, sore throat and tinnitus.    Eyes: Negative for blurred vision, double vision, pain and visual disturbance.   Cardiovascular: Positive for palpitations. Negative for chest pain, claudication, cyanosis, dyspnea on exertion, irregular heartbeat, leg swelling, near-syncope, orthopnea, paroxysmal nocturnal dyspnea and syncope.   Respiratory: Negative for cough, hemoptysis, snoring and wheezing.    Endocrine: Negative for cold intolerance, heat intolerance and polyuria.   Hematologic/Lymphatic: Negative for bleeding problem. Does not bruise/bleed easily.   Skin: Negative for color change, dry skin, flushing and itching.   Musculoskeletal: Negative for falls, joint pain, joint swelling, muscle cramps, muscle weakness and myalgias.   Gastrointestinal: Negative for abdominal pain, constipation, heartburn, melena, nausea and vomiting.   Genitourinary: Negative for dysuria and hematuria.   Neurological: Negative for excessive daytime sleepiness, dizziness, light-headedness, loss of balance, numbness, paresthesias, seizures and vertigo.   Psychiatric/Behavioral: Negative for altered mental status, depression, memory loss and substance abuse. The patient is nervous/anxious. The patient does not have insomnia.  " "  Allergic/Immunologic: Negative for environmental allergies.       Allergies   Allergen Reactions   • Adhesive Tape Hives and Itching     BANDAIDS   • Amoxicillin Hives and Itching   • Latex Other (See Comments)     Lips and nose swelled   • Red Dye Nausea And Vomiting   • Shellfish-Derived Products Shortness Of Breath   • Pennsaid [Diclofenac Sodium] Dizziness   • Simvastatin Other (See Comments)     Joint pain   • Lactose Intolerance (Gi) Nausea And Vomiting         Current Outpatient Medications:   •  aspirin (ASPIR) 81 MG EC tablet, , Disp: , Rfl:   •  azelaic acid (AZELEX) 15 % gel, APPLY TO AFFECTED AREA OF FACE TWICE DAILY, Disp: , Rfl:   •  losartan (Cozaar) 25 MG tablet, Take 1 tablet by mouth Daily., Disp: 90 tablet, Rfl: 3  •  polyethyl glycol-propyl glycol (SYSTANE) 0.4-0.3 % solution ophthalmic solution, Daily., Disp: , Rfl:   •  Stiolto Respimat 2.5-2.5 MCG/ACT aerosol solution inhaler, Inhale 2 puffs Daily., Disp: , Rfl:   •  Synthroid 100 MCG tablet, Take 100 mcg by mouth Daily. FOR 30 DAYS, Disp: , Rfl:   •  Ventolin  (90 Base) MCG/ACT inhaler, Inhale 2 puffs Every 4 (Four) Hours As Needed., Disp: , Rfl:   •  vitamin D (ERGOCALCIFEROL) 1.25 MG (35301 UT) capsule capsule, Take 50,000 Units by mouth 1 (One) Time Per Week., Disp: , Rfl:   •  Anoro Ellipta 62.5-25 MCG/INH aerosol powder  inhaler, Inhale 1 puff Daily As Needed., Disp: , Rfl:         Objective:     Vitals:    01/24/22 1053 01/24/22 1108 01/24/22 1140   BP: 164/72 180/88 170/80   BP Location: Left arm Right arm Left arm   Patient Position:   Sitting   Pulse: 63     Weight: 89.4 kg (197 lb)     Height: 170.2 cm (67\")       Body mass index is 30.85 kg/m².    PHYSICAL EXAM:    Vitals Reviewed.   General Appearance: No acute distress, well developed and well nourished. Obese.   Eyes: Conjunctiva and lids: No erythema, swelling, or discharge. Sclera non-icteric.   HENT: Atraumatic, normocephalic. External eyes, ears, and nose normal. No " hearing loss noted. Mucous membranes normal. Lips not cyanotic. Neck supple with no tenderness.  Wearing a mask  Respiratory: No signs of respiratory distress. Respiration rhythm and depth normal.   Clear to auscultation. No rales, crackles, rhonchi, or wheezing auscultated.   Cardiovascular:  Jugular Venous Pressure: Normal  Heart Rate and Rhythm: Normal, Heart Sounds: Normal S1 and S2. No S3 or S4 noted.  Murmurs: No murmurs noted. No rubs, thrills, or gallops.   Lower Extremities: No edema noted.  Gastrointestinal:  Abdomen soft, non-distended, non-tender. Normal bowel sounds.    Musculoskeletal: Normal movement of extremities  Skin and Nails: General appearance normal. No pallor, cyanosis, diaphoresis. Skin temperature normal. No clubbing of fingernails.   Psychiatric: Patient alert and oriented to person, place, and time. Speech and behavior appropriate. Normal mood and affect.       ECG 12 Lead    Date/Time: 1/24/2022 10:54 AM  Performed by: Veronica Deutsch APRN  Authorized by: Veronica Deutsch APRN   Comparison: compared with previous ECG from 7/19/2021  Similar to previous ECG  Rhythm: sinus rhythm  Rate: normal  Conduction: conduction normal  ST Segments: ST segments normal  T Waves: T waves normal  QRS axis: normal  Other: no other findings    Clinical impression: normal ECG              Assessment:       Diagnosis Plan   1. White coat syndrome with diagnosis of hypertension  Basic Metabolic Panel    ECG 12 Lead   2. Palpitations  Basic Metabolic Panel    ECG 12 Lead          Plan:       1.  Hypertension: Blood pressure quite elevated today.  She feels that she has whitecoat syndrome and her blood pressure is running 130s/70s at home.  She will continue on losartan and have a repeat BMP in the near future.  She would benefit from a low-sodium diet and regular exercise.    2.  Palpitations: On a rare occasion she will experience a brief fluttering sensation just for a few seconds.  She wore a Holter  monitor in the past which was normal.    3.  I recommended a 1 year follow-up visit with Dr. Nahum Benavides.    As always, it has been a pleasure to participate in your patient's care. Thank you.       Sincerely,       IGGY Fine  [facility] Cardiology      · COVID-19 Precautions - Patient was compliant in wearing a mask. When I saw the patient, I used appropriate personal protective equipment (PPE) including mask (standard procedure).  Additionally, I used gown, gloves, and eye shield if indicated.  Hand hygiene was completed before and after seeing the patient.  · Dictated utilizing Dragon Dictation

## 2022-01-27 ENCOUNTER — TRANSCRIBE ORDERS (OUTPATIENT)
Dept: ADMINISTRATIVE | Facility: HOSPITAL | Age: 72
End: 2022-01-27

## 2022-01-27 DIAGNOSIS — Z12.31 SCREENING MAMMOGRAM FOR BREAST CANCER: Primary | ICD-10-CM

## 2022-02-03 LAB
BUN SERPL-MCNC: 10 MG/DL (ref 8–27)
BUN/CREAT SERPL: 13 (ref 12–28)
CALCIUM SERPL-MCNC: 9.3 MG/DL (ref 8.7–10.3)
CHLORIDE SERPL-SCNC: 100 MMOL/L (ref 96–106)
CO2 SERPL-SCNC: 24 MMOL/L (ref 20–29)
CREAT SERPL-MCNC: 0.78 MG/DL (ref 0.57–1)
GLUCOSE SERPL-MCNC: 85 MG/DL (ref 65–99)
POTASSIUM SERPL-SCNC: 4.5 MMOL/L (ref 3.5–5.2)
SODIUM SERPL-SCNC: 139 MMOL/L (ref 134–144)

## 2022-02-15 ENCOUNTER — TELEPHONE (OUTPATIENT)
Dept: CARDIOLOGY | Facility: CLINIC | Age: 72
End: 2022-02-15

## 2022-02-16 NOTE — TELEPHONE ENCOUNTER
Please call patient.  I received her BMP collected on 2/2/2022.  BMP was normal.  Continue current medication regimen.  Please have her call with any concerns.  Thank you.

## 2022-02-16 NOTE — TELEPHONE ENCOUNTER
Notified patient of results. She verbalized understanding.    Adeola Russell, RN  Triage [facility]

## 2022-03-04 ENCOUNTER — ANESTHESIA EVENT (OUTPATIENT)
Dept: GASTROENTEROLOGY | Facility: HOSPITAL | Age: 72
End: 2022-03-04

## 2022-03-04 ENCOUNTER — ANESTHESIA (OUTPATIENT)
Dept: GASTROENTEROLOGY | Facility: HOSPITAL | Age: 72
End: 2022-03-04

## 2022-03-04 ENCOUNTER — HOSPITAL ENCOUNTER (OUTPATIENT)
Facility: HOSPITAL | Age: 72
Setting detail: HOSPITAL OUTPATIENT SURGERY
Discharge: HOME | End: 2022-03-04
Attending: INTERNAL MEDICINE | Admitting: INTERNAL MEDICINE

## 2022-03-04 VITALS
DIASTOLIC BLOOD PRESSURE: 72 MMHG | HEIGHT: 66 IN | SYSTOLIC BLOOD PRESSURE: 163 MMHG | TEMPERATURE: 97.9 F | WEIGHT: 195 LBS | RESPIRATION RATE: 16 BRPM | BODY MASS INDEX: 31.34 KG/M2 | HEART RATE: 66 BPM | OXYGEN SATURATION: 98 %

## 2022-03-04 PROCEDURE — 44388 COLONOSCOPY THRU STOMA SPX: CPT | Performed by: INTERNAL MEDICINE

## 2022-03-04 PROCEDURE — 25010000002 PROPOFOL 10 MG/ML EMULSION: Performed by: ANESTHESIOLOGY

## 2022-03-04 RX ORDER — SODIUM CHLORIDE 0.9 % (FLUSH) 0.9 %
10 SYRINGE (ML) INJECTION AS NEEDED
Status: DISCONTINUED | OUTPATIENT
Start: 2022-03-04 | End: 2022-03-04 | Stop reason: HOSPADM

## 2022-03-04 RX ORDER — SODIUM CHLORIDE, SODIUM LACTATE, POTASSIUM CHLORIDE, CALCIUM CHLORIDE 600; 310; 30; 20 MG/100ML; MG/100ML; MG/100ML; MG/100ML
1000 INJECTION, SOLUTION INTRAVENOUS CONTINUOUS
Status: DISCONTINUED | OUTPATIENT
Start: 2022-03-04 | End: 2022-03-04 | Stop reason: HOSPADM

## 2022-03-04 RX ORDER — PROPOFOL 10 MG/ML
VIAL (ML) INTRAVENOUS AS NEEDED
Status: DISCONTINUED | OUTPATIENT
Start: 2022-03-04 | End: 2022-03-04 | Stop reason: SURG

## 2022-03-04 RX ORDER — LIDOCAINE HYDROCHLORIDE 20 MG/ML
INJECTION, SOLUTION INFILTRATION; PERINEURAL AS NEEDED
Status: DISCONTINUED | OUTPATIENT
Start: 2022-03-04 | End: 2022-03-04 | Stop reason: SURG

## 2022-03-04 RX ADMIN — PROPOFOL 120 MCG/KG/MIN: 10 INJECTION, EMULSION INTRAVENOUS at 08:31

## 2022-03-04 RX ADMIN — SODIUM CHLORIDE, POTASSIUM CHLORIDE, SODIUM LACTATE AND CALCIUM CHLORIDE 1000 ML: 600; 310; 30; 20 INJECTION, SOLUTION INTRAVENOUS at 08:02

## 2022-03-04 RX ADMIN — PROPOFOL 50 MG: 10 INJECTION, EMULSION INTRAVENOUS at 08:30

## 2022-03-04 RX ADMIN — LIDOCAINE HYDROCHLORIDE 60 MG: 20 INJECTION, SOLUTION INFILTRATION; PERINEURAL at 08:29

## 2022-03-04 NOTE — DISCHARGE INSTRUCTIONS
For the next 24 hours patient needs to be with a responsible adult.    For 24 hours DO NOT drive, operate machinery, appliances, drink alcohol, make important decisions or sign legal documents.    Start with a light or bland diet if you are feeling sick to your stomach otherwise advance to regular diet as tolerated.    Follow recommendations on procedure report if provided by your doctor.    Call Dr PATEL* for problems [phone]    Problems may include but not limited to: large amounts of bleeding, trouble breathing, repeated vomiting, severe unrelieved pain, fever or chills.

## 2022-03-04 NOTE — ANESTHESIA PREPROCEDURE EVALUATION
Anesthesia Evaluation     Patient summary reviewed and Nursing notes reviewed   NPO Solid Status: > 8 hours  NPO Liquid Status: > 2 hours           Airway   Mallampati: II  TM distance: >3 FB  Neck ROM: full  Dental - normal exam     Pulmonary - normal exam   (+) pulmonary embolism, a smoker Former, asthma,  Cardiovascular - normal exam    (+) hypertension, hyperlipidemia,       Neuro/Psych  (+) psychiatric history Anxiety,    GI/Hepatic/Renal/Endo    (+)   thyroid problem hypothyroidism    Musculoskeletal     Abdominal    Substance History - negative use     OB/GYN negative ob/gyn ROS         Other   arthritis,    history of cancer                    Anesthesia Plan    ASA 3     MAC       Anesthetic plan, all risks, benefits, and alternatives have been provided, discussed and informed consent has been obtained with: patient.        CODE STATUS:

## 2022-03-04 NOTE — ANESTHESIA POSTPROCEDURE EVALUATION
"Patient: Faviola Issa    Procedure Summary     Date: 03/04/22 [facility] / [facility]:  [facility]    Anesthesia Start: 0824 Anesthesia Stop: 0849    Procedure: COLONOSCOPY to anastomosis (N/A ) Diagnosis:       Colon polyps      (Colon polyps [K63.5])    Surgeons: James Romeo MD Provider: Magdiel Al MD    Anesthesia Type: MAC ASA Status: 3          Anesthesia Type: MAC    Vitals  Vitals Value Taken Time   /70 03/04/22 0848   Temp     Pulse 72 03/04/22 0848   Resp 16 03/04/22 0848   SpO2 99 % 03/04/22 0848           Post Anesthesia Care and Evaluation    Patient location during evaluation: bedside  Patient participation: complete - patient participated  Level of consciousness: awake  Pain score: 2  Pain management: adequate  Airway patency: patent  Anesthetic complications: No anesthetic complications  PONV Status: none  Cardiovascular status: acceptable  Respiratory status: acceptable  Hydration status: acceptable    Comments: /70 (BP Location: Left arm, Patient Position: Lying)   Pulse 72   Temp 36.6 °C (97.9 °F) (Oral)   Resp 16   Ht 167.6 cm (66\")   Wt 88.5 kg (195 lb)   SpO2 99%   BMI 31.47 kg/m²         "

## 2022-03-04 NOTE — H&P
"[facility]  Pre Procedure History & Physical    Chief Complaint:   Time for my colonoscopy    Subjective     HPI:   71 y.o. female with a h/o rectal cancer with abdominalperoneal resection and colostomy placement in 1999. In 11/2011 she had surgery to resect a cecal cancer. She has had two surgeries for bilateral lung cancers. She last had a colonoscopy thru her LLQ abdominal colostomy in 5/18.     Past Medical History:   Past Medical History:   Diagnosis Date   • Arthritis    • Asthma    • Bilateral pulmonary embolism (HCC)     provoked, after surgery 2019, took 3 mos of OAC   • Colorectal cancer (HCC)     s/p surgery (colostomy), radiation, and chemotherapy, with mets to lung s/p surgical resection   • Enlarged thyroid gland     s/p total thyroidectomy 2019   • Eye exam normal 2013   • History of prior pregnancies     x4   • Hx of radiation therapy 2000    for colon cancer   • Hyperparathyroidism (HCC)     s/p removal of a single adenoma 5/2019 c/b bleeding/hematoma   • Hypertension     \"white coat syndrome\"   • 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 Her smoking use included cigarettes. She started smoking about 43 years ago. She has a 15.00 pack-year smoking history. She has never used smokeless tobacco. She reports that she does not drink alcohol and does not use drugs.    Medications:   Medications Prior to Admission   Medication Sig Dispense Refill Last Dose   • aspirin (ASPIR) 81 MG EC tablet    Past Week at Unknown time   • azelaic acid (AZELEX) 15 % gel " "APPLY TO AFFECTED AREA OF FACE TWICE DAILY   Past Week at Unknown time   • losartan (Cozaar) 25 MG tablet Take 1 tablet by mouth Daily. 90 tablet 3 Past Week at Unknown time   • polyethyl glycol-propyl glycol (SYSTANE) 0.4-0.3 % solution ophthalmic solution Daily.   3/3/2022 at Unknown time   • Stiolto Respimat 2.5-2.5 MCG/ACT aerosol solution inhaler Inhale 2 puffs Daily.   3/4/2022 at Unknown time   • Synthroid 100 MCG tablet Take 100 mcg by mouth Daily. FOR 30 DAYS   3/4/2022 at 0600   • Ventolin  (90 Base) MCG/ACT inhaler Inhale 2 puffs Every 4 (Four) Hours As Needed.   Past Week at Unknown time   • vitamin D (ERGOCALCIFEROL) 1.25 MG (59590 UT) capsule capsule Take 50,000 Units by mouth 1 (One) Time Per Week.   Past Week at Unknown time   • Anoro Ellipta 62.5-25 MCG/INH aerosol powder  inhaler Inhale 1 puff Daily As Needed.          Allergies:  Adhesive tape, Amoxicillin, Latex, Red dye, Shellfish-derived products, Pennsaid [diclofenac sodium], Prednisone, Simvastatin, and Lactose intolerance (gi)    ROS:    Pertinent items are noted in HPI     Objective     Blood pressure 169/95, pulse 68, temperature 97.9 °F (36.6 °C), temperature source Oral, resp. rate 19, height 167.6 cm (66\"), weight 88.5 kg (195 lb), SpO2 98 %, not currently breastfeeding.    Physical Exam   Constitutional: Pt is oriented to person, place, and time and well-developed, well-nourished, and in no distress.   HENT:   Mouth/Throat: Oropharynx is clear and moist.   Neck: Normal range of motion. Neck supple.   Cardiovascular: Normal rate, regular rhythm and normal heart sounds.    Pulmonary/Chest: Effort normal and breath sounds normal. No respiratory distress. No  wheezes.   Abdominal: Soft. Bowel sounds are normal.   Skin: Skin is warm and dry.   Psychiatric: Mood, memory, affect and judgment normal.     Assessment/Plan     Diagnosis:  71 y.o. female with a h/o rectal cancer with abdominalperoneal resection and colostomy placement in " 1999. In 11/2011 she had surgery to resect a cecal cancer. She has had two surgeries for bilateral lung cancers. She last had a colonoscopy thru her LLQ abdominal colostomy in 5/18.     Anticipated Surgical Procedure:  Colonoscopy thru her left abdominal colostomy.     The risks, benefits, and alternatives of this procedure have been discussed with the patient or the responsible party- the patient understands and agrees to proceed.    James Romeo M.D.

## 2022-03-25 ENCOUNTER — OFFICE VISIT (OUTPATIENT)
Dept: INTERNAL MEDICINE | Facility: CLINIC | Age: 72
End: 2022-03-25

## 2022-03-25 VITALS
WEIGHT: 196 LBS | TEMPERATURE: 98 F | HEIGHT: 66 IN | OXYGEN SATURATION: 99 % | SYSTOLIC BLOOD PRESSURE: 137 MMHG | HEART RATE: 75 BPM | BODY MASS INDEX: 31.5 KG/M2 | RESPIRATION RATE: 16 BRPM | DIASTOLIC BLOOD PRESSURE: 83 MMHG

## 2022-03-25 DIAGNOSIS — Z00.00 HEALTHCARE MAINTENANCE: ICD-10-CM

## 2022-03-25 DIAGNOSIS — Z00.00 MEDICARE ANNUAL WELLNESS VISIT, SUBSEQUENT: ICD-10-CM

## 2022-03-25 DIAGNOSIS — Z00.00 WELL WOMAN EXAM (NO GYNECOLOGICAL EXAM): Primary | ICD-10-CM

## 2022-03-25 DIAGNOSIS — E55.9 VITAMIN D DEFICIENCY: ICD-10-CM

## 2022-03-25 PROCEDURE — 1170F FXNL STATUS ASSESSED: CPT | Performed by: FAMILY MEDICINE

## 2022-03-25 PROCEDURE — 1125F AMNT PAIN NOTED PAIN PRSNT: CPT | Performed by: FAMILY MEDICINE

## 2022-03-25 PROCEDURE — 1159F MED LIST DOCD IN RCRD: CPT | Performed by: FAMILY MEDICINE

## 2022-03-25 PROCEDURE — 99397 PER PM REEVAL EST PAT 65+ YR: CPT | Performed by: FAMILY MEDICINE

## 2022-03-25 PROCEDURE — G0439 PPPS, SUBSEQ VISIT: HCPCS | Performed by: FAMILY MEDICINE

## 2022-03-25 NOTE — PROGRESS NOTES
"Joelle Issa is a 71 y.o. female and is here for a comprehensive physical exam. The patient reports no problems.    Pt is UTD with annual gyn exam and mammo           Social History:   Social History     Socioeconomic History   • Marital status:      Spouse name: KEN   • Number of children: 3   • Years of education: College   Tobacco Use   • Smoking status: Former Smoker     Packs/day: 1.00     Years: 15.00     Pack years: 15.00     Types: Cigarettes     Start date: 1978     Quit date: 2001     Years since quittin.5   • Smokeless tobacco: Never Used   • Tobacco comment: 1ppd x20 yrs   Vaping Use   • Vaping Use: Never used   Substance and Sexual Activity   • Alcohol use: No     Comment: Caffeine use: Tea 2 cups daily   • Drug use: No   • Sexual activity: Yes     Partners: Male     Birth control/protection: Post-menopausal, Surgical       Family History:   Family History   Problem Relation Age of Onset   • Cancer Sister         \"FEMALE\"   • Hypertension Father    • Breast cancer Daughter 45   • Deep vein thrombosis Niece    • Malig Hyperthermia Neg Hx        Past Medical History:   Past Medical History:   Diagnosis Date   • Arthritis    • Asthma    • Bilateral pulmonary embolism (HCC)     provoked, after surgery , took 3 mos of OAC   • Colorectal cancer (HCC)     s/p surgery (colostomy), radiation, and chemotherapy, with mets to lung s/p surgical resection   • Enlarged thyroid gland     s/p total thyroidectomy    • Eye exam normal    • History of prior pregnancies     x4   • Hx of radiation therapy     for colon cancer   • Hyperparathyroidism (HCC)     s/p removal of a single adenoma 2019 c/b bleeding/hematoma   • Hypertension     \"white coat syndrome\"   • 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    Review of Systems    Review of Systems   Constitutional: Negative for chills and fever.   HENT: Negative for congestion, rhinorrhea, sinus pain and sore throat.    Eyes: Negative for photophobia and visual disturbance.   Respiratory: Negative for cough, chest tightness and shortness of breath.    Cardiovascular: Negative for chest pain and palpitations.   Gastrointestinal: Negative for diarrhea, nausea and vomiting.   Genitourinary: Negative for dysuria, frequency and urgency.   Skin: Negative for rash and wound.   Neurological: Negative for dizziness and syncope.   Psychiatric/Behavioral: Negative for behavioral problems and confusion.       Objective   Physical Exam  Vitals and nursing note reviewed.   Constitutional:       Appearance: She is well-developed.   HENT:      Head: Normocephalic and atraumatic.      Right Ear: External ear normal.      Left Ear: External ear normal.   Cardiovascular:      Rate and Rhythm: Normal rate and regular rhythm.      Heart sounds: Normal heart sounds.   Pulmonary:      Effort: Pulmonary effort is normal. No respiratory distress.      Breath sounds: Normal breath sounds.   Abdominal:      Palpations: Abdomen is soft.      Tenderness: There is no abdominal tenderness. There is no guarding.   Musculoskeletal:         General: Normal range of motion.      Cervical back: Normal range of motion and neck supple.   Lymphadenopathy:      Cervical: No cervical adenopathy.   Skin:     General: Skin is warm.   Neurological:      Mental Status: She is alert and oriented to person, place, and time.   Psychiatric:         Behavior: Behavior normal.         Vitals:    03/25/22 1357   BP: 137/83   Pulse: 75   Resp: 16   Temp: 98 °F (36.7 °C)   SpO2: 99%     Body mass index is 31.64  kg/m².      Medications:   Current Outpatient Medications:   •  aspirin (aspirin) 81 MG EC tablet, , Disp: , Rfl:   •  azelaic acid (AZELEX) 15 % gel, APPLY TO AFFECTED AREA OF FACE TWICE DAILY, Disp: , Rfl:   •  losartan (Cozaar) 25 MG tablet, Take 1 tablet by mouth Daily., Disp: 90 tablet, Rfl: 3  •  polyethyl glycol-propyl glycol (SYSTANE) 0.4-0.3 % solution ophthalmic solution, Daily., Disp: , Rfl:   •  Stiolto Respimat 2.5-2.5 MCG/ACT aerosol solution inhaler, Inhale 2 puffs Daily., Disp: , Rfl:   •  Synthroid 100 MCG tablet, Take 100 mcg by mouth Daily. FOR 30 DAYS, Disp: , Rfl:   •  Ventolin  (90 Base) MCG/ACT inhaler, Inhale 2 puffs Every 4 (Four) Hours As Needed., Disp: , Rfl:   •  vitamin D (ERGOCALCIFEROL) 1.25 MG (12944 UT) capsule capsule, Take 50,000 Units by mouth 1 (One) Time Per Week., Disp: , Rfl:        Assessment/Plan   Healthy female exam.      1. Healthcare Maintenance:  2. Patient Counseling:  --Nutrition: Stressed importance of moderation in sodium/caffeine intake, saturated fat and cholesterol, caloric balance, sufficient intake of fresh fruits, vegetables, fiber, calcium and vit D  --Exercise: Recommended 30 minutes of exercise daily.  --Immunizations reviewed.  --Discussed benefits of screening colonoscopy.    Diagnoses and all orders for this visit:    Well woman exam (no gynecological exam)    Medicare annual wellness visit, subsequent    Healthcare maintenance  -     CBC & Differential  -     Comprehensive Metabolic Panel  -     Hemoglobin A1c  -     Thyroid Panel With TSH  -     Lipid Panel With LDL / HDL Ratio    Vitamin D deficiency  -     Vitamin D 25 Hydroxy        No follow-ups on file.             Dictated utilizing Dragon Voice Recognition Software

## 2022-03-25 NOTE — PROGRESS NOTES
The ABCs of the Annual Wellness Visit  Subsequent Medicare Wellness Visit    Chief Complaint   Patient presents with   • Medicare Wellness-subsequent      Subjective    History of Present Illness:  Faviola Issa is a 71 y.o. female who presents for a Subsequent Medicare Wellness Visit.    The following portions of the patient's history were reviewed and   updated as appropriate: allergies, current medications, past family history, past medical history, past social history, past surgical history and problem list.    Compared to one year ago, the patient feels her physical   health is better.    Compared to one year ago, the patient feels her mental   health is better.    Recent Hospitalizations:  She was not admitted to the hospital during the last year.       Current Medical Providers:  Patient Care Team:  Roger Beard MD as PCP - General (Family Medicine)  James Romeo MD as Consulting Physician (Gastroenterology)  Ajith Leggett II, MD as Consulting Physician (Hematology and Oncology)  Compa Álvarez MD as Consulting Physician (Pulmonary Disease)  Nahum Benavides MD as Consulting Physician (Cardiology)    Outpatient Medications Prior to Visit   Medication Sig Dispense Refill   • aspirin (aspirin) 81 MG EC tablet      • azelaic acid (AZELEX) 15 % gel APPLY TO AFFECTED AREA OF FACE TWICE DAILY     • losartan (Cozaar) 25 MG tablet Take 1 tablet by mouth Daily. 90 tablet 3   • polyethyl glycol-propyl glycol (SYSTANE) 0.4-0.3 % solution ophthalmic solution Daily.     • Stiolto Respimat 2.5-2.5 MCG/ACT aerosol solution inhaler Inhale 2 puffs Daily.     • Synthroid 100 MCG tablet Take 100 mcg by mouth Daily. FOR 30 DAYS     • Ventolin  (90 Base) MCG/ACT inhaler Inhale 2 puffs Every 4 (Four) Hours As Needed.     • vitamin D (ERGOCALCIFEROL) 1.25 MG (17096 UT) capsule capsule Take 50,000 Units by mouth 1 (One) Time Per Week.     • Anoro Ellipta 62.5-25 MCG/INH aerosol powder  inhaler Inhale 1 puff Daily  "As Needed.       No facility-administered medications prior to visit.       No opioid medication identified on active medication list. I have reviewed chart for other potential  high risk medication/s and harmful drug interactions in the elderly.          Aspirin is on active medication list. Aspirin use is indicated based on review of current medical condition/s. Pros and cons of this therapy have been discussed today. Benefits of this medication outweigh potential harm.  Patient has been encouraged to continue taking this medication.  .      Patient Active Problem List   Diagnosis   • Well female exam with routine gynecological exam   • Malignant neoplasm of sigmoid colon with mets to the lung (CMS/HCC)   • Mixed hyperlipidemia   • Status post thyroidectomy and left partial parathyroidectomy (CMS/HCC)   • Postoperative hypothyroidism   • Anxiety   • Palpitations   • Hypertension   • Colon polyps   • White coat syndrome with diagnosis of hypertension     Advance Care Planning  Advance Directive is not on file.  ACP discussion was held with the patient during this visit. Patient has an advance directive (not in EMR), copy requested.          Objective    Vitals:    22 1357   BP: 137/83   Pulse: 75   Resp: 16   Temp: 98 °F (36.7 °C)   SpO2: 99%   Weight: 88.9 kg (196 lb)   Height: 167.6 cm (66\")   PainSc:   8     BMI Readings from Last 1 Encounters:   22 31.64 kg/m²   BMI is above normal parameters. Recommendations include: exercise counseling and nutrition counseling    Does the patient have evidence of cognitive impairment? No    Physical Exam            HEALTH RISK ASSESSMENT    Smoking Status:  Social History     Tobacco Use   Smoking Status Former Smoker   • Packs/day: 1.00   • Years: 15.00   • Pack years: 15.00   • Types: Cigarettes   • Start date: 1978   • Quit date: 2001   • Years since quittin.5   Smokeless Tobacco Never Used   Tobacco Comment    1ppd x20 yrs     Alcohol " Consumption:  Social History     Substance and Sexual Activity   Alcohol Use No    Comment: Caffeine use: Tea 2 cups daily     Fall Risk Screen:    LESLEY Fall Risk Assessment was completed, and patient is at MODERATE risk for falls. Assessment completed on:3/25/2022    Depression Screening:  PHQ-2/PHQ-9 Depression Screening 3/25/2022   Retired Total Score -   Little Interest or Pleasure in Doing Things 0-->not at all   Feeling Down, Depressed or Hopeless 0-->not at all   PHQ-9: Brief Depression Severity Measure Score 0       Health Habits and Functional and Cognitive Screening:  Functional & Cognitive Status 3/25/2022   Do you have difficulty preparing food and eating? No   Do you have difficulty bathing yourself, getting dressed or grooming yourself? No   Do you have difficulty using the toilet? No   Do you have difficulty moving around from place to place? No   Do you have trouble with steps or getting out of a bed or a chair? No   Current Diet Well Balanced Diet   Dental Exam Up to date   Eye Exam Up to date   Exercise (times per week) 4 times per week   Current Exercises Include Treadmill   Current Exercise Activities Include -   Do you need help using the phone?  No   Are you deaf or do you have serious difficulty hearing?  No   Do you need help with transportation? No   Do you need help shopping? No   Do you need help preparing meals?  No   Do you need help with housework?  No   Do you need help with laundry? No   Do you need help taking your medications? No   Do you need help managing money? No   Do you ever drive or ride in a car without wearing a seat belt? No   Have you felt unusual stress, anger or loneliness in the last month? No   Who do you live with? Spouse   If you need help, do you have trouble finding someone available to you? No   Have you been bothered in the last four weeks by sexual problems? No   Do you have difficulty concentrating, remembering or making decisions? No       Age-appropriate  Screening Schedule:  Refer to the list below for future screening recommendations based on patient's age, sex and/or medical conditions. Orders for these recommended tests are listed in the plan section. The patient has been provided with a written plan.    Health Maintenance   Topic Date Due   • DXA SCAN  Never done   • PAP SMEAR  10/31/2019   • LIPID PANEL  12/16/2020   • MAMMOGRAM  03/18/2023   • TDAP/TD VACCINES (2 - Td or Tdap) 05/09/2024   • COLONOSCOPY  03/04/2025   • INFLUENZA VACCINE  Completed   • ZOSTER VACCINE  Discontinued              Assessment/Plan   CMS Preventative Services Quick Reference  Risk Factors Identified During Encounter  None Identified  The above risks/problems have been discussed with the patient.  Follow up actions/plans if indicated are seen below in the Assessment/Plan Section.  Pertinent information has been shared with the patient in the After Visit Summary.    Diagnoses and all orders for this visit:    1. Well woman exam (no gynecological exam) (Primary)    2. Medicare annual wellness visit, subsequent    3. Healthcare maintenance  -     CBC & Differential  -     Comprehensive Metabolic Panel  -     Hemoglobin A1c  -     Thyroid Panel With TSH  -     Lipid Panel With LDL / HDL Ratio    4. Vitamin D deficiency  -     Vitamin D 25 Hydroxy        Follow Up:   No follow-ups on file.     An After Visit Summary and PPPS were made available to the patient.

## 2022-03-31 LAB
25(OH)D3+25(OH)D2 SERPL-MCNC: 37.5 NG/ML (ref 30–100)
ALBUMIN SERPL-MCNC: 4.6 G/DL (ref 3.8–4.8)
ALBUMIN/GLOB SERPL: 1.5 {RATIO} (ref 1.2–2.2)
ALP SERPL-CCNC: 101 IU/L (ref 44–121)
ALT SERPL-CCNC: 10 IU/L (ref 0–32)
AST SERPL-CCNC: 14 IU/L (ref 0–40)
BASOPHILS # BLD AUTO: 0.1 X10E3/UL (ref 0–0.2)
BASOPHILS NFR BLD AUTO: 1 %
BILIRUB SERPL-MCNC: 0.5 MG/DL (ref 0–1.2)
BUN SERPL-MCNC: 9 MG/DL (ref 8–27)
BUN/CREAT SERPL: 11 (ref 12–28)
CALCIUM SERPL-MCNC: 9.5 MG/DL (ref 8.7–10.3)
CHLORIDE SERPL-SCNC: 101 MMOL/L (ref 96–106)
CHOLEST SERPL-MCNC: 207 MG/DL (ref 100–199)
CO2 SERPL-SCNC: 25 MMOL/L (ref 20–29)
CREAT SERPL-MCNC: 0.79 MG/DL (ref 0.57–1)
EGFRCR SERPLBLD CKD-EPI 2021: 80 ML/MIN/1.73
EOSINOPHIL # BLD AUTO: 0.1 X10E3/UL (ref 0–0.4)
EOSINOPHIL NFR BLD AUTO: 2 %
ERYTHROCYTE [DISTWIDTH] IN BLOOD BY AUTOMATED COUNT: 13.3 % (ref 11.7–15.4)
FT4I SERPL CALC-MCNC: 3.1 (ref 1.2–4.9)
GLOBULIN SER CALC-MCNC: 3 G/DL (ref 1.5–4.5)
GLUCOSE SERPL-MCNC: 84 MG/DL (ref 65–99)
HBA1C MFR BLD: 5.3 % (ref 4.8–5.6)
HCT VFR BLD AUTO: 44.7 % (ref 34–46.6)
HDLC SERPL-MCNC: 66 MG/DL
HGB BLD-MCNC: 14.3 G/DL (ref 11.1–15.9)
IMM GRANULOCYTES # BLD AUTO: 0 X10E3/UL (ref 0–0.1)
IMM GRANULOCYTES NFR BLD AUTO: 0 %
LDLC SERPL CALC-MCNC: 123 MG/DL (ref 0–99)
LDLC/HDLC SERPL: 1.9 RATIO (ref 0–3.2)
LYMPHOCYTES # BLD AUTO: 1.4 X10E3/UL (ref 0.7–3.1)
LYMPHOCYTES NFR BLD AUTO: 20 %
MCH RBC QN AUTO: 28.1 PG (ref 26.6–33)
MCHC RBC AUTO-ENTMCNC: 32 G/DL (ref 31.5–35.7)
MCV RBC AUTO: 88 FL (ref 79–97)
MONOCYTES # BLD AUTO: 0.5 X10E3/UL (ref 0.1–0.9)
MONOCYTES NFR BLD AUTO: 7 %
NEUTROPHILS # BLD AUTO: 5.1 X10E3/UL (ref 1.4–7)
NEUTROPHILS NFR BLD AUTO: 70 %
PLATELET # BLD AUTO: 320 X10E3/UL (ref 150–450)
POTASSIUM SERPL-SCNC: 4.4 MMOL/L (ref 3.5–5.2)
PROT SERPL-MCNC: 7.6 G/DL (ref 6–8.5)
RBC # BLD AUTO: 5.09 X10E6/UL (ref 3.77–5.28)
SODIUM SERPL-SCNC: 140 MMOL/L (ref 134–144)
T3RU NFR SERPL: 31 % (ref 24–39)
T4 SERPL-MCNC: 10.1 UG/DL (ref 4.5–12)
TRIGL SERPL-MCNC: 103 MG/DL (ref 0–149)
TSH SERPL DL<=0.005 MIU/L-ACNC: 2.06 UIU/ML (ref 0.45–4.5)
VLDLC SERPL CALC-MCNC: 18 MG/DL (ref 5–40)
WBC # BLD AUTO: 7.3 X10E3/UL (ref 3.4–10.8)

## 2022-04-05 ENCOUNTER — TREATMENT (OUTPATIENT)
Dept: PHYSICAL THERAPY | Facility: CLINIC | Age: 72
End: 2022-04-05

## 2022-04-05 DIAGNOSIS — M16.12 ARTHRITIS OF LEFT HIP: ICD-10-CM

## 2022-04-05 DIAGNOSIS — M79.605 LEFT LEG PAIN: ICD-10-CM

## 2022-04-05 DIAGNOSIS — R26.9 GAIT DIFFICULTY: ICD-10-CM

## 2022-04-05 DIAGNOSIS — M54.50 CHRONIC LEFT-SIDED LOW BACK PAIN, UNSPECIFIED WHETHER SCIATICA PRESENT: Primary | ICD-10-CM

## 2022-04-05 DIAGNOSIS — G89.29 CHRONIC LEFT-SIDED LOW BACK PAIN, UNSPECIFIED WHETHER SCIATICA PRESENT: Primary | ICD-10-CM

## 2022-04-05 PROCEDURE — 97110 THERAPEUTIC EXERCISES: CPT | Performed by: PHYSICAL THERAPIST

## 2022-04-05 PROCEDURE — 97161 PT EVAL LOW COMPLEX 20 MIN: CPT | Performed by: PHYSICAL THERAPIST

## 2022-04-05 PROCEDURE — 97530 THERAPEUTIC ACTIVITIES: CPT | Performed by: PHYSICAL THERAPIST

## 2022-04-05 NOTE — PROGRESS NOTES
Physical Therapy Initial Evaluation and Plan of Care    Patient: Faviola Issa   : 1950  Diagnosis/ICD-10 Code:  Chronic left-sided low back pain, unspecified whether sciatica present [M54.50, G89.29], L hip OA, Gait difficulty  Referring practitioner: Arin Bender MD  Past medical Hx reviewed: 2022    Subjective Evaluation    History of Present Illness  Mechanism of injury: I hurt my back when I was in my 20's.  I was using a large cutting tool for work and hurt my back at work.  Since then, The back has been messed up.  As I have aged, I feel like my back has worsened.  2019 I injured my leg and had a stress fracture and meniscus injury.  I was inactive for several months and eventually did some therapy for that.  I was able to eventually get the leg back on track and I did very well with that.  The back pain never really resolved.    Currently, I am having more pain down the L leg.  I do a lot of ice and some stretching.  I try to stay active but it really hurts after I walk too long or stand too long (1 hour or so) but I usually push through.  I use a cane since 2019 and without it, I feel very unsteady.  Sleeping on the L side wakes me.  Bending forward hurts and I use a grabber to help.  I can drive but getting in/out of vehicle is difficult.  Getting out of bed is difficult sometimes.  Stairs are difficult and we are looking to moving because of the stairs.  I do fine pushing my grocery cart.        Patient Occupation: Retired.   Quality of life: good    Pain  Current pain ratin (L side of the knee and down to the top of the foot. )  Pain scale at lowest: with ice and rest   At worst pain ratin  Location: L lower back.  Lateral L leg.    Quality: tight (Grabbing, squeezing pain of my lower spine.   Grabbing the hip, stiff in the knee.  )  Relieving factors: change in position and ice  Aggravating factors: sleeping, prolonged positioning, stairs, lifting and standing  Progression:  worsening    Social Support  Lives in: multiple-level home  Lives with: spouse    Diagnostic Tests  No diagnostic tests performed    Treatments  Previous treatment: chiropractic (Last visit for end of Feb. 2022)  Patient Goals  Patient goals for therapy: decreased pain, increased motion, increased strength, independence with ADLs/IADLs and return to sport/leisure activities      PLOF: Independent but with ongoing pain.      Objective          Static Posture     Lumbar Spine   Flattened and increased lordosis.   Lumbar spine (Left): Rotated.   Lumbar spine (Right): Tilted.      Pelvis   Anterior pelvic tilt  Pelvis (Left): Shifted.     Knee   Knee (Left): Flexed.     Comments  Unable to lie supine without (B) knee flexion.       Postural Observations  Seated posture: fair  Standing posture: poor  Correction of posture: makes symptoms better (with good back support)        Palpation   Left   Hypertonic in the erector spinae, lumbar paraspinals and quadratus lumborum.   Tenderness of the erector spinae and lumbar paraspinals.     Neurological Testing     Sensation     Lumbar   Left   Intact: light touch    Right   Intact: light touch    Active Range of Motion   Cervical/Thoracic Spine     Thoracic   Flexion: Active thoracic flexion: 50 %   Extension: Active thoracic extension: < 25%    Left lateral flexion: Active left thoracic lateral flexion: <25 % (minimal lumbar motion)    Right lateral flexion: Active right thoracic lateral flexion: <25 % (minimal lumbar motion)    Left rotation: Active left thoracic rotation: 25-50 %    Right rotation: Active right thoracic rotation: 25-50 % with pain to L anterior femur.      Passive Range of Motion   Left Hip   Flexion: with pain  Extension: with pain  Abduction: with pain  Adduction: with pain  External rotation (90/90): with pain  Internal rotation (90/90): with pain    Additional Passive Range of Motion Details  Significant L hip joint limitations in all planes with noted mm  guarding and joint restriction.      Joint Play   Left Hip   Hypomobile in the posterior hip capsule, anterior hip capsule and long axis distraction.    Strength/Myotome Testing     Left Hip   Planes of Motion   Flexion: 4- (pain)  Extension: 3+  Abduction: 3  Adduction: 4+  External rotation: 3-  Internal rotation: 3-    Right Hip   Planes of Motion   Flexion: 5  Extension: 4+  Abduction: 4+  Adduction: 5  External rotation: 4  Internal rotation: 4    Left Knee   Flexion: 5  Extension: 4+    Right Knee   Flexion: 5  Extension: 4+    Left Ankle/Foot   Dorsiflexion: 5  Plantar flexion: 4+    Right Ankle/Foot   Dorsiflexion: 5  Plantar flexion: 4+    Tests     Additional Tests Details  Seated SLR (-) for nerve tension.     :    Assessment & Plan     Assessment  Impairments: abnormal gait, abnormal or restricted ROM, activity intolerance, impaired balance, impaired physical strength, lacks appropriate home exercise program and pain with function    Assessment details: Pt presents to PT with symptoms consistent with significant arthritic restrictions and pain of the L hip.  Additionally, she does present with hx of lumbar dysfunction with suspected nerve root compression but the L hip joint appears to be the most debilitating.  Pt would benefit from skilled PT intervention to address the deficits noted.     **She has been informed that follow up with doctor to have X-rays taken of the hip and lumbar may be indicated to determine the extent of joint degeneration due to the severe joint limitation and ROM of the hip. **  Prognosis: good    Goals  Plan Goals:  SHORT TERM GOALS: 4-6 visits   1.  Patient to be compliant with HEP and demo good efficiency with TE  2.  Report < 2 sleep disturbances 2° hip pain.     3.  Increased Lumbar and L hip mobility to allow for improved pelvic alignment and gait mechanics (equal step length and level pelvis, knee extension throughout gait).    4.  Increased hip ER/IR ROM to WFL (45 /20°)  degrees to allow for increased ease with bed mobility and bending.  5. Pt will report minimal-no tenderness to palpation with firm pressure.   6. Pt. Able to ambulate up to 45 min with pain < 5/10 with acceptable pattern.      LONG TERM GOALS:  1.  Pt. to score > 20% perceived ability on LEFS.  Back index: 20% or less  2.  Pain level < 2/10 in hip /Lumbar with all activities including sitting/standing > 1 hr continuously.   3.  Hip  AROM to WFL to allow for return to ADL's/IADLS and functional activities without increased symptoms.  4. Hip strength to 4+/5  to allow for pushing, pulling and more strenuous activities to occur without pain.  Walk > 60 min.  no pain > 3/10.  5. No palpable tenderness to the hip.   6.  Trunk AROM increased to at least 50-75% in all directions.          Plan  Therapy options: will be seen for skilled therapy services  Planned modality interventions: cryotherapy, electrical stimulation/Russian stimulation, iontophoresis, TENS, thermotherapy (hydrocollator packs), traction and ultrasound  Other planned modality interventions: Dry Needling  Planned therapy interventions: abdominal trunk stabilization, ADL retraining, body mechanics training, balance/weight-bearing training, flexibility, functional ROM exercises, gait training, home exercise program, joint mobilization, manual therapy, neuromuscular re-education, postural training, soft tissue mobilization, spinal/joint mobilization, strengthening, stretching and therapeutic activities  Frequency: 2x week  Duration in visits: 12  Treatment plan discussed with: patient    :    Manual Therapy:    -     mins  90208;  Therapeutic Exercise:    18     mins  89976;     Neuromuscular Josh:    -    mins  14237;    Therapeutic Activity:     12     mins  95430;   Pt education on dx and prognosis. activity modifications  Gait Training:      -     mins  53174;     Ultrasound:     -     mins  70160;    Electrical Stimulation:    -     mins  26967 (  );  Dry Needling     -     mins self-pay    Timed Treatment:   30   mins   Total Treatment:     65   mins    PT SIGNATURE:  Hector Ricardo DPT, PT     Hector Ricardo, PT   [place] License #: [id number]    DATE TREATMENT INITIATED: 4/5/2022    Medicare Initial Certification  Certification Period: 7/4/2022  I certify that the therapy services are furnished while this patient is under my care.  The services outlined above are required by this patient, and will be reviewed every 90 days.     PHYSICIAN: Arin Bender MD      DATE:     Please sign and return via fax to [phone].. Thank you, [facility].

## 2022-04-06 ENCOUNTER — TELEPHONE (OUTPATIENT)
Dept: PHYSICAL THERAPY | Facility: CLINIC | Age: 72
End: 2022-04-06

## 2022-04-06 NOTE — TELEPHONE ENCOUNTER
NORAH WAS GOING TO GIVE HER A BAND AND SHE WENT OFF AND FORGOT TO GET IT, WILL BE BY TO , IT MAY BE TOMORROW

## 2022-04-18 ENCOUNTER — TREATMENT (OUTPATIENT)
Dept: PHYSICAL THERAPY | Facility: CLINIC | Age: 72
End: 2022-04-18

## 2022-04-18 DIAGNOSIS — M54.50 CHRONIC LEFT-SIDED LOW BACK PAIN, UNSPECIFIED WHETHER SCIATICA PRESENT: ICD-10-CM

## 2022-04-18 DIAGNOSIS — G89.29 CHRONIC LEFT-SIDED LOW BACK PAIN, UNSPECIFIED WHETHER SCIATICA PRESENT: ICD-10-CM

## 2022-04-18 DIAGNOSIS — M79.605 LEFT LEG PAIN: Primary | ICD-10-CM

## 2022-04-18 DIAGNOSIS — M16.12 ARTHRITIS OF LEFT HIP: ICD-10-CM

## 2022-04-18 DIAGNOSIS — R26.9 GAIT DIFFICULTY: ICD-10-CM

## 2022-04-18 PROCEDURE — 97110 THERAPEUTIC EXERCISES: CPT | Performed by: PHYSICAL THERAPIST

## 2022-04-18 NOTE — PROGRESS NOTES
Physical Therapy Daily Progress Note  Visit # 2           Patient: Faviola Issa   : 1950  Diagnosis/ICD-10 Code:  Left leg pain [M79.605]  Referring practitioner: Arin Bender MD  Date of Initial Evaluation:  Type: THERAPY  Noted: 2022      Subjective  Faviola Issa reports:   I'm hurting today, my (L) LE and knee is really bothering me.      Objective   Obs: pt presents with excessively tight/limited (L) hip flexion and rotation IR and ER, poor AROM ability of (L)LE.    See Exercise, Manual, and Modality Logs for complete treatment.   Added hip 'waggle', knee fallout with gentle self overpressure, and seated HS stretch for HEP, written instructions issued.        Assessment/Plan  Limited with TE tolerance today d/t excessive tightness/guarding in (L) LE; (R) LE tight as well but better compared to (L).    Would continue to benefit from skilled PT progressing with PROM, flexibility, AROM progressing toward improved functional WB and strength of LEs, decreased LBP.         Progress per Plan of Care and Progress strengthening /stabilization /functional activity           Timed:         Manual Therapy:         mins  10741     Therapeutic Exercise:     30    mins  57527     Neuromuscular Josh:        mins  37022    Therapeutic Activity:          mins  86945     Gait Training:           mins  35459     Ultrasound:          mins  19691    Ionto                                   mins  37708  Self Care                            mins  50465    Un-Timed:  Electrical Stimulation:         mins 70972 ( )  Traction          mins 84663    Timed Treatment:   30   mins   Total Treatment:     38   mins    OMKAR Arizmendi License #[id number]  Physical Therapist Assistant

## 2022-04-20 ENCOUNTER — HOSPITAL ENCOUNTER (OUTPATIENT)
Dept: MAMMOGRAPHY | Facility: HOSPITAL | Age: 72
Discharge: HOME | End: 2022-04-20
Admitting: FAMILY MEDICINE

## 2022-04-20 DIAGNOSIS — Z12.31 SCREENING MAMMOGRAM FOR BREAST CANCER: ICD-10-CM

## 2022-04-20 PROCEDURE — 77067 SCR MAMMO BI INCL CAD: CPT

## 2022-04-20 PROCEDURE — 77063 BREAST TOMOSYNTHESIS BI: CPT

## 2022-04-21 ENCOUNTER — TREATMENT (OUTPATIENT)
Dept: PHYSICAL THERAPY | Facility: CLINIC | Age: 72
End: 2022-04-21

## 2022-04-21 DIAGNOSIS — M16.12 ARTHRITIS OF LEFT HIP: ICD-10-CM

## 2022-04-21 DIAGNOSIS — M79.605 LEFT LEG PAIN: Primary | ICD-10-CM

## 2022-04-21 DIAGNOSIS — M54.50 CHRONIC LEFT-SIDED LOW BACK PAIN, UNSPECIFIED WHETHER SCIATICA PRESENT: ICD-10-CM

## 2022-04-21 DIAGNOSIS — R26.9 GAIT DIFFICULTY: ICD-10-CM

## 2022-04-21 DIAGNOSIS — G89.29 CHRONIC LEFT-SIDED LOW BACK PAIN, UNSPECIFIED WHETHER SCIATICA PRESENT: ICD-10-CM

## 2022-04-21 PROCEDURE — 97110 THERAPEUTIC EXERCISES: CPT | Performed by: PHYSICAL THERAPIST

## 2022-04-21 PROCEDURE — 97140 MANUAL THERAPY 1/> REGIONS: CPT | Performed by: PHYSICAL THERAPIST

## 2022-04-21 NOTE — PROGRESS NOTES
Physical Therapy Daily Progress Note      Patient: Faviola Issa   : 1950  Diagnosis/ICD-10 Code:  Left leg pain [M79.605]  Referring practitioner: Arin Bender MD  Date of Initial Visit: Type: THERAPY  Noted: 2022  Today's Date: 2022  Patient seen for 3 sessions    Subjective : Faviola Issa reports: I have not reached out to my doctor about getting imaging for the hip yet.  I will try to give them a call this afternoon.  After doing some stretching, I can walk a little better but it is still very very tight and painful to move.      Objective: Severe L hip hypomobility into Abduction, ER.  Unable to get hip to neutral from flexion.  L quadriceps, TFL mm guarding severe and hypertonic.    See Exercise, Manual, and Modality Logs for complete treatment.     Assessment/Plan:Pt continues to struggle with gentle stretching and general bed mobility and repositioning.  Still suspect some L hip internal dysfunction and has been reminded to follow up with physician for imaging.  Soft tissue mobilization to the L hip flexors was reported as beneficial.      Progress per Plan of Care and Progress strengthening /stabilization /functional activity     Manual Therapy:    15     mins  59861;  Therapeutic Exercise:    35     mins  95655;     Neuromuscular Josh:    -    mins  55493;    Therapeutic Activity:     -     mins  39115;     Gait Training:      -     mins  07728;     Ultrasound:     -     mins  62816;    Electrical Stimulation:    -     mins  49703 ( );  Dry Needling     -     mins self-pay    Timed Treatment:   50   mins   Total Treatment:     55   mins      ROSELIA Ponce License #[id number]    Physical Therapist

## 2022-04-25 NOTE — PROGRESS NOTES
RM:________     PCP: Roger Beard MD    : 1950  AGE: 71 y.o.  EST PATIENT   REASON FOR VISIT/  CC:    BP Readings from Last 3 Encounters:   22 137/83   22 163/72   22 170/80        WT: ____________ BP: __________L __________R HR______    CHEST PAIN: _____________    SOA: _____________PALPS: _______________     LIGHTHEADED: ___________FATIGUE: ________________ EDEMA __________    ALLERGIES:Adhesive tape, Amoxicillin, Latex, Red dye, Shellfish-derived products, Pennsaid [diclofenac sodium], Prednisone, Simvastatin, and Lactose intolerance (gi) SMOKING HISTORY:  Social History     Tobacco Use   • Smoking status: Former Smoker     Packs/day: 1.00     Years: 15.00     Pack years: 15.00     Types: Cigarettes     Start date: 1978     Quit date: 2001     Years since quittin.6   • Smokeless tobacco: Never Used   • Tobacco comment: 1ppd x20 yrs   Vaping Use   • Vaping Use: Never used   Substance Use Topics   • Alcohol use: No     Comment: Caffeine use: Tea 2 cups daily   • Drug use: No     CAFFEINE USE_________________  ALCOHOL ______________________    Below is the patient's most recent value for Albumin, ALT, AST, BUN, Calcium, Chloride, Cholesterol, CO2, Creatinine, GFR, Glucose, HDL, Hematocrit, Hemoglobin, Hemoglobin A1C, LDL, Magnesium, Phosphorus, Platelets, Potassium, PSA, Sodium, Triglycerides, TSH and WBC.   Lab Results   Component Value Date    ALBUMIN 4.6 2022    ALT 10 2022    AST 14 2022    BUN 9 2022    CALCIUM 9.5 2022     2022    CO2 25 2022    CREATININE 0.79 2022    HDL 66 2022    HCT 44.7 2022    HGB 14.3 2022    HGBA1C 5.3 2022     (H) 2022    PHOS 3.6 2019     2022    K 4.4 2022     2022    TRIG 103 2022    TSH 2.060 2022    WBC 7.3 2022          NEW DIAGNOSIS/ SURGERY/ HOSP OR ED VISITS:  ______________________    __________________________________________________________________      RECENT LABS OR DIAGNOSTIC TESTING:  _____________________________    __________________________________________________________________      ASSESSMENT/ PLAN: _______________________________________________    __________________________________________________________________

## 2022-04-27 ENCOUNTER — OFFICE VISIT (OUTPATIENT)
Dept: CARDIOLOGY | Facility: CLINIC | Age: 72
End: 2022-04-27

## 2022-04-27 VITALS
WEIGHT: 194.8 LBS | HEIGHT: 66 IN | HEART RATE: 65 BPM | BODY MASS INDEX: 31.31 KG/M2 | DIASTOLIC BLOOD PRESSURE: 86 MMHG | SYSTOLIC BLOOD PRESSURE: 138 MMHG

## 2022-04-27 DIAGNOSIS — I10 WHITE COAT SYNDROME WITH DIAGNOSIS OF HYPERTENSION: Primary | ICD-10-CM

## 2022-04-27 DIAGNOSIS — R00.2 PALPITATIONS: ICD-10-CM

## 2022-04-27 PROCEDURE — 93000 ELECTROCARDIOGRAM COMPLETE: CPT | Performed by: INTERNAL MEDICINE

## 2022-04-27 PROCEDURE — 99214 OFFICE O/P EST MOD 30 MIN: CPT | Performed by: INTERNAL MEDICINE

## 2022-04-27 RX ORDER — LOSARTAN POTASSIUM 25 MG/1
TABLET ORAL
Qty: 40 TABLET | Refills: 1 | Status: SHIPPED | OUTPATIENT
Start: 2022-04-27

## 2022-04-27 RX ORDER — METHYLPREDNISOLONE 4 MG/1
TABLET ORAL
COMMUNITY
Start: 2022-04-25 | End: 2022-04-27

## 2022-04-27 NOTE — PROGRESS NOTES
"Date of Office Visit: 22  Encounter Provider: Nahum Benavides MD  Place of Service: [facility]  Patient Name: Faviola Issa  :1950    Chief Complaint   Patient presents with   • Hypertension   :   HPI:     Ms. Issa is 71 y.o. and presents today to follow up. I have reviewed prior notes and there are no changes except for any new updates described below. I have also reviewed any information entered into the medical record by the patient or by ancillary staff.     She has a history of hypertension that is complicated by white coat hypertension. She has a history of palpitations.  She had a provoked PE in 2019 after a prolonged recovery from thyroid/parathyroid surgery, which was complicated by a neck hematoma.      In , she reportedly had an \"abnormal EKG\" and was experiencing palpitations. Her EKG showed nonspecific ST flattening. An echo was normal, as was a 24 hour Holter. A 14-day monitor was ordered, but she could only tolerate the adhesive for two days; it was a normal study during that time.     She checks her blood pressure at home, twice per day. She only takes losartan if it's >130/70 mm Hg. She only uses the losartan ~3 days per week. She has been exercising more, and her BP has been better controlled. However, when she takes it, she states that all of her muscles hurt.     Past Medical History:   Diagnosis Date   • Allergic    • Arthritis    • Asthma    • Bilateral pulmonary embolism (HCC)     provoked, after surgery , took 3 mos of OAC   • Colorectal cancer (HCC)     s/p surgery (colostomy), radiation, and chemotherapy, with mets to lung s/p surgical resection   • Enlarged thyroid gland     s/p total thyroidectomy    • Eye exam normal    • History of prior pregnancies     x4   • Hx of radiation therapy     for colon cancer   • Hyperparathyroidism (HCC)     s/p removal of a single adenoma 2019 c/b bleeding/hematoma   • " "Hypertension     \"white coat syndrome\"   • Injury of back    • Injury of neck    • 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, low-grade, treated with radiation   • PONV (postoperative nausea and vomiting)    • Post-menopausal    • Postoperative hypothyroidism 2019   • Shortness of breath        Past Surgical History:   Procedure Laterality Date   • COLON SURGERY       and 2011   • COLONOSCOPY  2016   • COLONOSCOPY N/A 2018    Procedure: COLONOSCOPY into ileum;  Surgeon: James Romeo MD;  Location:  [facility];  Service: Gastroenterology   • COLONOSCOPY N/A 3/4/2022    Procedure: COLONOSCOPY to anastomosis;  Surgeon: James Romeo MD;  Location:  [facility];  Service: Gastroenterology;  Laterality: N/A;  pre: hx of colorectal cancer   post: normal surgical anatomy    • HEMICOLOECTOMY W/ ANASTOMOSIS Right 2011    Adenocarcinoma of cecum   • HYSTERECTOMY     • LUNG LOBECTOMY      ANSELMO 2006 and RLL partial 2001 metastatic colon cancer    • THYROIDECTOMY Bilateral 2019    Procedure: Left PARATHYROIDECTOMY AND TOTAL THYROIDECTOMY;  Surgeon: Maxi Daly MD;  Location: [facility];  Service: ENT   • TRACHEOSTOMY N/A 2019    Procedure: EVACUATION OF NECK  HEMATOMA;  Surgeon: Maxi Daly MD;  Location: [facility];  Service: ENT       Social History     Socioeconomic History   • Marital status:      Spouse name: KEN   • Number of children: 3   • Years of education: College   Tobacco Use   • Smoking status: Former Smoker     Packs/day: 1.00     Years: 15.00     Pack years: 15.00     Types: Cigarettes     Start date: 1978     Quit date: 2001     Years since quittin.6   • Smokeless tobacco: Never Used   • Tobacco comment: 1ppd x20 yrs   Vaping Use   • Vaping Use: Never used   Substance and Sexual Activity   • Alcohol use: No     Comment: Caffeine use: Tea 2 cups daily   • Drug use: No   • Sexual activity: Yes     Partners: Male     Birth " "control/protection: Post-menopausal, Surgical       Family History   Problem Relation Age of Onset   • Cancer Sister         \"FEMALE\"   • Hypertension Father    • Breast cancer Daughter 45   • Deep vein thrombosis Niece    • Malig Hyperthermia Neg Hx        Review of Systems   Constitutional: Positive for malaise/fatigue and weight loss.   Cardiovascular: Positive for palpitations.   Respiratory: Positive for wheezing.    Musculoskeletal: Positive for joint pain and myalgias.   All other systems reviewed and are negative.      Allergies   Allergen Reactions   • Adhesive Tape Hives and Itching     BANDAIDS   • Amoxicillin Hives and Itching   • Latex Other (See Comments)     Lips and nose swelled   • Red Dye Nausea And Vomiting   • Shellfish-Derived Products Shortness Of Breath   • Pennsaid [Diclofenac Sodium] Dizziness   • Prednisone Other (See Comments)     SKIN FLUSHING   • Simvastatin Other (See Comments)     Joint pain   • Lactose Intolerance (Gi) Nausea And Vomiting         Current Outpatient Medications:   •  aspirin (aspirin) 81 MG EC tablet, , Disp: , Rfl:   •  azelaic acid (AZELEX) 15 % gel, APPLY TO AFFECTED AREA OF FACE TWICE DAILY, Disp: , Rfl:   •  losartan (Cozaar) 25 MG tablet, Take 1 tablet by mouth Daily., Disp: 90 tablet, Rfl: 3  •  polyethyl glycol-propyl glycol (SYSTANE) 0.4-0.3 % solution ophthalmic solution, Daily., Disp: , Rfl:   •  Stiolto Respimat 2.5-2.5 MCG/ACT aerosol solution inhaler, Inhale 2 puffs Daily., Disp: , Rfl:   •  Synthroid 100 MCG tablet, Take 100 mcg by mouth Daily. FOR 30 DAYS, Disp: , Rfl:   •  Ventolin  (90 Base) MCG/ACT inhaler, Inhale 2 puffs Every 4 (Four) Hours As Needed., Disp: , Rfl:   •  vitamin D (ERGOCALCIFEROL) 1.25 MG (49903 UT) capsule capsule, Take 50,000 Units by mouth 1 (One) Time Per Week., Disp: , Rfl:       Objective:     Vitals:    04/27/22 1045   BP: 138/86   BP Location: Left arm   Pulse: 65   Weight: 88.4 kg (194 lb 12.8 oz)   Height: 167.6 cm " "(66\")     Body mass index is 31.44 kg/m².    Vitals reviewed.   Constitutional:       Appearance: Healthy appearance. Well-developed and not in distress.   Eyes:      Conjunctiva/sclera: Conjunctivae normal.   HENT:      Head: Normocephalic.      Nose: Nose normal.         Comments: masked  Neck:      Vascular: No JVD. JVD normal.      Lymphadenopathy: No cervical adenopathy.   Pulmonary:      Effort: Pulmonary effort is normal.      Breath sounds: Normal breath sounds.   Cardiovascular:      Normal rate. Regular rhythm.      Murmurs: There is no murmur.   Pulses:     Intact distal pulses.   Edema:     Peripheral edema absent.   Abdominal:      Palpations: Abdomen is soft.      Tenderness: There is no abdominal tenderness.   Musculoskeletal: Normal range of motion.      Cervical back: Normal range of motion. Skin:     General: Skin is warm and dry.      Findings: No rash.   Neurological:      General: No focal deficit present.      Mental Status: Alert, oriented to person, place, and time and oriented to person, place and time.      Cranial Nerves: No cranial nerve deficit.   Psychiatric:         Behavior: Behavior normal.         Thought Content: Thought content normal.         Judgment: Judgment normal.         ECG 12 Lead    Date/Time: 4/27/2022 10:54 AM  Performed by: Nahum Benavides MD  Authorized by: Nahum Benavides MD   Comparison: compared with previous ECG   Similar to previous ECG  Rhythm: sinus rhythm  Conduction: conduction normal  ST Flattening: all  T flattening: aVF  QRS axis: normal  Other: no other findings    Clinical impression: non-specific ECG              Assessment:       Diagnosis Plan   1. White coat syndrome with diagnosis of hypertension     2. Palpitations            Plan:     Her BP is improving with increasing exercise. She only takes losartan if her BP is >130/70 mm Hg. However, she then gets diffuse muscle aches.     We will try cutting losartan in half and still using it PRN. If she " still doesn't tolerate that, we could try low dose amlodipine, 2.5mg.      Her palpitations are mild and infrequent and do not require further workup. She had a normal echo.     Sincerely,       Nahum Benavides MD

## 2022-05-02 ENCOUNTER — TREATMENT (OUTPATIENT)
Dept: PHYSICAL THERAPY | Facility: CLINIC | Age: 72
End: 2022-05-02

## 2022-05-02 DIAGNOSIS — M79.605 LEFT LEG PAIN: Primary | ICD-10-CM

## 2022-05-02 DIAGNOSIS — M16.12 ARTHRITIS OF LEFT HIP: ICD-10-CM

## 2022-05-02 DIAGNOSIS — R26.9 GAIT DIFFICULTY: ICD-10-CM

## 2022-05-02 DIAGNOSIS — M54.50 CHRONIC LEFT-SIDED LOW BACK PAIN, UNSPECIFIED WHETHER SCIATICA PRESENT: ICD-10-CM

## 2022-05-02 DIAGNOSIS — G89.29 CHRONIC LEFT-SIDED LOW BACK PAIN, UNSPECIFIED WHETHER SCIATICA PRESENT: ICD-10-CM

## 2022-05-02 PROCEDURE — 97110 THERAPEUTIC EXERCISES: CPT | Performed by: PHYSICAL THERAPIST

## 2022-05-02 PROCEDURE — 97530 THERAPEUTIC ACTIVITIES: CPT | Performed by: PHYSICAL THERAPIST

## 2022-05-02 NOTE — PROGRESS NOTES
Physical Therapy Daily Progress Note      Patient: Faviola Issa   : 1950  Diagnosis/ICD-10 Code:  Left leg pain [M79.605]  Referring practitioner: Arin Bender MD  Date of Initial Visit: Type: THERAPY  Noted: 2022  Today's Date: 2022  Patient seen for 4 sessions    Subjective : Faviola Issa reports: I did have my doctor take an X-ray of the hip like you suggested and they said that the hip was really bad and there is no space between the bones.  They recommended that I go to a hip orthopedic doctor.  She thinks it may need to get replaced but I don't want to have surgery.      Objective:   See Exercise, Manual, and Modality Logs for complete treatment.     Assessment/Plan:Pt was thoroughly educated on the implications of severe degenerative joint and possible medical intervention scenarios.  She was encouraged to follow up with hip doctor and to consider the advice provided by the specialist to help improve her function and QOL overall.  We continue to try addressing her hip mobility dysfunction but if end stage OA /degeneration is present, improvements may only be marginal.  This was also voiced to the patient.  I do believe the severity and duration of hip dysfunction has driven her back pain exacerbation as well.      Progress per Plan of Care and Progress strengthening /stabilization /functional activity     Manual Therapy:    -     mins  69355;  Therapeutic Exercise:    35     mins  77294;     Neuromuscular Josh:    -    mins  48122;    Therapeutic Activity:     10     mins  01143;  Pt education   Gait Training:      -     mins  05314;     Ultrasound:     -     mins  09967;    Electrical Stimulation:    -     mins  76593 ( );  Dry Needling     -     mins self-pay    Timed Treatment:   45   mins   Total Treatment:     52   mins      ROSELIA Ponce License #[id number]    Physical Therapist

## 2022-05-04 ENCOUNTER — TREATMENT (OUTPATIENT)
Dept: PHYSICAL THERAPY | Facility: CLINIC | Age: 72
End: 2022-05-04

## 2022-05-04 DIAGNOSIS — M54.50 CHRONIC LEFT-SIDED LOW BACK PAIN, UNSPECIFIED WHETHER SCIATICA PRESENT: ICD-10-CM

## 2022-05-04 DIAGNOSIS — M16.12 ARTHRITIS OF LEFT HIP: ICD-10-CM

## 2022-05-04 DIAGNOSIS — M79.605 LEFT LEG PAIN: Primary | ICD-10-CM

## 2022-05-04 DIAGNOSIS — G89.29 CHRONIC LEFT-SIDED LOW BACK PAIN, UNSPECIFIED WHETHER SCIATICA PRESENT: ICD-10-CM

## 2022-05-04 DIAGNOSIS — R26.9 GAIT DIFFICULTY: ICD-10-CM

## 2022-05-04 PROCEDURE — 97110 THERAPEUTIC EXERCISES: CPT | Performed by: PHYSICAL THERAPIST

## 2022-05-04 NOTE — PROGRESS NOTES
Physical Therapy Daily Progress Note      Patient: Faviola Issa   : 1950  Diagnosis/ICD-10 Code:  Left leg pain [M79.605]  Referring practitioner: Arin Bender MD  Date of Initial Visit: Type: THERAPY  Noted: 2022  Today's Date: 2022  Patient seen for 5 sessions    Subjective : Faviola Issa reports: I feel like the hip and back are a little better.  Last night was the first time all week I didn't wake up with significant pain.      Objective:   See Exercise, Manual, and Modality Logs for complete treatment.     Assessment/Plan:Pt tolerated treatment fair today. We continue to remain consistent with TE and stretching activity.  She has continued to be educated on indications of hip procedure to recover her quality of movement and quality of life.     Progress per Plan of Care and Progress strengthening /stabilization /functional activity     Manual Therapy:    5     mins  39656;  Therapeutic Exercise:    40     mins  83369;     Neuromuscular Josh:    -    mins  13743;    Therapeutic Activity:     -     mins  98114;     Gait Training:      -     mins  44605;     Ultrasound:     -     mins  01785;    Electrical Stimulation:    -     mins  96004 ( );  Dry Needling     -     mins self-pay    Timed Treatment:   45   mins   Total Treatment:     55   mins      RSOELIA Ponce License #[id number]    Physical Therapist

## 2022-05-05 ENCOUNTER — TRANSCRIBE ORDERS (OUTPATIENT)
Dept: PHYSICAL THERAPY | Facility: CLINIC | Age: 72
End: 2022-05-05

## 2022-05-05 DIAGNOSIS — M16.12 OSTEOARTHRITIS OF LEFT HIP, UNSPECIFIED OSTEOARTHRITIS TYPE: Primary | ICD-10-CM

## 2022-05-09 ENCOUNTER — TREATMENT (OUTPATIENT)
Dept: PHYSICAL THERAPY | Facility: CLINIC | Age: 72
End: 2022-05-09

## 2022-05-09 DIAGNOSIS — M54.50 CHRONIC LEFT-SIDED LOW BACK PAIN, UNSPECIFIED WHETHER SCIATICA PRESENT: ICD-10-CM

## 2022-05-09 DIAGNOSIS — G89.29 CHRONIC LEFT-SIDED LOW BACK PAIN, UNSPECIFIED WHETHER SCIATICA PRESENT: ICD-10-CM

## 2022-05-09 DIAGNOSIS — M79.605 LEFT LEG PAIN: Primary | ICD-10-CM

## 2022-05-09 DIAGNOSIS — M16.12 ARTHRITIS OF LEFT HIP: ICD-10-CM

## 2022-05-09 DIAGNOSIS — R26.9 GAIT DIFFICULTY: ICD-10-CM

## 2022-05-09 PROCEDURE — 97530 THERAPEUTIC ACTIVITIES: CPT | Performed by: PHYSICAL THERAPIST

## 2022-05-09 PROCEDURE — 97110 THERAPEUTIC EXERCISES: CPT | Performed by: PHYSICAL THERAPIST

## 2022-05-09 NOTE — PROGRESS NOTES
Physical Therapy Daily Progress Note      Patient: Faviola Issa   : 1950  Diagnosis/ICD-10 Code:  Left leg pain [M79.605]  Referring practitioner: Arin Bender MD  Date of Initial Visit: Type: THERAPY  Noted: 2022  Today's Date: 2022  Patient seen for 6 sessions    Subjective : Faviola Issa reports:  My (L) knee is hurting a lot more than my hip is.  It feels like when I had a stress fracture on the other side.      Objective:   See Exercise, Manual, and Modality Logs for complete treatment.     Assessment/Plan:  Able to slowly progress ROM on NuStep after being limited at onset.  Overall good tolerance to exercise today with breaking exercise up into multiple sets and rest breaks as needed.     Progress per Plan of Care and Progress strengthening /stabilization /functional activity     Manual Therapy:         mins  21454;  Therapeutic Exercise:    32     mins  42985;     Neuromuscular Josh:    -    mins  60452;    Therapeutic Activity:     10     mins  75167;     Gait Training:      -     mins  37476;     Ultrasound:     -     mins  32112;    Electrical Stimulation:    -     mins  47769 ( );  Dry Needling     -     mins self-pay    Timed Treatment:   42   mins   Total Treatment:     52   mins      OMKAR Arizmendi License #[id number]  Physical Therapist Assistant

## 2022-05-10 ENCOUNTER — TELEPHONE (OUTPATIENT)
Dept: INTERNAL MEDICINE | Facility: CLINIC | Age: 72
End: 2022-05-10

## 2022-05-10 NOTE — TELEPHONE ENCOUNTER
Caller: Faviola Issa    Relationship: Self    Best call back number: [phone]    What medication are you requesting: ANTIBIOTIC     What are your current symptoms: COUGHING UP YELLOW PHLEGM    How long have you been experiencing symptoms: LAST NIGHT    Have you had these symptoms before:    [x] Yes  [] No    Have you been treated for these symptoms before:   [x] Yes  [] No    If a prescription is needed, what is your preferred pharmacy and phone number: [address] [phone] [facility] [phone]      Additional notes: PLEASE ADVISE, PATIENT STATES SHE GETS THIS EVERY YEAR WHEN THE TREES BLOOM

## 2022-05-12 NOTE — TELEPHONE ENCOUNTER
Spoke to patient's alisia (I could hear patient in the back ground answering the question) and she said that patient is allergic to amoxicillin but she can take brand Augmentin without an issue.

## 2022-05-12 NOTE — TELEPHONE ENCOUNTER
Dr. Beard patient - Patient called back to check on this message.  She has done a home COVID test which was negative.  She said that the only antibiotic that she can take is Augmentin. No fever or other symptoms except for the coughing up yellow and ugly gray phlegm.  Please advise.

## 2022-05-12 NOTE — TELEPHONE ENCOUNTER
I sent the prescription in reluctantly.  If she can tolerate Augmentin and it does not matter if its name brand or generic then she is not allergic to amoxicillin and this should be corrected at some point in her chart but I will leave this up to Dr. Beard.

## 2022-05-12 NOTE — TELEPHONE ENCOUNTER
Isabela, patient's chart indicates she is allergic to amoxicillin which is what is in Augmentin.  There must be some mistake and we need to clarify this.  Looks like she is intolerant to quite a number of medications and I think she may be mistaken regarding being able to take Augmentin.  Please try to clarify this as soon as possible.

## 2022-05-13 ENCOUNTER — TELEPHONE (OUTPATIENT)
Dept: INTERNAL MEDICINE | Facility: CLINIC | Age: 72
End: 2022-05-13

## 2022-05-13 NOTE — TELEPHONE ENCOUNTER
Veronica with [facility] notified that patient stated that she can only take brand Augmentin.  Veronica stated that they can not even get brand Augmentin in stock.  Asked if patient has ever had generic Augmentin prescribed and was told that they do not show where she has ever had generic or brand Augmentin filled.  Please advise.

## 2022-05-13 NOTE — TELEPHONE ENCOUNTER
This patient needs to go to the urgent care to be properly evaluated.  I am not comfortable treating this patient over the phone and that is not our policy anyway.  I am particularly concerned about requesting specific medications that may have potential serious allergic reactions.  Once again, she needs to go to the urgent care.  End of discussion.

## 2022-05-13 NOTE — TELEPHONE ENCOUNTER
Caller: [facility] [place] [id number]  [place], [place] - [address] [facility] [phone]  - [phone]     Relationship to patient: Pharmacy    Best call back number: [phone]    Patient is needing: THE Augmentin 875-125 MG per tablet WAS SENT OVER AS BRAND ONLY, WHICH IS OVER $1000. THEY WANT TO MAKE SURE THAT IT WAS SUPPOSED TO BE BRAND ONLY OR IF IT COULD BE SENT AS A GENERIC. PLEASE REACH OUT OR RESEND

## 2022-05-14 ENCOUNTER — HOSPITAL ENCOUNTER (EMERGENCY)
Facility: HOSPITAL | Age: 72
Discharge: HOME | End: 2022-05-14
Attending: EMERGENCY MEDICINE | Admitting: EMERGENCY MEDICINE

## 2022-05-14 ENCOUNTER — APPOINTMENT (OUTPATIENT)
Dept: GENERAL RADIOLOGY | Facility: HOSPITAL | Age: 72
End: 2022-05-14

## 2022-05-14 VITALS
WEIGHT: 194 LBS | SYSTOLIC BLOOD PRESSURE: 157 MMHG | HEART RATE: 89 BPM | OXYGEN SATURATION: 94 % | TEMPERATURE: 99 F | BODY MASS INDEX: 30.45 KG/M2 | HEIGHT: 67 IN | DIASTOLIC BLOOD PRESSURE: 81 MMHG | RESPIRATION RATE: 18 BRPM

## 2022-05-14 DIAGNOSIS — J18.9 PNEUMONIA OF BOTH LOWER LOBES DUE TO INFECTIOUS ORGANISM: Primary | ICD-10-CM

## 2022-05-14 LAB
ALBUMIN SERPL-MCNC: 3.6 G/DL (ref 3.5–5.2)
ALBUMIN/GLOB SERPL: 0.8 G/DL
ALP SERPL-CCNC: 138 U/L (ref 39–117)
ALT SERPL W P-5'-P-CCNC: 36 U/L (ref 1–33)
ANION GAP SERPL CALCULATED.3IONS-SCNC: 12.6 MMOL/L (ref 5–15)
AST SERPL-CCNC: 24 U/L (ref 1–32)
B PARAPERT DNA SPEC QL NAA+PROBE: NOT DETECTED
B PERT DNA SPEC QL NAA+PROBE: NOT DETECTED
BACTERIA UR QL AUTO: ABNORMAL /HPF
BASOPHILS # BLD AUTO: 0.05 10*3/MM3 (ref 0–0.2)
BASOPHILS NFR BLD AUTO: 0.4 % (ref 0–1.5)
BILIRUB SERPL-MCNC: 0.8 MG/DL (ref 0–1.2)
BILIRUB UR QL STRIP: ABNORMAL
BUN SERPL-MCNC: 7 MG/DL (ref 8–23)
BUN/CREAT SERPL: 8 (ref 7–25)
C PNEUM DNA NPH QL NAA+NON-PROBE: NOT DETECTED
CALCIUM SPEC-SCNC: 9 MG/DL (ref 8.6–10.5)
CHLORIDE SERPL-SCNC: 96 MMOL/L (ref 98–107)
CLARITY UR: CLEAR
CO2 SERPL-SCNC: 26.4 MMOL/L (ref 22–29)
COLOR UR: ABNORMAL
CREAT SERPL-MCNC: 0.88 MG/DL (ref 0.57–1)
DEPRECATED RDW RBC AUTO: 44.6 FL (ref 37–54)
EGFRCR SERPLBLD CKD-EPI 2021: 70.8 ML/MIN/1.73
EOSINOPHIL # BLD AUTO: 0.02 10*3/MM3 (ref 0–0.4)
EOSINOPHIL NFR BLD AUTO: 0.1 % (ref 0.3–6.2)
ERYTHROCYTE [DISTWIDTH] IN BLOOD BY AUTOMATED COUNT: 13.4 % (ref 12.3–15.4)
FLUAV SUBTYP SPEC NAA+PROBE: NOT DETECTED
FLUBV RNA ISLT QL NAA+PROBE: NOT DETECTED
GLOBULIN UR ELPH-MCNC: 4.3 GM/DL
GLUCOSE SERPL-MCNC: 118 MG/DL (ref 65–99)
GLUCOSE UR STRIP-MCNC: NEGATIVE MG/DL
HADV DNA SPEC NAA+PROBE: NOT DETECTED
HCOV 229E RNA SPEC QL NAA+PROBE: NOT DETECTED
HCOV HKU1 RNA SPEC QL NAA+PROBE: NOT DETECTED
HCOV NL63 RNA SPEC QL NAA+PROBE: NOT DETECTED
HCOV OC43 RNA SPEC QL NAA+PROBE: NOT DETECTED
HCT VFR BLD AUTO: 41.9 % (ref 34–46.6)
HGB BLD-MCNC: 13.6 G/DL (ref 12–15.9)
HGB UR QL STRIP.AUTO: ABNORMAL
HMPV RNA NPH QL NAA+NON-PROBE: NOT DETECTED
HPIV1 RNA ISLT QL NAA+PROBE: NOT DETECTED
HPIV2 RNA SPEC QL NAA+PROBE: NOT DETECTED
HPIV3 RNA NPH QL NAA+PROBE: NOT DETECTED
HPIV4 P GENE NPH QL NAA+PROBE: NOT DETECTED
HYALINE CASTS UR QL AUTO: ABNORMAL /LPF
IMM GRANULOCYTES # BLD AUTO: 0.08 10*3/MM3 (ref 0–0.05)
IMM GRANULOCYTES NFR BLD AUTO: 0.6 % (ref 0–0.5)
KETONES UR QL STRIP: ABNORMAL
LEUKOCYTE ESTERASE UR QL STRIP.AUTO: ABNORMAL
LYMPHOCYTES # BLD AUTO: 0.74 10*3/MM3 (ref 0.7–3.1)
LYMPHOCYTES NFR BLD AUTO: 5.5 % (ref 19.6–45.3)
M PNEUMO IGG SER IA-ACNC: NOT DETECTED
MCH RBC QN AUTO: 29.1 PG (ref 26.6–33)
MCHC RBC AUTO-ENTMCNC: 32.5 G/DL (ref 31.5–35.7)
MCV RBC AUTO: 89.5 FL (ref 79–97)
MONOCYTES # BLD AUTO: 1.16 10*3/MM3 (ref 0.1–0.9)
MONOCYTES NFR BLD AUTO: 8.6 % (ref 5–12)
NEUTROPHILS NFR BLD AUTO: 11.38 10*3/MM3 (ref 1.7–7)
NEUTROPHILS NFR BLD AUTO: 84.8 % (ref 42.7–76)
NITRITE UR QL STRIP: NEGATIVE
NRBC BLD AUTO-RTO: 0.1 /100 WBC (ref 0–0.2)
NT-PROBNP SERPL-MCNC: 124 PG/ML (ref 0–900)
PH UR STRIP.AUTO: 6.5 [PH] (ref 5–8)
PLATELET # BLD AUTO: 277 10*3/MM3 (ref 140–450)
PMV BLD AUTO: 9.8 FL (ref 6–12)
POTASSIUM SERPL-SCNC: 3.6 MMOL/L (ref 3.5–5.2)
PROT SERPL-MCNC: 7.9 G/DL (ref 6–8.5)
PROT UR QL STRIP: ABNORMAL
QT INTERVAL: 344 MS
RBC # BLD AUTO: 4.68 10*6/MM3 (ref 3.77–5.28)
RBC # UR STRIP: ABNORMAL /HPF
REF LAB TEST METHOD: ABNORMAL
RHINOVIRUS RNA SPEC NAA+PROBE: NOT DETECTED
RSV RNA NPH QL NAA+NON-PROBE: NOT DETECTED
SARS-COV-2 RNA NPH QL NAA+NON-PROBE: NOT DETECTED
SODIUM SERPL-SCNC: 135 MMOL/L (ref 136–145)
SP GR UR STRIP: 1.02 (ref 1–1.03)
SQUAMOUS #/AREA URNS HPF: ABNORMAL /HPF
TROPONIN T SERPL-MCNC: <0.01 NG/ML (ref 0–0.03)
UROBILINOGEN UR QL STRIP: ABNORMAL
WBC # UR STRIP: ABNORMAL /HPF
WBC NRBC COR # BLD: 13.43 10*3/MM3 (ref 3.4–10.8)

## 2022-05-14 PROCEDURE — 93005 ELECTROCARDIOGRAM TRACING: CPT | Performed by: EMERGENCY MEDICINE

## 2022-05-14 PROCEDURE — 94761 N-INVAS EAR/PLS OXIMETRY MLT: CPT

## 2022-05-14 PROCEDURE — 81001 URINALYSIS AUTO W/SCOPE: CPT | Performed by: EMERGENCY MEDICINE

## 2022-05-14 PROCEDURE — 84484 ASSAY OF TROPONIN QUANT: CPT | Performed by: EMERGENCY MEDICINE

## 2022-05-14 PROCEDURE — 71045 X-RAY EXAM CHEST 1 VIEW: CPT

## 2022-05-14 PROCEDURE — 94799 UNLISTED PULMONARY SVC/PX: CPT

## 2022-05-14 PROCEDURE — 25010000002 CEFTRIAXONE PER 250 MG: Performed by: EMERGENCY MEDICINE

## 2022-05-14 PROCEDURE — 93010 ELECTROCARDIOGRAM REPORT: CPT | Performed by: INTERNAL MEDICINE

## 2022-05-14 PROCEDURE — 96365 THER/PROPH/DIAG IV INF INIT: CPT

## 2022-05-14 PROCEDURE — 94664 DEMO&/EVAL PT USE INHALER: CPT

## 2022-05-14 PROCEDURE — 83880 ASSAY OF NATRIURETIC PEPTIDE: CPT | Performed by: EMERGENCY MEDICINE

## 2022-05-14 PROCEDURE — 96375 TX/PRO/DX INJ NEW DRUG ADDON: CPT

## 2022-05-14 PROCEDURE — 99283 EMERGENCY DEPT VISIT LOW MDM: CPT

## 2022-05-14 PROCEDURE — 85025 COMPLETE CBC W/AUTO DIFF WBC: CPT | Performed by: EMERGENCY MEDICINE

## 2022-05-14 PROCEDURE — 94640 AIRWAY INHALATION TREATMENT: CPT

## 2022-05-14 PROCEDURE — 25010000002 METHYLPREDNISOLONE PER 125 MG: Performed by: EMERGENCY MEDICINE

## 2022-05-14 PROCEDURE — 0202U NFCT DS 22 TRGT SARS-COV-2: CPT | Performed by: EMERGENCY MEDICINE

## 2022-05-14 PROCEDURE — 80053 COMPREHEN METABOLIC PANEL: CPT | Performed by: EMERGENCY MEDICINE

## 2022-05-14 RX ORDER — DOXYCYCLINE 100 MG/1
100 CAPSULE ORAL 2 TIMES DAILY
Qty: 14 CAPSULE | Refills: 0 | Status: SHIPPED | OUTPATIENT
Start: 2022-05-14 | End: 2022-05-21

## 2022-05-14 RX ORDER — ALBUTEROL SULFATE 2.5 MG/3ML
2.5 SOLUTION RESPIRATORY (INHALATION) EVERY 4 HOURS PRN
Qty: 60 EACH | Refills: 0 | Status: SHIPPED | OUTPATIENT
Start: 2022-05-14

## 2022-05-14 RX ORDER — METHYLPREDNISOLONE SODIUM SUCCINATE 125 MG/2ML
125 INJECTION, POWDER, LYOPHILIZED, FOR SOLUTION INTRAMUSCULAR; INTRAVENOUS ONCE
Status: COMPLETED | OUTPATIENT
Start: 2022-05-14 | End: 2022-05-14

## 2022-05-14 RX ORDER — SODIUM CHLORIDE 0.9 % (FLUSH) 0.9 %
10 SYRINGE (ML) INJECTION AS NEEDED
Status: DISCONTINUED | OUTPATIENT
Start: 2022-05-14 | End: 2022-05-14 | Stop reason: HOSPADM

## 2022-05-14 RX ORDER — ALBUTEROL SULFATE 2.5 MG/3ML
2.5 SOLUTION RESPIRATORY (INHALATION)
Status: COMPLETED | OUTPATIENT
Start: 2022-05-14 | End: 2022-05-14

## 2022-05-14 RX ADMIN — SODIUM CHLORIDE 500 ML: 9 INJECTION, SOLUTION INTRAVENOUS at 12:30

## 2022-05-14 RX ADMIN — ALBUTEROL SULFATE 2.5 MG: 2.5 SOLUTION RESPIRATORY (INHALATION) at 12:15

## 2022-05-14 RX ADMIN — ALBUTEROL SULFATE 2.5 MG: 2.5 SOLUTION RESPIRATORY (INHALATION) at 12:20

## 2022-05-14 RX ADMIN — CEFTRIAXONE 1 G: 1 INJECTION, POWDER, FOR SOLUTION INTRAMUSCULAR; INTRAVENOUS at 13:48

## 2022-05-14 RX ADMIN — METHYLPREDNISOLONE SODIUM SUCCINATE 125 MG: 125 INJECTION, POWDER, FOR SOLUTION INTRAMUSCULAR; INTRAVENOUS at 12:28

## 2022-05-14 NOTE — DISCHARGE INSTRUCTIONS
Return here immediately for any worsening shortness of breath, chest pain, fevers, or passing out.  Take antibiotics as prescribed until they are gone.  Use your nebulizer as needed to help with your shortness of breath.    [phone]  to arrange a switch for primary care.

## 2022-05-14 NOTE — ED TRIAGE NOTES
Pt to ER from home via PV, c/o wheezing and productive cough since Tuesday, reports hx of bronchitis. Also c/o lower right side pain. Has had two negative covid tests since Tuesday. Pt placed in mask at triage. This RN also wearing a mask.

## 2022-05-14 NOTE — ED PROVIDER NOTES
EMERGENCY DEPARTMENT ENCOUNTER    Room Number:  38/38  Date of encounter:  2022  PCP: Roger Beard MD  Patient Care Team:  Roger Beard MD as PCP - General (Family Medicine)  James Romeo MD as Consulting Physician (Gastroenterology)  Ajith Leggett II, MD as Consulting Physician (Hematology and Oncology)  Compa Álvarez MD as Consulting Physician (Pulmonary Disease)  Nahum Benavides MD as Consulting Physician (Cardiology)   Historian: Patient    HPI:  Chief Complaint: Shortness of breath, cough  A complete HPI/ROS/PMH/PSH/SH/FH are unobtainable due to: Nothing    Context: Faviola Issa is a 71 y.o. female who presents to the ED c/o shortness of breath and cough.  She reports on Tuesday she developed laryngitis which she states she gets every year at this time with the pollen.  She reports that she has had a productive cough since then.  She reports some shortness of breath.  She reports some pain in her ribs.  She reports low-grade fever.  She states she was using her nebulizers but found that they were  in 2018.  She states she has had similar episodes in the past.  She is not currently on any antibiotics.  She reports that she has required steroids in the past although she does not like how they make her feel.  He states she is also searching for a new primary care doctor.  She also reports her urine has been dark recently.    Prior record review: Telephone notes back-and-forth between her primary care doctor from 5/10/2022 until yesterday regarding needing Augmentin for her upper respiratory infection    PAST MEDICAL HISTORY  Active Ambulatory Problems     Diagnosis Date Noted   • Well female exam with routine gynecological exam 10/31/2016   • Malignant neoplasm of sigmoid colon with mets to the lung (CMS/HCC) 10/31/2016   • Mixed hyperlipidemia 2017   • Status post thyroidectomy and left partial parathyroidectomy (CMS/[facility]) 2019   • Postoperative hypothyroidism 2019    • Anxiety 10/02/2020   • Palpitations 10/02/2020   • Hypertension    • Colon polyps 03/08/2021   • White coat syndrome with diagnosis of hypertension 01/24/2022     Resolved Ambulatory Problems     Diagnosis Date Noted   • History of lung cancer 02/24/2017   • History of colon cancer 02/24/2017   • LFT elevation 02/24/2017   • Colon cancer (HCC) 01/15/2018   • Thyroid goiter 05/06/2019   • Primary hyperparathyroidism (HCC) 05/06/2019   • Enlarged thyroid 05/06/2019   • Bilateral pulmonary embolism (HCC) 05/23/2019   • Voice disturbance 05/23/2019   • Elevated troponin 05/23/2019   • Colon polyps 03/08/2021     Past Medical History:   Diagnosis Date   • Allergic    • Arthritis    • Asthma    • Colorectal cancer (HCC)    • Enlarged thyroid gland    • Eye exam normal 2013   • History of prior pregnancies    • Hx of radiation therapy 2000   • Hyperparathyroidism (HCC)    • Injury of back    • Injury of neck    • Lymphoma (HCC) 1998   • PONV (postoperative nausea and vomiting)    • Post-menopausal    • Shortness of breath        The patient has a COVID HM Topic on their chart, and they are fully vaccinated.    PAST SURGICAL HISTORY  Past Surgical History:   Procedure Laterality Date   • COLON SURGERY      1999 and 11/2011   • COLONOSCOPY  2016   • COLONOSCOPY N/A 5/8/2018    Procedure: COLONOSCOPY into ileum;  Surgeon: James Romeo MD;  [facility];  Service: Gastroenterology   • COLONOSCOPY N/A 3/4/2022    Procedure: COLONOSCOPY to anastomosis;  Surgeon: James Romeo MD;  Location: [facility] ENDOSCOPY;  Service: Gastroenterology;  Laterality: N/A;  pre: hx of colorectal cancer   post: normal surgical anatomy    • HEMICOLOECTOMY W/ ANASTOMOSIS Right 11/2011    Adenocarcinoma of cecum   • HYSTERECTOMY  1999   • LUNG LOBECTOMY      ANSELMO 08/2006 and RLL partial 09/2001 metastatic colon cancer    • THYROIDECTOMY Bilateral 5/6/2019    Procedure: Left PARATHYROIDECTOMY AND TOTAL THYROIDECTOMY;  Surgeon: Malika  "Maxi FITCH MD;  [facility];  Service: ENT   • TRACHEOSTOMY N/A 2019    Procedure: EVACUATION OF NECK  HEMATOMA;  Surgeon: Maxi Daly MD;  Location: [facility] MAIN OR;  Service: ENT         FAMILY HISTORY  Family History   Problem Relation Age of Onset   • Cancer Sister         \"FEMALE\"   • Hypertension Father    • Breast cancer Daughter 45   • Deep vein thrombosis Niece    • Malig Hyperthermia Neg Hx          SOCIAL HISTORY  Social History     Socioeconomic History   • Marital status:      Spouse name: KEN   • Number of children: 3   • Years of education: College   Tobacco Use   • Smoking status: Former Smoker     Packs/day: 1.00     Years: 15.00     Pack years: 15.00     Types: Cigarettes     Start date: 1978     Quit date: 2001     Years since quittin.6   • Smokeless tobacco: Never Used   • Tobacco comment: 1ppd x20 yrs   Vaping Use   • Vaping Use: Never used   Substance and Sexual Activity   • Alcohol use: No     Comment: Caffeine use: Tea 2 cups daily   • Drug use: No   • Sexual activity: Yes     Partners: Male     Birth control/protection: Post-menopausal, Surgical         ALLERGIES  Adhesive tape, Amoxicillin, Latex, Red dye, Shellfish-derived products, Pennsaid [diclofenac sodium], Prednisone, Simvastatin, and Lactose intolerance (gi)        REVIEW OF SYSTEMS  Review of Systems   No chest pain, positive shortness of breath, positive cough, negative chills, subjective fever, positive rib pain, negative syncope, negative palpitations, negative headache  All systems reviewed and negative except for those discussed in HPI.       PHYSICAL EXAM    I have reviewed the triage vital signs and nursing notes.    ED Triage Vitals   Temp Heart Rate Resp BP SpO2   22 1137 22 1136 22 1136 -- 22 1136   99 °F (37.2 °C) 101 17  96 %      Temp src Heart Rate Source Patient Position BP Location FiO2 (%)   22 1137 -- -- -- --   Tympanic           Physical " Exam  GENERAL: Awake, alert, no acute distress  SKIN: Warm, dry  HENT: Normocephalic, atraumatic, hoarse voice without stridor or drooling  EYES: no scleral icterus  CV: regular rhythm, regular rate  RESPIRATORY: normal effort, lungs with wheezes  ABDOMEN: soft, nontender, nondistended  MUSCULOSKELETAL: no deformity  NEURO: alert, moves all extremities, follows commands          LAB RESULTS  Recent Results (from the past 24 hour(s))   ECG 12 Lead    Collection Time: 05/14/22 11:51 AM   Result Value Ref Range    QT Interval 344 ms   Respiratory Panel PCR w/COVID-19(SARS-CoV-2) [facility] In-House, NP Swab in UTM/VTM, 3-4 HR TAT - Swab, Nasopharynx    Collection Time: 05/14/22 11:56 AM    Specimen: Nasopharynx; Swab   Result Value Ref Range    ADENOVIRUS, PCR Not Detected Not Detected    Coronavirus 229E Not Detected Not Detected    Coronavirus HKU1 Not Detected Not Detected    Coronavirus NL63 Not Detected Not Detected    Coronavirus OC43 Not Detected Not Detected    COVID19 Not Detected Not Detected - Ref. Range    Human Metapneumovirus Not Detected Not Detected    Human Rhinovirus/Enterovirus Not Detected Not Detected    Influenza A PCR Not Detected Not Detected    Influenza B PCR Not Detected Not Detected    Parainfluenza Virus 1 Not Detected Not Detected    Parainfluenza Virus 2 Not Detected Not Detected    Parainfluenza Virus 3 Not Detected Not Detected    Parainfluenza Virus 4 Not Detected Not Detected    RSV, PCR Not Detected Not Detected    Bordetella pertussis pcr Not Detected Not Detected    Bordetella parapertussis PCR Not Detected Not Detected    Chlamydophila pneumoniae PCR Not Detected Not Detected    Mycoplasma pneumo by PCR Not Detected Not Detected   Comprehensive Metabolic Panel    Collection Time: 05/14/22 12:03 PM    Specimen: Blood   Result Value Ref Range    Glucose 118 (H) 65 - 99 mg/dL    BUN 7 (L) 8 - 23 mg/dL    Creatinine 0.88 0.57 - 1.00 mg/dL    Sodium 135 (L) 136 - 145  mmol/L    Potassium 3.6 3.5 - 5.2 mmol/L    Chloride 96 (L) 98 - 107 mmol/L    CO2 26.4 22.0 - 29.0 mmol/L    Calcium 9.0 8.6 - 10.5 mg/dL    Total Protein 7.9 6.0 - 8.5 g/dL    Albumin 3.60 3.50 - 5.20 g/dL    ALT (SGPT) 36 (H) 1 - 33 U/L    AST (SGOT) 24 1 - 32 U/L    Alkaline Phosphatase 138 (H) 39 - 117 U/L    Total Bilirubin 0.8 0.0 - 1.2 mg/dL    Globulin 4.3 gm/dL    A/G Ratio 0.8 g/dL    BUN/Creatinine Ratio 8.0 7.0 - 25.0    Anion Gap 12.6 5.0 - 15.0 mmol/L    eGFR 70.8 >60.0 mL/min/1.73   BNP    Collection Time: 05/14/22 12:03 PM    Specimen: Blood   Result Value Ref Range    proBNP 124.0 0.0 - 900.0 pg/mL   Troponin    Collection Time: 05/14/22 12:03 PM    Specimen: Blood   Result Value Ref Range    Troponin T <0.010 0.000 - 0.030 ng/mL   CBC Auto Differential    Collection Time: 05/14/22 12:03 PM    Specimen: Blood   Result Value Ref Range    WBC 13.43 (H) 3.40 - 10.80 10*3/mm3    RBC 4.68 3.77 - 5.28 10*6/mm3    Hemoglobin 13.6 12.0 - 15.9 g/dL    Hematocrit 41.9 34.0 - 46.6 %    MCV 89.5 79.0 - 97.0 fL    MCH 29.1 26.6 - 33.0 pg    MCHC 32.5 31.5 - 35.7 g/dL    RDW 13.4 12.3 - 15.4 %    RDW-SD 44.6 37.0 - 54.0 fl    MPV 9.8 6.0 - 12.0 fL    Platelets 277 140 - 450 10*3/mm3    Neutrophil % 84.8 (H) 42.7 - 76.0 %    Lymphocyte % 5.5 (L) 19.6 - 45.3 %    Monocyte % 8.6 5.0 - 12.0 %    Eosinophil % 0.1 (L) 0.3 - 6.2 %    Basophil % 0.4 0.0 - 1.5 %    Immature Grans % 0.6 (H) 0.0 - 0.5 %    Neutrophils, Absolute 11.38 (H) 1.70 - 7.00 10*3/mm3    Lymphocytes, Absolute 0.74 0.70 - 3.10 10*3/mm3    Monocytes, Absolute 1.16 (H) 0.10 - 0.90 10*3/mm3    Eosinophils, Absolute 0.02 0.00 - 0.40 10*3/mm3    Basophils, Absolute 0.05 0.00 - 0.20 10*3/mm3    Immature Grans, Absolute 0.08 (H) 0.00 - 0.05 10*3/mm3    nRBC 0.1 0.0 - 0.2 /100 WBC   Urinalysis With Microscopic If Indicated (No Culture) - Urine, Clean Catch    Collection Time: 05/14/22 12:29 PM    Specimen: Urine, Clean Catch   Result Value Ref Range     Color, UA Dark Yellow (A) Yellow, Straw    Appearance, UA Clear Clear    pH, UA 6.5 5.0 - 8.0    Specific Gravity, UA 1.019 1.005 - 1.030    Glucose, UA Negative Negative    Ketones, UA Trace (A) Negative    Bilirubin, UA Small (1+) (A) Negative    Blood, UA Small (1+) (A) Negative    Protein,  mg/dL (2+) (A) Negative    Leuk Esterase, UA Moderate (2+) (A) Negative    Nitrite, UA Negative Negative    Urobilinogen, UA 1.0 E.U./dL 0.2 - 1.0 E.U./dL   Urinalysis, Microscopic Only - Urine, Clean Catch    Collection Time: 05/14/22 12:29 PM    Specimen: Urine, Clean Catch   Result Value Ref Range    RBC, UA 3-5 (A) None Seen, 0-2 /HPF    WBC, UA 21-30 (A) None Seen, 0-2 /HPF    Bacteria, UA None Seen None Seen /HPF    Squamous Epithelial Cells, UA 7-12 (A) None Seen, 0-2 /HPF    Hyaline Casts, UA 13-20 None Seen /LPF    Methodology Manual Light Microscopy        Ordered the above labs and independently reviewed the results.        RADIOLOGY  XR Chest 1 View    Result Date: 5/14/2022  XR CHEST 1 VW-  05/14/2022  HISTORY: Shortness of breath.  Heart size is mildly enlarged. There is moderate patchy infiltrate in the right lung base consistent with atelectasis and/or pneumonia. There are some minimal infiltrate in the left lung base as well.  Bony structures appear unremarkable.      1. Moderate atelectasis or pneumonia in the right lung base with some minimal atelectasis or pneumonia in the left lung base. 2. Cardiomegaly.  This report was finalized on 5/14/2022 12:30 PM by Dr. Alexandre Rosa M.D.        I ordered the above noted radiological studies. Reviewed by me and discussed with radiologist.  See dictation for official radiology interpretation.      PROCEDURES    Procedures      MEDICATIONS GIVEN IN ER    Medications   sodium chloride 0.9 % flush 10 mL (has no administration in time range)   cefTRIAXone (ROCEPHIN) 1 g in sodium chloride 0.9 % 100 mL IVPB-VTB (has no administration in time range)   sodium  chloride 0.9 % bolus 500 mL (500 mL Intravenous New Bag 22 1230)   albuterol (PROVENTIL) nebulizer solution 0.083% 2.5 mg/3mL (2.5 mg Nebulization Given 22 1220)   methylPREDNISolone sodium succinate (SOLU-Medrol) injection 125 mg (125 mg Intravenous Given 22 1228)         PROGRESS, DATA ANALYSIS, CONSULTS, AND MEDICAL DECISION MAKING    All labs have been independently reviewed by me.  All radiology studies have been reviewed by me and discussed with radiologist dictating the report.   EKG's independently viewed and interpreted by me.  Discussion below represents my analysis of pertinent findings related to patient's condition, differential diagnosis, treatment plan and final disposition.    Differential diagnosis includes but is not limited to pneumonia, viral respiratory infection, non-STEMI, STEMI, pneumothorax, CHF, PE, acute aortic syndrome    ED Course as of 22 1347   Sat May 14, 2022   1154 EKG          EKG time: 1151  Rhythm/Rate: Normal sinus, rate 96  P waves and AK: Normal P, normal AK  QRS, axis: Narrow QRS, normal axis  ST and T waves: Nonspecific ST change V4, V5, V6    Interpreted Contemporaneously by me, independently viewed  Not significantly changed compared to prior 2019 [TR]   1226 WBC(!): 13.43 [TR]   1305 COVID19: Not Detected [TR]   1327 Reviewed the work-up and findings with the patient and family at the bedside.  She states she feels significantly better.  Plan to hydrate her here, give her IV antibiotics here for her pneumonia.  We will send her home on antibiotics.  I will refill her albuterol for her nebulizer given that hers was .  She does not particularly want to be on steroids at home as she has had side effects from them in the past.  She wants to switch primary care doctors.  I will give her a couple of options.  She has no hypoxia, no tachypnea currently.  She feels significantly better and wants to go home. [TR]      ED Course User Index  [TR]  James Wall MD           PPE: The patient wore a mask and I wore an N95 mask throughout the entire patient encounter.       AS OF 13:47 EDT VITALS:    BP - 155/84  HR - 85  TEMP - 99 °F (37.2 °C) (Tympanic)  O2 SATS - 100%        DIAGNOSIS  Final diagnoses:   Pneumonia of both lower lobes due to infectious organism         DISPOSITION  ED Disposition     ED Disposition   Discharge    Condition   Stable    Comment   --                   James Wall MD  05/14/22 [id number]

## 2022-06-01 ENCOUNTER — HOSPITAL ENCOUNTER (OUTPATIENT)
Dept: GENERAL RADIOLOGY | Facility: HOSPITAL | Age: 72
Discharge: HOME | End: 2022-06-01
Admitting: FAMILY MEDICINE

## 2022-06-01 ENCOUNTER — OFFICE VISIT (OUTPATIENT)
Dept: INTERNAL MEDICINE | Facility: CLINIC | Age: 72
End: 2022-06-01

## 2022-06-01 VITALS
TEMPERATURE: 98.6 F | HEIGHT: 67 IN | HEART RATE: 72 BPM | OXYGEN SATURATION: 98 % | BODY MASS INDEX: 29.88 KG/M2 | SYSTOLIC BLOOD PRESSURE: 168 MMHG | WEIGHT: 190.4 LBS | DIASTOLIC BLOOD PRESSURE: 84 MMHG

## 2022-06-01 DIAGNOSIS — J18.9 PNEUMONIA OF BOTH LUNGS DUE TO INFECTIOUS ORGANISM, UNSPECIFIED PART OF LUNG: Primary | ICD-10-CM

## 2022-06-01 DIAGNOSIS — Z86.711 HISTORY OF PULMONARY EMBOLISM: ICD-10-CM

## 2022-06-01 PROCEDURE — 99214 OFFICE O/P EST MOD 30 MIN: CPT | Performed by: FAMILY MEDICINE

## 2022-06-01 PROCEDURE — 71046 X-RAY EXAM CHEST 2 VIEWS: CPT

## 2022-06-01 RX ORDER — AZITHROMYCIN 250 MG/1
TABLET, FILM COATED ORAL
Qty: 6 TABLET | Refills: 0 | Status: SHIPPED | OUTPATIENT
Start: 2022-06-01 | End: 2022-09-29

## 2022-06-01 NOTE — PROGRESS NOTES
Please inform the patient of the following abnormal results. While she still continues to have pneumonia, she still has some shortness of breath. She may benefit from a round of zpak.

## 2022-06-01 NOTE — PROGRESS NOTES
".Chief Complaint  Pneumonia (Hospital Follow Up from 3 weeks ago)    Subjective          Faviola Issa presents to [facility]  History of Present Illness    Miss Issa presents today for follow-up on pneumonia.    Pneumonia  The patient was diagnosed with pneumonia approximately 3 weeks ago. She was seen at the hospital by Dr. Wall. She had an x-ray that showed moderate atelectasis or pneumonia in the right lung base with minimal atelectasis or pneumonia in the left lung base. She was prescribed doxycycline but someone put Augmentin. She notes that she tried to call the pharmacy and verify if the prescription was for doxycycline. She has been taking the doxycycline and she is not taking Augmentin.    She states that she was sick 4 days before she went to the hospital. She has a history of blood clots in her lungs from her leg surgery. She states that she was taking blood thinners for 1.5 years. The patient states that her doctor did a test and he put her on baby aspirin 81 mg.    She denies any swelling in her legs. She has a history of bronchitis and he is using a ResMed inhaler. She has an appointment with her pulmonologist next month. She reports that she takes the albuterol breathing treatments. She states that in the afternoon she will need it. She reports that she can go upstairs but she will have to stop alf and then climb the rest of the stairs.    Objective   Vital Signs:   /84   Pulse 72   Temp 98.6 °F (37 °C)   Ht 170.2 cm (67\")   Wt 86.4 kg (190 lb 6.4 oz)   SpO2 98%   BMI 29.82 kg/m²     Physical Exam  Vitals and nursing note reviewed.   Constitutional:       Appearance: She is well-developed.   HENT:      Head: Normocephalic and atraumatic.      Right Ear: External ear normal.      Left Ear: External ear normal.   Cardiovascular:      Rate and Rhythm: Normal rate and regular rhythm.      Heart sounds: Normal heart sounds.   Pulmonary:      Effort: " Pulmonary effort is normal. No respiratory distress.      Breath sounds: Normal breath sounds.   Musculoskeletal:         General: Normal range of motion.      Cervical back: Normal range of motion and neck supple.   Lymphadenopathy:      Cervical: No cervical adenopathy.   Skin:     General: Skin is warm.   Neurological:      Mental Status: She is alert and oriented to person, place, and time.   Psychiatric:         Behavior: Behavior normal.        Result Review :                 Assessment and Plan    Diagnoses and all orders for this visit:    1. Pneumonia of both lungs due to infectious organism, unspecified part of lung (Primary)  -     CBC & Differential  -     Comprehensive Metabolic Panel  -     XR Chest 2 View    2. History of pulmonary embolism  -     D-dimer, Quantitative    Will get a chest xray to see if pneumonia is clearing up. Will check cbc and cmp. Will also get a d-dimer, however suspicion is low of PE. She is on baby aspirin currently. If signs and symptoms continue needs to go to ER.      Follow Up   No follow-ups on file.  Patient was given instructions and counseling regarding her condition or for health maintenance advice. Please see specific information pulled into the AVS if appropriate.         Transcribed from ambient dictation for Roger Beard MD by Katelyn Dietz.  06/01/22   12:10 EDT    Patient verbalized consent to the visit recording.

## 2022-06-02 ENCOUNTER — TELEPHONE (OUTPATIENT)
Dept: INTERNAL MEDICINE | Facility: CLINIC | Age: 72
End: 2022-06-02

## 2022-06-02 LAB
ALBUMIN SERPL-MCNC: 4.6 G/DL (ref 3.7–4.7)
ALBUMIN/GLOB SERPL: 1.4 {RATIO} (ref 1.2–2.2)
ALP SERPL-CCNC: 95 IU/L (ref 44–121)
ALT SERPL-CCNC: 8 IU/L (ref 0–32)
AST SERPL-CCNC: 16 IU/L (ref 0–40)
BASOPHILS # BLD AUTO: 0.1 X10E3/UL (ref 0–0.2)
BASOPHILS NFR BLD AUTO: 1 %
BILIRUB SERPL-MCNC: 0.6 MG/DL (ref 0–1.2)
BUN SERPL-MCNC: 9 MG/DL (ref 8–27)
BUN/CREAT SERPL: 12 (ref 12–28)
CALCIUM SERPL-MCNC: 9.8 MG/DL (ref 8.7–10.3)
CHLORIDE SERPL-SCNC: 100 MMOL/L (ref 96–106)
CO2 SERPL-SCNC: 24 MMOL/L (ref 20–29)
CREAT SERPL-MCNC: 0.73 MG/DL (ref 0.57–1)
D DIMER PPP FEU-MCNC: 0.85 MG/L FEU (ref 0–0.49)
EGFRCR SERPLBLD CKD-EPI 2021: 88 ML/MIN/1.73
EOSINOPHIL # BLD AUTO: 0.1 X10E3/UL (ref 0–0.4)
EOSINOPHIL NFR BLD AUTO: 1 %
ERYTHROCYTE [DISTWIDTH] IN BLOOD BY AUTOMATED COUNT: 13.4 % (ref 11.7–15.4)
GLOBULIN SER CALC-MCNC: 3.4 G/DL (ref 1.5–4.5)
GLUCOSE SERPL-MCNC: 82 MG/DL (ref 65–99)
HCT VFR BLD AUTO: 43.4 % (ref 34–46.6)
HGB BLD-MCNC: 14.2 G/DL (ref 11.1–15.9)
IMM GRANULOCYTES # BLD AUTO: 0 X10E3/UL (ref 0–0.1)
IMM GRANULOCYTES NFR BLD AUTO: 0 %
LYMPHOCYTES # BLD AUTO: 1.3 X10E3/UL (ref 0.7–3.1)
LYMPHOCYTES NFR BLD AUTO: 22 %
MCH RBC QN AUTO: 28.3 PG (ref 26.6–33)
MCHC RBC AUTO-ENTMCNC: 32.7 G/DL (ref 31.5–35.7)
MCV RBC AUTO: 87 FL (ref 79–97)
MONOCYTES # BLD AUTO: 0.4 X10E3/UL (ref 0.1–0.9)
MONOCYTES NFR BLD AUTO: 7 %
NEUTROPHILS # BLD AUTO: 4 X10E3/UL (ref 1.4–7)
NEUTROPHILS NFR BLD AUTO: 69 %
PLATELET # BLD AUTO: 431 X10E3/UL (ref 150–450)
POTASSIUM SERPL-SCNC: 4.6 MMOL/L (ref 3.5–5.2)
PROT SERPL-MCNC: 8 G/DL (ref 6–8.5)
RBC # BLD AUTO: 5.02 X10E6/UL (ref 3.77–5.28)
SODIUM SERPL-SCNC: 139 MMOL/L (ref 134–144)
WBC # BLD AUTO: 5.8 X10E3/UL (ref 3.4–10.8)

## 2022-06-02 NOTE — TELEPHONE ENCOUNTER
Pt informed that X- Ray shows still has some Pneumonia therefore Chirag Bryant sent in a Z-Manjinder    SJB

## 2022-06-02 NOTE — TELEPHONE ENCOUNTER
Caller: Faviola Issa    Relationship: Self    Best call back number: [phone] (H)  PATIENT STATES SHE WAS IN OFFICE YESTERDAY FOR AN X-RAY ON HER LUNGS.    PATIENT STATES A MEDICATION WAS CALLED INTO THE PHARMACY AND SHE IS NOT SURE WHAT IT IS FOR:    azithromycin (Zithromax) 250 MG tablet       SHE MENTIONED SHE THOUGHT SHE HAD A UTI, BUT NO LABS WERE RAN FOR THIS AND THAT WAS AS FAR AS THAT CONVERSATION WENT, BUT STATES HER X-RAY WAS ON HER LUNGS REGARDING PNEUMONIA       PLEASE GIVE HER A CALLBACK

## 2022-06-04 ENCOUNTER — HOSPITAL ENCOUNTER (EMERGENCY)
Facility: HOSPITAL | Age: 72
Discharge: HOME | End: 2022-06-04
Attending: EMERGENCY MEDICINE | Admitting: EMERGENCY MEDICINE

## 2022-06-04 ENCOUNTER — APPOINTMENT (OUTPATIENT)
Dept: CT IMAGING | Facility: HOSPITAL | Age: 72
End: 2022-06-04

## 2022-06-04 VITALS
HEART RATE: 61 BPM | TEMPERATURE: 97.3 F | DIASTOLIC BLOOD PRESSURE: 97 MMHG | SYSTOLIC BLOOD PRESSURE: 176 MMHG | OXYGEN SATURATION: 90 % | WEIGHT: 190 LBS | BODY MASS INDEX: 29.82 KG/M2 | RESPIRATION RATE: 14 BRPM | HEIGHT: 67 IN

## 2022-06-04 DIAGNOSIS — R06.09 EXERTIONAL DYSPNEA: Primary | ICD-10-CM

## 2022-06-04 DIAGNOSIS — J18.9 PERSISTENT PNEUMONIA: ICD-10-CM

## 2022-06-04 LAB
ALBUMIN SERPL-MCNC: 4.5 G/DL (ref 3.5–5.2)
ALBUMIN/GLOB SERPL: 1.3 G/DL
ALP SERPL-CCNC: 88 U/L (ref 39–117)
ALT SERPL W P-5'-P-CCNC: 8 U/L (ref 1–33)
ANION GAP SERPL CALCULATED.3IONS-SCNC: 11 MMOL/L (ref 5–15)
AST SERPL-CCNC: 12 U/L (ref 1–32)
BASOPHILS # BLD AUTO: 0.05 10*3/MM3 (ref 0–0.2)
BASOPHILS NFR BLD AUTO: 0.7 % (ref 0–1.5)
BILIRUB SERPL-MCNC: 0.3 MG/DL (ref 0–1.2)
BUN SERPL-MCNC: 12 MG/DL (ref 8–23)
BUN/CREAT SERPL: 16.2 (ref 7–25)
CALCIUM SPEC-SCNC: 9.3 MG/DL (ref 8.6–10.5)
CHLORIDE SERPL-SCNC: 102 MMOL/L (ref 98–107)
CO2 SERPL-SCNC: 25 MMOL/L (ref 22–29)
CREAT SERPL-MCNC: 0.74 MG/DL (ref 0.57–1)
DEPRECATED RDW RBC AUTO: 43.8 FL (ref 37–54)
EGFRCR SERPLBLD CKD-EPI 2021: 86.6 ML/MIN/1.73
EOSINOPHIL # BLD AUTO: 0.09 10*3/MM3 (ref 0–0.4)
EOSINOPHIL NFR BLD AUTO: 1.3 % (ref 0.3–6.2)
ERYTHROCYTE [DISTWIDTH] IN BLOOD BY AUTOMATED COUNT: 13.7 % (ref 12.3–15.4)
GLOBULIN UR ELPH-MCNC: 3.5 GM/DL
GLUCOSE SERPL-MCNC: 100 MG/DL (ref 65–99)
HCT VFR BLD AUTO: 43.3 % (ref 34–46.6)
HGB BLD-MCNC: 13.9 G/DL (ref 12–15.9)
IMM GRANULOCYTES # BLD AUTO: 0.01 10*3/MM3 (ref 0–0.05)
IMM GRANULOCYTES NFR BLD AUTO: 0.1 % (ref 0–0.5)
LYMPHOCYTES # BLD AUTO: 1.76 10*3/MM3 (ref 0.7–3.1)
LYMPHOCYTES NFR BLD AUTO: 25.3 % (ref 19.6–45.3)
MCH RBC QN AUTO: 28.1 PG (ref 26.6–33)
MCHC RBC AUTO-ENTMCNC: 32.1 G/DL (ref 31.5–35.7)
MCV RBC AUTO: 87.7 FL (ref 79–97)
MONOCYTES # BLD AUTO: 0.47 10*3/MM3 (ref 0.1–0.9)
MONOCYTES NFR BLD AUTO: 6.8 % (ref 5–12)
NEUTROPHILS NFR BLD AUTO: 4.58 10*3/MM3 (ref 1.7–7)
NEUTROPHILS NFR BLD AUTO: 65.8 % (ref 42.7–76)
NRBC BLD AUTO-RTO: 0 /100 WBC (ref 0–0.2)
PLATELET # BLD AUTO: 366 10*3/MM3 (ref 140–450)
PMV BLD AUTO: 9.3 FL (ref 6–12)
POTASSIUM SERPL-SCNC: 4.6 MMOL/L (ref 3.5–5.2)
PROT SERPL-MCNC: 8 G/DL (ref 6–8.5)
RBC # BLD AUTO: 4.94 10*6/MM3 (ref 3.77–5.28)
SODIUM SERPL-SCNC: 138 MMOL/L (ref 136–145)
WBC NRBC COR # BLD: 6.96 10*3/MM3 (ref 3.4–10.8)

## 2022-06-04 PROCEDURE — 85025 COMPLETE CBC W/AUTO DIFF WBC: CPT | Performed by: EMERGENCY MEDICINE

## 2022-06-04 PROCEDURE — 36415 COLL VENOUS BLD VENIPUNCTURE: CPT

## 2022-06-04 PROCEDURE — 71275 CT ANGIOGRAPHY CHEST: CPT

## 2022-06-04 PROCEDURE — 99282 EMERGENCY DEPT VISIT SF MDM: CPT

## 2022-06-04 PROCEDURE — 0 IOPAMIDOL PER 1 ML: Performed by: EMERGENCY MEDICINE

## 2022-06-04 PROCEDURE — 80053 COMPREHEN METABOLIC PANEL: CPT | Performed by: EMERGENCY MEDICINE

## 2022-06-04 RX ORDER — SODIUM CHLORIDE 0.9 % (FLUSH) 0.9 %
10 SYRINGE (ML) INJECTION AS NEEDED
Status: DISCONTINUED | OUTPATIENT
Start: 2022-06-04 | End: 2022-06-04 | Stop reason: HOSPADM

## 2022-06-04 RX ADMIN — IOPAMIDOL 95 ML: 755 INJECTION, SOLUTION INTRAVENOUS at 03:13

## 2022-06-04 NOTE — DISCHARGE INSTRUCTIONS
Continue your antibiotics as prescribed by your primary care provider.  Return to the emergency room immediately if worsening shortness of breath or any other concerns.

## 2022-06-04 NOTE — ED TRIAGE NOTES
Pt reports that her PMD called this evening reporting that her d. Dimer lab test was elevated and that she should come in to be evaluated for potential blood clots. Pt is currently being treated for pneumonia with outpatient antibiotics. Pt has a history of bilateral PE and is currently reporting shortness of breath with exertion. Pt reports taking 81mg ASA daily for her history of clots.     Pt placed in mask and staff wearing appropriate ppe at the time of pt arrival.

## 2022-06-04 NOTE — ED PROVIDER NOTES
EMERGENCY DEPARTMENT ENCOUNTER    CHIEF COMPLAINT  Chief Complaint: Shortness of breath  History given by: Patient  History limited by: None  Room Number: 15/15  PMD: Roger Beard MD      HPI:  Pt is a 72 y.o. female who presents complaining of intermittent episodes of shortness of breath that has been present since being diagnosed with pneumonia approximately 3 weeks ago.  She reports that after treatment for the pneumonia, her symptoms greatly improved and do continue to improve daily.  However, she does still have some mild shortness of breath on occasion that seems to be a bit worse with climbing stairs.  Her primary provider saw her and did order a D-dimer test which did come back elevated prompting her emergency room visit tonight.  She is currently taking a Z-Manjinder for the symptoms.  She denies chest pain, fever/chills, nausea/vomiting, or extremity pain.    Duration: Ongoing for the past 3 weeks  Onset: Gradual  Location: Pulmonary  Radiation: None  Quality: Shortness of breath  Intensity/Severity: Mild  Progression: Walking up stairs  Associated Symptoms: None  Aggravating Factors: Walking up stairs  Alleviating Factors: Rest  Previous Episodes: Yes, history of pneumonia  Treatment before arrival: Azithromycin    PAST MEDICAL HISTORY  Active Ambulatory Problems     Diagnosis Date Noted   • Well female exam with routine gynecological exam 10/31/2016   • Malignant neoplasm of sigmoid colon with mets to the lung (CMS/HCC) 10/31/2016   • Mixed hyperlipidemia 02/24/2017   • Status post thyroidectomy and left partial parathyroidectomy (CMS/HCC) 05/21/2019   • Postoperative hypothyroidism 05/21/2019   • Anxiety 10/02/2020   • Palpitations 10/02/2020   • Hypertension    • Colon polyps 03/08/2021   • White coat syndrome with diagnosis of hypertension 01/24/2022     Resolved Ambulatory Problems     Diagnosis Date Noted   • History of lung cancer 02/24/2017   • History of colon cancer 02/24/2017   • LFT elevation  "02/24/2017   • Colon cancer (HCC) 01/15/2018   • Thyroid goiter 05/06/2019   • Primary hyperparathyroidism (HCC) 05/06/2019   • Enlarged thyroid 05/06/2019   • Bilateral pulmonary embolism (HCC) 05/23/2019   • Voice disturbance 05/23/2019   • Elevated troponin 05/23/2019   • Colon polyps 03/08/2021     Past Medical History:   Diagnosis Date   • Allergic    • Arthritis    • Asthma    • Colorectal cancer (HCC)    • Enlarged thyroid gland    • Eye exam normal 2013   • History of prior pregnancies    • Hx of radiation therapy 2000   • Hyperparathyroidism (HCC)    • Injury of back    • Injury of neck    • Lymphoma (HCC) 1998   • PONV (postoperative nausea and vomiting)    • Post-menopausal    • Shortness of breath        PAST SURGICAL HISTORY  Past Surgical History:   Procedure Laterality Date   • COLON SURGERY      1999 and 11/2011   • COLONOSCOPY  2016   • COLONOSCOPY N/A 5/8/2018    Procedure: COLONOSCOPY into ileum;  Surgeon: James Romeo MD;  [facility];  Service: Gastroenterology   • COLONOSCOPY N/A 3/4/2022    Procedure: COLONOSCOPY to anastomosis;  Surgeon: James Romeo MD;  [facility];  Service: Gastroenterology;  Laterality: N/A;  pre: hx of colorectal cancer   post: normal surgical anatomy    • HEMICOLOECTOMY W/ ANASTOMOSIS Right 11/2011    Adenocarcinoma of cecum   • HYSTERECTOMY  1999   • LUNG LOBECTOMY      ANSELMO 08/2006 and RLL partial 09/2001 metastatic colon cancer    • THYROIDECTOMY Bilateral 5/6/2019    Procedure: Left PARATHYROIDECTOMY AND TOTAL THYROIDECTOMY;  Surgeon: Maxi Daly MD;  Location: [facility] OR [facility];  Service: ENT   • TRACHEOSTOMY N/A 5/6/2019    Procedure: EVACUATION OF NECK  HEMATOMA;  Surgeon: Maxi Daly MD;  Location: [facility] MAIN OR;  Service: ENT       FAMILY HISTORY  Family History   Problem Relation Age of Onset   • Cancer Sister         \"FEMALE\"   • Hypertension Father    • Breast cancer Daughter 45   • Deep vein thrombosis Niece    • " Malig Hyperthermia Neg Hx        SOCIAL HISTORY  Social History     Socioeconomic History   • Marital status:      Spouse name: KEN   • Number of children: 3   • Years of education: College   Tobacco Use   • Smoking status: Former Smoker     Packs/day: 1.00     Years: 15.00     Pack years: 15.00     Types: Cigarettes     Start date: 1978     Quit date: 2001     Years since quittin.7   • Smokeless tobacco: Never Used   • Tobacco comment: 1ppd x20 yrs   Vaping Use   • Vaping Use: Never used   Substance and Sexual Activity   • Alcohol use: No     Comment: Caffeine use: Tea 2 cups daily   • Drug use: No   • Sexual activity: Yes     Partners: Male     Birth control/protection: Post-menopausal, Surgical       ALLERGIES  Adhesive tape, Amoxicillin, Latex, Red dye, Shellfish-derived products, Pennsaid [diclofenac sodium], Prednisone, Simvastatin, and Lactose intolerance (gi)    REVIEW OF SYSTEMS  Review of Systems   Constitutional: Negative for fever.   HENT: Negative for sore throat.    Eyes: Negative.    Respiratory: Positive for shortness of breath. Negative for cough.    Cardiovascular: Negative for chest pain.   Gastrointestinal: Negative for abdominal pain, diarrhea and vomiting.   Genitourinary: Negative for dysuria.   Musculoskeletal: Negative for neck pain.   Skin: Negative for rash.   Allergic/Immunologic: Negative.    Neurological: Negative for weakness, numbness and headaches.   Hematological: Negative.    Psychiatric/Behavioral: Negative.    All other systems reviewed and are negative.      PHYSICAL EXAM  ED Triage Vitals [22 0059]   Temp Heart Rate Resp BP SpO2   97.3 °F (36.3 °C) 61 14 176/97 90 %      Temp src Heart Rate Source Patient Position BP Location FiO2 (%)   Tympanic Monitor Standing Left arm --       Physical Exam  Vitals and nursing note reviewed.   Constitutional:       General: She is not in acute distress.  HENT:      Head: Normocephalic and atraumatic.   Eyes:       Pupils: Pupils are equal, round, and reactive to light.   Cardiovascular:      Rate and Rhythm: Normal rate and regular rhythm.      Heart sounds: Normal heart sounds.   Pulmonary:      Effort: Pulmonary effort is normal. No respiratory distress.      Breath sounds: Normal breath sounds.   Abdominal:      Palpations: Abdomen is soft.      Tenderness: There is no abdominal tenderness. There is no guarding or rebound.   Musculoskeletal:         General: Normal range of motion.      Cervical back: Normal range of motion and neck supple.   Skin:     General: Skin is warm and dry.      Findings: No rash.   Neurological:      Mental Status: She is alert and oriented to person, place, and time.      Sensory: Sensation is intact.   Psychiatric:         Mood and Affect: Mood and affect normal.         LAB RESULTS  Lab Results (last 24 hours)     Procedure Component Value Units Date/Time    CBC & Differential [[id number]]  (Normal) Collected: 06/04/22 0226    Specimen: Blood Updated: 06/04/22 0238    Narrative:      The following orders were created for panel order CBC & Differential.  Procedure                               Abnormality         Status                     ---------                               -----------         ------                     CBC Auto Differential[[id number]]        Normal              Final result                 Please view results for these tests on the individual orders.    Comprehensive Metabolic Panel [[id number]]  (Abnormal) Collected: 06/04/22 0226    Specimen: Blood Updated: 06/04/22 0258     Glucose 100 mg/dL      BUN 12 mg/dL      Creatinine 0.74 mg/dL      Sodium 138 mmol/L      Potassium 4.6 mmol/L      Comment: Slight hemolysis detected by analyzer. Results may be affected.        Chloride 102 mmol/L      CO2 25.0 mmol/L      Calcium 9.3 mg/dL      Total Protein 8.0 g/dL      Albumin 4.50 g/dL      ALT (SGPT) 8 U/L      AST (SGOT) 12 U/L      Comment: Slight hemolysis detected by  analyzer. Results may be affected.        Alkaline Phosphatase 88 U/L      Total Bilirubin 0.3 mg/dL      Globulin 3.5 gm/dL      A/G Ratio 1.3 g/dL      BUN/Creatinine Ratio 16.2     Anion Gap 11.0 mmol/L      eGFR 86.6 mL/min/1.73      Comment: National Kidney Foundation and American Society of Nephrology (ASN) Task Force recommended calculation based on the Chronic Kidney Disease Epidemiology Collaboration (CKD-EPI) equation refit without adjustment for race.       Narrative:      GFR Normal >60  Chronic Kidney Disease <60  Kidney Failure <15      CBC Auto Differential [[id number]]  (Normal) Collected: 06/04/22 0226    Specimen: Blood Updated: 06/04/22 0238     WBC 6.96 10*3/mm3      RBC 4.94 10*6/mm3      Hemoglobin 13.9 g/dL      Hematocrit 43.3 %      MCV 87.7 fL      MCH 28.1 pg      MCHC 32.1 g/dL      RDW 13.7 %      RDW-SD 43.8 fl      MPV 9.3 fL      Platelets 366 10*3/mm3      Neutrophil % 65.8 %      Lymphocyte % 25.3 %      Monocyte % 6.8 %      Eosinophil % 1.3 %      Basophil % 0.7 %      Immature Grans % 0.1 %      Neutrophils, Absolute 4.58 10*3/mm3      Lymphocytes, Absolute 1.76 10*3/mm3      Monocytes, Absolute 0.47 10*3/mm3      Eosinophils, Absolute 0.09 10*3/mm3      Basophils, Absolute 0.05 10*3/mm3      Immature Grans, Absolute 0.01 10*3/mm3      nRBC 0.0 /100 WBC           I ordered the above labs and reviewed the results    RADIOLOGY  CT Angiogram Chest   Final Result   No evidence of pulmonary embolism. There is posterior right   lower lung zone pneumonia and some scattered parenchymal density in the   left lung that may also represent pneumonia. No other acute abnormality   identified.       This report was finalized on 6/4/2022 5:40 AM by Dr. Braden Beatty M.D.               I ordered the above noted radiological studies. Interpreted by radiologist. Reviewed by me in PACS.       PROCEDURES  Procedures      PROGRESS AND CONSULTS     The patient was wearing a facemask upon entrance  into the room and remained in such throughout their visit.  I was wearing PPE including a facemask, eye protection, as well as gloves at any point entering the room and throughout the visit    0555  On reevaluation, the patient is resting comfortably with stable vital signs and is without acute complaint.  I did inform her that her CT scan did show persistent areas of pneumonia however no evidence of thromboembolic disease.  She is currently taking antibiotics to treat pneumonia.  She has no acute complaints and is stable for discharge to follow-up with her primary provider.  All questions have been answered.      MEDICAL DECISION MAKING  Results were reviewed/discussed with the patient and they were also made aware of online access. Pt also made aware that some labs, such as cultures, will not be resulted during ER visit and follow up with PMD is necessary.     MDM  Number of Diagnoses or Management Options     Amount and/or Complexity of Data Reviewed  Clinical lab tests: ordered and reviewed  Tests in the radiology section of CPT®: ordered and reviewed  Review and summarize past medical records: yes (Upon medical records review, the patient was last seen and evaluated on 5/14/2022 secondary to bilateral lower lobe pneumonia)  Independent visualization of images, tracings, or specimens: yes (CT scan of the chest showing areas of pneumonia but no evidence of thromboembolic disease)           DIAGNOSIS  Final diagnoses:   Exertional dyspnea   Persistent pneumonia       DISPOSITION  DISCHARGE    Patient discharged in stable condition.    Reviewed implications of results, diagnosis, meds, responsibility to follow up, warning signs and symptoms of possible worsening, potential complications and reasons to return to ER.    Patient/Family voiced understanding of above instructions.    Discussed plan for discharge, as there is no emergent indication for admission. Patient referred to primary care provider for BP  management due to today's BP. Pt/family is agreeable and understands need for follow up and repeat testing.  Pt is aware that discharge does not mean that nothing is wrong but it indicates no emergency is present that requires admission and they must continue care with follow-up as given below or physician of their choice.     FOLLOW-UP  Roger Beard MD  [address] [address]  [phone]    Schedule an appointment as soon as possible for a visit            Medication List      No changes were made to your prescriptions during this visit.           Latest Documented Vital Signs:  As of 06:41 EDT  BP- 176/97 HR- 61 Temp- 97.3 °F (36.3 °C) (Tympanic) O2 sat- 90%         Brenden Turpin MD  06/04/22 0663

## 2022-06-04 NOTE — PROGRESS NOTES
I just called the patient (11:28 pm 6/3/2022), informing of elevated D-dimer. She did have some shortness of breath status post pneumonia. But she has hx of PE. I have instructed to go to ER. She states she will go first thing in the morning or sooner if she can. Patient understood agreed by going to the ER further tests can be done. And she is risk for PE.

## 2022-06-08 NOTE — TELEPHONE ENCOUNTER
I do not treat over the phone, particularly when previous treatment has failed.  Patient needs to go to the urgent care.

## 2022-06-08 NOTE — TELEPHONE ENCOUNTER
Patient notified and expressed understanding. She said that she does not like going to a bunch of different doctors and would like for Dr. Beard to call her when he returns to the office on Monday. (patient is aware that Dr. Beard is out of the office until Monday)

## 2022-06-08 NOTE — TELEPHONE ENCOUNTER
Caller: Faviola Issa    Relationship: Self    Best call back number: [phone]     What was the call regarding: PATIENT CALLING STATNG THAT SHE IS STILL COUGHING UP MUCUS SHE WOULD LIKE TO KNOW IF THERE WOULD BE ANOTHER MEDICATION CALLED IN ADDITION TO THE Z-PACK SHE HAS COMPLETED THIS ON OVER THE WEEKEND     Do you require a callback: YES

## 2022-06-14 NOTE — TELEPHONE ENCOUNTER
Spoke to patient. She is scheduled for 06/29/2022. She said she is doing better I told her if she needed something sooner to call us and we will get her in sooner. .

## 2022-09-19 ENCOUNTER — TELEPHONE (OUTPATIENT)
Dept: GASTROENTEROLOGY | Facility: CLINIC | Age: 72
End: 2022-09-19

## 2022-09-19 NOTE — TELEPHONE ENCOUNTER
Order for ostomy supplies faxed to [email or web address] at [phone] and fax confirmation received and scanned under media tab.

## 2022-09-29 ENCOUNTER — OFFICE VISIT (OUTPATIENT)
Dept: INTERNAL MEDICINE | Facility: CLINIC | Age: 72
End: 2022-09-29

## 2022-09-29 VITALS
WEIGHT: 187 LBS | HEART RATE: 86 BPM | OXYGEN SATURATION: 97 % | DIASTOLIC BLOOD PRESSURE: 88 MMHG | HEIGHT: 67 IN | SYSTOLIC BLOOD PRESSURE: 140 MMHG | TEMPERATURE: 97.8 F | BODY MASS INDEX: 29.35 KG/M2

## 2022-09-29 DIAGNOSIS — I10 ESSENTIAL HYPERTENSION: Primary | ICD-10-CM

## 2022-09-29 DIAGNOSIS — E78.5 DYSLIPIDEMIA: ICD-10-CM

## 2022-09-29 PROCEDURE — 99214 OFFICE O/P EST MOD 30 MIN: CPT | Performed by: FAMILY MEDICINE

## 2022-10-11 ENCOUNTER — TELEPHONE (OUTPATIENT)
Dept: INTERNAL MEDICINE | Facility: CLINIC | Age: 72
End: 2022-10-11

## 2022-10-11 NOTE — TELEPHONE ENCOUNTER
Spoke to patient advised she needed an appt to see Dr. Beard. She said she would but cannot come in today she said she would go to the UC to be seen.

## 2022-10-11 NOTE — TELEPHONE ENCOUNTER
mik     Caller: Faviola Issa    Relationship: Self    Best call back number: [phone]    What medication are you requesting: Z PACK    What are your current symptoms: CONGESTION, SPITTING UP PHLEGM, LOSING VOICE    How long have you been experiencing symptoms: SINCE YESTERDAY     Have you had these symptoms before:    [x] Yes  [] No    Have you been treated for these symptoms before:   [x] Yes  [] No    If a prescription is needed, what is your preferred pharmacy and phone number:    [place] [id number] [facility] [address] [facility] [phone] [facility] [phone]       Additional notes: PATIENT FEELS SHE IS DEVELOPING BRONCHITIS

## 2022-11-01 ENCOUNTER — OFFICE VISIT (OUTPATIENT)
Dept: CARDIOLOGY | Facility: CLINIC | Age: 72
End: 2022-11-01

## 2022-11-01 VITALS
WEIGHT: 191.2 LBS | HEART RATE: 65 BPM | DIASTOLIC BLOOD PRESSURE: 78 MMHG | HEIGHT: 67 IN | SYSTOLIC BLOOD PRESSURE: 162 MMHG | BODY MASS INDEX: 30.01 KG/M2

## 2022-11-01 DIAGNOSIS — I10 WHITE COAT SYNDROME WITH DIAGNOSIS OF HYPERTENSION: Primary | ICD-10-CM

## 2022-11-01 DIAGNOSIS — Z01.810 ENCOUNTER FOR PRE-OPERATIVE CARDIOVASCULAR CLEARANCE: ICD-10-CM

## 2022-11-01 DIAGNOSIS — R00.2 PALPITATIONS: ICD-10-CM

## 2022-11-01 PROCEDURE — 93000 ELECTROCARDIOGRAM COMPLETE: CPT | Performed by: NURSE PRACTITIONER

## 2022-11-01 PROCEDURE — 99214 OFFICE O/P EST MOD 30 MIN: CPT | Performed by: NURSE PRACTITIONER

## 2022-11-01 NOTE — PROGRESS NOTES
"  Date of Office Visit: 2022  Encounter Provider: IGGY Andersen  Place of Service: [facility] CARDIOLOGY  Patient Name: Faviola Issa  :1950  Primary Cardiologist: Dr. Nahum Benavides     Chief Complaint   Patient presents with   • Follow-up   • Hypertension   :     HPI: Faviola Issa is a pleasant 72 y.o. female who presents today for follow up on hypertension. I have reviewed her medical records.       She has not diagnosed with hypertension that is complicated by whitecoat hypertension.  In 2019, she had a pulmonary embolism provoked from prolonged recovery from thyroid/parathyroid surgery and neck hematoma.    She has had a history of palpitations.  In 2020, she reportedly had a \"abnormal EKG\" which showed nonspecific ST flattening.  Echocardiogram and Holter monitor was normal.  A 14-day monitor was ordered, but she could only tolerate the adhesive for 2 days and that study was also normal.    She presents today for a follow-up visit.  Her blood pressure is elevated due to the whitecoat syndrome.  At home her blood pressure is running 120s/60s.  A couple times a week it may be in systolic 140-160s and she will take losartan 12.5 mg.  When she takes the losartan it makes her urinate more and sometimes she feels lightheaded.  She denies chest pain, shortness of air, palpitations, edema, syncope, or bleeding.  She is scheduled to undergo left hip surgery on  by Dr. Vargas.      Past Medical History:   Diagnosis Date   • Allergic    • Arthritis    • Asthma    • Bilateral pulmonary embolism (HCC)     provoked, after surgery , took 3 mos of OAC   • Colorectal cancer (HCC)     s/p surgery (colostomy), radiation, and chemotherapy, with mets to lung s/p surgical resection   • Enlarged thyroid gland     s/p total thyroidectomy    • Eye exam normal    • History of prior pregnancies     x4   • Hx of radiation therapy     for colon cancer   • " "Hyperparathyroidism (HCC)     s/p removal of a single adenoma 2019 c/b bleeding/hematoma   • Hypertension     \"white coat syndrome\"   • Injury of back    • Injury of neck    • 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, low-grade, treated with radiation   • PONV (postoperative nausea and vomiting)    • Post-menopausal    • Postoperative hypothyroidism 2019   • Shortness of breath        Past Surgical History:   Procedure Laterality Date   • COLON SURGERY       and 2011   • COLONOSCOPY  2016   • COLONOSCOPY N/A 2018    Procedure: COLONOSCOPY into ileum;  Surgeon: James Romeo MD;  [facility];  Service: Gastroenterology   • COLONOSCOPY N/A 3/4/2022    Procedure: COLONOSCOPY to anastomosis;  Surgeon: James Romeo MD;  [facility];  Service: Gastroenterology;  Laterality: N/A;  pre: hx of colorectal cancer   post: normal surgical anatomy    • HEMICOLOECTOMY W/ ANASTOMOSIS Right 2011    Adenocarcinoma of cecum   • HYSTERECTOMY     • LUNG LOBECTOMY      ANSELMO 2006 and RLL partial 2001 metastatic colon cancer    • THYROIDECTOMY Bilateral 2019    Procedure: Left PARATHYROIDECTOMY AND TOTAL THYROIDECTOMY;  Surgeon: Maxi Daly MD;  Location: [facility] OR OSC;  Service: ENT   • TRACHEOSTOMY N/A 2019    Procedure: EVACUATION OF NECK  HEMATOMA;  Surgeon: Maxi Daly MD;  Location: [facility] MAIN OR;  Service: ENT       Social History     Socioeconomic History   • Marital status:      Spouse name: KEN   • Number of children: 3   • Years of education: College   Tobacco Use   • Smoking status: Former     Packs/day: 1.00     Years: 15.00     Pack years: 15.00     Types: Cigarettes     Start date: 1978     Quit date: 2001     Years since quittin.1   • Smokeless tobacco: Never   • Tobacco comments:     1ppd x20 yrs   Vaping Use   • Vaping Use: Never used   Substance and Sexual Activity   • Alcohol use: No     Comment: Caffeine use: Tea 2 cups " "daily   • Drug use: No   • Sexual activity: Yes     Partners: Male     Birth control/protection: Post-menopausal, Surgical       Family History   Problem Relation Age of Onset   • Cancer Sister         \"FEMALE\"   • Hypertension Father    • Breast cancer Daughter 45   • Deep vein thrombosis Niece    • Malig Hyperthermia Neg Hx        The following portion of the patient's history were reviewed and updated as appropriate: past medical history, past surgical history, past social history, past family history, allergies, current medications, and problem list.    Review of Systems   Constitutional: Negative.   Cardiovascular: Negative.    Respiratory: Negative.    Neurological: Positive for light-headedness.       Allergies   Allergen Reactions   • Adhesive Tape Hives and Itching     BANDAIDS   • Amoxicillin Hives and Itching   • Latex Other (See Comments)     Lips and nose swelled   • Red Dye Nausea And Vomiting   • Shellfish-Derived Products Shortness Of Breath   • Pennsaid [Diclofenac Sodium] Dizziness   • Prednisone Other (See Comments)     SKIN FLUSHING   • Simvastatin Other (See Comments)     Joint pain   • Lactose Intolerance (Gi) Nausea And Vomiting         Current Outpatient Medications:   •  albuterol (PROVENTIL) (2.5 MG/3ML) 0.083% nebulizer solution, Take 2.5 mg by nebulization Every 4 (Four) Hours As Needed for Wheezing., Disp: 60 each, Rfl: 0  •  aspirin (aspirin) 81 MG EC tablet, , Disp: , Rfl:   •  losartan (Cozaar) 25 MG tablet, Take 1/2 tablet daily PRN if BP is > 130/70mm Hg, Disp: 40 tablet, Rfl: 1  •  polyethyl glycol-propyl glycol (SYSTANE) 0.4-0.3 % solution ophthalmic solution, Daily., Disp: , Rfl:   •  Stiolto Respimat 2.5-2.5 MCG/ACT aerosol solution inhaler, Inhale 2 puffs Daily., Disp: , Rfl:   •  Synthroid 100 MCG tablet, Take 100 mcg by mouth Daily. FOR 30 DAYS, Disp: , Rfl:   •  vitamin D (ERGOCALCIFEROL) 1.25 MG (44506 UT) capsule capsule, Take 50,000 Units by mouth 1 (One) Time Per Week., " "Disp: , Rfl:         Objective:     Vitals:    11/01/22 0951   BP: 162/78   BP Location: Right arm   Patient Position: Sitting   Cuff Size: Adult   Pulse: 65   Weight: 86.7 kg (191 lb 3.2 oz)   Height: 170.2 cm (67\")     Body mass index is 29.95 kg/m².    PHYSICAL EXAM:    Vitals Reviewed.   General Appearance: No acute distress, well developed and well nourished.    Eyes: Conjunctiva and lids: No erythema, swelling, or discharge. Sclera non-icteric. Glasses.   HENT: Atraumatic, normocephalic. External eyes, ears, and nose normal. No hearing loss noted. Mucous membranes normal. Lips not cyanotic. Neck supple with no tenderness. Wearing mask.   Respiratory: No signs of respiratory distress. Respiration rhythm and depth normal.   Clear to auscultation. No rales, crackles, rhonchi, or wheezing auscultated.   Cardiovascular:  Jugular Venous Pressure: Normal  Heart Rate and Rhythm: Normal, Heart Sounds: Normal S1 and S2. No S3 or S4 noted.  Murmurs: No murmurs noted. No rubs, thrills, or gallops.   Lower Extremities: No edema noted.  Gastrointestinal:  Abdomen soft, non-distended, non-tender.    Musculoskeletal: Normal movement of extremities.  Skin and Nails: General appearance normal. No pallor, cyanosis, diaphoresis. Skin temperature normal. No clubbing of fingernails.   Psychiatric: Patient alert and oriented to person, place, and time. Speech and behavior appropriate. Normal mood and affect.       ECG 12 Lead    Date/Time: 11/1/2022 9:56 AM  Performed by: Veronica Deutsch APRN  Authorized by: Veronica Deutsch APRN   Comparison: compared with previous ECG from 5/14/2022  Similar to previous ECG  Rhythm: sinus rhythm  Rate: normal  BPM: 65  Conduction: conduction normal  T Waves: T waves normal  QRS axis: normal  Other findings: non-specific ST-T wave changes    Clinical impression: non-specific ECG              Assessment:       Diagnosis Plan   1. White coat syndrome with diagnosis of hypertension        2. " Palpitations        3. Encounter for pre-operative cardiovascular clearance               Plan:       1.  White coat syndrome with diagnosis of hypertension: Blood pressure elevated today.  At home blood pressure is running 120s/60s.  Approximately twice per week, she is taking losartan 12.5 mg if her blood pressure is systolic 140-160s.  She does not like taking losartan often because it makes her urinate more and feel lightheaded.    2.  Palpitations: Resolved.    3.  Perioperative Cardiac Risk Assessment: She is scheduled for left hip surgery on 11/28 by Dr. Vargas.  She is considered at acceptable risk for surgery from a cardiovascular standpoint. According to the Jorge's Revised Cardiac Risk Index, patient is considered very low risk (0.4%) of adverse cardiovascular events occurring with moderate risk surgery.     4.  I recommended a 1 year follow-up visit with Dr. Nahum Benavides.  She will call with any concerns if necessary      As always, it has been a pleasure to participate in your patient's care. Thank you.       Sincerely,         IGGY Fine  [facility] Cardiology      · Dictated utilizing Dragon Dictation  · COVID-19 Precautions - Patient was compliant in wearing a mask. When I saw the patient, I used appropriate personal protective equipment (PPE) including mask and eye shield (standard procedure).  Additionally, I used gown and gloves if indicated.  Hand hygiene was completed before and after seeing the patient.

## 2022-11-10 ENCOUNTER — TELEPHONE (OUTPATIENT)
Dept: GASTROENTEROLOGY | Facility: CLINIC | Age: 72
End: 2022-11-10

## 2022-11-12 LAB
ALBUMIN SERPL-MCNC: 4.4 G/DL (ref 3.5–5.2)
ALBUMIN/GLOB SERPL: 1.6 G/DL
ALP SERPL-CCNC: 89 U/L (ref 39–117)
ALT SERPL-CCNC: 6 U/L (ref 1–33)
AST SERPL-CCNC: 12 U/L (ref 1–32)
BASOPHILS # BLD AUTO: 0.05 10*3/MM3 (ref 0–0.2)
BASOPHILS NFR BLD AUTO: 1 % (ref 0–1.5)
BILIRUB SERPL-MCNC: 0.4 MG/DL (ref 0–1.2)
BUN SERPL-MCNC: 15 MG/DL (ref 8–23)
BUN/CREAT SERPL: 19.2 (ref 7–25)
CALCIUM SERPL-MCNC: 9.3 MG/DL (ref 8.6–10.5)
CHLORIDE SERPL-SCNC: 102 MMOL/L (ref 98–107)
CHOLEST SERPL-MCNC: 206 MG/DL (ref 0–200)
CO2 SERPL-SCNC: 28 MMOL/L (ref 22–29)
CREAT SERPL-MCNC: 0.78 MG/DL (ref 0.57–1)
EGFRCR SERPLBLD CKD-EPI 2021: 81.3 ML/MIN/1.73
EOSINOPHIL # BLD AUTO: 0.09 10*3/MM3 (ref 0–0.4)
EOSINOPHIL NFR BLD AUTO: 1.8 % (ref 0.3–6.2)
ERYTHROCYTE [DISTWIDTH] IN BLOOD BY AUTOMATED COUNT: 13.8 % (ref 12.3–15.4)
GLOBULIN SER CALC-MCNC: 2.8 GM/DL
GLUCOSE SERPL-MCNC: 78 MG/DL (ref 65–99)
HCT VFR BLD AUTO: 43 % (ref 34–46.6)
HDLC SERPL-MCNC: 64 MG/DL (ref 40–60)
HGB BLD-MCNC: 13.6 G/DL (ref 12–15.9)
IMM GRANULOCYTES # BLD AUTO: 0.01 10*3/MM3 (ref 0–0.05)
IMM GRANULOCYTES NFR BLD AUTO: 0.2 % (ref 0–0.5)
LDLC SERPL CALC-MCNC: 126 MG/DL (ref 0–100)
LDLC/HDLC SERPL: 1.94 {RATIO}
LYMPHOCYTES # BLD AUTO: 1.27 10*3/MM3 (ref 0.7–3.1)
LYMPHOCYTES NFR BLD AUTO: 25 % (ref 19.6–45.3)
MCH RBC QN AUTO: 28.1 PG (ref 26.6–33)
MCHC RBC AUTO-ENTMCNC: 31.6 G/DL (ref 31.5–35.7)
MCV RBC AUTO: 88.8 FL (ref 79–97)
MONOCYTES # BLD AUTO: 0.36 10*3/MM3 (ref 0.1–0.9)
MONOCYTES NFR BLD AUTO: 7.1 % (ref 5–12)
NEUTROPHILS # BLD AUTO: 3.29 10*3/MM3 (ref 1.7–7)
NEUTROPHILS NFR BLD AUTO: 64.9 % (ref 42.7–76)
NRBC BLD AUTO-RTO: 0 /100 WBC (ref 0–0.2)
PLATELET # BLD AUTO: 292 10*3/MM3 (ref 140–450)
POTASSIUM SERPL-SCNC: 4.5 MMOL/L (ref 3.5–5.2)
PROT SERPL-MCNC: 7.2 G/DL (ref 6–8.5)
RBC # BLD AUTO: 4.84 10*6/MM3 (ref 3.77–5.28)
SODIUM SERPL-SCNC: 139 MMOL/L (ref 136–145)
TRIGL SERPL-MCNC: 89 MG/DL (ref 0–150)
VLDLC SERPL CALC-MCNC: 16 MG/DL (ref 5–40)
WBC # BLD AUTO: 5.07 10*3/MM3 (ref 3.4–10.8)

## 2022-11-16 ENCOUNTER — OFFICE VISIT (OUTPATIENT)
Dept: INTERNAL MEDICINE | Facility: CLINIC | Age: 72
End: 2022-11-16

## 2022-11-16 VITALS
WEIGHT: 197 LBS | HEIGHT: 67 IN | DIASTOLIC BLOOD PRESSURE: 78 MMHG | HEART RATE: 83 BPM | OXYGEN SATURATION: 96 % | BODY MASS INDEX: 30.92 KG/M2 | SYSTOLIC BLOOD PRESSURE: 140 MMHG

## 2022-11-16 DIAGNOSIS — L30.9 ECZEMA, UNSPECIFIED TYPE: Primary | ICD-10-CM

## 2022-11-16 DIAGNOSIS — M17.0 PRIMARY OSTEOARTHRITIS OF BOTH KNEES: ICD-10-CM

## 2022-11-16 PROCEDURE — 99214 OFFICE O/P EST MOD 30 MIN: CPT | Performed by: FAMILY MEDICINE

## 2022-11-16 RX ORDER — TRIAMCINOLONE ACETONIDE 5 MG/G
1 OINTMENT TOPICAL 2 TIMES DAILY
Qty: 15 G | Refills: 0 | Status: SHIPPED | OUTPATIENT
Start: 2022-11-16 | End: 2023-03-29

## 2022-11-16 NOTE — PROGRESS NOTES
"Chief Complaint  rash on leg    Subjective        Faviola Issa presents to [facility] PRIMARY [facility]ARE  History of Present Illness    Patient states that she has a mild rash located on both of her legs pacifically left leg.  Patient states is slightly pruritic.  Slightly red.  It can be intermittent.    Patient also notes she has an osteoarthritis in both of her knees, and states that she would be interested in trying some medication for this.    Objective   Vital Signs:  /78 (BP Location: Left arm, Patient Position: Sitting, Cuff Size: Adult)   Pulse 83   Ht 170 cm (66.93\")   Wt 89.4 kg (197 lb)   SpO2 96%   BMI 30.92 kg/m²   Estimated body mass index is 30.92 kg/m² as calculated from the following:    Height as of this encounter: 170 cm (66.93\").    Weight as of this encounter: 89.4 kg (197 lb).          Physical Exam  Vitals and nursing note reviewed.   Constitutional:       Appearance: She is well-developed.   HENT:      Head: Normocephalic and atraumatic.   Musculoskeletal:      Cervical back: Normal range of motion and neck supple.   Neurological:      Mental Status: She is alert and oriented to person, place, and time.   Psychiatric:         Behavior: Behavior normal.        Result Review :                Assessment and Plan   Diagnoses and all orders for this visit:    1. Eczema, unspecified type (Primary)  -     triamcinolone (KENALOG) 0.5 % ointment; Apply 1 application topically to the appropriate area as directed 2 (Two) Times a Day.  Dispense: 15 g; Refill: 00    2. Primary osteoarthritis of both knees      For the rash which looks to be more likely eczema.  Start on triamcinolone ointment.  For the primary osteoarthritis of her knees we will start her on Pennsaid.  She is going to get hip surgery done soon.       Follow Up   No follow-ups on file.  Patient was given instructions and counseling regarding her condition or for health maintenance advice. Please see specific " information pulled into the AVS if appropriate.

## 2022-11-18 ENCOUNTER — HOSPITAL ENCOUNTER (OUTPATIENT)
Dept: GENERAL RADIOLOGY | Facility: HOSPITAL | Age: 72
Discharge: HOME | End: 2022-11-18

## 2022-11-18 ENCOUNTER — PRE-ADMISSION TESTING (OUTPATIENT)
Dept: PREADMISSION TESTING | Facility: HOSPITAL | Age: 72
End: 2022-11-18

## 2022-11-18 VITALS
DIASTOLIC BLOOD PRESSURE: 70 MMHG | HEIGHT: 67 IN | BODY MASS INDEX: 29.82 KG/M2 | HEART RATE: 58 BPM | SYSTOLIC BLOOD PRESSURE: 149 MMHG | RESPIRATION RATE: 16 BRPM | TEMPERATURE: 98 F | WEIGHT: 190 LBS | OXYGEN SATURATION: 98 %

## 2022-11-18 LAB
ABO GROUP BLD: NORMAL
BACTERIA UR QL AUTO: NORMAL /HPF
BILIRUB UR QL STRIP: NEGATIVE
BLD GP AB SCN SERPL QL: NEGATIVE
CLARITY UR: CLEAR
COLOR UR: YELLOW
GLUCOSE UR STRIP-MCNC: NEGATIVE MG/DL
HBA1C MFR BLD: 5.1 % (ref 4.8–5.6)
HGB UR QL STRIP.AUTO: NEGATIVE
HYALINE CASTS UR QL AUTO: NORMAL /LPF
KETONES UR QL STRIP: NEGATIVE
LEUKOCYTE ESTERASE UR QL STRIP.AUTO: ABNORMAL
NITRITE UR QL STRIP: NEGATIVE
PH UR STRIP.AUTO: 7 [PH] (ref 5–8)
PROT UR QL STRIP: NEGATIVE
RBC # UR STRIP: NORMAL /HPF
REF LAB TEST METHOD: NORMAL
RH BLD: POSITIVE
SP GR UR STRIP: <=1.005 (ref 1–1.03)
SQUAMOUS #/AREA URNS HPF: NORMAL /HPF
T&S EXPIRATION DATE: NORMAL
UROBILINOGEN UR QL STRIP: ABNORMAL
WBC # UR STRIP: NORMAL /HPF

## 2022-11-18 PROCEDURE — 81001 URINALYSIS AUTO W/SCOPE: CPT

## 2022-11-18 PROCEDURE — 71046 X-RAY EXAM CHEST 2 VIEWS: CPT

## 2022-11-18 PROCEDURE — 83036 HEMOGLOBIN GLYCOSYLATED A1C: CPT

## 2022-11-18 PROCEDURE — 86900 BLOOD TYPING SEROLOGIC ABO: CPT

## 2022-11-18 PROCEDURE — 36415 COLL VENOUS BLD VENIPUNCTURE: CPT

## 2022-11-18 PROCEDURE — 86901 BLOOD TYPING SEROLOGIC RH(D): CPT

## 2022-11-18 PROCEDURE — 86850 RBC ANTIBODY SCREEN: CPT

## 2022-11-18 PROCEDURE — 73502 X-RAY EXAM HIP UNI 2-3 VIEWS: CPT

## 2022-11-18 ASSESSMENT — HOOS JR
HOOS JR SCORE: 16
HOOS JR SCORE: 47.487

## 2022-11-18 NOTE — DISCHARGE INSTRUCTIONS
Take the following medications the morning of surgery:    LEVOTHYROXINE AND STIOTO INHALER    TIME OF ARRIVAL WILL BE CALLED TO YOU THE DAY BEFORE SURGERY    If you are on prescription narcotic pain medication to control your pain you may also take that medication the morning of surgery.    General Instructions:  Do not eat solid food after midnight the night before surgery.  You may drink clear liquids day of surgery but must stop at least one hour before your hospital arrival time.  It is beneficial for you to have a clear drink that contains carbohydrates the day of surgery.  We suggest a 12 to 20 ounce bottle of Gatorade or Powerade for non-diabetic patients or a 12 to 20 ounce bottle of G2 or Powerade Zero for diabetic patients. (Pediatric patients, are not advised to drink a 12 to 20 ounce carbohydrate drink)    Clear liquids are liquids you can see through.  Nothing red in color.     Plain water                               Sports drinks  Sodas                                   Gelatin (Jell-O)  Fruit juices without pulp such as white grape juice and apple juice  Popsicles that contain no fruit or yogurt  Tea or coffee (no cream or milk added)  Gatorade / Powerade  G2 / Powerade Zero       Patients who avoid smoking, chewing tobacco and alcohol for 4 weeks prior to surgery have a reduced risk of post-operative complications.  Quit smoking as many days before surgery as you can.  Do not smoke, use chewing tobacco or drink alcohol the day of surgery.   If applicable bring your C-PAP/ BI-PAP machine.  Bring any papers given to you in the doctor’s office.  Wear clean comfortable clothes.  Do not wear contact lenses, false eyelashes or make-up.  Bring a case for your glasses.   Bring crutches or walker if applicable.  Remove all piercings.  Leave jewelry and any other valuables at home.  Hair extensions with metal clips must be removed prior to surgery.  The Pre-Admission Testing nurse will instruct you to bring  medications if unable to obtain an accurate list in Pre-Admission Testing.        If you were given a blood bank ID arm band remember to bring it with you the day of surgery.    Preventing a Surgical Site Infection:  For 2 to 3 days before surgery, avoid shaving with a razor because the razor can irritate skin and make it easier to develop an infection.    Any areas of open skin can increase the risk of a post-operative wound infection by allowing bacteria to enter and travel throughout the body.  Notify your surgeon if you have any skin wounds / rashes even if it is not near the expected surgical site.  The area will need assessed to determine if surgery should be delayed until it is healed.  The night prior to surgery shower using a fresh bar of anti-bacterial soap (such as Dial) and clean washcloth.  Sleep in a clean bed with clean clothing.  Do not allow pets to sleep with you.  Shower on the morning of surgery using a fresh bar of anti-bacterial soap (such as Dial) and clean washcloth.  Dry with a clean towel and dress in clean clothing.  Ask your surgeon if you will be receiving antibiotics prior to surgery.  Make sure you, your family, and all healthcare providers clean their hands with soap and water or an alcohol based hand  before caring for you or your wound.    Day of surgery:  Your arrival time is approximately two hours before your scheduled surgery time.  Upon arrival, a Pre-op nurse and Anesthesiologist will review your health history, obtain vital signs, and answer questions you may have.  The only belongings needed at this time will be a list of your home medications and if applicable your C-PAP/BI-PAP machine.  A Pre-op nurse will start an IV and you may receive medication in preparation for surgery, including something to help you relax.     Please be aware that surgery does come with discomfort.  We want to make every effort to control your discomfort so please discuss any uncontrolled  symptoms with your nurse.   Your doctor will most likely have prescribed pain medications.      If you are going home after surgery you will receive individualized written care instructions before being discharged.  A responsible adult must drive you to and from the hospital on the day of your surgery and stay with you for 24 hours.  Discharge prescriptions can be filled by the hospital pharmacy during regular pharmacy hours.  If you are having surgery late in the day/evening your prescription may be e-prescribed to your pharmacy.  Please verify your pharmacy hours or chose a 24 hour pharmacy to avoid not having access to your prescription because your pharmacy has closed for the day.    If you are staying overnight following surgery, you will be transported to your hospital room following the recovery period.  [facility] has all private rooms.    If you have any questions please call Pre-Admission Testing at [phone].  Deductibles and co-payments are collected on the day of service. Please be prepared to pay the required co-pay, deductible or deposit on the day of service as defined by your plan.    Call your surgeon immediately if you experience any of the following symptoms:  Sore Throat  Shortness of Breath or difficulty breathing  Cough  Chills  Body soreness or muscle pain  Headache  Fever  New loss of taste or smell  Do not arrive for your surgery ill.  Your procedure will need to be rescheduled to another time.  You will need to call your physician before the day of surgery to avoid any unnecessary exposure to hospital staff as well as other patients.       CHLORHEXIDINE CLOTH INSTRUCTIONS  The morning of surgery follow these instructions using the Chlorhexidine cloths you've been given.  These steps reduce bacteria on the body.  Do not use the cloths near your eyes, ears mouth, genitalia or on open wounds.  Throw the cloths away after use but do not try to flush them down a toilet.       Open and remove one cloth at a time from the package.    Leave the cloth unfolded and begin the bathing.  Massage the skin with the cloths using gentle pressure to remove bacteria.  Do not scrub harshly.   Follow the steps below with one 2% CHG cloth per area (6 total cloths).  One cloth for neck, shoulders and chest.  One cloth for both arms, hands, fingers and underarms (do underarms last).  One cloth for the abdomen followed by groin.  One cloth for right leg and foot including between the toes.  One cloth for left leg and foot including between the toes.  The last cloth is to be used for the back of the neck, back and buttocks.    Allow the CHG to air dry 3 minutes on the skin which will give it time to work and decrease the chance of irritation.  The skin may feel sticky until it is dry.  Do not rinse with water or any other liquid or you will lose the beneficial effects of the CHG.  If mild skin irritation occurs, do rinse the skin to remove the CHG.  Report this to the nurse at time of admission.  Do not apply lotions, creams, ointments, deodorants or perfumes after using the clothes. Dress in clean clothes before coming to the hospital.

## 2022-11-22 ENCOUNTER — TELEPHONE (OUTPATIENT)
Dept: INTERNAL MEDICINE | Facility: CLINIC | Age: 72
End: 2022-11-22

## 2022-11-22 NOTE — TELEPHONE ENCOUNTER
Caller: Faviola Issa    Relationship: Self    Best call back number: [phone]    What is the best time to reach you: ANYTIME    Who are you requesting to speak with (clinical staff, provider,  specific staff member): CLINICAL    What was the call regarding: PATIENT CALLING WANTING TO KNOW IF SHE RECEIVED HER FLU SHOT AT THE OFFICE SHE THOUGHT SHE CAME IN ABOUT SEPT SHE CANT REMEMBER IF IT WAS THE OFFICE OR [facility]    Do you require a callback: YES

## 2022-11-28 ENCOUNTER — ANESTHESIA (OUTPATIENT)
Dept: PERIOP | Facility: HOSPITAL | Age: 72
End: 2022-11-28

## 2022-11-28 ENCOUNTER — HOSPITAL ENCOUNTER (OUTPATIENT)
Facility: HOSPITAL | Age: 72
Discharge: HOME | End: 2022-11-29
Attending: ORTHOPAEDIC SURGERY | Admitting: ORTHOPAEDIC SURGERY

## 2022-11-28 ENCOUNTER — APPOINTMENT (OUTPATIENT)
Dept: GENERAL RADIOLOGY | Facility: HOSPITAL | Age: 72
End: 2022-11-28

## 2022-11-28 ENCOUNTER — ANESTHESIA EVENT (OUTPATIENT)
Dept: PERIOP | Facility: HOSPITAL | Age: 72
End: 2022-11-28

## 2022-11-28 PROBLEM — Z96.649 HIP JOINT REPLACEMENT STATUS: Status: ACTIVE | Noted: 2022-11-28

## 2022-11-28 LAB
HCT VFR BLD AUTO: 41 % (ref 34–46.6)
HGB BLD-MCNC: 13.3 G/DL (ref 12–15.9)

## 2022-11-28 PROCEDURE — 85018 HEMOGLOBIN: CPT | Performed by: ORTHOPAEDIC SURGERY

## 2022-11-28 PROCEDURE — G0378 HOSPITAL OBSERVATION PER HR: HCPCS

## 2022-11-28 PROCEDURE — C1776 JOINT DEVICE (IMPLANTABLE): HCPCS | Performed by: ORTHOPAEDIC SURGERY

## 2022-11-28 PROCEDURE — 85014 HEMATOCRIT: CPT | Performed by: ORTHOPAEDIC SURGERY

## 2022-11-28 PROCEDURE — 25010000002 ROPIVACAINE PER 1 MG: Performed by: ORTHOPAEDIC SURGERY

## 2022-11-28 PROCEDURE — 76000 FLUOROSCOPY <1 HR PHYS/QHP: CPT

## 2022-11-28 PROCEDURE — 25010000002 FENTANYL CITRATE (PF) 50 MCG/ML SOLUTION: Performed by: ANESTHESIOLOGY

## 2022-11-28 PROCEDURE — 25010000002 HYDROMORPHONE 1 MG/ML SOLUTION: Performed by: NURSE ANESTHETIST, CERTIFIED REGISTERED

## 2022-11-28 PROCEDURE — 25010000002 PROPOFOL 10 MG/ML EMULSION: Performed by: NURSE ANESTHETIST, CERTIFIED REGISTERED

## 2022-11-28 PROCEDURE — 97161 PT EVAL LOW COMPLEX 20 MIN: CPT

## 2022-11-28 PROCEDURE — 25010000002 FENTANYL CITRATE (PF) 100 MCG/2ML SOLUTION: Performed by: NURSE ANESTHETIST, CERTIFIED REGISTERED

## 2022-11-28 PROCEDURE — 25010000002 DEXAMETHASONE SODIUM PHOSPHATE 20 MG/5ML SOLUTION: Performed by: NURSE ANESTHETIST, CERTIFIED REGISTERED

## 2022-11-28 PROCEDURE — 25010000002 ONDANSETRON PER 1 MG: Performed by: ORTHOPAEDIC SURGERY

## 2022-11-28 PROCEDURE — 72170 X-RAY EXAM OF PELVIS: CPT

## 2022-11-28 PROCEDURE — 63710000001 ONDANSETRON PER 8 MG: Performed by: ORTHOPAEDIC SURGERY

## 2022-11-28 PROCEDURE — 25010000002 CLONIDINE PER 1 MG: Performed by: ORTHOPAEDIC SURGERY

## 2022-11-28 PROCEDURE — 25010000002 EPINEPHRINE 1 MG/ML SOLUTION 30 ML VIAL: Performed by: ORTHOPAEDIC SURGERY

## 2022-11-28 PROCEDURE — 25010000002 ONDANSETRON PER 1 MG: Performed by: NURSE ANESTHETIST, CERTIFIED REGISTERED

## 2022-11-28 PROCEDURE — 25010000002 ONDANSETRON PER 1 MG: Performed by: ANESTHESIOLOGY

## 2022-11-28 PROCEDURE — 97530 THERAPEUTIC ACTIVITIES: CPT

## 2022-11-28 PROCEDURE — 73501 X-RAY EXAM HIP UNI 1 VIEW: CPT

## 2022-11-28 PROCEDURE — 25010000002 KETOROLAC TROMETHAMINE PER 15 MG: Performed by: ORTHOPAEDIC SURGERY

## 2022-11-28 DEVICE — POLARSTEM STANDARD NON-CEMENTED                                    WITH TI/HA 2
Type: IMPLANTABLE DEVICE | Site: HIP | Status: FUNCTIONAL
Brand: POLARSTEM

## 2022-11-28 DEVICE — R3 3 HOLE ACETABULAR SHELL 54MM
Type: IMPLANTABLE DEVICE | Site: HIP | Status: FUNCTIONAL
Brand: R3 ACETABULAR

## 2022-11-28 DEVICE — R3 0 DEGREE XLPE ACETABULAR LINER                                    36MM ID X OD 54MM
Type: IMPLANTABLE DEVICE | Site: HIP | Status: FUNCTIONAL
Brand: R3

## 2022-11-28 DEVICE — IMPLANTABLE DEVICE: Type: IMPLANTABLE DEVICE | Site: HIP | Status: FUNCTIONAL

## 2022-11-28 DEVICE — DEV CONTRL TISS STRATAFIX SPIRAL MNCRYL UD 3/0 PLS 30CM: Type: IMPLANTABLE DEVICE | Site: HIP | Status: FUNCTIONAL

## 2022-11-28 DEVICE — OXINIUM FEMORAL HEAD 12/14 TAPER                                    36 MM M/+4
Type: IMPLANTABLE DEVICE | Site: HIP | Status: FUNCTIONAL
Brand: OXINIUM

## 2022-11-28 RX ORDER — CLINDAMYCIN PHOSPHATE 900 MG/50ML
900 INJECTION INTRAVENOUS ONCE
Status: COMPLETED | OUTPATIENT
Start: 2022-11-28 | End: 2022-11-28

## 2022-11-28 RX ORDER — OXYCODONE AND ACETAMINOPHEN 7.5; 325 MG/1; MG/1
1 TABLET ORAL ONCE AS NEEDED
Status: DISCONTINUED | OUTPATIENT
Start: 2022-11-28 | End: 2022-11-28 | Stop reason: HOSPADM

## 2022-11-28 RX ORDER — ONDANSETRON 4 MG/1
4 TABLET, FILM COATED ORAL EVERY 6 HOURS PRN
Status: DISCONTINUED | OUTPATIENT
Start: 2022-11-28 | End: 2022-11-29 | Stop reason: HOSPADM

## 2022-11-28 RX ORDER — LOSARTAN POTASSIUM 25 MG/1
25 TABLET ORAL
Status: DISCONTINUED | OUTPATIENT
Start: 2022-11-28 | End: 2022-11-29 | Stop reason: HOSPADM

## 2022-11-28 RX ORDER — POVIDONE-IODINE 10 MG/ML
1 SOLUTION TOPICAL ONCE
Status: DISCONTINUED | OUTPATIENT
Start: 2022-11-28 | End: 2022-11-28 | Stop reason: HOSPADM

## 2022-11-28 RX ORDER — FAMOTIDINE 20 MG/1
40 TABLET, FILM COATED ORAL DAILY
Status: DISCONTINUED | OUTPATIENT
Start: 2022-11-28 | End: 2022-11-29 | Stop reason: HOSPADM

## 2022-11-28 RX ORDER — DOCUSATE SODIUM 100 MG/1
100 CAPSULE, LIQUID FILLED ORAL 2 TIMES DAILY PRN
Status: DISCONTINUED | OUTPATIENT
Start: 2022-11-28 | End: 2022-11-29 | Stop reason: HOSPADM

## 2022-11-28 RX ORDER — SODIUM CHLORIDE 0.9 % (FLUSH) 0.9 %
3 SYRINGE (ML) INJECTION EVERY 12 HOURS SCHEDULED
Status: DISCONTINUED | OUTPATIENT
Start: 2022-11-28 | End: 2022-11-28 | Stop reason: HOSPADM

## 2022-11-28 RX ORDER — ONDANSETRON 2 MG/ML
4 INJECTION INTRAMUSCULAR; INTRAVENOUS EVERY 6 HOURS PRN
Status: DISCONTINUED | OUTPATIENT
Start: 2022-11-28 | End: 2022-11-29 | Stop reason: HOSPADM

## 2022-11-28 RX ORDER — LEVOTHYROXINE SODIUM 0.1 MG/1
100 TABLET ORAL DAILY
Status: DISCONTINUED | OUTPATIENT
Start: 2022-11-28 | End: 2022-11-29 | Stop reason: HOSPADM

## 2022-11-28 RX ORDER — MIDAZOLAM HYDROCHLORIDE 1 MG/ML
0.5 INJECTION INTRAMUSCULAR; INTRAVENOUS
Status: DISCONTINUED | OUTPATIENT
Start: 2022-11-28 | End: 2022-11-28 | Stop reason: HOSPADM

## 2022-11-28 RX ORDER — NALOXONE HCL 0.4 MG/ML
0.1 VIAL (ML) INJECTION
Status: DISCONTINUED | OUTPATIENT
Start: 2022-11-28 | End: 2022-11-29 | Stop reason: HOSPADM

## 2022-11-28 RX ORDER — MELOXICAM 15 MG/1
15 TABLET ORAL DAILY
Status: DISCONTINUED | OUTPATIENT
Start: 2022-11-28 | End: 2022-11-29 | Stop reason: HOSPADM

## 2022-11-28 RX ORDER — ONDANSETRON 2 MG/ML
4 INJECTION INTRAMUSCULAR; INTRAVENOUS ONCE AS NEEDED
Status: COMPLETED | OUTPATIENT
Start: 2022-11-28 | End: 2022-11-28

## 2022-11-28 RX ORDER — CLINDAMYCIN PHOSPHATE 900 MG/50ML
900 INJECTION INTRAVENOUS EVERY 8 HOURS
Status: COMPLETED | OUTPATIENT
Start: 2022-11-28 | End: 2022-11-29

## 2022-11-28 RX ORDER — SODIUM CHLORIDE 0.9 % (FLUSH) 0.9 %
3-10 SYRINGE (ML) INJECTION AS NEEDED
Status: DISCONTINUED | OUTPATIENT
Start: 2022-11-28 | End: 2022-11-28 | Stop reason: HOSPADM

## 2022-11-28 RX ORDER — CELECOXIB 200 MG/1
200 CAPSULE ORAL ONCE
Status: COMPLETED | OUTPATIENT
Start: 2022-11-28 | End: 2022-11-28

## 2022-11-28 RX ORDER — HYDROCODONE BITARTRATE AND ACETAMINOPHEN 5; 325 MG/1; MG/1
1 TABLET ORAL ONCE AS NEEDED
Status: DISCONTINUED | OUTPATIENT
Start: 2022-11-28 | End: 2022-11-28 | Stop reason: HOSPADM

## 2022-11-28 RX ORDER — PROMETHAZINE HYDROCHLORIDE 25 MG/1
25 TABLET ORAL ONCE AS NEEDED
Status: DISCONTINUED | OUTPATIENT
Start: 2022-11-28 | End: 2022-11-28 | Stop reason: HOSPADM

## 2022-11-28 RX ORDER — ACETAMINOPHEN 500 MG
1000 TABLET ORAL ONCE
Status: COMPLETED | OUTPATIENT
Start: 2022-11-28 | End: 2022-11-28

## 2022-11-28 RX ORDER — NALOXONE HCL 0.4 MG/ML
0.4 VIAL (ML) INJECTION AS NEEDED
Status: DISCONTINUED | OUTPATIENT
Start: 2022-11-28 | End: 2022-11-28 | Stop reason: HOSPADM

## 2022-11-28 RX ORDER — FENTANYL CITRATE 50 UG/ML
50 INJECTION, SOLUTION INTRAMUSCULAR; INTRAVENOUS
Status: DISCONTINUED | OUTPATIENT
Start: 2022-11-28 | End: 2022-11-28 | Stop reason: HOSPADM

## 2022-11-28 RX ORDER — HYDROMORPHONE HYDROCHLORIDE 1 MG/ML
0.25 INJECTION, SOLUTION INTRAMUSCULAR; INTRAVENOUS; SUBCUTANEOUS
Status: DISCONTINUED | OUTPATIENT
Start: 2022-11-28 | End: 2022-11-28 | Stop reason: HOSPADM

## 2022-11-28 RX ORDER — ROCURONIUM BROMIDE 10 MG/ML
INJECTION, SOLUTION INTRAVENOUS AS NEEDED
Status: DISCONTINUED | OUTPATIENT
Start: 2022-11-28 | End: 2022-11-28 | Stop reason: SURG

## 2022-11-28 RX ORDER — OXYCODONE HYDROCHLORIDE AND ACETAMINOPHEN 5; 325 MG/1; MG/1
1 TABLET ORAL EVERY 4 HOURS PRN
Status: DISCONTINUED | OUTPATIENT
Start: 2022-11-28 | End: 2022-11-29 | Stop reason: HOSPADM

## 2022-11-28 RX ORDER — CEFAZOLIN SODIUM 2 G/100ML
2 INJECTION, SOLUTION INTRAVENOUS ONCE
Status: DISCONTINUED | OUTPATIENT
Start: 2022-11-28 | End: 2022-11-28 | Stop reason: HOSPADM

## 2022-11-28 RX ORDER — PREGABALIN 75 MG/1
150 CAPSULE ORAL ONCE
Status: COMPLETED | OUTPATIENT
Start: 2022-11-28 | End: 2022-11-28

## 2022-11-28 RX ORDER — FAMOTIDINE 10 MG/ML
20 INJECTION, SOLUTION INTRAVENOUS ONCE
Status: COMPLETED | OUTPATIENT
Start: 2022-11-28 | End: 2022-11-28

## 2022-11-28 RX ORDER — ONDANSETRON 2 MG/ML
INJECTION INTRAMUSCULAR; INTRAVENOUS AS NEEDED
Status: DISCONTINUED | OUTPATIENT
Start: 2022-11-28 | End: 2022-11-28 | Stop reason: SURG

## 2022-11-28 RX ORDER — PROMETHAZINE HYDROCHLORIDE 25 MG/1
25 SUPPOSITORY RECTAL ONCE AS NEEDED
Status: DISCONTINUED | OUTPATIENT
Start: 2022-11-28 | End: 2022-11-28 | Stop reason: HOSPADM

## 2022-11-28 RX ORDER — OXYCODONE HYDROCHLORIDE AND ACETAMINOPHEN 5; 325 MG/1; MG/1
2 TABLET ORAL EVERY 4 HOURS PRN
Status: DISCONTINUED | OUTPATIENT
Start: 2022-11-28 | End: 2022-11-29 | Stop reason: HOSPADM

## 2022-11-28 RX ORDER — FENTANYL CITRATE 50 UG/ML
INJECTION, SOLUTION INTRAMUSCULAR; INTRAVENOUS AS NEEDED
Status: DISCONTINUED | OUTPATIENT
Start: 2022-11-28 | End: 2022-11-28 | Stop reason: SURG

## 2022-11-28 RX ORDER — LIDOCAINE HYDROCHLORIDE 10 MG/ML
0.5 INJECTION, SOLUTION EPIDURAL; INFILTRATION; INTRACAUDAL; PERINEURAL ONCE AS NEEDED
Status: DISCONTINUED | OUTPATIENT
Start: 2022-11-28 | End: 2022-11-28 | Stop reason: HOSPADM

## 2022-11-28 RX ORDER — CLINDAMYCIN PHOSPHATE 600 MG/50ML
600 INJECTION INTRAVENOUS ONCE
Status: DISCONTINUED | OUTPATIENT
Start: 2022-11-28 | End: 2022-11-28

## 2022-11-28 RX ORDER — ASPIRIN 81 MG/1
81 TABLET ORAL EVERY 12 HOURS SCHEDULED
Status: DISCONTINUED | OUTPATIENT
Start: 2022-11-29 | End: 2022-11-29 | Stop reason: HOSPADM

## 2022-11-28 RX ORDER — DEXAMETHASONE SODIUM PHOSPHATE 4 MG/ML
INJECTION, SOLUTION INTRA-ARTICULAR; INTRALESIONAL; INTRAMUSCULAR; INTRAVENOUS; SOFT TISSUE AS NEEDED
Status: DISCONTINUED | OUTPATIENT
Start: 2022-11-28 | End: 2022-11-28 | Stop reason: SURG

## 2022-11-28 RX ORDER — DIPHENHYDRAMINE HYDROCHLORIDE 50 MG/ML
12.5 INJECTION INTRAMUSCULAR; INTRAVENOUS
Status: DISCONTINUED | OUTPATIENT
Start: 2022-11-28 | End: 2022-11-28 | Stop reason: HOSPADM

## 2022-11-28 RX ORDER — SODIUM CHLORIDE 9 MG/ML
100 INJECTION, SOLUTION INTRAVENOUS CONTINUOUS
Status: DISCONTINUED | OUTPATIENT
Start: 2022-11-28 | End: 2022-11-29 | Stop reason: HOSPADM

## 2022-11-28 RX ORDER — LIDOCAINE HYDROCHLORIDE 20 MG/ML
INJECTION, SOLUTION EPIDURAL; INFILTRATION; INTRACAUDAL; PERINEURAL AS NEEDED
Status: DISCONTINUED | OUTPATIENT
Start: 2022-11-28 | End: 2022-11-28 | Stop reason: SURG

## 2022-11-28 RX ORDER — SODIUM CHLORIDE, SODIUM LACTATE, POTASSIUM CHLORIDE, CALCIUM CHLORIDE 600; 310; 30; 20 MG/100ML; MG/100ML; MG/100ML; MG/100ML
9 INJECTION, SOLUTION INTRAVENOUS CONTINUOUS
Status: DISCONTINUED | OUTPATIENT
Start: 2022-11-28 | End: 2022-11-29 | Stop reason: HOSPADM

## 2022-11-28 RX ORDER — ENALAPRILAT 2.5 MG/2ML
1.25 INJECTION INTRAVENOUS ONCE AS NEEDED
Status: DISCONTINUED | OUTPATIENT
Start: 2022-11-28 | End: 2022-11-28 | Stop reason: HOSPADM

## 2022-11-28 RX ORDER — PROPOFOL 10 MG/ML
VIAL (ML) INTRAVENOUS AS NEEDED
Status: DISCONTINUED | OUTPATIENT
Start: 2022-11-28 | End: 2022-11-28 | Stop reason: SURG

## 2022-11-28 RX ADMIN — OXYCODONE AND ACETAMINOPHEN 1 TABLET: 5; 325 TABLET ORAL at 22:56

## 2022-11-28 RX ADMIN — FENTANYL CITRATE 50 MCG: 0.05 INJECTION, SOLUTION INTRAMUSCULAR; INTRAVENOUS at 12:06

## 2022-11-28 RX ADMIN — FENTANYL CITRATE 50 MCG: 50 INJECTION, SOLUTION INTRAMUSCULAR; INTRAVENOUS at 11:23

## 2022-11-28 RX ADMIN — FENTANYL CITRATE 50 MCG: 50 INJECTION, SOLUTION INTRAMUSCULAR; INTRAVENOUS at 10:57

## 2022-11-28 RX ADMIN — FAMOTIDINE 20 MG: 10 INJECTION INTRAVENOUS at 10:18

## 2022-11-28 RX ADMIN — PROPOFOL 200 MG: 10 INJECTION, EMULSION INTRAVENOUS at 10:28

## 2022-11-28 RX ADMIN — ROCURONIUM BROMIDE 50 MG: 10 INJECTION, SOLUTION INTRAVENOUS at 10:28

## 2022-11-28 RX ADMIN — FENTANYL CITRATE 100 MCG: 50 INJECTION, SOLUTION INTRAMUSCULAR; INTRAVENOUS at 10:26

## 2022-11-28 RX ADMIN — SUGAMMADEX 350 MG: 100 INJECTION, SOLUTION INTRAVENOUS at 11:19

## 2022-11-28 RX ADMIN — CLINDAMYCIN PHOSPHATE 900 MG: 900 INJECTION, SOLUTION INTRAVENOUS at 10:16

## 2022-11-28 RX ADMIN — ONDANSETRON 4 MG: 2 INJECTION INTRAMUSCULAR; INTRAVENOUS at 11:12

## 2022-11-28 RX ADMIN — ONDANSETRON HYDROCHLORIDE 4 MG: 4 TABLET, FILM COATED ORAL at 22:56

## 2022-11-28 RX ADMIN — CLINDAMYCIN PHOSPHATE 900 MG: 900 INJECTION, SOLUTION INTRAVENOUS at 18:24

## 2022-11-28 RX ADMIN — ONDANSETRON 4 MG: 2 INJECTION INTRAMUSCULAR; INTRAVENOUS at 12:07

## 2022-11-28 RX ADMIN — SODIUM CHLORIDE, POTASSIUM CHLORIDE, SODIUM LACTATE AND CALCIUM CHLORIDE 9 ML/HR: 600; 310; 30; 20 INJECTION, SOLUTION INTRAVENOUS at 10:20

## 2022-11-28 RX ADMIN — PREGABALIN 150 MG: 75 CAPSULE ORAL at 10:13

## 2022-11-28 RX ADMIN — CELECOXIB 200 MG: 200 CAPSULE ORAL at 10:13

## 2022-11-28 RX ADMIN — LIDOCAINE HYDROCHLORIDE 100 MG: 20 INJECTION, SOLUTION EPIDURAL; INFILTRATION; INTRACAUDAL; PERINEURAL at 10:28

## 2022-11-28 RX ADMIN — DEXAMETHASONE SODIUM PHOSPHATE 8 MG: 4 INJECTION, SOLUTION INTRAMUSCULAR; INTRAVENOUS at 10:35

## 2022-11-28 RX ADMIN — MELOXICAM 15 MG: 15 TABLET ORAL at 14:49

## 2022-11-28 RX ADMIN — LOSARTAN POTASSIUM 25 MG: 25 TABLET, FILM COATED ORAL at 14:49

## 2022-11-28 RX ADMIN — HYDROMORPHONE HYDROCHLORIDE 0.5 MG: 1 INJECTION, SOLUTION INTRAMUSCULAR; INTRAVENOUS; SUBCUTANEOUS at 11:27

## 2022-11-28 RX ADMIN — ONDANSETRON 4 MG: 2 INJECTION INTRAMUSCULAR; INTRAVENOUS at 17:28

## 2022-11-28 RX ADMIN — ACETAMINOPHEN 1000 MG: 500 TABLET ORAL at 10:14

## 2022-11-28 NOTE — ANESTHESIA POSTPROCEDURE EVALUATION
Patient: Faviola Issa    Procedure Summary     Date: 11/28/22 [facility] / [facility]:  [facility]  [facility]    Anesthesia Start: 1022 Anesthesia Stop: 1144    Procedure: LEFT TOTAL HIP ARTHROPLASTY ANTERIOR (Left: Hip) Diagnosis:     Surgeons: Saul Vargas II, MD Provider: Emory Dacosta MD    Anesthesia Type: general ASA Status: 3          Anesthesia Type: general    Vitals  Vitals Value Taken Time   /66 11/28/22 1230   Temp 36.4 °C (97.6 °F) 11/28/22 1141   Pulse 53 11/28/22 1241   Resp 16 11/28/22 1230   SpO2 100 % 11/28/22 1241   Vitals shown include unvalidated device data.        Post Anesthesia Care and Evaluation    Patient location during evaluation: bedside  Patient participation: complete - patient participated  Level of consciousness: awake  Pain management: adequate    Airway patency: patent  Anesthetic complications: No anesthetic complications  PONV Status: controlled  Cardiovascular status: acceptable  Respiratory status: acceptable  Hydration status: acceptable    Comments: /62   Pulse 54   Temp 36.4 °C (97.6 °F) (Oral)   Resp 16   LMP  (LMP Unknown)   SpO2 99%

## 2022-11-28 NOTE — ANESTHESIA PROCEDURE NOTES
Airway  Urgency: elective    Date/Time: 11/28/2022 10:33 AM  Airway not difficult    General Information and Staff    Patient location during procedure: OR  Anesthesiologist: Emory Dacosta MD  CRNA/CAA: Priscilla Saeed CRNA    Indications and Patient Condition  Indications for airway management: airway protection    Preoxygenated: yes  MILS maintained throughout  Mask difficulty assessment: 2 - vent by mask + OA or adjuvant +/- NMBA    Final Airway Details  Final airway type: endotracheal airway      Successful airway: ETT  Cuffed: yes   Successful intubation technique: video laryngoscopy  Facilitating devices/methods: intubating stylet and cricoid pressure  Endotracheal tube insertion site: oral  Blade: CMAC  Blade size: D  ETT size (mm): 7.0  Cormack-Lehane Classification: grade I - full view of glottis  Placement verified by: chest auscultation and capnometry   Cuff volume (mL): 10  Measured from: teeth  ETT/EBT  to teeth (cm): 21  Number of attempts at approach: 1  Assessment: lips, teeth, and gum same as pre-op and atraumatic intubation    Additional Comments  CMAC UTILIZED R/T ASSESSMENT OF POTENTIAL DIFF AW + HISTORY OF CMAC.

## 2022-11-28 NOTE — ANESTHESIA PREPROCEDURE EVALUATION
Anesthesia Evaluation     Patient summary reviewed and Nursing notes reviewed   history of anesthetic complications: PONV  NPO Solid Status: > 8 hours  NPO Liquid Status: > 2 hours           Airway   Mallampati: II  TM distance: >3 FB  Neck ROM: limited  Possible difficult intubation  Comment: CMAC used on last attempt  Dental - normal exam     Pulmonary - normal exam   (+) pulmonary embolism, a smoker Former, asthma,  Cardiovascular - normal exam    ECG reviewed    (+) hypertension, SORTO, hyperlipidemia,   (-) angina, orthopnea, PND    ROS comment: NSR    Neuro/Psych  (+) psychiatric history Anxiety,    GI/Hepatic/Renal/Endo    (+)   thyroid problem hypothyroidism    Musculoskeletal     Abdominal    Substance History - negative use     OB/GYN negative ob/gyn ROS         Other   arthritis,    history of cancer                    Anesthesia Plan    ASA 3     general     intravenous induction     Anesthetic plan, risks, benefits, and alternatives have been provided, discussed and informed consent has been obtained with: patient.        CODE STATUS:

## 2022-11-29 ENCOUNTER — READMISSION MANAGEMENT (OUTPATIENT)
Dept: CALL CENTER | Facility: HOSPITAL | Age: 72
End: 2022-11-29

## 2022-11-29 ENCOUNTER — HOME HEALTH ADMISSION (OUTPATIENT)
Dept: HOME HEALTH SERVICES | Facility: HOME HEALTHCARE | Age: 72
End: 2022-11-29

## 2022-11-29 ENCOUNTER — TRANSCRIBE ORDERS (OUTPATIENT)
Dept: HOME HEALTH SERVICES | Facility: HOME HEALTHCARE | Age: 72
End: 2022-11-29

## 2022-11-29 VITALS
TEMPERATURE: 97.7 F | OXYGEN SATURATION: 99 % | SYSTOLIC BLOOD PRESSURE: 125 MMHG | RESPIRATION RATE: 18 BRPM | WEIGHT: 190.04 LBS | BODY MASS INDEX: 29.83 KG/M2 | DIASTOLIC BLOOD PRESSURE: 61 MMHG | HEIGHT: 67 IN | HEART RATE: 69 BPM

## 2022-11-29 DIAGNOSIS — Z96.642 S/P TOTAL LEFT HIP ARTHROPLASTY: Primary | ICD-10-CM

## 2022-11-29 LAB
ANION GAP SERPL CALCULATED.3IONS-SCNC: 8 MMOL/L (ref 5–15)
BUN SERPL-MCNC: 9 MG/DL (ref 8–23)
BUN/CREAT SERPL: 13.6 (ref 7–25)
CALCIUM SPEC-SCNC: 8.4 MG/DL (ref 8.6–10.5)
CHLORIDE SERPL-SCNC: 103 MMOL/L (ref 98–107)
CO2 SERPL-SCNC: 25 MMOL/L (ref 22–29)
CREAT SERPL-MCNC: 0.66 MG/DL (ref 0.57–1)
EGFRCR SERPLBLD CKD-EPI 2021: 93.9 ML/MIN/1.73
GLUCOSE SERPL-MCNC: 119 MG/DL (ref 65–99)
HCT VFR BLD AUTO: 31.8 % (ref 34–46.6)
HGB BLD-MCNC: 10.5 G/DL (ref 12–15.9)
POTASSIUM SERPL-SCNC: 4.9 MMOL/L (ref 3.5–5.2)
SODIUM SERPL-SCNC: 136 MMOL/L (ref 136–145)

## 2022-11-29 PROCEDURE — 80048 BASIC METABOLIC PNL TOTAL CA: CPT | Performed by: ORTHOPAEDIC SURGERY

## 2022-11-29 PROCEDURE — 85018 HEMOGLOBIN: CPT | Performed by: ORTHOPAEDIC SURGERY

## 2022-11-29 PROCEDURE — 97116 GAIT TRAINING THERAPY: CPT

## 2022-11-29 PROCEDURE — G0378 HOSPITAL OBSERVATION PER HR: HCPCS

## 2022-11-29 PROCEDURE — 85014 HEMATOCRIT: CPT | Performed by: ORTHOPAEDIC SURGERY

## 2022-11-29 PROCEDURE — 63710000001 ONDANSETRON PER 8 MG: Performed by: ORTHOPAEDIC SURGERY

## 2022-11-29 PROCEDURE — 97530 THERAPEUTIC ACTIVITIES: CPT

## 2022-11-29 PROCEDURE — 97110 THERAPEUTIC EXERCISES: CPT

## 2022-11-29 RX ORDER — ASPIRIN 81 MG/1
81 TABLET ORAL EVERY 12 HOURS
Qty: 60 TABLET | Refills: 0 | Status: SHIPPED | OUTPATIENT
Start: 2022-11-29 | End: 2022-12-29

## 2022-11-29 RX ORDER — OXYCODONE HYDROCHLORIDE AND ACETAMINOPHEN 5; 325 MG/1; MG/1
1 TABLET ORAL EVERY 4 HOURS PRN
Qty: 50 TABLET | Refills: 0 | Status: SHIPPED | OUTPATIENT
Start: 2022-11-29

## 2022-11-29 RX ADMIN — ONDANSETRON HYDROCHLORIDE 4 MG: 4 TABLET, FILM COATED ORAL at 05:38

## 2022-11-29 RX ADMIN — ASPIRIN 81 MG: 81 TABLET, COATED ORAL at 08:06

## 2022-11-29 RX ADMIN — FAMOTIDINE 40 MG: 20 TABLET ORAL at 08:06

## 2022-11-29 RX ADMIN — MELOXICAM 15 MG: 15 TABLET ORAL at 08:06

## 2022-11-29 RX ADMIN — LEVOTHYROXINE SODIUM 100 MCG: 0.1 TABLET ORAL at 08:06

## 2022-11-29 RX ADMIN — OXYCODONE AND ACETAMINOPHEN 1 TABLET: 5; 325 TABLET ORAL at 05:38

## 2022-11-29 RX ADMIN — CLINDAMYCIN PHOSPHATE 900 MG: 900 INJECTION, SOLUTION INTRAVENOUS at 05:28

## 2022-11-29 NOTE — OUTREACH NOTE
Prep Survey    Flowsheet Row Responses   [facility] facility patient discharged from? [facility]   Is LACE score < 7 ? Yes   Emergency Room discharge w/ pulse ox? No   Eligibility [facility]   Date of Admission 11/28/22   Date of Discharge 11/29/22   Discharge Disposition Home or Self Care   Discharge diagnosis Left total hip arhtro   Does the patient have one of the following disease processes/diagnoses(primary or secondary)? Total Joint Replacement   Does the patient have Home health ordered? Yes   What is the Home health agency?  [facility]    Is there a DME ordered? No   Prep survey completed? Yes          FLAVIA GRACE - Registered Nurse

## 2022-11-30 ENCOUNTER — TRANSITIONAL CARE MANAGEMENT TELEPHONE ENCOUNTER (OUTPATIENT)
Dept: CALL CENTER | Facility: HOSPITAL | Age: 72
End: 2022-11-30

## 2022-11-30 ENCOUNTER — HOME CARE VISIT (OUTPATIENT)
Dept: HOME HEALTH SERVICES | Facility: HOME HEALTHCARE | Age: 72
End: 2022-11-30

## 2022-11-30 VITALS
DIASTOLIC BLOOD PRESSURE: 66 MMHG | RESPIRATION RATE: 18 BRPM | HEART RATE: 82 BPM | TEMPERATURE: 97.8 F | OXYGEN SATURATION: 96 % | SYSTOLIC BLOOD PRESSURE: 126 MMHG

## 2022-11-30 PROCEDURE — G0151 HHCP-SERV OF PT,EA 15 MIN: HCPCS

## 2022-11-30 NOTE — HOME HEALTH
REASON FOR REFERRAL: decreased ability to ambulate in and out of the home following recent hospital stay for L anterior CÉSAR on 11/28/22 by Dr. Catarino Vargas resulting in functional decline and LE weakness.    MEDICAL HISTORY: OA, Asthma, History of pulmonary embolism, History of colorectal cancer, colostomy, Hyperparathyroidism, HTN,     THERAPY IS MEDICALLY NECESSARY FOR: Instruction/education in lower extremity strengthening HEP, bed mobility/transfers training, gait training, balance training, fall prevention, and activity tolerance training to enable patient to safely exit home for medical appointments.    WBAT L LE    MARU dressing over L hip surgical incision to remain on for 7 day post-op and they can be removed. Zipline to be removed 14 days post-op.

## 2022-11-30 NOTE — OUTREACH NOTE
Call Center TCM Note    Flowsheet Row Responses   [facility] patient discharged from? [place]   Does the patient have one of the following disease processes/diagnoses(primary or secondary)? Total Joint Replacement   Joint surgery performed? Hip   TCM attempt successful? Yes   Call start time 1445   Call end time 1448   Has the patient been back in either the hospital or Emergency Department since discharge? No   Discharge diagnosis LEFT TOTAL HIP ARTHROPLASTY ANTERIOR   Person spoke with today (if not patient) and relationship Saul Issa Spouse   Does the patient have all medications related to this admission filled (includes all antibiotics, pain medications, etc.) Yes   Is the patient taking all medications as directed (includes completed medication regime)? Yes   Comments PCP Dr Beard. Declines to schedule with PCP at this time. Reports will call with any needs.  [Patient to follow up with alma Holden, 3 weeks. ]   Does the patient have an appointment with their PCP within 7 days of discharge? No   Nursing Interventions Patient desires to follow up with specialty only   What is the Home health agency?  [facility]    Has home health visited the patient within 72 hours of discharge? Yes  [ reports PT there at time of call. ]   Psychosocial issues? No   Has the patient began therapy sessions (either in the home or as an out patient)? Yes   If the patient has started attending therapy, what post op day did they begin to attend (either in home or as an out patient)?   today, POD #2   Does the patient have a wound vac in place? N/A   Has the patient fallen since discharge? No   Did the patient receive a copy of their discharge instructions? Yes   Nursing interventions Reviewed instructions with patient   What is the patient's perception of their functional status since discharge? Improving   Is the patient/caregiver able to teach back the hierarchy of who to call/visit for symptoms/problems? PCP,  Specialist, Home health nurse, Urgent Care, ED, 911 Yes   TCM call completed? Yes   Wrap up additional comments Brief call with . He denies any needs from primary care at this time. Denies questions today. PT present at time of call.   Call end time 1448   Would this patient benefit from a Referral to [facility] Social Work? No   Is the patient interested in additional calls from an ambulatory ?  NOTE:  applies to high risk patients requiring additional follow-up. No          Pearl Garcia RN    11/30/2022, 14:50 EST

## 2022-12-02 ENCOUNTER — HOME CARE VISIT (OUTPATIENT)
Dept: HOME HEALTH SERVICES | Facility: HOME HEALTHCARE | Age: 72
End: 2022-12-02

## 2022-12-02 VITALS
SYSTOLIC BLOOD PRESSURE: 126 MMHG | DIASTOLIC BLOOD PRESSURE: 78 MMHG | OXYGEN SATURATION: 98 % | HEART RATE: 75 BPM | TEMPERATURE: 96.8 F

## 2022-12-02 PROCEDURE — G0151 HHCP-SERV OF PT,EA 15 MIN: HCPCS

## 2022-12-06 ENCOUNTER — HOME CARE VISIT (OUTPATIENT)
Dept: HOME HEALTH SERVICES | Facility: HOME HEALTHCARE | Age: 72
End: 2022-12-06

## 2022-12-06 VITALS
HEART RATE: 72 BPM | DIASTOLIC BLOOD PRESSURE: 76 MMHG | OXYGEN SATURATION: 96 % | TEMPERATURE: 97.8 F | SYSTOLIC BLOOD PRESSURE: 144 MMHG

## 2022-12-06 PROCEDURE — G0151 HHCP-SERV OF PT,EA 15 MIN: HCPCS

## 2022-12-09 ENCOUNTER — HOME CARE VISIT (OUTPATIENT)
Dept: HOME HEALTH SERVICES | Facility: HOME HEALTHCARE | Age: 72
End: 2022-12-09

## 2022-12-09 VITALS
OXYGEN SATURATION: 97 % | DIASTOLIC BLOOD PRESSURE: 74 MMHG | TEMPERATURE: 98.6 F | HEART RATE: 71 BPM | SYSTOLIC BLOOD PRESSURE: 142 MMHG

## 2022-12-09 PROCEDURE — G0151 HHCP-SERV OF PT,EA 15 MIN: HCPCS

## 2022-12-09 NOTE — HOME HEALTH
Patient reports no falls or changes in medication since last visit.    Plan for next visit:  -transfer training  -gait training with cane as tolerated  -education on HEP with progressions in standing  -stair mobility training  -standing balance exercises

## 2022-12-12 ENCOUNTER — HOME CARE VISIT (OUTPATIENT)
Dept: HOME HEALTH SERVICES | Facility: HOME HEALTHCARE | Age: 72
End: 2022-12-12

## 2022-12-12 VITALS
SYSTOLIC BLOOD PRESSURE: 144 MMHG | OXYGEN SATURATION: 98 % | HEART RATE: 80 BPM | DIASTOLIC BLOOD PRESSURE: 78 MMHG | TEMPERATURE: 97.6 F

## 2022-12-12 PROCEDURE — G0151 HHCP-SERV OF PT,EA 15 MIN: HCPCS

## 2022-12-12 NOTE — HOME HEALTH
Patient reports no falls or changes in medication since last visit.    Patient demonstrated improved gait mechanics with use of cane with no loss of balance.  Patient tolerated standing HEP without complications.    Plan for next visit:  -transfer training  -gait training with cane  -education on HEP with progressions as appropriate  -standing balance exercises    PT discharge next visit.

## 2022-12-15 ENCOUNTER — HOME CARE VISIT (OUTPATIENT)
Dept: HOME HEALTH SERVICES | Facility: HOME HEALTHCARE | Age: 72
End: 2022-12-15

## 2022-12-15 VITALS
OXYGEN SATURATION: 97 % | DIASTOLIC BLOOD PRESSURE: 76 MMHG | TEMPERATURE: 97.8 F | SYSTOLIC BLOOD PRESSURE: 124 MMHG | HEART RATE: 75 BPM

## 2022-12-15 PROCEDURE — G0151 HHCP-SERV OF PT,EA 15 MIN: HCPCS

## 2023-02-28 ENCOUNTER — TELEPHONE (OUTPATIENT)
Dept: INTERNAL MEDICINE | Facility: CLINIC | Age: 73
End: 2023-02-28

## 2023-02-28 NOTE — TELEPHONE ENCOUNTER
PATIENT REQUESTING LAB ORDERS TO BE MAILED TO HER AT ADDRESS ON FILE. SHE WILL GO TO [facility] AT [facility]. SHE HAS A MEDICARE WELLNESS VISIT ON 3/28/23    CALL BACK NUMBER [phone]

## 2023-03-06 DIAGNOSIS — Z00.00 HEALTHCARE MAINTENANCE: Primary | ICD-10-CM

## 2023-03-07 DIAGNOSIS — Z00.00 HEALTHCARE MAINTENANCE: ICD-10-CM

## 2023-03-09 LAB
ALBUMIN SERPL-MCNC: 4.2 G/DL (ref 3.5–5.2)
ALBUMIN/GLOB SERPL: 1.4 G/DL
ALP SERPL-CCNC: 101 U/L (ref 39–117)
ALT SERPL-CCNC: 9 U/L (ref 1–33)
AST SERPL-CCNC: 12 U/L (ref 1–32)
BASOPHILS # BLD AUTO: 0.06 10*3/MM3 (ref 0–0.2)
BASOPHILS NFR BLD AUTO: 0.8 % (ref 0–1.5)
BILIRUB SERPL-MCNC: 0.4 MG/DL (ref 0–1.2)
BUN SERPL-MCNC: 9 MG/DL (ref 8–23)
BUN/CREAT SERPL: 10.2 (ref 7–25)
CALCIUM SERPL-MCNC: 9.8 MG/DL (ref 8.6–10.5)
CHLORIDE SERPL-SCNC: 103 MMOL/L (ref 98–107)
CHOLEST SERPL-MCNC: 223 MG/DL (ref 0–200)
CO2 SERPL-SCNC: 27.3 MMOL/L (ref 22–29)
CREAT SERPL-MCNC: 0.88 MG/DL (ref 0.57–1)
EGFRCR SERPLBLD CKD-EPI 2021: 70.4 ML/MIN/1.73
EOSINOPHIL # BLD AUTO: 0.1 10*3/MM3 (ref 0–0.4)
EOSINOPHIL NFR BLD AUTO: 1.4 % (ref 0.3–6.2)
ERYTHROCYTE [DISTWIDTH] IN BLOOD BY AUTOMATED COUNT: 13.9 % (ref 12.3–15.4)
FT4I SERPL CALC-MCNC: 2.8 (ref 1.2–4.9)
GLOBULIN SER CALC-MCNC: 3 GM/DL
GLUCOSE SERPL-MCNC: 79 MG/DL (ref 65–99)
HBA1C MFR BLD: 5.3 % (ref 4.8–5.6)
HCT VFR BLD AUTO: 43.9 % (ref 34–46.6)
HDLC SERPL-MCNC: 69 MG/DL (ref 40–60)
HGB BLD-MCNC: 14.1 G/DL (ref 12–15.9)
IMM GRANULOCYTES # BLD AUTO: 0.03 10*3/MM3 (ref 0–0.05)
IMM GRANULOCYTES NFR BLD AUTO: 0.4 % (ref 0–0.5)
LDLC SERPL CALC-MCNC: 134 MG/DL (ref 0–100)
LDLC/HDLC SERPL: 1.91 {RATIO}
LYMPHOCYTES # BLD AUTO: 1.49 10*3/MM3 (ref 0.7–3.1)
LYMPHOCYTES NFR BLD AUTO: 20.8 % (ref 19.6–45.3)
MCH RBC QN AUTO: 27.4 PG (ref 26.6–33)
MCHC RBC AUTO-ENTMCNC: 32.1 G/DL (ref 31.5–35.7)
MCV RBC AUTO: 85.4 FL (ref 79–97)
MONOCYTES # BLD AUTO: 0.51 10*3/MM3 (ref 0.1–0.9)
MONOCYTES NFR BLD AUTO: 7.1 % (ref 5–12)
NEUTROPHILS # BLD AUTO: 4.98 10*3/MM3 (ref 1.7–7)
NEUTROPHILS NFR BLD AUTO: 69.5 % (ref 42.7–76)
NRBC BLD AUTO-RTO: 0 /100 WBC (ref 0–0.2)
PLATELET # BLD AUTO: 294 10*3/MM3 (ref 140–450)
POTASSIUM SERPL-SCNC: 4.5 MMOL/L (ref 3.5–5.2)
PROT SERPL-MCNC: 7.2 G/DL (ref 6–8.5)
RBC # BLD AUTO: 5.14 10*6/MM3 (ref 3.77–5.28)
SODIUM SERPL-SCNC: 142 MMOL/L (ref 136–145)
T3RU NFR SERPL: 32 % (ref 24–39)
T4 SERPL-MCNC: 8.9 UG/DL (ref 4.5–12)
TRIGL SERPL-MCNC: 112 MG/DL (ref 0–150)
TSH SERPL DL<=0.005 MIU/L-ACNC: 1.62 UIU/ML (ref 0.45–4.5)
VLDLC SERPL CALC-MCNC: 20 MG/DL (ref 5–40)
WBC # BLD AUTO: 7.17 10*3/MM3 (ref 3.4–10.8)

## 2023-03-11 RX ORDER — BEMPEDOIC ACID 180 MG/1
1 TABLET, FILM COATED ORAL DAILY
Qty: 30 TABLET | Refills: 11 | Status: SHIPPED | OUTPATIENT
Start: 2023-03-11 | End: 2023-03-29

## 2023-03-11 NOTE — PROGRESS NOTES
Please inform the patient of the following abnormal results. Cholesterol still high. She has been unable to do statins. Have her start nexlitol.

## 2023-03-29 ENCOUNTER — OFFICE VISIT (OUTPATIENT)
Dept: INTERNAL MEDICINE | Facility: CLINIC | Age: 73
End: 2023-03-29
Payer: MEDICARE

## 2023-03-29 VITALS
RESPIRATION RATE: 16 BRPM | HEIGHT: 67 IN | OXYGEN SATURATION: 98 % | BODY MASS INDEX: 30.13 KG/M2 | HEART RATE: 75 BPM | SYSTOLIC BLOOD PRESSURE: 142 MMHG | WEIGHT: 192 LBS | DIASTOLIC BLOOD PRESSURE: 84 MMHG

## 2023-03-29 DIAGNOSIS — L30.9 ECZEMA, UNSPECIFIED TYPE: ICD-10-CM

## 2023-03-29 DIAGNOSIS — E78.5 HYPERLIPIDEMIA, UNSPECIFIED HYPERLIPIDEMIA TYPE: ICD-10-CM

## 2023-03-29 DIAGNOSIS — Z00.00 WELL WOMAN EXAM (NO GYNECOLOGICAL EXAM): Primary | ICD-10-CM

## 2023-03-29 DIAGNOSIS — Z00.00 MEDICARE ANNUAL WELLNESS VISIT, SUBSEQUENT: ICD-10-CM

## 2023-03-29 DIAGNOSIS — Z00.00 HEALTHCARE MAINTENANCE: ICD-10-CM

## 2023-03-29 RX ORDER — FLUOCINONIDE 0.5 MG/G
1 OINTMENT TOPICAL 2 TIMES DAILY
Qty: 30 G | Refills: 1 | Status: SHIPPED | OUTPATIENT
Start: 2023-03-29

## 2023-03-29 RX ORDER — ROSUVASTATIN CALCIUM 5 MG/1
5 TABLET, COATED ORAL DAILY
Qty: 90 TABLET | Refills: 3 | Status: SHIPPED | OUTPATIENT
Start: 2023-03-29 | End: 2023-04-12

## 2023-03-29 NOTE — PROGRESS NOTES
The ABCs of the Annual Wellness Visit  Subsequent Medicare Wellness Visit    Subjective      Faviola Issa is a 72 y.o. female who presents for a Subsequent Medicare Wellness Visit.    The following portions of the patient's history were reviewed and   updated as appropriate: allergies, current medications, past family history, past medical history, past social history, past surgical history and problem list.    Compared to one year ago, the patient feels her physical   health is better.    Compared to one year ago, the patient feels her mental   health is better.    Recent Hospitalizations:  She was admitted within the past 365 days at [facility].       Current Medical Providers:  Patient Care Team:  Roger Beard MD as PCP - General (Family Medicine)  James Romeo MD as Consulting Physician (Gastroenterology)  Ajith Leggett II, MD as Consulting Physician (Hematology and Oncology)  Compa Álvarez MD as Consulting Physician (Pulmonary Disease)  Nahum Benavides MD as Consulting Physician (Cardiology)    Outpatient Medications Prior to Visit   Medication Sig Dispense Refill   • albuterol (PROVENTIL) (2.5 MG/3ML) 0.083% nebulizer solution Take 2.5 mg by nebulization Every 4 (Four) Hours As Needed for Wheezing. 60 each 0   • Chlorhexidine Gluconate 2 % pads Apply  topically. As directed pre op     • losartan (Cozaar) 25 MG tablet Take 1/2 tablet daily PRN if BP is > 130/70mm Hg (Patient taking differently: Take As Directed. Take 1/2 tablet daily PRN if BP is > 130/70mm Hg) 40 tablet 1   • methylPREDNISolone (MEDROL) 4 MG dose pack Take as directed on package instructions. 21 tablet 0   • polyethyl glycol-propyl glycol (SYSTANE) 0.4-0.3 % solution ophthalmic solution Administer 2 drops to both eyes Daily.     • Stiolto Respimat 2.5-2.5 MCG/ACT aerosol solution inhaler Inhale 2 puffs Daily.     • Synthroid 100 MCG tablet Take 1 tablet by mouth Daily.     • TURMERIC PO Take 1 tablet by mouth Daily.     •  "vitamin D (ERGOCALCIFEROL) 1.25 MG (46377 UT) capsule capsule Take 1 capsule by mouth 1 (One) Time Per Week. TUESDAYS     • Bempedoic Acid (Nexletol) 180 MG tablet Take 1 tablet by mouth Daily. 30 tablet 11   • triamcinolone (KENALOG) 0.5 % ointment Apply 1 application topically to the appropriate area as directed 2 (Two) Times a Day. (Patient taking differently: Apply 1 application topically to the appropriate area as directed 2 (Two) Times a Day As Needed.) 15 g 00   • oxyCODONE-acetaminophen (PERCOCET) 5-325 MG per tablet Take 1 tablet by mouth Every 4 (Four) Hours As Needed for Severe Pain. 50 tablet 0     No facility-administered medications prior to visit.       Opioid medication/s are on active medication list.  and I have evaluated her active treatment plan and pain score trends (see table).  There were no vitals filed for this visit.  I have reviewed the chart for potential of high risk medication and harmful drug interactions in the elderly.            Aspirin is not on active medication list.  Aspirin use is not indicated based on review of current medical condition/s. Risk of harm outweighs potential benefits.  .    Patient Active Problem List   Diagnosis   • Well female exam with routine gynecological exam   • Malignant neoplasm of sigmoid colon with mets to the lung (CMS/HCC)   • Mixed hyperlipidemia   • Status post thyroidectomy and left partial parathyroidectomy (CMS/HCC)   • Postoperative hypothyroidism   • Anxiety   • Palpitations   • Hypertension   • Colon polyps   • White coat syndrome with diagnosis of hypertension   • Hip joint replacement status     Advance Care Planning  Advance Directive is not on file.  ACP discussion was held with the patient during this visit. Patient has an advance directive (not in EMR), copy requested.     Objective    Vitals:    03/29/23 1048   BP: 142/84   Pulse: 75   Resp: 16   SpO2: 98%   Weight: 87.1 kg (192 lb)   Height: 170.2 cm (67\")     Estimated body mass " "index is 30.07 kg/m² as calculated from the following:    Height as of this encounter: 170.2 cm (67\").    Weight as of this encounter: 87.1 kg (192 lb).    BMI is >= 30 and <35. (Class 1 Obesity). The following options were offered after discussion;: exercise counseling/recommendations and nutrition counseling/recommendations      Does the patient have evidence of cognitive impairment?   No    Lab Results   Component Value Date    CHLPL 223 (H) 2023    TRIG 112 2023    HDL 69 (H) 2023     (H) 2023    VLDL 20 2023    HGBA1C 5.30 2023          HEALTH RISK ASSESSMENT    Smoking Status:  Social History     Tobacco Use   Smoking Status Former   • Packs/day: 1.00   • Years: 15.00   • Pack years: 15.00   • Types: Cigarettes   • Start date: 1978   • Quit date: 2001   • Years since quittin.5   • Passive exposure: Never   Smokeless Tobacco Never   Tobacco Comments    1ppd x20 yrs     Alcohol Consumption:  Social History     Substance and Sexual Activity   Alcohol Use No    Comment: Caffeine use: Tea 2 cups daily     Fall Risk Screen:    STEADI Fall Risk Assessment was completed, and patient is at LOW risk for falls.Assessment completed on:3/29/2023    Depression Screening:  PHQ-2/PHQ-9 Depression Screening 3/29/2023   Little Interest or Pleasure in Doing Things 0-->not at all   Feeling Down, Depressed or Hopeless 0-->not at all   PHQ-9: Brief Depression Severity Measure Score 0       Health Habits and Functional and Cognitive Screening:  Functional & Cognitive Status 3/29/2023   Do you have difficulty preparing food and eating? No   Do you have difficulty bathing yourself, getting dressed or grooming yourself? No   Do you have difficulty using the toilet? No   Do you have difficulty moving around from place to place? No   Do you have trouble with steps or getting out of a bed or a chair? No   Current Diet Well Balanced Diet   Dental Exam Up to date   Eye Exam Up to " date   Exercise (times per week) 3 times per week   Current Exercises Include House Cleaning   Current Exercise Activities Include -   Do you need help using the phone?  No   Are you deaf or do you have serious difficulty hearing?  No   Do you need help with transportation? No   Do you need help shopping? No   Do you need help preparing meals?  No   Do you need help with housework?  No   Do you need help with laundry? No   Do you need help taking your medications? No   Do you need help managing money? No   Do you ever drive or ride in a car without wearing a seat belt? No   Have you felt unusual stress, anger or loneliness in the last month? No   Who do you live with? Alone   If you need help, do you have trouble finding someone available to you? No   Have you been bothered in the last four weeks by sexual problems? No   Do you have difficulty concentrating, remembering or making decisions? No       Age-appropriate Screening Schedule:  Refer to the list below for future screening recommendations based on patient's age, sex and/or medical conditions. Orders for these recommended tests are listed in the plan section. The patient has been provided with a written plan.    Health Maintenance   Topic Date Due   • DXA SCAN  Never done   • PAP SMEAR  10/31/2019   • ANNUAL WELLNESS VISIT  03/25/2023   • LIPID PANEL  03/08/2024   • MAMMOGRAM  04/20/2024   • TDAP/TD VACCINES (2 - Td or Tdap) 05/09/2024   • COLONOSCOPY  03/04/2025   • COVID-19 Vaccine  Completed   • INFLUENZA VACCINE  Completed   • Pneumococcal Vaccine 65+  Completed   • HEPATITIS C SCREENING  Addressed   • ZOSTER VACCINE  Discontinued                CMS Preventative Services Quick Reference  Risk Factors Identified During Encounter:    None Identified    The above risks/problems have been discussed with the patient.  Pertinent information has been shared with the patient in the After Visit Summary.    Diagnoses and all orders for this visit:    1. Well woman  exam (no gynecological exam) (Primary)    2. Medicare annual wellness visit, subsequent    3. Healthcare maintenance  -     CBC & Differential  -     Comprehensive Metabolic Panel  -     Hemoglobin A1c  -     Thyroid Panel With TSH  -     Lipid Panel With LDL / HDL Ratio            Follow Up:   Next Medicare Wellness visit to be scheduled in 1 year.      An After Visit Summary and PPPS were made available to the patient.

## 2023-03-29 NOTE — PROGRESS NOTES
"Subjective   Faviola Issa is a 72 y.o. female and is here for a comprehensive physical exam. The patient reports no problems.    Pt is UTD with annual gyn exam and mammo           Social History:   Social History     Socioeconomic History   • Marital status:      Spouse name: KEN   • Number of children: 3   • Years of education: College   Tobacco Use   • Smoking status: Former     Packs/day: 1.00     Years: 15.00     Pack years: 15.00     Types: Cigarettes     Start date: 1978     Quit date: 2001     Years since quittin.5     Passive exposure: Never   • Smokeless tobacco: Never   • Tobacco comments:     1ppd x20 yrs   Vaping Use   • Vaping Use: Never used   Substance and Sexual Activity   • Alcohol use: No     Comment: Caffeine use: Tea 2 cups daily   • Drug use: No   • Sexual activity: Yes     Partners: Male     Birth control/protection: Post-menopausal, Surgical       Family History:   Family History   Problem Relation Age of Onset   • Cancer Sister         \"FEMALE\"   • Hypertension Father    • Breast cancer Daughter 45   • Deep vein thrombosis Niece    • Malig Hyperthermia Neg Hx        Past Medical History:   Past Medical History:   Diagnosis Date   • Allergic    • Arthritis    • Asthma    • Bilateral pulmonary embolism (HCC)     provoked, after surgery , took 3 mos of OAC   • Colorectal cancer (HCC)     s/p surgery (colostomy), radiation, and chemotherapy, with mets to lung s/p surgical resection   • Colostomy in place (HCC)    • Enlarged thyroid gland     s/p total thyroidectomy    • Eye exam normal    • History of prior pregnancies     x4   • Hx of radiation therapy     for colon cancer   • Hyperparathyroidism (HCC)     s/p removal of a single adenoma 2019 c/b bleeding/hematoma   • Hypertension     \"white coat syndrome\"   • Injury of back    • Injury of neck    • Left hip pain    • Lymphoma (HCC)     Eyelid, low-grade, treated with radiation   • " Palpitations    • PONV (postoperative nausea and vomiting)    • Postoperative hypothyroidism 05/21/2019   • Rash     LEFT LOWER LEG STATES SEEN DERMATOLOGIST AND SAID DERMATITIS.  STATES DR POSADA AWARE OF.   • Short of breath on exertion        The following portions of the patient's history were reviewed and updated as appropriate: allergies, current medications, past family history, past medical history, past social history, past surgical history and problem list.    Review of Systems    Review of Systems   Constitutional: Negative for chills and fever.   HENT: Negative for congestion, rhinorrhea, sinus pain and sore throat.    Eyes: Negative for photophobia and visual disturbance.   Respiratory: Negative for cough, chest tightness and shortness of breath.    Cardiovascular: Negative for chest pain and palpitations.   Gastrointestinal: Negative for diarrhea, nausea and vomiting.   Genitourinary: Negative for dysuria, frequency and urgency.   Skin: Negative for rash and wound.   Neurological: Negative for dizziness and syncope.   Psychiatric/Behavioral: Negative for behavioral problems and confusion.       Objective   Physical Exam  Vitals and nursing note reviewed.   Constitutional:       Appearance: She is well-developed.   HENT:      Head: Normocephalic and atraumatic.      Right Ear: External ear normal.      Left Ear: External ear normal.   Cardiovascular:      Rate and Rhythm: Normal rate and regular rhythm.      Heart sounds: Normal heart sounds.   Pulmonary:      Effort: Pulmonary effort is normal. No respiratory distress.      Breath sounds: Normal breath sounds.   Abdominal:      Palpations: Abdomen is soft.      Tenderness: There is no abdominal tenderness. There is no guarding.   Musculoskeletal:         General: Normal range of motion.      Cervical back: Normal range of motion and neck supple.   Lymphadenopathy:      Cervical: No cervical adenopathy.   Skin:     General: Skin is warm.   Neurological:       Mental Status: She is alert and oriented to person, place, and time.   Psychiatric:         Behavior: Behavior normal.         Vitals:    03/29/23 1048   BP: 142/84   Pulse: 75   Resp: 16   SpO2: 98%     Body mass index is 30.07 kg/m².      Medications:   Current Outpatient Medications:   •  albuterol (PROVENTIL) (2.5 MG/3ML) 0.083% nebulizer solution, Take 2.5 mg by nebulization Every 4 (Four) Hours As Needed for Wheezing., Disp: 60 each, Rfl: 0  •  Bempedoic Acid (Nexletol) 180 MG tablet, Take 1 tablet by mouth Daily., Disp: 30 tablet, Rfl: 11  •  Chlorhexidine Gluconate 2 % pads, Apply  topically. As directed pre op, Disp: , Rfl:   •  losartan (Cozaar) 25 MG tablet, Take 1/2 tablet daily PRN if BP is > 130/70mm Hg (Patient taking differently: Take As Directed. Take 1/2 tablet daily PRN if BP is > 130/70mm Hg), Disp: 40 tablet, Rfl: 1  •  methylPREDNISolone (MEDROL) 4 MG dose pack, Take as directed on package instructions., Disp: 21 tablet, Rfl: 0  •  polyethyl glycol-propyl glycol (SYSTANE) 0.4-0.3 % solution ophthalmic solution, Administer 2 drops to both eyes Daily., Disp: , Rfl:   •  Stiolto Respimat 2.5-2.5 MCG/ACT aerosol solution inhaler, Inhale 2 puffs Daily., Disp: , Rfl:   •  Synthroid 100 MCG tablet, Take 1 tablet by mouth Daily., Disp: , Rfl:   •  TURMERIC PO, Take 1 tablet by mouth Daily., Disp: , Rfl:   •  vitamin D (ERGOCALCIFEROL) 1.25 MG (24385 UT) capsule capsule, Take 1 capsule by mouth 1 (One) Time Per Week. TUESDAYS, Disp: , Rfl:   •  fluocinonide (LIDEX) 0.05 % ointment, Apply 1 application topically to the appropriate area as directed 2 (Two) Times a Day., Disp: 30 g, Rfl: 1  •  oxyCODONE-acetaminophen (PERCOCET) 5-325 MG per tablet, Take 1 tablet by mouth Every 4 (Four) Hours As Needed for Severe Pain., Disp: 50 tablet, Rfl: 0       Assessment & Plan   Healthy female exam.      1. Healthcare Maintenance:  2. Patient Counseling:  --Nutrition: Stressed importance of moderation in  sodium/caffeine intake, saturated fat and cholesterol, caloric balance, sufficient intake of fresh fruits, vegetables, fiber, calcium and vit D  --Exercise: Recommended 30 minutes of exercise daily.  --Immunizations reviewed.  --Discussed benefits of screening colonoscopy.    Diagnoses and all orders for this visit:    Well woman exam (no gynecological exam)    Medicare annual wellness visit, subsequent    Healthcare maintenance  -     CBC & Differential  -     Comprehensive Metabolic Panel  -     Hemoglobin A1c  -     Thyroid Panel With TSH  -     Lipid Panel With LDL / HDL Ratio    Eczema, unspecified type  -     fluocinonide (LIDEX) 0.05 % ointment; Apply 1 application topically to the appropriate area as directed 2 (Two) Times a Day.        No follow-ups on file.             Dictated utilizing Dragon Voice Recognition Software

## 2023-03-31 ENCOUNTER — TELEPHONE (OUTPATIENT)
Dept: INTERNAL MEDICINE | Facility: CLINIC | Age: 73
End: 2023-03-31

## 2023-03-31 NOTE — TELEPHONE ENCOUNTER
Caller: Maegan Faviola E    Relationship: Self    Best call back number: [phone]    What medications are you currently taking:   Current Outpatient Medications on File Prior to Visit   Medication Sig Dispense Refill   • albuterol (PROVENTIL) (2.5 MG/3ML) 0.083% nebulizer solution Take 2.5 mg by nebulization Every 4 (Four) Hours As Needed for Wheezing. 60 each 0   • Chlorhexidine Gluconate 2 % pads Apply  topically. As directed pre op     • fluocinonide (LIDEX) 0.05 % ointment Apply 1 application topically to the appropriate area as directed 2 (Two) Times a Day. 30 g 1   • losartan (Cozaar) 25 MG tablet Take 1/2 tablet daily PRN if BP is > 130/70mm Hg (Patient taking differently: Take As Directed. Take 1/2 tablet daily PRN if BP is > 130/70mm Hg) 40 tablet 1   • methylPREDNISolone (MEDROL) 4 MG dose pack Take as directed on package instructions. 21 tablet 0   • oxyCODONE-acetaminophen (PERCOCET) 5-325 MG per tablet Take 1 tablet by mouth Every 4 (Four) Hours As Needed for Severe Pain. 50 tablet 0   • polyethyl glycol-propyl glycol (SYSTANE) 0.4-0.3 % solution ophthalmic solution Administer 2 drops to both eyes Daily.     • rosuvastatin (Crestor) 5 MG tablet Take 1 tablet by mouth Daily. 90 tablet 3   • Stiolto Respimat 2.5-2.5 MCG/ACT aerosol solution inhaler Inhale 2 puffs Daily.     • Synthroid 100 MCG tablet Take 1 tablet by mouth Daily.     • TURMERIC PO Take 1 tablet by mouth Daily.     • vitamin D (ERGOCALCIFEROL) 1.25 MG (25315 UT) capsule capsule Take 1 capsule by mouth 1 (One) Time Per Week. TUESDAYS       No current facility-administered medications on file prior to visit.          When did you start taking these medications: 3/30    Which medication are you concerned about: CRESTOR    Who prescribed you this medication: DR. BRENNER    What are your concerns: PATIENT WANTS IT CHANGED DUE TO SAFETY CONCERNS.     [address] [address] [place] [address]  [phone] [facility] [phone]   [phone]

## 2023-04-01 NOTE — PROGRESS NOTES
"Chief Complaint  Medicare Wellness-subsequent    Subjective        Faviola Issa presents to [facility]  History of Present Illness    Patient has a past medical history of hyperlipidemia.  Patient has not been wanting to be put on medication for this.  At today's office visit we did discuss about starting a statin medication.  She has been very hesitant up to now.    Today's office visit she does note that she has some eczema on her top part of her forehead as well as some of her scalp.  Patient not sure exactly why this happens.  She does dye her hair regularly.  She states that sometimes the irritation can happen even when she is not drying her hair.    Objective   Vital Signs:  /84   Pulse 75   Resp 16   Ht 170.2 cm (67\")   Wt 87.1 kg (192 lb)   SpO2 98%   BMI 30.07 kg/m²   Estimated body mass index is 30.07 kg/m² as calculated from the following:    Height as of this encounter: 170.2 cm (67\").    Weight as of this encounter: 87.1 kg (192 lb).             Physical Exam  Vitals and nursing note reviewed.   Constitutional:       Appearance: She is well-developed.   HENT:      Head: Normocephalic and atraumatic.      Right Ear: External ear normal.      Left Ear: External ear normal.   Cardiovascular:      Rate and Rhythm: Normal rate and regular rhythm.      Heart sounds: Normal heart sounds.   Pulmonary:      Effort: Pulmonary effort is normal. No respiratory distress.      Breath sounds: Normal breath sounds.   Abdominal:      Palpations: Abdomen is soft.      Tenderness: There is no abdominal tenderness. There is no guarding.   Musculoskeletal:         General: Normal range of motion.      Cervical back: Normal range of motion and neck supple.   Lymphadenopathy:      Cervical: No cervical adenopathy.   Skin:     General: Skin is warm.   Neurological:      Mental Status: She is alert and oriented to person, place, and time.   Psychiatric:         Behavior: Behavior " normal.        Result Review :                   Assessment and Plan   Diagnoses and all orders for this visit:        Healthcare maintenance  -     CBC & Differential  -     Comprehensive Metabolic Panel  -     Hemoglobin A1c  -     Thyroid Panel With TSH  -     Lipid Panel With LDL / HDL Ratio    Eczema, unspecified type  -     fluocinonide (LIDEX) 0.05 % ointment; Apply 1 application topically to the appropriate area as directed 2 (Two) Times a Day.  Dispense: 30 g; Refill: 1  -     Will start patient on fluocinonide ointment.     Hyperlipidemia, unspecified hyperlipidemia type  -     Comprehensive Metabolic Panel  -     Lipid Panel With LDL / HDL Ratio  -     rosuvastatin (Crestor) 5 MG tablet; Take 1 tablet by mouth Daily.  Dispense: 90 tablet; Refill: 3  -     Start crestor.              Follow Up   No follow-ups on file.  Patient was given instructions and counseling regarding her condition or for health maintenance advice. Please see specific information pulled into the AVS if appropriate.

## 2023-04-01 NOTE — PROGRESS NOTES
"Subjective   Faviola Issa is a 72 y.o. female and is here for a comprehensive physical exam. The patient reports no problems.    Pt is UTD with annual gyn exam and mammo           Social History:   Social History     Socioeconomic History   • Marital status:      Spouse name: KEN   • Number of children: 3   • Years of education: College   Tobacco Use   • Smoking status: Former     Packs/day: 1.00     Years: 15.00     Pack years: 15.00     Types: Cigarettes     Start date: 1978     Quit date: 2001     Years since quittin.5     Passive exposure: Never   • Smokeless tobacco: Never   • Tobacco comments:     1ppd x20 yrs   Vaping Use   • Vaping Use: Never used   Substance and Sexual Activity   • Alcohol use: No     Comment: Caffeine use: Tea 2 cups daily   • Drug use: No   • Sexual activity: Yes     Partners: Male     Birth control/protection: Post-menopausal, Surgical       Family History:   Family History   Problem Relation Age of Onset   • Cancer Sister         \"FEMALE\"   • Hypertension Father    • Breast cancer Daughter 45   • Deep vein thrombosis Niece    • Malig Hyperthermia Neg Hx        Past Medical History:   Past Medical History:   Diagnosis Date   • Allergic    • Arthritis    • Asthma    • Bilateral pulmonary embolism     provoked, after surgery , took 3 mos of OAC   • Colorectal cancer     s/p surgery (colostomy), radiation, and chemotherapy, with mets to lung s/p surgical resection   • Colostomy in place    • Enlarged thyroid gland     s/p total thyroidectomy    • Eye exam normal    • History of prior pregnancies     x4   • Hx of radiation therapy     for colon cancer   • Hyperparathyroidism     s/p removal of a single adenoma 2019 c/b bleeding/hematoma   • Hypertension     \"white coat syndrome\"   • Injury of back    • Injury of neck    • Left hip pain    • 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  STATES DR POSADA AWARE OF.   • Short of breath on exertion        The following portions of the patient's history were reviewed and updated as appropriate: allergies, current medications, past family history, past medical history, past social history, past surgical history and problem list.    Review of Systems    Review of Systems   Constitutional: Negative for chills and fever.   HENT: Negative for congestion, rhinorrhea, sinus pain and sore throat.    Eyes: Negative for photophobia and visual disturbance.   Respiratory: Negative for cough, chest tightness and shortness of breath.    Cardiovascular: Negative for chest pain and palpitations.   Gastrointestinal: Negative for diarrhea, nausea and vomiting.   Genitourinary: Negative for dysuria, frequency and urgency.   Skin: Negative for rash and wound.   Neurological: Negative for dizziness and syncope.   Psychiatric/Behavioral: Negative for behavioral problems and confusion.       Objective   Physical Exam  Vitals and nursing note reviewed.   Constitutional:       Appearance: She is well-developed.   HENT:      Head: Normocephalic and atraumatic.      Right Ear: External ear normal.      Left Ear: External ear normal.   Cardiovascular:      Rate and Rhythm: Normal rate and regular rhythm.      Heart sounds: Normal heart sounds.   Pulmonary:      Effort: Pulmonary effort is normal. No respiratory distress.      Breath sounds: Normal breath sounds.   Abdominal:      Palpations: Abdomen is soft.      Tenderness: There is no abdominal tenderness. There is no guarding.   Musculoskeletal:         General: Normal range of motion.      Cervical back: Normal range of motion and neck supple.   Lymphadenopathy:      Cervical: No cervical adenopathy.   Skin:     General: Skin is warm.   Neurological:      Mental Status: She is alert and  oriented to person, place, and time.   Psychiatric:         Behavior: Behavior normal.         Vitals:    03/29/23 1048   BP: 142/84   Pulse: 75   Resp: 16   SpO2: 98%     Body mass index is 30.07 kg/m².      Medications:   Current Outpatient Medications:   •  albuterol (PROVENTIL) (2.5 MG/3ML) 0.083% nebulizer solution, Take 2.5 mg by nebulization Every 4 (Four) Hours As Needed for Wheezing., Disp: 60 each, Rfl: 0  •  Chlorhexidine Gluconate 2 % pads, Apply  topically. As directed pre op, Disp: , Rfl:   •  losartan (Cozaar) 25 MG tablet, Take 1/2 tablet daily PRN if BP is > 130/70mm Hg (Patient taking differently: Take As Directed. Take 1/2 tablet daily PRN if BP is > 130/70mm Hg), Disp: 40 tablet, Rfl: 1  •  methylPREDNISolone (MEDROL) 4 MG dose pack, Take as directed on package instructions., Disp: 21 tablet, Rfl: 0  •  polyethyl glycol-propyl glycol (SYSTANE) 0.4-0.3 % solution ophthalmic solution, Administer 2 drops to both eyes Daily., Disp: , Rfl:   •  Stiolto Respimat 2.5-2.5 MCG/ACT aerosol solution inhaler, Inhale 2 puffs Daily., Disp: , Rfl:   •  Synthroid 100 MCG tablet, Take 1 tablet by mouth Daily., Disp: , Rfl:   •  TURMERIC PO, Take 1 tablet by mouth Daily., Disp: , Rfl:   •  vitamin D (ERGOCALCIFEROL) 1.25 MG (72111 UT) capsule capsule, Take 1 capsule by mouth 1 (One) Time Per Week. TUESDAYS, Disp: , Rfl:   •  fluocinonide (LIDEX) 0.05 % ointment, Apply 1 application topically to the appropriate area as directed 2 (Two) Times a Day., Disp: 30 g, Rfl: 1  •  oxyCODONE-acetaminophen (PERCOCET) 5-325 MG per tablet, Take 1 tablet by mouth Every 4 (Four) Hours As Needed for Severe Pain., Disp: 50 tablet, Rfl: 0  •  rosuvastatin (Crestor) 5 MG tablet, Take 1 tablet by mouth Daily., Disp: 90 tablet, Rfl: 3       Assessment & Plan   Healthy female exam.      1. Healthcare Maintenance:  2. Patient Counseling:  --Nutrition: Stressed importance of moderation in sodium/caffeine intake, saturated fat and  cholesterol, caloric balance, sufficient intake of fresh fruits, vegetables, fiber, calcium and vit D  --Exercise: Recommended 30 minutes of exercise daily.  --Immunizations reviewed.  --Discussed benefits of screening colonoscopy.    Diagnoses and all orders for this visit:    Well woman exam (no gynecological exam)    Medicare annual wellness visit, subsequent    Healthcare maintenance  -     CBC & Differential  -     Comprehensive Metabolic Panel  -     Hemoglobin A1c  -     Thyroid Panel With TSH  -     Lipid Panel With LDL / HDL Ratio          No follow-ups on file.             Dictated utilizing Dragon Voice Recognition Software

## 2023-04-11 NOTE — TELEPHONE ENCOUNTER
If she does not want the crestor than she needs to be on nexitol. Have her think about this and see what she wants to do. She can come back in for an office appointment and we can discuss her options more in depth.

## 2023-04-12 RX ORDER — SIMVASTATIN 20 MG
20 TABLET ORAL NIGHTLY
Qty: 90 TABLET | Refills: 3 | Status: SHIPPED | OUTPATIENT
Start: 2023-04-12

## 2023-04-12 NOTE — TELEPHONE ENCOUNTER
Called and s/w pt and advised Dr. Beard said to d/c the crestor, and he sent in an RX for simvastatin. Pt voiced understanding and was very thankful.

## 2023-04-12 NOTE — TELEPHONE ENCOUNTER
Dr. Beard,     I spoke with patient and she said she does not want to take the crestor or the nexitol. She said that in the past you had her on simvastatin and she would rather just go back to that. Please advise and send script if you are agreeable. Thank you.

## 2023-04-24 DIAGNOSIS — E78.2 MIXED HYPERLIPIDEMIA: Primary | ICD-10-CM

## 2023-05-19 ENCOUNTER — TELEPHONE (OUTPATIENT)
Dept: INTERNAL MEDICINE | Facility: CLINIC | Age: 73
End: 2023-05-19
Payer: MEDICARE

## 2023-05-19 NOTE — TELEPHONE ENCOUNTER
Caller: Faviola Issa    Relationship: Self    Best call back number: [phone]    What was the call regarding: PATIENT NEEDS TO KNOW IF SHE NEEDS TO HAVE LABS DRAWN FOR HER APPOINTMENT WITH DR. BRENNER ON 06/29. IF SO, PLEASE SEND PATIENT LAB ORDER SHEET SO SHE CAN GET THESE COMPLETED.     Do you require a callback: YES

## 2023-05-31 LAB
ALBUMIN SERPL-MCNC: 4.2 G/DL (ref 3.5–5.2)
ALBUMIN/GLOB SERPL: 1.4 G/DL
ALP SERPL-CCNC: 87 U/L (ref 39–117)
ALT SERPL-CCNC: 12 U/L (ref 1–33)
AST SERPL-CCNC: 14 U/L (ref 1–32)
BASOPHILS # BLD AUTO: 0.04 10*3/MM3 (ref 0–0.2)
BASOPHILS NFR BLD AUTO: 0.7 % (ref 0–1.5)
BILIRUB SERPL-MCNC: 0.5 MG/DL (ref 0–1.2)
BUN SERPL-MCNC: 9 MG/DL (ref 8–23)
BUN/CREAT SERPL: 10.7 (ref 7–25)
CALCIUM SERPL-MCNC: 9.5 MG/DL (ref 8.6–10.5)
CHLORIDE SERPL-SCNC: 103 MMOL/L (ref 98–107)
CHOLEST SERPL-MCNC: 191 MG/DL (ref 0–200)
CO2 SERPL-SCNC: 28.5 MMOL/L (ref 22–29)
CREAT SERPL-MCNC: 0.84 MG/DL (ref 0.57–1)
EGFRCR SERPLBLD CKD-EPI 2021: 74.4 ML/MIN/1.73
EOSINOPHIL # BLD AUTO: 0.11 10*3/MM3 (ref 0–0.4)
EOSINOPHIL NFR BLD AUTO: 1.8 % (ref 0.3–6.2)
ERYTHROCYTE [DISTWIDTH] IN BLOOD BY AUTOMATED COUNT: 13.9 % (ref 12.3–15.4)
FT4I SERPL CALC-MCNC: 3.1 (ref 1.2–4.9)
GLOBULIN SER CALC-MCNC: 2.9 GM/DL
GLUCOSE SERPL-MCNC: 78 MG/DL (ref 65–99)
HBA1C MFR BLD: 5.3 % (ref 4.8–5.6)
HCT VFR BLD AUTO: 42 % (ref 34–46.6)
HDLC SERPL-MCNC: 64 MG/DL (ref 40–60)
HGB BLD-MCNC: 13.6 G/DL (ref 12–15.9)
IMM GRANULOCYTES # BLD AUTO: 0.01 10*3/MM3 (ref 0–0.05)
IMM GRANULOCYTES NFR BLD AUTO: 0.2 % (ref 0–0.5)
LDLC SERPL CALC-MCNC: 110 MG/DL (ref 0–100)
LDLC/HDLC SERPL: 1.68 {RATIO}
LYMPHOCYTES # BLD AUTO: 1.23 10*3/MM3 (ref 0.7–3.1)
LYMPHOCYTES NFR BLD AUTO: 20 % (ref 19.6–45.3)
MCH RBC QN AUTO: 28.1 PG (ref 26.6–33)
MCHC RBC AUTO-ENTMCNC: 32.4 G/DL (ref 31.5–35.7)
MCV RBC AUTO: 86.8 FL (ref 79–97)
MONOCYTES # BLD AUTO: 0.5 10*3/MM3 (ref 0.1–0.9)
MONOCYTES NFR BLD AUTO: 8.1 % (ref 5–12)
NEUTROPHILS # BLD AUTO: 4.26 10*3/MM3 (ref 1.7–7)
NEUTROPHILS NFR BLD AUTO: 69.2 % (ref 42.7–76)
NRBC BLD AUTO-RTO: 0 /100 WBC (ref 0–0.2)
PLATELET # BLD AUTO: 288 10*3/MM3 (ref 140–450)
POTASSIUM SERPL-SCNC: 4.1 MMOL/L (ref 3.5–5.2)
PROT SERPL-MCNC: 7.1 G/DL (ref 6–8.5)
RBC # BLD AUTO: 4.84 10*6/MM3 (ref 3.77–5.28)
SODIUM SERPL-SCNC: 143 MMOL/L (ref 136–145)
T3RU NFR SERPL: 31 % (ref 24–39)
T4 SERPL-MCNC: 9.9 UG/DL (ref 4.5–12)
TRIGL SERPL-MCNC: 97 MG/DL (ref 0–150)
TSH SERPL DL<=0.005 MIU/L-ACNC: 2.11 UIU/ML (ref 0.45–4.5)
VLDLC SERPL CALC-MCNC: 17 MG/DL (ref 5–40)
WBC # BLD AUTO: 6.15 10*3/MM3 (ref 3.4–10.8)

## 2023-06-05 ENCOUNTER — TELEPHONE (OUTPATIENT)
Dept: INTERNAL MEDICINE | Facility: CLINIC | Age: 73
End: 2023-06-05

## 2023-06-05 NOTE — TELEPHONE ENCOUNTER
Hub staff attempted to follow warm transfer process and was unsuccessful     Caller: Faviola Issa    Relationship to patient: Self    Best call back number: [phone]     Patient is needing: PATIENT IS RETURNING DR BRENNER'S MA'A CALL    PLEASE CALL AND ADVISE

## 2023-06-16 ENCOUNTER — OFFICE VISIT (OUTPATIENT)
Dept: ENDOCRINOLOGY | Age: 73
End: 2023-06-16
Payer: MEDICARE

## 2023-06-16 VITALS
OXYGEN SATURATION: 98 % | DIASTOLIC BLOOD PRESSURE: 67 MMHG | WEIGHT: 195 LBS | HEART RATE: 70 BPM | BODY MASS INDEX: 30.61 KG/M2 | SYSTOLIC BLOOD PRESSURE: 169 MMHG | HEIGHT: 67 IN | TEMPERATURE: 97.3 F

## 2023-06-16 DIAGNOSIS — E78.2 MIXED DYSLIPIDEMIA: Primary | ICD-10-CM

## 2023-06-16 DIAGNOSIS — E89.0 POSTSURGICAL HYPOTHYROIDISM: ICD-10-CM

## 2023-06-16 PROCEDURE — 1159F MED LIST DOCD IN RCRD: CPT | Performed by: INTERNAL MEDICINE

## 2023-06-16 PROCEDURE — 3078F DIAST BP <80 MM HG: CPT | Performed by: INTERNAL MEDICINE

## 2023-06-16 PROCEDURE — 99204 OFFICE O/P NEW MOD 45 MIN: CPT | Performed by: INTERNAL MEDICINE

## 2023-06-16 PROCEDURE — 3077F SYST BP >= 140 MM HG: CPT | Performed by: INTERNAL MEDICINE

## 2023-06-16 PROCEDURE — 1160F RVW MEDS BY RX/DR IN RCRD: CPT | Performed by: INTERNAL MEDICINE

## 2023-06-16 RX ORDER — ACETAMINOPHEN 500 MG
500 TABLET ORAL AS NEEDED
COMMUNITY

## 2023-06-16 RX ORDER — ROSUVASTATIN CALCIUM 20 MG/1
20 TABLET, COATED ORAL DAILY
COMMUNITY

## 2023-06-16 NOTE — PROGRESS NOTES
New Patient      Chief Complaint    Chief Complaint   Patient presents with   • MIXED HYPERLIPIDEMIA     NEW PATIENT        HPI:   Faviola Issa is a 73 y.o. female sent to us for consultative evaluation and management of mixed dyslipidemia.  She was not sure when it was first diagnosed but she is on treatment.  She was initially treated with simvastatin but she did not tolerate that very well because of muscle aches and pains.  She was later switched to rosuvastatin 20 mg daily and she tolerates that better.  Her lipid panel that was checked on May 30, 2023, showed a total cholesterol of 191 mg/dL; triglycerides 97 mg/dL; HDL 64 mg/dL and  mg/dL.  She does not have any personal history of coronary heart disease and she does not have any family history of coronary heart disease.  She does not have any history of diabetes.  Her A1c on May 30, 2023, was 5.3%.  She exercises as much as she is able to but is currently awaiting a right total hip replacement before she can use renew her regular exercise.  She has had left hip replacement.  She has postsurgical hypothyroidism for which she is on L-thyroxine replacement therapy and is currently taking levothyroxine as 100 mcg daily.  Her most recent TSH on May 30, 2023, was 2.110 uIU/mL with a total T4 of 9.9 mcg/dL.  She has a history of hypercalcemia due to primary hyperparathyroidism for which she has had a parathyroidectomy.  Her recent calcium on May 30, 2023, was 9.5 mg/dL with an albumin of 4.2 g/dL.      Past Medical History:   Diagnosis Date   • Allergic    • Arthritis    • Asthma    • Bilateral pulmonary embolism     provoked, after surgery 2019, took 3 mos of OAC   • Colorectal cancer     s/p surgery (colostomy), radiation, and chemotherapy, with mets to lung s/p surgical resection   • Colostomy in place    • Enlarged thyroid gland     s/p total thyroidectomy 2019   • Eye exam normal 2013   • History of prior pregnancies     x4   • Hx of radiation  "therapy     for colon cancer   • Hyperparathyroidism     s/p removal of a single adenoma 2019 c/b bleeding/hematoma   • Hypertension     \"white coat syndrome\"   • Injury of back    • Injury of neck    • Left hip pain    • Lymphoma     Eyelid, low-grade, treated with radiation   • Palpitations    • PONV (postoperative nausea and vomiting)    • Postoperative hypothyroidism 2019   • Rash     LEFT LOWER LEG STATES SEEN DERMATOLOGIST AND SAID DERMATITIS.  STATES DR POSADA AWARE OF.   • Short of breath on exertion           ROS:  Pertinent to this visit, only as mentioned above.  The rest was negative.    Social History     Socioeconomic History   • Marital status:      Spouse name: KEN   • Number of children: 3   • Years of education: College   Tobacco Use   • Smoking status: Former     Packs/day: 1.00     Years: 15.00     Pack years: 15.00     Types: Cigarettes     Start date: 1978     Quit date: 2001     Years since quittin.7     Passive exposure: Never   • Smokeless tobacco: Never   • Tobacco comments:     1ppd x20 yrs   Vaping Use   • Vaping Use: Never used   Substance and Sexual Activity   • Alcohol use: No     Comment: Caffeine use: Tea 2 cups daily   • Drug use: No   • Sexual activity: Yes     Partners: Male     Birth control/protection: Post-menopausal, Surgical       Physical Exam:  GENERAL: She looked well.  Mobile with a cane  HEENT: Normal examination.  NECK: Old surgical scar  LUNGS: Clear to auscultation bilaterally  CVS: RRR  EXTREMITIES: Normal examination.    Assessment:  1.  Mixed dyslipidemia on treatment.  2.  Postsurgical hypothyroidism on L-thyroxine replacement therapy.    Diagnoses and all orders for this visit:    1. Mixed dyslipidemia (Primary)    2. Postsurgical hypothyroidism      Recommendations:  1.  We reviewed with Mrs. Issa, the history of hypercholesterolemia and her treatment and talked about her most recent lipid panel which looks great.  2.  " We encouraged her to continue with her current dose of the rosuvastatin as she is taking and to  exercise when she is able to.  3.  We reviewed the fact that her A1c was excellent at 5.3%.  4.  We also reviewed the history of postsurgical hypothyroidism, her L-thyroxine replacement therapy, her levothyroxine dose and her most recent TSH which looks good.  5.  She will therefore continue with levothyroxine as 100 mcg daily.  6.  We reassured her that she is doing all the best that she can and everything is under excellent control by her primary care physician.  7.  She will therefore follow-up with a primary care physician and return to endocrinology only as clearly needed.  7.  We gave her the opportunity to ask questions, which we answered and we addressed her concerns.

## 2023-09-01 ENCOUNTER — TELEPHONE (OUTPATIENT)
Dept: INTERNAL MEDICINE | Facility: CLINIC | Age: 73
End: 2023-09-01
Payer: MEDICARE

## 2023-09-01 NOTE — TELEPHONE ENCOUNTER
Caller: Faviola Issa    Relationship: Self    Best call back number: [phone]    What medication are you requesting: SOMETHING FOR EITHER POISON IVY OR CONTACT DERMATITIS -     What are your current symptoms:  BUMP BIG THIGH- AND IT SPREADING - ITCHING    How long have you been experiencing symptoms:      Have you had these symptoms before:    [] Yes  [] No    Have you been treated for these symptoms before:   [] Yes  [] No    If a prescription is needed, what is your preferred pharmacy and phone number:  [facility] [id number] [place], [place] - [address] [facility] [phone] [facility] [phone] [facility]     Additional notes: PLEASE CONTACT PATIENT TO ADVISE.           THANKS

## 2023-09-19 ENCOUNTER — HOSPITAL ENCOUNTER (OUTPATIENT)
Dept: CT IMAGING | Facility: HOSPITAL | Age: 73
Discharge: HOME | End: 2023-09-19
Admitting: INTERNAL MEDICINE
Payer: MEDICARE

## 2023-09-19 DIAGNOSIS — Z12.2 SCREENING FOR LUNG CANCER: ICD-10-CM

## 2023-09-19 PROCEDURE — 71271 CT THORAX LUNG CANCER SCR C-: CPT

## 2023-09-27 ENCOUNTER — TRANSCRIBE ORDERS (OUTPATIENT)
Dept: ADMINISTRATIVE | Facility: HOSPITAL | Age: 73
End: 2023-09-27
Payer: MEDICARE

## 2023-09-27 DIAGNOSIS — R91.1 LUNG NODULE: Primary | ICD-10-CM

## 2023-10-03 ENCOUNTER — HOSPITAL ENCOUNTER (OUTPATIENT)
Dept: PET IMAGING | Facility: HOSPITAL | Age: 73
Discharge: HOME | End: 2023-10-03
Payer: MEDICARE

## 2023-10-03 DIAGNOSIS — R91.1 LUNG NODULE: ICD-10-CM

## 2023-10-03 LAB — GLUCOSE BLDC GLUCOMTR-MCNC: 67 MG/DL (ref 70–130)

## 2023-10-03 PROCEDURE — 82948 REAGENT STRIP/BLOOD GLUCOSE: CPT

## 2023-10-03 PROCEDURE — 78815 PET IMAGE W/CT SKULL-THIGH: CPT

## 2023-10-03 PROCEDURE — A9552 F18 FDG: HCPCS | Performed by: INTERNAL MEDICINE

## 2023-10-03 PROCEDURE — 0 FLUDEOXYGLUCOSE F18 SOLUTION: Performed by: INTERNAL MEDICINE

## 2023-10-03 RX ADMIN — FLUDEOXYGLUCOSE F18 1 DOSE: 300 INJECTION INTRAVENOUS at 10:05

## 2023-10-05 ENCOUNTER — TRANSCRIBE ORDERS (OUTPATIENT)
Dept: ADMINISTRATIVE | Facility: HOSPITAL | Age: 73
End: 2023-10-05
Payer: MEDICARE

## 2023-10-05 DIAGNOSIS — R91.1 LUNG NODULE: Primary | ICD-10-CM

## 2023-10-23 ENCOUNTER — OFFICE VISIT (OUTPATIENT)
Dept: INTERNAL MEDICINE | Facility: CLINIC | Age: 73
End: 2023-10-23
Payer: MEDICARE

## 2023-10-23 VITALS
HEIGHT: 67 IN | DIASTOLIC BLOOD PRESSURE: 74 MMHG | WEIGHT: 194.4 LBS | OXYGEN SATURATION: 96 % | RESPIRATION RATE: 14 BRPM | SYSTOLIC BLOOD PRESSURE: 132 MMHG | BODY MASS INDEX: 30.51 KG/M2 | HEART RATE: 85 BPM

## 2023-10-23 DIAGNOSIS — E78.2 MIXED HYPERLIPIDEMIA: Primary | ICD-10-CM

## 2023-10-23 PROCEDURE — 3075F SYST BP GE 130 - 139MM HG: CPT | Performed by: FAMILY MEDICINE

## 2023-10-23 PROCEDURE — 1159F MED LIST DOCD IN RCRD: CPT | Performed by: FAMILY MEDICINE

## 2023-10-23 PROCEDURE — 99214 OFFICE O/P EST MOD 30 MIN: CPT | Performed by: FAMILY MEDICINE

## 2023-10-23 PROCEDURE — 3078F DIAST BP <80 MM HG: CPT | Performed by: FAMILY MEDICINE

## 2023-10-23 PROCEDURE — 1160F RVW MEDS BY RX/DR IN RCRD: CPT | Performed by: FAMILY MEDICINE

## 2023-10-23 RX ORDER — BEMPEDOIC ACID AND EZETIMIBE 180; 10 MG/1; MG/1
1 TABLET, FILM COATED ORAL DAILY
Qty: 30 TABLET | Refills: 11 | Status: SHIPPED | OUTPATIENT
Start: 2023-10-23

## 2023-10-23 NOTE — PROGRESS NOTES
"Chief Complaint  Hypertension    Subjective          Faviola Issa presents to [facility]  History of Present Illness    The patient presents today to follow up on her cholesterol, blood work, and blood pressure.    The patient reports that the first statin medication she was given was 5 mg, and the rest were 20 mg. She has not taken the 20 mg tablets. She was taking 5 mg at night, but they made her legs \"walk like a robot.\" When her lab results were good, she cut back to taking Crestor 5 mg weekly. She is currently taking 5 mg 3 days a week at bedtime.     She is still exercising and trying to get her legs up the steps. She is not taking coenzyme Q10 because she got tired of it. The patient agrees to try another medication that is not a statin.    The patient reports that she has bronchitis, and she is using her inhalers.    The patient complains of hip pain. She has had surgery on one of them and states she needs to have the other done. She is exercising to help. The patient declines physical therapy.    Review of Systems was performed, and pertinent findings are noted in the HPI.    Objective   Vital Signs:   /74 (BP Location: Left arm, Patient Position: Sitting, Cuff Size: Adult)   Pulse 85   Resp 14   Ht 170.2 cm (67\")   Wt 88.2 kg (194 lb 6.4 oz)   SpO2 96%   BMI 30.45 kg/m²     Physical Exam  Vitals and nursing note reviewed.   Constitutional:       Appearance: She is well-developed.   HENT:      Head: Normocephalic and atraumatic.   Musculoskeletal:      Cervical back: Normal range of motion and neck supple.   Neurological:      Mental Status: She is alert and oriented to person, place, and time.   Psychiatric:         Behavior: Behavior normal.        Result Review :              No results were reviewed or obtained today.     Assessment and Plan    Diagnoses and all orders for this visit:    1. Mixed hyperlipidemia (Primary)  -     Bempedoic Acid-Ezetimibe " (Nexlizet) 180-10 MG tablet; Take 1 tablet by mouth Daily.  Dispense: 30 tablet; Refill: 11  -     Lipid Panel With LDL / HDL Ratio  -     Comprehensive Metabolic Panel    1. Hyperlipidemia  - She will discontinue Crestor.  - She will start Nexlizet 1 tablet daily. Samples given today.    2. Hypertension  - She will continue her current medication regimen.    3. Hip pain  - She will follow up with her orthopedist.      Follow Up   No follow-ups on file.  Follow up first week of 12/2023.    Patient was given instructions and counseling regarding her condition or for health maintenance advice. Please see specific information pulled into the AVS if appropriate.         Transcribed from ambient dictation for Roger Beard MD by Brie Walden.  10/23/23   11:35 EDT    Patient or patient representative verbalized consent to the visit recording.  I have personally performed the services described in this document as transcribed by the above individual, and it is both accurate and complete.

## 2023-10-25 ENCOUNTER — TELEPHONE (OUTPATIENT)
Dept: INTERNAL MEDICINE | Facility: CLINIC | Age: 73
End: 2023-10-25

## 2023-10-25 NOTE — TELEPHONE ENCOUNTER
Caller: Faviola Issa    Relationship: Self    Best call back number: [phone]    What is the best time to reach you: ANYTIME     Who are you requesting to speak with (clinical staff, provider,  specific staff member): CLINICAL  STAFF     Do you know the name of the person who called: SELF     What was the call regarding: PATIENT CALLED AND STATED SHE WILL CONTINUE TO TAKE THE ROSUVASTATIN AND WILL NOT TAKE THE NEXLIZET DUE TO THE BAD SIDE EFFECTS ON THE LITERATURE. PATIENT WILL DROP THE SAMPLES OFF.     Is it okay if the provider responds through MyChart: YES

## 2023-11-07 LAB
ALBUMIN SERPL-MCNC: 4.5 G/DL (ref 3.5–5.2)
ALBUMIN/GLOB SERPL: 1.5 G/DL
ALP SERPL-CCNC: 93 U/L (ref 39–117)
ALT SERPL-CCNC: 17 U/L (ref 1–33)
AST SERPL-CCNC: 21 U/L (ref 1–32)
BILIRUB SERPL-MCNC: 0.3 MG/DL (ref 0–1.2)
BUN SERPL-MCNC: 9 MG/DL (ref 8–23)
BUN/CREAT SERPL: 13 (ref 7–25)
CALCIUM SERPL-MCNC: 9.6 MG/DL (ref 8.6–10.5)
CHLORIDE SERPL-SCNC: 102 MMOL/L (ref 98–107)
CHOLEST SERPL-MCNC: 131 MG/DL (ref 0–200)
CO2 SERPL-SCNC: 29.1 MMOL/L (ref 22–29)
CREAT SERPL-MCNC: 0.69 MG/DL (ref 0.57–1)
EGFRCR SERPLBLD CKD-EPI 2021: 92.3 ML/MIN/1.73
GLOBULIN SER CALC-MCNC: 3.1 GM/DL
GLUCOSE SERPL-MCNC: 87 MG/DL (ref 65–99)
HDLC SERPL-MCNC: 62 MG/DL (ref 40–60)
LDLC SERPL CALC-MCNC: 55 MG/DL (ref 0–100)
LDLC/HDLC SERPL: 0.9 {RATIO}
POTASSIUM SERPL-SCNC: 4.3 MMOL/L (ref 3.5–5.2)
PROT SERPL-MCNC: 7.6 G/DL (ref 6–8.5)
SODIUM SERPL-SCNC: 141 MMOL/L (ref 136–145)
TRIGL SERPL-MCNC: 66 MG/DL (ref 0–150)
VLDLC SERPL CALC-MCNC: 14 MG/DL (ref 5–40)

## 2023-12-04 ENCOUNTER — TELEPHONE (OUTPATIENT)
Dept: CARDIOLOGY | Facility: CLINIC | Age: 73
End: 2023-12-04
Payer: MEDICARE

## 2023-12-04 NOTE — TELEPHONE ENCOUNTER
You had an tomorrow morning in [facility], and the pt took it.    Blessing Wadsworth, RN  Triage RN  12/04/23 11:07 EST

## 2023-12-04 NOTE — TELEPHONE ENCOUNTER
"Pt called in this morning, states yesterday evening while she was sitting she had a \"squeezing\" pain in her chest and back approx 7-8/10.  Did not radiate to arms/jaw, denies SOA/dizziness/lightheadedness/diaphoresis or other associated symptoms.  She states she took 2 ASA 81mg, and pain was gone in 5-10 min.  After pain went away, she did note that she had a lot of burping.  This AM she is not having any symptoms, and her /81.    She has an appointment in Feb, and would like to know if she should be seen sooner.  I did recommend that if pain returns/intensifies or doesn't go away, she should seek immediate care.  Recommendations?    Thanks so much,  Blessing Wadsworth, RN  Triage RN  12/04/23 10:41 EST    "

## 2023-12-05 ENCOUNTER — OFFICE VISIT (OUTPATIENT)
Dept: CARDIOLOGY | Facility: CLINIC | Age: 73
End: 2023-12-05
Payer: MEDICARE

## 2023-12-05 VITALS
HEART RATE: 61 BPM | BODY MASS INDEX: 30.29 KG/M2 | WEIGHT: 193 LBS | HEIGHT: 67 IN | DIASTOLIC BLOOD PRESSURE: 80 MMHG | OXYGEN SATURATION: 96 % | SYSTOLIC BLOOD PRESSURE: 140 MMHG

## 2023-12-05 DIAGNOSIS — R07.2 PRECORDIAL PAIN: Primary | ICD-10-CM

## 2023-12-05 DIAGNOSIS — I10 PRIMARY HYPERTENSION: ICD-10-CM

## 2023-12-05 PROCEDURE — 93000 ELECTROCARDIOGRAM COMPLETE: CPT | Performed by: INTERNAL MEDICINE

## 2023-12-05 PROCEDURE — 3077F SYST BP >= 140 MM HG: CPT | Performed by: INTERNAL MEDICINE

## 2023-12-05 PROCEDURE — 99214 OFFICE O/P EST MOD 30 MIN: CPT | Performed by: INTERNAL MEDICINE

## 2023-12-05 PROCEDURE — 1160F RVW MEDS BY RX/DR IN RCRD: CPT | Performed by: INTERNAL MEDICINE

## 2023-12-05 PROCEDURE — 1159F MED LIST DOCD IN RCRD: CPT | Performed by: INTERNAL MEDICINE

## 2023-12-05 PROCEDURE — 3079F DIAST BP 80-89 MM HG: CPT | Performed by: INTERNAL MEDICINE

## 2023-12-05 RX ORDER — ROSUVASTATIN CALCIUM 10 MG/1
5 TABLET, COATED ORAL DAILY
COMMUNITY

## 2023-12-05 RX ORDER — ASPIRIN 81 MG/1
81 TABLET ORAL NIGHTLY
COMMUNITY

## 2023-12-05 NOTE — PROGRESS NOTES
RM:________     PCP: Roger Beard MD    : 1950  AGE: 73 y.o.  EST PATIENT     REASON FOR VISIT/  CC:        BP Readings from Last 3 Encounters:   10/23/23 132/74   23 (!) 184/78   23 128/66      Wt Readings from Last 3 Encounters:   10/23/23 88.2 kg (194 lb 6.4 oz)   23 89.4 kg (197 lb)   23 89.6 kg (197 lb 9.6 oz)        WT: ____________ BP: __________L __________R HR______    CHEST PAIN: _____________    SOA: _____________PALPS: _______________     LIGHTHEADED: ___________FATIGUE: ________________ EDEMA __________    ALLERGIES:Adhesive tape, Amoxicillin, Latex, Red dye, Shellfish-derived products, Pennsaid [diclofenac sodium], Prednisone, Simvastatin, and Lactose intolerance (gi) SMOKING HISTORY:  Social History     Tobacco Use    Smoking status: Former     Packs/day: 1.00     Years: 15.00     Additional pack years: 0.00     Total pack years: 15.00     Types: Cigarettes     Start date: 1978     Quit date: 2001     Years since quittin.2     Passive exposure: Never    Smokeless tobacco: Never    Tobacco comments:     1ppd x20 yrs   Vaping Use    Vaping Use: Never used   Substance Use Topics    Alcohol use: No     Comment: Caffeine use: Tea 2 cups daily    Drug use: No     CAFFEINE USE_________________  ALCOHOL ______________________

## 2023-12-05 NOTE — PROGRESS NOTES
"Date of Office Visit: 23  Encounter Provider: Nahum Benavides MD  Place of Service: [facility] CARDIOLOGY  Patient Name: Faviola Issa  :1950    Chief Complaint   Patient presents with    Chest Pain   :   HPI:     Ms. Issa is 73 y.o. and presents today for a sick visit. I have reviewed prior notes and there are no changes except for any new updates described below. I have also reviewed any information entered into the medical record by the patient or by ancillary staff.     She has a history of hypertension that is complicated by white coat hypertension. She has a history of palpitations.  She had a provoked PE in 2019 after a prolonged recovery from thyroid/parathyroid surgery, which was complicated by a neck hematoma.  She took an appropriate course of OAC.     In , she reportedly had an \"abnormal EKG\" and was experiencing palpitations. Her EKG showed nonspecific ST flattening. An echo was normal, as was a 24 hour Holter. A 14-day monitor was ordered, but she could only tolerate the adhesive for two days; it was a normal study during that time.    She reports intermittent chest \"spasms.\"  These occur without any rhyme or reason and they occur at rest.  She has a pain that is predominantly centered in her mid back and radiates around her body to the front.  She is not diaphoretic or lightheaded or nauseated.  It lasts 5 to 7 minutes and feels like a squeezing feeling and then it resolves on its own.  It usually resolves after she swallow some water, and then she has significant belching afterwards.  She denies any exertional chest pain or shortness of breath.  She checks her blood pressure regularly at home and her systolic pressure is always in the 110s to 120s.    Past Medical History:   Diagnosis Date    Allergic     Arthritis     Asthma     Bilateral pulmonary embolism     provoked, after surgery 2019, took 3 mos of OAC    Colorectal cancer     s/p surgery " "(colostomy), radiation, and chemotherapy, with mets to lung s/p surgical resection    Colostomy in place     Enlarged thyroid gland     s/p total thyroidectomy 2019    Eye exam normal 2013    History of prior pregnancies     x4    Hx of radiation therapy 2000    for colon cancer    Hyperparathyroidism     s/p removal of a single adenoma 5/2019 c/b bleeding/hematoma    Hypertension     \"white coat syndrome\"    Injury of back     Injury of neck     Left hip pain     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  STATES DR POSADA AWARE OF.    Well female exam with routine gynecological exam 10/31/2016       Past Surgical History:   Procedure Laterality Date    COLON SURGERY      1999 and 11/2011    COLONOSCOPY  2016    COLONOSCOPY N/A 05/08/2018    Procedure: COLONOSCOPY into ileum;  Surgeon: James Romeo MD;  Location: [facility];  Service: Gastroenterology    COLONOSCOPY N/A 03/04/2022    Procedure: COLONOSCOPY to anastomosis;  Surgeon: James Romeo MD;  Location: [facility] END[facility];  Service: Gastroenterology;  Laterality: N/A;  pre: hx of colorectal cancer   post: normal surgical anatomy     HEMICOLOECTOMY W/ ANASTOMOSIS Right 11/2011    Adenocarcinoma of cecum    HYSTERECTOMY  1999    LUNG LOBECTOMY      ANSELMO 08/2006 and RLL partial 09/2001 metastatic colon cancer     THYROIDECTOMY Bilateral 05/06/2019    Procedure: Left PARATHYROIDECTOMY AND TOTAL THYROIDECTOMY;  Surgeon: Maxi Daly MD;  [place] [facility] [place];  Service: ENT    TOTAL HIP ARTHROPLASTY Left 11/28/2022    Procedure: LEFT TOTAL HIP ARTHROPLASTY ANTERIOR;  Surgeon: Saul Posada II, MD;  Location: [facility];  Service: Orthopedics;  Laterality: Left;    TRACHEOSTOMY N/A 05/06/2019    Procedure: EVACUATION OF NECK  HEMATOMA;  Surgeon: " "Maxi Daly MD;  Location: [facility] OR;  Service: ENT       Social History     Socioeconomic History    Marital status:      Spouse name: KEN    Number of children: 3    Years of education: College   Tobacco Use    Smoking status: Former     Packs/day: 1.00     Years: 15.00     Additional pack years: 0.00     Total pack years: 15.00     Types: Cigarettes     Start date: 1978     Quit date: 2001     Years since quittin.2     Passive exposure: Never    Smokeless tobacco: Never    Tobacco comments:     1ppd x20 yrs   Vaping Use    Vaping Use: Never used   Substance and Sexual Activity    Alcohol use: No     Comment: Caffeine use: Tea 2 cups daily    Drug use: No    Sexual activity: Yes     Partners: Male     Birth control/protection: Post-menopausal, Surgical       Family History   Problem Relation Age of Onset    Cancer Sister         \"FEMALE\"    Hypertension Father     Breast cancer Daughter 45    Deep vein thrombosis Niece     Heart attack Mother     Malig Hyperthermia Neg Hx        Review of Systems   Constitutional: Positive for malaise/fatigue and weight loss.   Cardiovascular:  Positive for palpitations.   Respiratory:  Positive for wheezing.    Musculoskeletal:  Positive for joint pain and myalgias.   All other systems reviewed and are negative.      Allergies   Allergen Reactions    Adhesive Tape Hives and Itching     BANDAIDS    Amoxicillin Hives and Itching    Latex Other (See Comments)     Lips and nose swelled    Red Dye Nausea And Vomiting    Shellfish-Derived Products Shortness Of Breath    Lactose Intolerance (Gi) Nausea And Vomiting    Pennsaid [Diclofenac Sodium] Dizziness    Prednisone Other (See Comments)     SKIN FLUSHING    Simvastatin Myalgia         Current Outpatient Medications:     acetaminophen (TYLENOL) 500 MG tablet, Take 1 tablet by mouth As Needed for Mild Pain., Disp: , Rfl:     albuterol (PROVENTIL) (2.5 MG/3ML) 0.083% nebulizer solution, Take 2.5 mg " "by nebulization Every 4 (Four) Hours As Needed for Wheezing., Disp: 60 each, Rfl: 0    aspirin 81 MG EC tablet, Take 1 tablet by mouth Every Night., Disp: , Rfl:     Chlorhexidine Gluconate 2 % pads, Apply  topically. As directed pre op, Disp: , Rfl:     fluocinonide (LIDEX) 0.05 % ointment, Apply 1 application topically to the appropriate area as directed 2 (Two) Times a Day., Disp: 30 g, Rfl: 1    losartan (Cozaar) 25 MG tablet, Take 1/2 tablet daily PRN if BP is > 130/70mm Hg (Patient taking differently: Take As Directed. Take 1/2 tablet daily PRN if BP is > 130/70mm Hg), Disp: 40 tablet, Rfl: 1    polyethyl glycol-propyl glycol (SYSTANE) 0.4-0.3 % solution ophthalmic solution, Administer 2 drops to both eyes Daily., Disp: , Rfl:     rosuvastatin (CRESTOR) 10 MG tablet, Take 0.5 tablets by mouth Daily., Disp: , Rfl:     Stiolto Respimat 2.5-2.5 MCG/ACT aerosol solution inhaler, Inhale 2 puffs Daily., Disp: , Rfl:     Synthroid 100 MCG tablet, Take 1 tablet by mouth Daily., Disp: , Rfl:     vitamin D (ERGOCALCIFEROL) 1.25 MG (73936 UT) capsule capsule, Take 1 capsule by mouth 1 (One) Time Per Week. TUESDAYS, Disp: , Rfl:       Objective:     Vitals:    12/05/23 1025 12/05/23 1052   BP: 152/86 140/80   BP Location: Left arm    Patient Position: Sitting    Cuff Size: Adult    Pulse: 61    SpO2: 96%    Weight: 87.5 kg (193 lb)    Height: 170.2 cm (67\")        Body mass index is 30.23 kg/m².    Vitals reviewed.   Constitutional:       Appearance: Healthy appearance. Well-developed and not in distress.   Eyes:      Conjunctiva/sclera: Conjunctivae normal.   HENT:      Head: Normocephalic.      Nose: Nose normal.   Neck:      Vascular: No JVD. JVD normal.      Lymphadenopathy: No cervical adenopathy.   Pulmonary:      Effort: Pulmonary effort is normal.      Breath sounds: Normal breath sounds.   Cardiovascular:      Normal rate. Regular rhythm.      Murmurs: There is no murmur.   Pulses:     Intact distal pulses. "   Edema:     Peripheral edema absent.   Abdominal:      Palpations: Abdomen is soft.      Tenderness: There is no abdominal tenderness.   Musculoskeletal: Normal range of motion.      Cervical back: Normal range of motion. Skin:     General: Skin is warm and dry.      Findings: No rash.   Neurological:      General: No focal deficit present.      Mental Status: Alert, oriented to person, place, and time and oriented to person, place and time.      Cranial Nerves: No cranial nerve deficit.   Psychiatric:         Behavior: Behavior normal.         Thought Content: Thought content normal.         Judgment: Judgment normal.         ECG 12 Lead    Date/Time: 12/5/2023 12:31 PM  Performed by: Nahum Benavides MD    Authorized by: Nahum Benavides MD  Comparison: compared with previous ECG   Similar to previous ECG  Rhythm: sinus rhythm  Conduction: conduction normal  ST Segments: ST segments normal  T flattening: all  QRS axis: normal  Other: no other findings    Clinical impression: non-specific ECG            Assessment:       Diagnosis Plan   1. Precordial pain  Stress Test With Myocardial Perfusion One Day      2. Primary hypertension                 Plan:     She reports intermittent episodes of squeezing pain in her back that radiates around her body to the front.  She does not have any exertional symptoms.  This seems very atypical, and I suspect that it is intermittent esophageal spasm or intermittent entrapment of a viscus organ within the hiatal hernia.  I would like her to be evaluated by GI.  She is wanting to have knee surgery this year, and given her symptoms, she will require some type of preoperative stratification.  I have recommended that we proceed with a stress test for further evaluation.    Her blood pressure was improved when I rechecked it, and it is always within normal limits at home.  I have made no changes.    Sincerely,       Nahum Benavides MD

## 2023-12-13 ENCOUNTER — TELEPHONE (OUTPATIENT)
Dept: GASTROENTEROLOGY | Facility: CLINIC | Age: 73
End: 2023-12-13
Payer: MEDICARE

## 2023-12-13 ENCOUNTER — OFFICE VISIT (OUTPATIENT)
Dept: GASTROENTEROLOGY | Facility: CLINIC | Age: 73
End: 2023-12-13
Payer: MEDICARE

## 2023-12-13 VITALS
OXYGEN SATURATION: 97 % | HEART RATE: 73 BPM | HEIGHT: 67 IN | WEIGHT: 196.2 LBS | TEMPERATURE: 97.7 F | SYSTOLIC BLOOD PRESSURE: 163 MMHG | DIASTOLIC BLOOD PRESSURE: 75 MMHG | BODY MASS INDEX: 30.79 KG/M2

## 2023-12-13 DIAGNOSIS — K63.5 POLYP OF COLON, UNSPECIFIED PART OF COLON, UNSPECIFIED TYPE: Primary | ICD-10-CM

## 2023-12-13 DIAGNOSIS — C34.90 MALIGNANT NEOPLASM OF LUNG, UNSPECIFIED LATERALITY, UNSPECIFIED PART OF LUNG: ICD-10-CM

## 2023-12-13 DIAGNOSIS — C18.7 MALIGNANT NEOPLASM OF SIGMOID COLON: ICD-10-CM

## 2023-12-13 DIAGNOSIS — K21.00 GASTROESOPHAGEAL REFLUX DISEASE WITH ESOPHAGITIS WITHOUT HEMORRHAGE: ICD-10-CM

## 2023-12-13 PROCEDURE — 3077F SYST BP >= 140 MM HG: CPT | Performed by: INTERNAL MEDICINE

## 2023-12-13 PROCEDURE — 99214 OFFICE O/P EST MOD 30 MIN: CPT | Performed by: INTERNAL MEDICINE

## 2023-12-13 PROCEDURE — 3078F DIAST BP <80 MM HG: CPT | Performed by: INTERNAL MEDICINE

## 2023-12-13 RX ORDER — CLINDAMYCIN HYDROCHLORIDE 150 MG/1
CAPSULE ORAL
COMMUNITY
End: 2023-12-18

## 2023-12-13 RX ORDER — CLINDAMYCIN HYDROCHLORIDE 300 MG/1
CAPSULE ORAL
COMMUNITY
End: 2023-12-18

## 2023-12-13 RX ORDER — SIMVASTATIN 20 MG
1 TABLET ORAL NIGHTLY
COMMUNITY
End: 2023-12-14

## 2023-12-13 RX ORDER — PANTOPRAZOLE SODIUM 40 MG/1
40 TABLET, DELAYED RELEASE ORAL DAILY
Qty: 30 TABLET | Refills: 11 | Status: SHIPPED | OUTPATIENT
Start: 2023-12-13

## 2023-12-13 RX ORDER — DOXYCYCLINE 100 MG/1
1 CAPSULE ORAL 2 TIMES DAILY
COMMUNITY
End: 2023-12-18

## 2023-12-13 NOTE — PROGRESS NOTES
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 In 11/2011 she had surgery to resect a cecal cancer. She has had two surgeries for bilateral lung cancers. She last had a colonoscopy thru her LLQ abdominal colostomy in 3/22 and I recommended a repeat colonoscopy in 3-5 yrs.        She had a PET/CT in 10/23 that showed:  IMPRESSION:  1.  Left lower lobe pulmonary nodule appears to have increased in size  since 2019. It does not demonstrate uptake significantly above that of  mediastinal blood pool. Given the size increase, findings are concerning  for slow-growing neoplasm. Given patient history, findings could be  either primary lung malignancy versus metastatic disease.  2.  Short segment of uptake within the distal esophagus suggestive of  esophagitis in the appropriate clinical context. Correlation with  patient history is recommended with gastroenterology referral if  clinically indicated.   3.  Postsurgical changes as above.  4.  Other findings as above.     This report was finalized on 10/4/2023 8:11 AM by Dr. Jayesh Erickson M.D       She is to have a repeat PET scan next month.         C/o bilat abdominal spasms with epigastric discomfort with radiation to her back. She was sent by Cardiologist. This is worse when she is moving around in her bed. She is not on any meds for GERD. C/o heartburn occasionally. No dysphagia. No nausea or vomtiing. NO fevers, chills. Some constipation. NO diarrhea. No blood in the stool or melena. Colostomy working OK.     Past Medical History:   Diagnosis Date    Allergic     Arthritis     Asthma     Bilateral pulmonary embolism     provoked, after surgery 2019, took 3 mos of OAC    Colon cancer     Colorectal cancer     s/p surgery (colostomy), radiation, and chemotherapy, with mets to lung s/p surgical resection    Colostomy in place   "   Enlarged thyroid gland     s/p total thyroidectomy 2019    Eye exam normal 2013    History of prior pregnancies     x4    Hx of radiation therapy 2000    for colon cancer    Hyperlipidemia     Hyperparathyroidism     s/p removal of a single adenoma 5/2019 c/b bleeding/hematoma    Hypertension     \"white coat syndrome\"    Injury of back     Injury of neck     Left hip pain     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  STATES DR POSADA AWARE OF.    Well female exam with routine gynecological exam 10/31/2016       Past Surgical History:   Procedure Laterality Date    COLON SURGERY      1999 and 11/2011    COLONOSCOPY  2016    COLONOSCOPY N/A 05/08/2018    Procedure: COLONOSCOPY into ileum;  Surgeon: James Romeo MD;  [facility];  Service: Gastroenterology    COLONOSCOPY N/A 03/04/2022    Procedure: COLONOSCOPY to anastomosis;  Surgeon: James Romeo MD;  [facility];  Service: Gastroenterology;  Laterality: N/A;  pre: hx of colorectal cancer   post: normal surgical anatomy     HEMICOLOECTOMY W/ ANASTOMOSIS Right 11/2011    Adenocarcinoma of cecum    HYSTERECTOMY  1999    LUNG LOBECTOMY      ANSELMO 08/2006 and RLL partial 09/2001 metastatic colon cancer     THYROIDECTOMY Bilateral 05/06/2019    Procedure: Left PARATHYROIDECTOMY AND TOTAL THYROIDECTOMY;  Surgeon: Maxi Daly MD;  Location: [facility] OR [facility];  Service: ENT    TOTAL HIP ARTHROPLASTY Left 11/28/2022    Procedure: LEFT TOTAL HIP ARTHROPLASTY ANTERIOR;  Surgeon: Saul Posada II, MD;  Location: [facility] OR [facility];  Service: Orthopedics;  Laterality: Left;    TRACHEOSTOMY N/A 05/06/2019    Procedure: EVACUATION OF NECK  HEMATOMA;  Surgeon: Maxi Daly MD;  Location: [facility] MAIN OR;  Service: ENT         Current Outpatient " Medications:     acetaminophen (TYLENOL) 500 MG tablet, Take 1 tablet by mouth As Needed for Mild Pain., Disp: , Rfl:     albuterol (PROVENTIL) (2.5 MG/3ML) 0.083% nebulizer solution, Take 2.5 mg by nebulization Every 4 (Four) Hours As Needed for Wheezing., Disp: 60 each, Rfl: 0    aspirin 81 MG EC tablet, Take 1 tablet by mouth Every Night., Disp: , Rfl:     Chlorhexidine Gluconate 2 % pads, Apply  topically. As directed pre op, Disp: , Rfl:     clindamycin (CLEOCIN) 150 MG capsule, TAKE 1 CAPSULE BY MOUTH THREE TIMES DAILY AFTER A MEAL, Disp: , Rfl:     clindamycin (CLEOCIN) 300 MG capsule, TAKE 2 CAPSULES BY MOUTH ONE HOUR PRIOR TO DENTAL APPOINTMENT, Disp: , Rfl:     fluocinonide (LIDEX) 0.05 % ointment, Apply 1 application topically to the appropriate area as directed 2 (Two) Times a Day., Disp: 30 g, Rfl: 1    losartan (Cozaar) 25 MG tablet, Take 1/2 tablet daily PRN if BP is > 130/70mm Hg (Patient taking differently: Take As Directed. Take 1/2 tablet daily PRN if BP is > 130/70mm Hg), Disp: 40 tablet, Rfl: 1    polyethyl glycol-propyl glycol (SYSTANE) 0.4-0.3 % solution ophthalmic solution, Administer 2 drops to both eyes Daily., Disp: , Rfl:     rosuvastatin (CRESTOR) 10 MG tablet, Take 0.5 tablets by mouth Daily., Disp: , Rfl:     Stiolto Respimat 2.5-2.5 MCG/ACT aerosol solution inhaler, Inhale 2 puffs Daily., Disp: , Rfl:     Synthroid 100 MCG tablet, Take 1 tablet by mouth Daily., Disp: , Rfl:     vitamin D (ERGOCALCIFEROL) 1.25 MG (59560 UT) capsule capsule, Take 1 capsule by mouth 1 (One) Time Per Week. TUESDAYS, Disp: , Rfl:     doxycycline (MONODOX) 100 MG capsule, Take 1 capsule by mouth 2 (Two) Times a Day. (Patient not taking: Reported on 12/13/2023), Disp: , Rfl:     pantoprazole (PROTONIX) 40 MG EC tablet, Take 1 tablet by mouth Daily., Disp: 30 tablet, Rfl: 11    simvastatin (ZOCOR) 20 MG tablet, Take 1 tablet by mouth Every Night. (Patient not taking: Reported on 12/13/2023), Disp: , Rfl:  "    Allergies   Allergen Reactions    Adhesive Tape Hives and Itching     BANDAIDS    Amoxicillin Hives and Itching    Latex Other (See Comments)     Lips and nose swelled    Red Dye Nausea And Vomiting    Rosuvastatin Anaphylaxis    Shellfish-Derived Products Shortness Of Breath    Lactose Intolerance (Gi) Nausea And Vomiting    Pennsaid [Diclofenac Sodium] Dizziness    Prednisone Other (See Comments)     SKIN FLUSHING    Simvastatin Myalgia       Family History   Problem Relation Age of Onset    Cancer Sister         \"FEMALE\"    Hypertension Father     Breast cancer Daughter 45    Deep vein thrombosis Niece     Heart attack Mother     Malig Hyperthermia Neg Hx        Social History     Socioeconomic History    Marital status:      Spouse name: KEN    Number of children: 3    Years of education: College   Tobacco Use    Smoking status: Former     Packs/day: 1.00     Years: 15.00     Additional pack years: 0.00     Total pack years: 15.00     Types: Cigarettes     Start date: 1978     Quit date: 2001     Years since quittin.2     Passive exposure: Never    Smokeless tobacco: Never    Tobacco comments:     1ppd x20 yrs   Vaping Use    Vaping Use: Never used   Substance and Sexual Activity    Alcohol use: No     Comment: Caffeine use: Tea 2 cups daily    Drug use: No    Sexual activity: Yes     Partners: Male     Birth control/protection: Post-menopausal, Surgical       Review of Systems   Gastrointestinal:  Positive for abdominal pain.   All other systems reviewed and are negative.    Pertinent positives and negatives documented in the HPI and all other systems reviewed and were found to be negative.  Vitals:    23 0827   BP: 163/75   Pulse: 73   Temp: 97.7 °F (36.5 °C)   SpO2: 97%       Physical Exam  Vitals reviewed.   Constitutional:       General: She is not in acute distress.     Appearance: Normal appearance. She is well-developed. She is not diaphoretic.   HENT:      Head: " Normocephalic and atraumatic. Hair is normal.      Right Ear: Hearing, tympanic membrane, ear canal and external ear normal. No decreased hearing noted. No drainage.      Left Ear: Hearing, tympanic membrane, ear canal and external ear normal. No decreased hearing noted.      Nose: Nose normal. No nasal deformity.      Mouth/Throat:      Mouth: Mucous membranes are moist.   Eyes:      General: Lids are normal.         Right eye: No discharge.         Left eye: No discharge.      Extraocular Movements: Extraocular movements intact.      Conjunctiva/sclera: Conjunctivae normal.      Pupils: Pupils are equal, round, and reactive to light.   Neck:      Thyroid: No thyromegaly.      Vascular: No JVD.      Trachea: No tracheal deviation.   Cardiovascular:      Rate and Rhythm: Normal rate and regular rhythm.      Pulses: Normal pulses.      Heart sounds: Normal heart sounds. No murmur heard.     No friction rub. No gallop.   Pulmonary:      Effort: Pulmonary effort is normal. No respiratory distress.      Breath sounds: Normal breath sounds. No wheezing or rales.   Chest:      Chest wall: No tenderness.   Abdominal:      General: Bowel sounds are normal. There is no distension.      Palpations: Abdomen is soft. There is no mass.      Tenderness: There is no abdominal tenderness. There is no guarding or rebound.      Hernia: No hernia is present.      Comments: Ostomy looks good in LLQ.    Musculoskeletal:         General: No tenderness or deformity. Normal range of motion.      Cervical back: Normal range of motion and neck supple.   Lymphadenopathy:      Cervical: No cervical adenopathy.   Skin:     General: Skin is warm and dry.      Findings: No erythema or rash.   Neurological:      Mental Status: She is alert and oriented to person, place, and time.      Cranial Nerves: No cranial nerve deficit.      Motor: No abnormal muscle tone.      Coordination: Coordination normal.      Deep Tendon Reflexes: Reflexes are normal  and symmetric. Reflexes normal.   Psychiatric:         Mood and Affect: Mood normal.         Behavior: Behavior normal.         Thought Content: Thought content normal.         Judgment: Judgment normal.         Diagnoses and all orders for this visit:    1. Polyp of colon, unspecified part of colon, unspecified type (Primary)    2. Gastroesophageal reflux disease with esophagitis without hemorrhage  -     pantoprazole (PROTONIX) 40 MG EC tablet; Take 1 tablet by mouth Daily.  Dispense: 30 tablet; Refill: 11  -     Case Request; Standing  -     Case Request    3. Malignant neoplasm of sigmoid colon    4. Malignant neoplasm of lung, unspecified laterality, unspecified part of lung    Other orders  -     Implement Anesthesia orders day of procedure.; Standing  -     Obtain informed consent; Standing  -     Follow Anesthesia Guidelines / Protocol; Future  -     Obtain Informed Consent; Future      Assessment:  h/o rectal cancer with abdominalperoneal resection and colostomy placement in 1999. In 11/2011 she had surgery to resect a cecal cancer. She last had a colonoscopy thru her LLQ abdominal colostomy in 3/22 and I recommended a repeat colonoscopy in 3-5 yrs.   History of 2 surgeries for bilateral lung cancers.  Abnormal distal esophagus on PET/CT  GERD      Recommendations:  EGD  Trial of Protonix 40 mg/day.       No follow-ups on file.    James Romeo MD  12/13/2023

## 2023-12-14 ENCOUNTER — OFFICE VISIT (OUTPATIENT)
Dept: INTERNAL MEDICINE | Facility: CLINIC | Age: 73
End: 2023-12-14
Payer: MEDICARE

## 2023-12-14 VITALS
TEMPERATURE: 98.2 F | SYSTOLIC BLOOD PRESSURE: 132 MMHG | WEIGHT: 194.3 LBS | HEART RATE: 77 BPM | HEIGHT: 67 IN | BODY MASS INDEX: 30.49 KG/M2 | OXYGEN SATURATION: 97 % | DIASTOLIC BLOOD PRESSURE: 80 MMHG

## 2023-12-14 DIAGNOSIS — Z78.0 POSTMENOPAUSAL: ICD-10-CM

## 2023-12-14 DIAGNOSIS — E78.2 MIXED HYPERLIPIDEMIA: Primary | ICD-10-CM

## 2023-12-14 DIAGNOSIS — E55.9 VITAMIN D DEFICIENCY: ICD-10-CM

## 2023-12-14 PROCEDURE — 1160F RVW MEDS BY RX/DR IN RCRD: CPT | Performed by: FAMILY MEDICINE

## 2023-12-14 PROCEDURE — 1159F MED LIST DOCD IN RCRD: CPT | Performed by: FAMILY MEDICINE

## 2023-12-14 PROCEDURE — 3075F SYST BP GE 130 - 139MM HG: CPT | Performed by: FAMILY MEDICINE

## 2023-12-14 PROCEDURE — 99214 OFFICE O/P EST MOD 30 MIN: CPT | Performed by: FAMILY MEDICINE

## 2023-12-14 PROCEDURE — 3079F DIAST BP 80-89 MM HG: CPT | Performed by: FAMILY MEDICINE

## 2023-12-14 NOTE — PROGRESS NOTES
"Chief Complaint  Hyperlipidemia, Follow-up (Labs; egg allergy discussion ), and Hypertension    Subjective        Faviola Issa presents to [facility]  Hyperlipidemia    Hypertension        Patient is history of hyperlipidemia.  Patient is currently on Crestor 5 mg daily.  Patient denies any side effects of the medication patient seems to be doing fairly well with this.  Last labs, I did show that the cholesterol was improving with the medication.    Patient does have a history having been postmenopausal, we did discuss about perhaps doing a DEXA scan.  For her vitamin D deficiency, she is taking 50,000 IUs of vitamin D weekly.    Objective   Vital Signs:  /80   Pulse 77   Temp 98.2 °F (36.8 °C)   Ht 170.2 cm (67.01\")   Wt 88.1 kg (194 lb 4.8 oz)   SpO2 97%   BMI 30.42 kg/m²   Estimated body mass index is 30.42 kg/m² as calculated from the following:    Height as of this encounter: 170.2 cm (67.01\").    Weight as of this encounter: 88.1 kg (194 lb 4.8 oz).               Physical Exam  Vitals and nursing note reviewed.   Constitutional:       Appearance: She is well-developed.   HENT:      Head: Normocephalic and atraumatic.   Musculoskeletal:      Cervical back: Normal range of motion and neck supple.   Neurological:      Mental Status: She is alert and oriented to person, place, and time.   Psychiatric:         Behavior: Behavior normal.        Result Review :                   Assessment and Plan   Diagnoses and all orders for this visit:    1. Mixed hyperlipidemia (Primary)  -     Comprehensive Metabolic Panel  -     Lipid Panel With LDL / HDL Ratio    2. Postmenopausal  -     DEXA Bone Density Axial    3. Vitamin D deficiency  -     Vitamin D,25-Hydroxy    Continue supplemental vitamin D.  Will get DEXA scan at today's visit.  For the hyperlipidemia, continue Crestor 5 mg daily.         Follow Up   No follow-ups on file.  Patient was given instructions and " counseling regarding her condition or for health maintenance advice. Please see specific information pulled into the AVS if appropriate.

## 2023-12-15 ENCOUNTER — HOSPITAL ENCOUNTER (OUTPATIENT)
Dept: CARDIOLOGY | Facility: HOSPITAL | Age: 73
Discharge: HOME | End: 2023-12-15
Payer: MEDICARE

## 2023-12-15 VITALS — HEIGHT: 67 IN | WEIGHT: 196.21 LBS | BODY MASS INDEX: 30.8 KG/M2

## 2023-12-15 DIAGNOSIS — R07.2 PRECORDIAL PAIN: ICD-10-CM

## 2023-12-15 LAB
BH CV NUCLEAR PRIOR STUDY: 2
BH CV REST NUCLEAR ISOTOPE DOSE: 11 MCI
BH CV STRESS BP STAGE 1: NORMAL
BH CV STRESS COMMENTS STAGE 1: NORMAL
BH CV STRESS DOSE REGADENOSON STAGE 1: 0.4
BH CV STRESS DURATION MIN STAGE 1: 0
BH CV STRESS DURATION SEC STAGE 1: 10
BH CV STRESS HR STAGE 1: 101
BH CV STRESS NUCLEAR ISOTOPE DOSE: 35.6 MCI
BH CV STRESS PROTOCOL 1: NORMAL
BH CV STRESS RECOVERY BP: NORMAL MMHG
BH CV STRESS RECOVERY HR: 68 BPM
BH CV STRESS STAGE 1: 1
LV EF NUC BP: 57 %
MAXIMAL PREDICTED HEART RATE: 148 BPM
PERCENT MAX PREDICTED HR: 68.24 %
STRESS BASELINE BP: NORMAL MMHG
STRESS BASELINE HR: 63 BPM
STRESS PERCENT HR: 80 %
STRESS POST EXERCISE DUR SEC: 10 SEC
STRESS POST PEAK BP: NORMAL MMHG
STRESS POST PEAK HR: 101 BPM
STRESS TARGET HR: 126 BPM

## 2023-12-15 PROCEDURE — 25010000002 REGADENOSON 0.4 MG/5ML SOLUTION: Performed by: INTERNAL MEDICINE

## 2023-12-15 PROCEDURE — 78452 HT MUSCLE IMAGE SPECT MULT: CPT

## 2023-12-15 PROCEDURE — 93017 CV STRESS TEST TRACING ONLY: CPT

## 2023-12-15 PROCEDURE — A9502 TC99M TETROFOSMIN: HCPCS | Performed by: INTERNAL MEDICINE

## 2023-12-15 PROCEDURE — 0 TECHNETIUM TETROFOSMIN KIT: Performed by: INTERNAL MEDICINE

## 2023-12-15 RX ORDER — AMINOPHYLLINE 25 MG/ML
125 INJECTION, SOLUTION INTRAVENOUS ONCE
Status: DISCONTINUED | OUTPATIENT
Start: 2023-12-15 | End: 2023-12-16 | Stop reason: HOSPADM

## 2023-12-15 RX ORDER — REGADENOSON 0.08 MG/ML
0.4 INJECTION, SOLUTION INTRAVENOUS
Status: COMPLETED | OUTPATIENT
Start: 2023-12-15 | End: 2023-12-15

## 2023-12-15 RX ADMIN — REGADENOSON 0.4 MG: 0.08 INJECTION, SOLUTION INTRAVENOUS at 08:27

## 2023-12-15 RX ADMIN — TETROFOSMIN 1 DOSE: 1.38 INJECTION, POWDER, LYOPHILIZED, FOR SOLUTION INTRAVENOUS at 07:35

## 2023-12-15 RX ADMIN — TETROFOSMIN 1 DOSE: 1.38 INJECTION, POWDER, LYOPHILIZED, FOR SOLUTION INTRAVENOUS at 08:27

## 2023-12-18 ENCOUNTER — TELEPHONE (OUTPATIENT)
Dept: INTERNAL MEDICINE | Facility: CLINIC | Age: 73
End: 2023-12-18

## 2023-12-18 RX ORDER — AZITHROMYCIN 250 MG/1
TABLET, FILM COATED ORAL
Qty: 6 TABLET | Refills: 0 | Status: SHIPPED | OUTPATIENT
Start: 2023-12-18

## 2023-12-18 RX ORDER — BROMPHENIRAMINE MALEATE, PSEUDOEPHEDRINE HYDROCHLORIDE, AND DEXTROMETHORPHAN HYDROBROMIDE 2; 30; 10 MG/5ML; MG/5ML; MG/5ML
5 SYRUP ORAL 4 TIMES DAILY PRN
Qty: 150 ML | Refills: 0 | Status: SHIPPED | OUTPATIENT
Start: 2023-12-18

## 2023-12-18 NOTE — TELEPHONE ENCOUNTER
I have sent a message to Dr. Beard about this pt.  We do not have same day appts today but I will see if he can call her or send something in.

## 2023-12-18 NOTE — TELEPHONE ENCOUNTER
UNABLE TO WARM TRANSFER    Caller: Faviola Issa    Relationship to patient: Self    Best call back number:     [phone]       Patient is needing: PATIENT IS NEEDING A SAME DAY APPOINTMENT FOR WHEEZING/LOSING VOICE/ FAILED SCREEN COUGH WITH YELLOW PHLEGM. PLEASE CALL AND ADVISE.

## 2023-12-20 LAB
25(OH)D3+25(OH)D2 SERPL-MCNC: 54.2 NG/ML (ref 30–100)
ALBUMIN SERPL-MCNC: 4.5 G/DL (ref 3.5–5.2)
ALBUMIN/GLOB SERPL: 1.5 G/DL
ALP SERPL-CCNC: 100 U/L (ref 39–117)
ALT SERPL-CCNC: 10 U/L (ref 1–33)
AST SERPL-CCNC: 14 U/L (ref 1–32)
BILIRUB SERPL-MCNC: 0.6 MG/DL (ref 0–1.2)
BUN SERPL-MCNC: 9 MG/DL (ref 8–23)
BUN/CREAT SERPL: 10.8 (ref 7–25)
CALCIUM SERPL-MCNC: 9.6 MG/DL (ref 8.6–10.5)
CHLORIDE SERPL-SCNC: 100 MMOL/L (ref 98–107)
CHOLEST SERPL-MCNC: 120 MG/DL (ref 0–200)
CO2 SERPL-SCNC: 27.2 MMOL/L (ref 22–29)
CREAT SERPL-MCNC: 0.83 MG/DL (ref 0.57–1)
EGFRCR SERPLBLD CKD-EPI 2021: 75 ML/MIN/1.73
GLOBULIN SER CALC-MCNC: 3.1 GM/DL
GLUCOSE SERPL-MCNC: 82 MG/DL (ref 65–99)
HDLC SERPL-MCNC: 62 MG/DL (ref 40–60)
LDLC SERPL CALC-MCNC: 44 MG/DL (ref 0–100)
LDLC/HDLC SERPL: 0.72 {RATIO}
POTASSIUM SERPL-SCNC: 4.2 MMOL/L (ref 3.5–5.2)
PROT SERPL-MCNC: 7.6 G/DL (ref 6–8.5)
SODIUM SERPL-SCNC: 138 MMOL/L (ref 136–145)
TRIGL SERPL-MCNC: 68 MG/DL (ref 0–150)
VLDLC SERPL CALC-MCNC: 14 MG/DL (ref 5–40)

## 2023-12-22 RX ORDER — LOSARTAN POTASSIUM 25 MG/1
TABLET ORAL
Qty: 40 TABLET | Refills: 0 | Status: SHIPPED | OUTPATIENT
Start: 2023-12-22

## 2024-01-02 ENCOUNTER — HOSPITAL ENCOUNTER (OUTPATIENT)
Facility: HOSPITAL | Age: 74
Discharge: HOME | End: 2024-01-02
Admitting: FAMILY MEDICINE
Payer: MEDICARE

## 2024-01-02 PROCEDURE — 77080 DXA BONE DENSITY AXIAL: CPT

## 2024-01-11 ENCOUNTER — TELEPHONE (OUTPATIENT)
Dept: INTERNAL MEDICINE | Facility: CLINIC | Age: 74
End: 2024-01-11
Payer: MEDICARE

## 2024-01-11 NOTE — TELEPHONE ENCOUNTER
Caller: Faviola Issa    Relationship: Self    Best call back number: [phone]    Which medication are you concerned about: ROSUVASTATIN       What are your concerns: PATIENT STATES THAT THIS MEDICATION GIVES HER CONSTIPATION, SHE WOULD LIKE TO KNOW IF SHE CAN GO BACK TO HER ROSUVASTATIN. PLEASE ADVISE PATIENT ASAP.       PATIENT USES   [facility] [id number] [place], [place] - [address] [facility] [phone] [facility] [phone] [facility] [phone]

## 2024-01-12 NOTE — TELEPHONE ENCOUNTER
Pt is wanting to switch back to simvastatin because the rosuvastatin is causing her to have constipation.

## 2024-01-16 ENCOUNTER — TRANSCRIBE ORDERS (OUTPATIENT)
Dept: ADMINISTRATIVE | Facility: HOSPITAL | Age: 74
End: 2024-01-16
Payer: MEDICARE

## 2024-01-16 DIAGNOSIS — Z12.31 BREAST CANCER SCREENING BY MAMMOGRAM: Primary | ICD-10-CM

## 2024-01-18 RX ORDER — SIMVASTATIN 20 MG
20 TABLET ORAL NIGHTLY
Qty: 30 TABLET | Refills: 3 | Status: SHIPPED | OUTPATIENT
Start: 2024-01-18

## 2024-01-29 ENCOUNTER — HOSPITAL ENCOUNTER (OUTPATIENT)
Dept: MAMMOGRAPHY | Facility: HOSPITAL | Age: 74
Discharge: HOME | End: 2024-01-29
Admitting: FAMILY MEDICINE
Payer: MEDICARE

## 2024-01-29 DIAGNOSIS — Z12.31 BREAST CANCER SCREENING BY MAMMOGRAM: ICD-10-CM

## 2024-01-29 PROCEDURE — 77063 BREAST TOMOSYNTHESIS BI: CPT

## 2024-01-29 PROCEDURE — 77067 SCR MAMMO BI INCL CAD: CPT

## 2024-01-30 ENCOUNTER — HOSPITAL ENCOUNTER (OUTPATIENT)
Dept: CT IMAGING | Facility: HOSPITAL | Age: 74
Discharge: HOME | End: 2024-01-30
Admitting: INTERNAL MEDICINE
Payer: MEDICARE

## 2024-01-30 DIAGNOSIS — R91.1 LUNG NODULE: ICD-10-CM

## 2024-01-30 PROCEDURE — 71250 CT THORAX DX C-: CPT

## 2024-02-02 NOTE — PROGRESS NOTES
IMPRESSION:  1. There is no evidence for malignancy or significant change in either  breast. Routine followup mammography is recommended.    BI-RADS category 2: Benign.

## 2024-02-05 ENCOUNTER — TELEPHONE (OUTPATIENT)
Dept: INTERNAL MEDICINE | Facility: CLINIC | Age: 74
End: 2024-02-05
Payer: MEDICARE

## 2024-02-05 ENCOUNTER — TELEPHONE (OUTPATIENT)
Dept: CARDIOLOGY | Facility: CLINIC | Age: 74
End: 2024-02-05
Payer: MEDICARE

## 2024-02-05 NOTE — TELEPHONE ENCOUNTER
Hub staff attempted to follow warm transfer process and was unsuccessful     Caller: Faviola Issa    Relationship to patient: Self    Best call back number: [phone]    Patient is needing: PT RETURNING MISSED CALL TO [facility].

## 2024-02-05 NOTE — TELEPHONE ENCOUNTER
Pt called and stated that Dr. Álvarez ordered a CT scan.  Pt was concerned about some of the findings.  I gave her cardiac information within my scop (negative); however please review results to ensure that there is not any cardiac findings that would need to be discussed.   Thanks.

## 2024-02-06 ENCOUNTER — TELEPHONE (OUTPATIENT)
Dept: INTERNAL MEDICINE | Facility: CLINIC | Age: 74
End: 2024-02-06
Payer: MEDICARE

## 2024-02-06 DIAGNOSIS — L60.0 INGROWN TOENAIL OF BOTH FEET: Primary | ICD-10-CM

## 2024-02-06 NOTE — TELEPHONE ENCOUNTER
I reviewed it -- I don't see any concerning findings from a heart standpoint.    Triage -- Please let her know    Irineo powers

## 2024-02-06 NOTE — TELEPHONE ENCOUNTER
Caller: Faviola Issa    Relationship: Self    Best call back number: [phone]     What is the medical concern/diagnosis: PODIATRIST     What specialty or service is being requested: [employer]     What is the provider, practice or medical service name: [facility]    What is the office location: [address] [place] [address]    What is the office phone number: [phone]    Any additional details: NONE

## 2024-02-06 NOTE — TELEPHONE ENCOUNTER
Reviewed results with Faviola Issa and patient verbalized understanding of results.      Dr. Benavides,  Patient wanted to express her thanks to you - you gave her a moment of peace with these results.    Thank you,  Corina KIM RN  Triage Nurse [facility]    09:07 EST

## 2024-02-09 ENCOUNTER — TRANSCRIBE ORDERS (OUTPATIENT)
Dept: ADMINISTRATIVE | Facility: HOSPITAL | Age: 74
End: 2024-02-09
Payer: MEDICARE

## 2024-02-09 DIAGNOSIS — R91.1 LUNG NODULE: Primary | ICD-10-CM

## 2024-02-09 RX ORDER — LOSARTAN POTASSIUM 25 MG/1
TABLET ORAL
Qty: 40 TABLET | Refills: 1 | Status: SHIPPED | OUTPATIENT
Start: 2024-02-09

## 2024-02-12 ENCOUNTER — TELEPHONE (OUTPATIENT)
Dept: GASTROENTEROLOGY | Facility: CLINIC | Age: 74
End: 2024-02-12
Payer: MEDICARE

## 2024-02-27 ENCOUNTER — HOSPITAL ENCOUNTER (OUTPATIENT)
Dept: GENERAL RADIOLOGY | Facility: HOSPITAL | Age: 74
Discharge: HOME | End: 2024-02-27
Payer: MEDICARE

## 2024-02-27 ENCOUNTER — PRE-ADMISSION TESTING (OUTPATIENT)
Dept: PREADMISSION TESTING | Facility: HOSPITAL | Age: 74
End: 2024-02-27
Payer: MEDICARE

## 2024-02-27 VITALS
RESPIRATION RATE: 16 BRPM | OXYGEN SATURATION: 97 % | TEMPERATURE: 97 F | HEART RATE: 74 BPM | WEIGHT: 196 LBS | BODY MASS INDEX: 31.5 KG/M2 | DIASTOLIC BLOOD PRESSURE: 77 MMHG | HEIGHT: 66 IN | SYSTOLIC BLOOD PRESSURE: 163 MMHG

## 2024-02-27 LAB
ALBUMIN SERPL-MCNC: 4.2 G/DL (ref 3.5–5.2)
ALBUMIN/GLOB SERPL: 1.4 G/DL
ALP SERPL-CCNC: 91 U/L (ref 39–117)
ALT SERPL W P-5'-P-CCNC: 7 U/L (ref 1–33)
ANION GAP SERPL CALCULATED.3IONS-SCNC: 12.8 MMOL/L (ref 5–15)
AST SERPL-CCNC: 11 U/L (ref 1–32)
BACTERIA UR QL AUTO: NORMAL /HPF
BILIRUB SERPL-MCNC: 0.5 MG/DL (ref 0–1.2)
BILIRUB UR QL STRIP: NEGATIVE
BUN SERPL-MCNC: 10 MG/DL (ref 8–23)
BUN/CREAT SERPL: 12.7 (ref 7–25)
CALCIUM SPEC-SCNC: 8.9 MG/DL (ref 8.6–10.5)
CHLORIDE SERPL-SCNC: 104 MMOL/L (ref 98–107)
CLARITY UR: CLEAR
CO2 SERPL-SCNC: 24.2 MMOL/L (ref 22–29)
COLOR UR: YELLOW
CREAT SERPL-MCNC: 0.79 MG/DL (ref 0.57–1)
DEPRECATED RDW RBC AUTO: 42.9 FL (ref 37–54)
EGFRCR SERPLBLD CKD-EPI 2021: 79.6 ML/MIN/1.73
ERYTHROCYTE [DISTWIDTH] IN BLOOD BY AUTOMATED COUNT: 13.1 % (ref 12.3–15.4)
GLOBULIN UR ELPH-MCNC: 3.1 GM/DL
GLUCOSE SERPL-MCNC: 88 MG/DL (ref 65–99)
GLUCOSE UR STRIP-MCNC: NEGATIVE MG/DL
HBA1C MFR BLD: 5.4 % (ref 4.8–5.6)
HCT VFR BLD AUTO: 42.1 % (ref 34–46.6)
HGB BLD-MCNC: 13.6 G/DL (ref 12–15.9)
HGB UR QL STRIP.AUTO: NEGATIVE
HYALINE CASTS UR QL AUTO: NORMAL /LPF
INR PPP: 1.05 (ref 0.9–1.1)
KETONES UR QL STRIP: NEGATIVE
LEUKOCYTE ESTERASE UR QL STRIP.AUTO: ABNORMAL
MCH RBC QN AUTO: 28.4 PG (ref 26.6–33)
MCHC RBC AUTO-ENTMCNC: 32.3 G/DL (ref 31.5–35.7)
MCV RBC AUTO: 87.9 FL (ref 79–97)
NITRITE UR QL STRIP: NEGATIVE
PH UR STRIP.AUTO: 6.5 [PH] (ref 5–8)
PLATELET # BLD AUTO: 266 10*3/MM3 (ref 140–450)
PMV BLD AUTO: 10.2 FL (ref 6–12)
POTASSIUM SERPL-SCNC: 4.4 MMOL/L (ref 3.5–5.2)
PROT SERPL-MCNC: 7.3 G/DL (ref 6–8.5)
PROT UR QL STRIP: NEGATIVE
PROTHROMBIN TIME: 13.9 SECONDS (ref 11.7–14.2)
RBC # BLD AUTO: 4.79 10*6/MM3 (ref 3.77–5.28)
RBC # UR STRIP: NORMAL /HPF
REF LAB TEST METHOD: NORMAL
SODIUM SERPL-SCNC: 141 MMOL/L (ref 136–145)
SP GR UR STRIP: 1.01 (ref 1–1.03)
SQUAMOUS #/AREA URNS HPF: NORMAL /HPF
UROBILINOGEN UR QL STRIP: ABNORMAL
WBC # UR STRIP: NORMAL /HPF
WBC NRBC COR # BLD AUTO: 5.94 10*3/MM3 (ref 3.4–10.8)

## 2024-02-27 PROCEDURE — 36415 COLL VENOUS BLD VENIPUNCTURE: CPT

## 2024-02-27 PROCEDURE — 83036 HEMOGLOBIN GLYCOSYLATED A1C: CPT

## 2024-02-27 PROCEDURE — 80053 COMPREHEN METABOLIC PANEL: CPT

## 2024-02-27 PROCEDURE — 81001 URINALYSIS AUTO W/SCOPE: CPT

## 2024-02-27 PROCEDURE — 85027 COMPLETE CBC AUTOMATED: CPT

## 2024-02-27 PROCEDURE — 71046 X-RAY EXAM CHEST 2 VIEWS: CPT

## 2024-02-27 PROCEDURE — 73502 X-RAY EXAM HIP UNI 2-3 VIEWS: CPT

## 2024-02-27 PROCEDURE — 85610 PROTHROMBIN TIME: CPT

## 2024-02-27 RX ORDER — ALBUTEROL SULFATE 90 UG/1
2 AEROSOL, METERED RESPIRATORY (INHALATION) EVERY 4 HOURS PRN
Status: ON HOLD | COMMUNITY

## 2024-02-27 NOTE — DISCHARGE INSTRUCTIONS
Take the following medications the morning of surgery: SYNTHROID, STIOLTO, PANTOPRAZOLE    SURGERY WILL CALL YOU WITH YOUR ARRIVAL TIME.  GO TO ENTRANCE C.    If you are on prescription narcotic pain medication to control your pain you may also take that medication the morning of surgery.    General Instructions:  Do not eat solid food after midnight the night before surgery.  You may drink clear liquids day of surgery but must stop at least one hour before your hospital arrival time.  It is beneficial for you to have a clear drink that contains carbohydrates the day of surgery.  We suggest a 12 to 20 ounce bottle of Gatorade or Powerade for non-diabetic patients or a 12 to 20 ounce bottle of G2 or Powerade Zero for diabetic patients. (Pediatric patients, are not advised to drink a 12 to 20 ounce carbohydrate drink)    Clear liquids are liquids you can see through.  Nothing red in color.     Plain water                               Sports drinks  Sodas                                   Gelatin (Jell-O)  Fruit juices without pulp such as white grape juice and apple juice  Popsicles that contain no fruit or yogurt  Tea or coffee (no cream or milk added)  Gatorade / Powerade  G2 / Powerade Zero    Infants may have breast milk up to four hours before surgery.  Infants drinking formula may drink formula up to six hours before surgery.   Patients who avoid smoking, chewing tobacco and alcohol for 4 weeks prior to surgery have a reduced risk of post-operative complications.  Quit smoking as many days before surgery as you can.  Do not smoke, use chewing tobacco or drink alcohol the day of surgery.   If applicable bring your C-PAP/ BI-PAP machine in with you to preop day of surgery.  Bring any papers given to you in the doctor’s office.  Wear clean comfortable clothes.  Do not wear contact lenses, false eyelashes or make-up.  Bring a case for your glasses.   Bring crutches or walker if applicable.  Remove all piercings.   Leave jewelry and any other valuables at home.  Hair extensions with metal clips must be removed prior to surgery.  The Pre-Admission Testing nurse will instruct you to bring medications if unable to obtain an accurate list in Pre-Admission Testing.        If you were given a blood bank ID arm band remember to bring it with you the day of surgery.    Preventing a Surgical Site Infection:  For 2 to 3 days before surgery, avoid shaving with a razor because the razor can irritate skin and make it easier to develop an infection.    Any areas of open skin can increase the risk of a post-operative wound infection by allowing bacteria to enter and travel throughout the body.  Notify your surgeon if you have any skin wounds / rashes even if it is not near the expected surgical site.  The area will need assessed to determine if surgery should be delayed until it is healed.  The night prior to surgery shower using a fresh bar of anti-bacterial soap (such as Dial) and clean washcloth.  Sleep in a clean bed with clean clothing.  Do not allow pets to sleep with you.  Shower on the morning of surgery using a fresh bar of anti-bacterial soap (such as Dial) and clean washcloth.  Dry with a clean towel and dress in clean clothing.    CHLORHEXIDINE CLOTH INSTRUCTIONS  The morning of surgery follow these instructions using the Chlorhexidine cloths you've been given.  These steps reduce bacteria on the body.  Do not use the cloths near your eyes, ears mouth, genitalia or on open wounds.  Throw the cloths away after use but do not try to flush them down a toilet.      Open and remove one cloth at a time from the package.    Leave the cloth unfolded and begin the bathing.  Massage the skin with the cloths using gentle pressure to remove bacteria.  Do not scrub harshly.   Follow the steps below with one 2% CHG cloth per area (6 total cloths).  One cloth for neck, shoulders and chest.  One cloth for both arms, hands, fingers and underarms  (do underarms last).  One cloth for the abdomen followed by groin.  One cloth for right leg and foot including between the toes.  One cloth for left leg and foot including between the toes.  The last cloth is to be used for the back of the neck, back and buttocks.    Allow the CHG to air dry 3 minutes on the skin which will give it time to work and decrease the chance of irritation.  The skin may feel sticky until it is dry.  Do not rinse with water or any other liquid or you will lose the beneficial effects of the CHG.  If mild skin irritation occurs, do rinse the skin to remove the CHG.  Report this to the nurse at time of admission.  Do not apply lotions, creams, ointments, deodorants or perfumes after using the cloths. Dress in clean clothes before coming to the hospital.  Ask your surgeon if you will be receiving antibiotics prior to surgery.  Make sure you, your family, and all healthcare providers clean their hands with soap and water or an alcohol based hand  before caring for you or your wound.    Day of surgery:  Your arrival time is approximately two hours before your scheduled surgery time.  Upon arrival, a Pre-op nurse and Anesthesiologist will review your health history, obtain vital signs, and answer questions you may have.  The only belongings needed at this time will be a list of your home medications and if applicable your C-PAP/BI-PAP machine.  A Pre-op nurse will start an IV and you may receive medication in preparation for surgery, including something to help you relax.     Please be aware that surgery does come with discomfort.  We want to make every effort to control your discomfort so please discuss any uncontrolled symptoms with your nurse.   Your doctor will most likely have prescribed pain medications.      If you are going home after surgery you will receive individualized written care instructions before being discharged.  A responsible adult must drive you to and from the  hospital on the day of your surgery and ideally stay with you through the night.   .  Discharge prescriptions can be filled by the hospital pharmacy during regular pharmacy hours.  If you are having surgery late in the day/evening your prescription may be e-prescribed to your pharmacy.  Please verify your pharmacy hours or chose a 24 hour pharmacy to avoid not having access to your prescription because your pharmacy has closed for the day.    If you are staying overnight following surgery, you will be transported to your hospital room following the recovery period.  [facility] has all private rooms.    If you have any questions please call Pre-Admission Testing at [phone].  Deductibles and co-payments are collected on the day of service. Please be prepared to pay the required co-pay, deductible or deposit on the day of service as defined by your plan.    Call your surgeon immediately if you experience any of the following symptoms:  Sore Throat  Shortness of Breath or difficulty breathing  Cough  Chills  Body soreness or muscle pain  Headache  Fever  New loss of taste or smell  Do not arrive for your surgery ill.  Your procedure will need to be rescheduled to another time.  You will need to call your physician before the day of surgery to avoid any unnecessary exposure to hospital staff as well as other patients.

## 2024-03-04 ENCOUNTER — ANESTHESIA EVENT (OUTPATIENT)
Dept: PERIOP | Facility: HOSPITAL | Age: 74
End: 2024-03-04
Payer: MEDICARE

## 2024-03-04 ENCOUNTER — APPOINTMENT (OUTPATIENT)
Dept: GENERAL RADIOLOGY | Facility: HOSPITAL | Age: 74
End: 2024-03-04
Payer: MEDICARE

## 2024-03-04 ENCOUNTER — HOSPITAL ENCOUNTER (OUTPATIENT)
Facility: HOSPITAL | Age: 74
Discharge: HOME | End: 2024-03-05
Attending: ORTHOPAEDIC SURGERY | Admitting: ORTHOPAEDIC SURGERY
Payer: MEDICARE

## 2024-03-04 ENCOUNTER — ANESTHESIA (OUTPATIENT)
Dept: PERIOP | Facility: HOSPITAL | Age: 74
End: 2024-03-04
Payer: MEDICARE

## 2024-03-04 LAB
ANION GAP SERPL CALCULATED.3IONS-SCNC: 11.8 MMOL/L (ref 5–15)
BUN SERPL-MCNC: 12 MG/DL (ref 8–23)
BUN/CREAT SERPL: 15 (ref 7–25)
CALCIUM SPEC-SCNC: 8.2 MG/DL (ref 8.6–10.5)
CHLORIDE SERPL-SCNC: 105 MMOL/L (ref 98–107)
CO2 SERPL-SCNC: 24.2 MMOL/L (ref 22–29)
CREAT SERPL-MCNC: 0.8 MG/DL (ref 0.57–1)
EGFRCR SERPLBLD CKD-EPI 2021: 78.4 ML/MIN/1.73
GLUCOSE SERPL-MCNC: 141 MG/DL (ref 65–99)
HCT VFR BLD AUTO: 37.8 % (ref 34–46.6)
HGB BLD-MCNC: 12.5 G/DL (ref 12–15.9)
POTASSIUM SERPL-SCNC: 3.9 MMOL/L (ref 3.5–5.2)
SODIUM SERPL-SCNC: 141 MMOL/L (ref 136–145)

## 2024-03-04 PROCEDURE — 25010000002 VANCOMYCIN 1 G RECONSTITUTED SOLUTION: Performed by: ORTHOPAEDIC SURGERY

## 2024-03-04 PROCEDURE — G0378 HOSPITAL OBSERVATION PER HR: HCPCS

## 2024-03-04 PROCEDURE — 25010000002 CLONIDINE PER 1 MG: Performed by: ORTHOPAEDIC SURGERY

## 2024-03-04 PROCEDURE — 25810000003 LACTATED RINGERS PER 1000 ML: Performed by: STUDENT IN AN ORGANIZED HEALTH CARE EDUCATION/TRAINING PROGRAM

## 2024-03-04 PROCEDURE — 25010000002 DEXAMETHASONE SODIUM PHOSPHATE 20 MG/5ML SOLUTION: Performed by: NURSE ANESTHETIST, CERTIFIED REGISTERED

## 2024-03-04 PROCEDURE — 25010000002 CEFAZOLIN IN DEXTROSE 2-4 GM/100ML-% SOLUTION: Performed by: ORTHOPAEDIC SURGERY

## 2024-03-04 PROCEDURE — 25010000002 PROPOFOL 200 MG/20ML EMULSION: Performed by: NURSE ANESTHETIST, CERTIFIED REGISTERED

## 2024-03-04 PROCEDURE — 25010000002 MAGNESIUM SULFATE PER 500 MG OF MAGNESIUM: Performed by: NURSE ANESTHETIST, CERTIFIED REGISTERED

## 2024-03-04 PROCEDURE — C1776 JOINT DEVICE (IMPLANTABLE): HCPCS | Performed by: ORTHOPAEDIC SURGERY

## 2024-03-04 PROCEDURE — 72170 X-RAY EXAM OF PELVIS: CPT

## 2024-03-04 PROCEDURE — 25010000002 SUGAMMADEX 200 MG/2ML SOLUTION: Performed by: NURSE ANESTHETIST, CERTIFIED REGISTERED

## 2024-03-04 PROCEDURE — 25010000002 HYDROMORPHONE PER 4 MG: Performed by: NURSE ANESTHETIST, CERTIFIED REGISTERED

## 2024-03-04 PROCEDURE — 25010000002 FENTANYL CITRATE (PF) 50 MCG/ML SOLUTION: Performed by: NURSE ANESTHETIST, CERTIFIED REGISTERED

## 2024-03-04 PROCEDURE — 25810000003 SODIUM CHLORIDE 0.9 % SOLUTION: Performed by: ORTHOPAEDIC SURGERY

## 2024-03-04 PROCEDURE — 25010000002 ONDANSETRON PER 1 MG: Performed by: ORTHOPAEDIC SURGERY

## 2024-03-04 PROCEDURE — 25010000002 EPINEPHRINE 1 MG/ML SOLUTION 30 ML VIAL: Performed by: ORTHOPAEDIC SURGERY

## 2024-03-04 PROCEDURE — 97530 THERAPEUTIC ACTIVITIES: CPT

## 2024-03-04 PROCEDURE — 25010000002 DROPERIDOL PER 5 MG: Performed by: NURSE ANESTHETIST, CERTIFIED REGISTERED

## 2024-03-04 PROCEDURE — 25010000002 KETOROLAC TROMETHAMINE PER 15 MG: Performed by: ORTHOPAEDIC SURGERY

## 2024-03-04 PROCEDURE — 25010000002 FENTANYL CITRATE (PF) 100 MCG/2ML SOLUTION: Performed by: NURSE ANESTHETIST, CERTIFIED REGISTERED

## 2024-03-04 PROCEDURE — 25010000002 ONDANSETRON PER 1 MG: Performed by: NURSE ANESTHETIST, CERTIFIED REGISTERED

## 2024-03-04 PROCEDURE — 97161 PT EVAL LOW COMPLEX 20 MIN: CPT

## 2024-03-04 PROCEDURE — 85014 HEMATOCRIT: CPT | Performed by: ORTHOPAEDIC SURGERY

## 2024-03-04 PROCEDURE — 80048 BASIC METABOLIC PNL TOTAL CA: CPT | Performed by: ORTHOPAEDIC SURGERY

## 2024-03-04 PROCEDURE — 76000 FLUOROSCOPY <1 HR PHYS/QHP: CPT

## 2024-03-04 PROCEDURE — 25010000002 ROPIVACAINE PER 1 MG: Performed by: ORTHOPAEDIC SURGERY

## 2024-03-04 PROCEDURE — 85018 HEMOGLOBIN: CPT | Performed by: ORTHOPAEDIC SURGERY

## 2024-03-04 DEVICE — R3 3 HOLE ACETABULAR SHELL 54MM
Type: IMPLANTABLE DEVICE | Site: HIP | Status: FUNCTIONAL
Brand: R3 ACETABULAR

## 2024-03-04 DEVICE — IMPLANTABLE DEVICE: Type: IMPLANTABLE DEVICE | Status: FUNCTIONAL

## 2024-03-04 DEVICE — OXINIUM FEMORAL HEAD 12/14 TAPER                                    36 MM +0
Type: IMPLANTABLE DEVICE | Site: HIP | Status: FUNCTIONAL
Brand: OXINIUM

## 2024-03-04 DEVICE — DEV CONTRL TISS STRATAFIX SPIRAL MNCRYL UD 3/0 PLS 30CM: Type: IMPLANTABLE DEVICE | Site: HIP | Status: FUNCTIONAL

## 2024-03-04 DEVICE — POLARSTEM STANDARD NON-CEMENTED                                    WITH TI/HA 2
Type: IMPLANTABLE DEVICE | Site: HIP | Status: FUNCTIONAL
Brand: POLARSTEM

## 2024-03-04 DEVICE — R3 0 DEGREE XLPE ACETABULAR LINER                                    36MM ID X OD 54MM
Type: IMPLANTABLE DEVICE | Site: HIP | Status: FUNCTIONAL
Brand: R3

## 2024-03-04 RX ORDER — ALBUTEROL SULFATE 2.5 MG/3ML
2.5 SOLUTION RESPIRATORY (INHALATION) EVERY 6 HOURS PRN
Status: DISCONTINUED | OUTPATIENT
Start: 2024-03-04 | End: 2024-03-04 | Stop reason: SDUPTHER

## 2024-03-04 RX ORDER — DEXAMETHASONE SODIUM PHOSPHATE 4 MG/ML
INJECTION, SOLUTION INTRA-ARTICULAR; INTRALESIONAL; INTRAMUSCULAR; INTRAVENOUS; SOFT TISSUE AS NEEDED
Status: DISCONTINUED | OUTPATIENT
Start: 2024-03-04 | End: 2024-03-04 | Stop reason: SURG

## 2024-03-04 RX ORDER — MIDAZOLAM HYDROCHLORIDE 1 MG/ML
0.5 INJECTION INTRAMUSCULAR; INTRAVENOUS
Status: DISCONTINUED | OUTPATIENT
Start: 2024-03-04 | End: 2024-03-04 | Stop reason: HOSPADM

## 2024-03-04 RX ORDER — FENTANYL CITRATE 50 UG/ML
50 INJECTION, SOLUTION INTRAMUSCULAR; INTRAVENOUS
Status: DISCONTINUED | OUTPATIENT
Start: 2024-03-04 | End: 2024-03-04 | Stop reason: HOSPADM

## 2024-03-04 RX ORDER — PROMETHAZINE HYDROCHLORIDE 25 MG/1
25 SUPPOSITORY RECTAL ONCE AS NEEDED
Status: DISCONTINUED | OUTPATIENT
Start: 2024-03-04 | End: 2024-03-04 | Stop reason: HOSPADM

## 2024-03-04 RX ORDER — ONDANSETRON 4 MG/1
4 TABLET, FILM COATED ORAL EVERY 6 HOURS PRN
Qty: 30 TABLET | Refills: 0 | Status: SHIPPED | OUTPATIENT
Start: 2024-03-04

## 2024-03-04 RX ORDER — FAMOTIDINE 20 MG/1
40 TABLET, FILM COATED ORAL DAILY
Status: DISCONTINUED | OUTPATIENT
Start: 2024-03-04 | End: 2024-03-05 | Stop reason: HOSPADM

## 2024-03-04 RX ORDER — HYDROCODONE BITARTRATE AND ACETAMINOPHEN 5; 325 MG/1; MG/1
1 TABLET ORAL ONCE AS NEEDED
Status: DISCONTINUED | OUTPATIENT
Start: 2024-03-04 | End: 2024-03-04 | Stop reason: HOSPADM

## 2024-03-04 RX ORDER — TRANEXAMIC ACID 100 MG/ML
INJECTION, SOLUTION INTRAVENOUS AS NEEDED
Status: DISCONTINUED | OUTPATIENT
Start: 2024-03-04 | End: 2024-03-04 | Stop reason: SURG

## 2024-03-04 RX ORDER — LIDOCAINE HYDROCHLORIDE 20 MG/ML
INJECTION, SOLUTION EPIDURAL; INFILTRATION; INTRACAUDAL; PERINEURAL AS NEEDED
Status: DISCONTINUED | OUTPATIENT
Start: 2024-03-04 | End: 2024-03-04 | Stop reason: SURG

## 2024-03-04 RX ORDER — ONDANSETRON 2 MG/ML
INJECTION INTRAMUSCULAR; INTRAVENOUS AS NEEDED
Status: DISCONTINUED | OUTPATIENT
Start: 2024-03-04 | End: 2024-03-04 | Stop reason: SURG

## 2024-03-04 RX ORDER — MAGNESIUM HYDROXIDE 1200 MG/15ML
LIQUID ORAL AS NEEDED
Status: DISCONTINUED | OUTPATIENT
Start: 2024-03-04 | End: 2024-03-04 | Stop reason: HOSPADM

## 2024-03-04 RX ORDER — VANCOMYCIN HYDROCHLORIDE 1 G/20ML
INJECTION, POWDER, LYOPHILIZED, FOR SOLUTION INTRAVENOUS AS NEEDED
Status: DISCONTINUED | OUTPATIENT
Start: 2024-03-04 | End: 2024-03-04 | Stop reason: HOSPADM

## 2024-03-04 RX ORDER — MAGNESIUM SULFATE HEPTAHYDRATE 500 MG/ML
INJECTION, SOLUTION INTRAMUSCULAR; INTRAVENOUS AS NEEDED
Status: DISCONTINUED | OUTPATIENT
Start: 2024-03-04 | End: 2024-03-04 | Stop reason: SURG

## 2024-03-04 RX ORDER — CEFAZOLIN SODIUM 2 G/100ML
2000 INJECTION, SOLUTION INTRAVENOUS EVERY 8 HOURS
Qty: 200 ML | Refills: 0 | Status: COMPLETED | OUTPATIENT
Start: 2024-03-04 | End: 2024-03-05

## 2024-03-04 RX ORDER — OXYCODONE HYDROCHLORIDE AND ACETAMINOPHEN 5; 325 MG/1; MG/1
1 TABLET ORAL EVERY 4 HOURS PRN
Qty: 50 TABLET | Refills: 0 | Status: SHIPPED | OUTPATIENT
Start: 2024-03-04

## 2024-03-04 RX ORDER — ALBUTEROL SULFATE 2.5 MG/3ML
2.5 SOLUTION RESPIRATORY (INHALATION) EVERY 4 HOURS PRN
Status: DISCONTINUED | OUTPATIENT
Start: 2024-03-04 | End: 2024-03-05 | Stop reason: HOSPADM

## 2024-03-04 RX ORDER — PROPOFOL 10 MG/ML
INJECTION, EMULSION INTRAVENOUS AS NEEDED
Status: DISCONTINUED | OUTPATIENT
Start: 2024-03-04 | End: 2024-03-04 | Stop reason: SURG

## 2024-03-04 RX ORDER — HYDRALAZINE HYDROCHLORIDE 20 MG/ML
5 INJECTION INTRAMUSCULAR; INTRAVENOUS
Status: DISCONTINUED | OUTPATIENT
Start: 2024-03-04 | End: 2024-03-04 | Stop reason: HOSPADM

## 2024-03-04 RX ORDER — SODIUM CHLORIDE 0.9 % (FLUSH) 0.9 %
3 SYRINGE (ML) INJECTION EVERY 12 HOURS SCHEDULED
Status: DISCONTINUED | OUTPATIENT
Start: 2024-03-04 | End: 2024-03-04 | Stop reason: HOSPADM

## 2024-03-04 RX ORDER — ONDANSETRON 4 MG/1
4 TABLET, ORALLY DISINTEGRATING ORAL EVERY 6 HOURS PRN
Status: DISCONTINUED | OUTPATIENT
Start: 2024-03-04 | End: 2024-03-05 | Stop reason: HOSPADM

## 2024-03-04 RX ORDER — DIPHENHYDRAMINE HYDROCHLORIDE 50 MG/ML
12.5 INJECTION INTRAMUSCULAR; INTRAVENOUS
Status: DISCONTINUED | OUTPATIENT
Start: 2024-03-04 | End: 2024-03-04 | Stop reason: HOSPADM

## 2024-03-04 RX ORDER — OXYCODONE AND ACETAMINOPHEN 7.5; 325 MG/1; MG/1
1 TABLET ORAL EVERY 4 HOURS PRN
Status: DISCONTINUED | OUTPATIENT
Start: 2024-03-04 | End: 2024-03-04 | Stop reason: HOSPADM

## 2024-03-04 RX ORDER — FENTANYL CITRATE 50 UG/ML
INJECTION, SOLUTION INTRAMUSCULAR; INTRAVENOUS AS NEEDED
Status: DISCONTINUED | OUTPATIENT
Start: 2024-03-04 | End: 2024-03-04 | Stop reason: SURG

## 2024-03-04 RX ORDER — CELECOXIB 200 MG/1
200 CAPSULE ORAL DAILY
Status: COMPLETED | OUTPATIENT
Start: 2024-03-04 | End: 2024-03-04

## 2024-03-04 RX ORDER — OXYCODONE HYDROCHLORIDE AND ACETAMINOPHEN 5; 325 MG/1; MG/1
2 TABLET ORAL EVERY 4 HOURS PRN
Status: DISCONTINUED | OUTPATIENT
Start: 2024-03-04 | End: 2024-03-05 | Stop reason: HOSPADM

## 2024-03-04 RX ORDER — EPHEDRINE SULFATE 50 MG/ML
5 INJECTION, SOLUTION INTRAVENOUS ONCE AS NEEDED
Status: DISCONTINUED | OUTPATIENT
Start: 2024-03-04 | End: 2024-03-04 | Stop reason: HOSPADM

## 2024-03-04 RX ORDER — NALOXONE HCL 0.4 MG/ML
0.2 VIAL (ML) INJECTION AS NEEDED
Status: DISCONTINUED | OUTPATIENT
Start: 2024-03-04 | End: 2024-03-04 | Stop reason: HOSPADM

## 2024-03-04 RX ORDER — DROPERIDOL 2.5 MG/ML
0.62 INJECTION, SOLUTION INTRAMUSCULAR; INTRAVENOUS
Status: DISCONTINUED | OUTPATIENT
Start: 2024-03-04 | End: 2024-03-04 | Stop reason: HOSPADM

## 2024-03-04 RX ORDER — SODIUM CHLORIDE, SODIUM LACTATE, POTASSIUM CHLORIDE, CALCIUM CHLORIDE 600; 310; 30; 20 MG/100ML; MG/100ML; MG/100ML; MG/100ML
9 INJECTION, SOLUTION INTRAVENOUS CONTINUOUS
Status: DISCONTINUED | OUTPATIENT
Start: 2024-03-04 | End: 2024-03-05 | Stop reason: HOSPADM

## 2024-03-04 RX ORDER — LIDOCAINE HYDROCHLORIDE 10 MG/ML
0.5 INJECTION, SOLUTION INFILTRATION; PERINEURAL ONCE AS NEEDED
Status: DISCONTINUED | OUTPATIENT
Start: 2024-03-04 | End: 2024-03-04 | Stop reason: HOSPADM

## 2024-03-04 RX ORDER — SODIUM CHLORIDE 9 MG/ML
100 INJECTION, SOLUTION INTRAVENOUS CONTINUOUS
Status: DISCONTINUED | OUTPATIENT
Start: 2024-03-04 | End: 2024-03-05 | Stop reason: HOSPADM

## 2024-03-04 RX ORDER — CEFAZOLIN SODIUM 2 G/100ML
2000 INJECTION, SOLUTION INTRAVENOUS ONCE
Status: COMPLETED | OUTPATIENT
Start: 2024-03-04 | End: 2024-03-04

## 2024-03-04 RX ORDER — NALOXONE HCL 0.4 MG/ML
0.1 VIAL (ML) INJECTION
Status: DISCONTINUED | OUTPATIENT
Start: 2024-03-04 | End: 2024-03-05 | Stop reason: HOSPADM

## 2024-03-04 RX ORDER — ACETAMINOPHEN 500 MG
1000 TABLET ORAL ONCE
Status: COMPLETED | OUTPATIENT
Start: 2024-03-04 | End: 2024-03-04

## 2024-03-04 RX ORDER — ONDANSETRON 2 MG/ML
4 INJECTION INTRAMUSCULAR; INTRAVENOUS ONCE AS NEEDED
Status: DISCONTINUED | OUTPATIENT
Start: 2024-03-04 | End: 2024-03-04 | Stop reason: HOSPADM

## 2024-03-04 RX ORDER — DOCUSATE SODIUM 100 MG/1
100 CAPSULE, LIQUID FILLED ORAL 2 TIMES DAILY PRN
Status: DISCONTINUED | OUTPATIENT
Start: 2024-03-04 | End: 2024-03-05 | Stop reason: HOSPADM

## 2024-03-04 RX ORDER — IPRATROPIUM BROMIDE AND ALBUTEROL SULFATE 2.5; .5 MG/3ML; MG/3ML
3 SOLUTION RESPIRATORY (INHALATION) ONCE AS NEEDED
Status: DISCONTINUED | OUTPATIENT
Start: 2024-03-04 | End: 2024-03-04 | Stop reason: HOSPADM

## 2024-03-04 RX ORDER — FLUMAZENIL 0.1 MG/ML
0.2 INJECTION INTRAVENOUS AS NEEDED
Status: DISCONTINUED | OUTPATIENT
Start: 2024-03-04 | End: 2024-03-04 | Stop reason: HOSPADM

## 2024-03-04 RX ORDER — ASPIRIN 81 MG/1
81 TABLET ORAL EVERY 12 HOURS SCHEDULED
Status: DISCONTINUED | OUTPATIENT
Start: 2024-03-04 | End: 2024-03-05 | Stop reason: HOSPADM

## 2024-03-04 RX ORDER — OXYCODONE HYDROCHLORIDE AND ACETAMINOPHEN 5; 325 MG/1; MG/1
1 TABLET ORAL EVERY 4 HOURS PRN
Status: DISCONTINUED | OUTPATIENT
Start: 2024-03-04 | End: 2024-03-05 | Stop reason: HOSPADM

## 2024-03-04 RX ORDER — KETAMINE HCL IN NACL, ISO-OSM 100MG/10ML
SYRINGE (ML) INJECTION AS NEEDED
Status: DISCONTINUED | OUTPATIENT
Start: 2024-03-04 | End: 2024-03-04 | Stop reason: SURG

## 2024-03-04 RX ORDER — PROMETHAZINE HYDROCHLORIDE 25 MG/1
25 TABLET ORAL ONCE AS NEEDED
Status: DISCONTINUED | OUTPATIENT
Start: 2024-03-04 | End: 2024-03-04 | Stop reason: HOSPADM

## 2024-03-04 RX ORDER — HYDROMORPHONE HYDROCHLORIDE 1 MG/ML
0.5 INJECTION, SOLUTION INTRAMUSCULAR; INTRAVENOUS; SUBCUTANEOUS
Status: DISCONTINUED | OUTPATIENT
Start: 2024-03-04 | End: 2024-03-04 | Stop reason: HOSPADM

## 2024-03-04 RX ORDER — MELOXICAM 15 MG/1
15 TABLET ORAL DAILY
Status: DISCONTINUED | OUTPATIENT
Start: 2024-03-04 | End: 2024-03-05 | Stop reason: HOSPADM

## 2024-03-04 RX ORDER — ROCURONIUM BROMIDE 10 MG/ML
INJECTION, SOLUTION INTRAVENOUS AS NEEDED
Status: DISCONTINUED | OUTPATIENT
Start: 2024-03-04 | End: 2024-03-04 | Stop reason: SURG

## 2024-03-04 RX ORDER — SODIUM CHLORIDE 0.9 % (FLUSH) 0.9 %
3-10 SYRINGE (ML) INJECTION AS NEEDED
Status: DISCONTINUED | OUTPATIENT
Start: 2024-03-04 | End: 2024-03-04 | Stop reason: HOSPADM

## 2024-03-04 RX ORDER — LEVOTHYROXINE SODIUM 0.1 MG/1
100 TABLET ORAL
Status: DISCONTINUED | OUTPATIENT
Start: 2024-03-05 | End: 2024-03-05 | Stop reason: HOSPADM

## 2024-03-04 RX ORDER — LOSARTAN POTASSIUM 25 MG/1
25 TABLET ORAL
Status: DISCONTINUED | OUTPATIENT
Start: 2024-03-05 | End: 2024-03-05 | Stop reason: HOSPADM

## 2024-03-04 RX ORDER — ASPIRIN 81 MG/1
81 TABLET ORAL EVERY 12 HOURS
Qty: 60 TABLET | Refills: 0 | Status: SHIPPED | OUTPATIENT
Start: 2024-03-04 | End: 2024-04-03

## 2024-03-04 RX ORDER — ONDANSETRON 2 MG/ML
4 INJECTION INTRAMUSCULAR; INTRAVENOUS EVERY 6 HOURS PRN
Status: DISCONTINUED | OUTPATIENT
Start: 2024-03-04 | End: 2024-03-05 | Stop reason: HOSPADM

## 2024-03-04 RX ORDER — LABETALOL HYDROCHLORIDE 5 MG/ML
5 INJECTION, SOLUTION INTRAVENOUS
Status: DISCONTINUED | OUTPATIENT
Start: 2024-03-04 | End: 2024-03-04 | Stop reason: HOSPADM

## 2024-03-04 RX ADMIN — SODIUM CHLORIDE, POTASSIUM CHLORIDE, SODIUM LACTATE AND CALCIUM CHLORIDE: 600; 310; 30; 20 INJECTION, SOLUTION INTRAVENOUS at 08:01

## 2024-03-04 RX ADMIN — ONDANSETRON 4 MG: 2 INJECTION INTRAMUSCULAR; INTRAVENOUS at 21:06

## 2024-03-04 RX ADMIN — SODIUM CHLORIDE 100 ML/HR: 9 INJECTION, SOLUTION INTRAVENOUS at 09:55

## 2024-03-04 RX ADMIN — Medication 20 MG: at 07:15

## 2024-03-04 RX ADMIN — SODIUM CHLORIDE, POTASSIUM CHLORIDE, SODIUM LACTATE AND CALCIUM CHLORIDE 9 ML/HR: 600; 310; 30; 20 INJECTION, SOLUTION INTRAVENOUS at 06:10

## 2024-03-04 RX ADMIN — CEFAZOLIN SODIUM 2000 MG: 2 INJECTION, SOLUTION INTRAVENOUS at 06:53

## 2024-03-04 RX ADMIN — ASPIRIN 81 MG: 81 TABLET, COATED ORAL at 21:05

## 2024-03-04 RX ADMIN — HYDROMORPHONE HYDROCHLORIDE 0.5 MG: 1 INJECTION, SOLUTION INTRAMUSCULAR; INTRAVENOUS; SUBCUTANEOUS at 08:20

## 2024-03-04 RX ADMIN — LIDOCAINE HYDROCHLORIDE 100 MG: 20 INJECTION, SOLUTION EPIDURAL; INFILTRATION; INTRACAUDAL; PERINEURAL at 07:03

## 2024-03-04 RX ADMIN — CEFAZOLIN SODIUM 2000 MG: 2 INJECTION, SOLUTION INTRAVENOUS at 14:16

## 2024-03-04 RX ADMIN — ONDANSETRON 4 MG: 2 INJECTION INTRAMUSCULAR; INTRAVENOUS at 08:00

## 2024-03-04 RX ADMIN — FENTANYL CITRATE 50 MCG: 50 INJECTION, SOLUTION INTRAMUSCULAR; INTRAVENOUS at 08:32

## 2024-03-04 RX ADMIN — FENTANYL CITRATE 100 MCG: 50 INJECTION, SOLUTION INTRAMUSCULAR; INTRAVENOUS at 07:03

## 2024-03-04 RX ADMIN — OXYCODONE AND ACETAMINOPHEN 1 TABLET: 5; 325 TABLET ORAL at 14:17

## 2024-03-04 RX ADMIN — ACETAMINOPHEN 1000 MG: 500 TABLET ORAL at 06:02

## 2024-03-04 RX ADMIN — SUGAMMADEX 200 MG: 100 INJECTION, SOLUTION INTRAVENOUS at 08:00

## 2024-03-04 RX ADMIN — ROCURONIUM BROMIDE 50 MG: 10 INJECTION, SOLUTION INTRAVENOUS at 07:03

## 2024-03-04 RX ADMIN — DEXAMETHASONE SODIUM PHOSPHATE 10 MG: 4 INJECTION, SOLUTION INTRAMUSCULAR; INTRAVENOUS at 07:15

## 2024-03-04 RX ADMIN — OXYCODONE AND ACETAMINOPHEN 1 TABLET: 5; 325 TABLET ORAL at 21:05

## 2024-03-04 RX ADMIN — MAGNESIUM SULFATE HEPTAHYDRATE 2 G: 500 INJECTION, SOLUTION INTRAMUSCULAR; INTRAVENOUS at 07:15

## 2024-03-04 RX ADMIN — ONDANSETRON 4 MG: 2 INJECTION INTRAMUSCULAR; INTRAVENOUS at 14:16

## 2024-03-04 RX ADMIN — DROPERIDOL 0.62 MG: 2.5 INJECTION, SOLUTION INTRAMUSCULAR; INTRAVENOUS at 08:13

## 2024-03-04 RX ADMIN — FENTANYL CITRATE 50 MCG: 50 INJECTION, SOLUTION INTRAMUSCULAR; INTRAVENOUS at 08:15

## 2024-03-04 RX ADMIN — CELECOXIB 200 MG: 200 CAPSULE ORAL at 06:09

## 2024-03-04 RX ADMIN — TRANEXAMIC ACID 1000 MG: 100 INJECTION INTRAVENOUS at 07:15

## 2024-03-04 RX ADMIN — PROPOFOL 200 MG: 10 INJECTION, EMULSION INTRAVENOUS at 07:03

## 2024-03-04 NOTE — H&P
"  Orthopaedic Surgery  History & Physical For Elective Total Hip  Dr. MAGALY Posada II  [phone]    HPI:  Patient is a 73 y.o. Not  or  female who presents with End-stage arthritis of the right hip.  They failed conservative treatment of their hip pain and a thorough discussion of the risks and benefits of surgery was had.  The patient wishes to continue with elective total hip replacement, they were scheduled and are here for surgery. They did get medical clearance as well as a thorough preoperative workup.     MEDICAL HISTORY  Past Medical History:   Diagnosis Date    Allergic     Arthritis     Asthma     Bilateral pulmonary embolism     provoked, after surgery 2019, took 3 mos of OAC    Colon cancer     Colorectal cancer     s/p surgery (colostomy), radiation, and chemotherapy, with mets to lung s/p surgical resection    Colostomy in place     COPD (chronic obstructive pulmonary disease)     Enlarged thyroid gland     s/p total thyroidectomy 2019    Eye exam normal 2013    History of prior pregnancies     x4    Hx of radiation therapy 2000    for colon cancer    Hyperlipidemia     Hyperparathyroidism     s/p removal of a single adenoma 5/2019 c/b bleeding/hematoma    Hypertension     \"white coat syndrome\"    Injury of back     Injury of neck     Left hip pain     Lung nodule     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  STATES DR POSADA AWARE OF.    Rash     RIGHT HIP STATES BEEN THERE SINCE LEFT HIP SURGERY AND DR. POSADA AWARE.    Well female exam with routine gynecological exam 10/31/2016     Past Surgical History:   Procedure Laterality Date    COLON SURGERY      1999 and 11/2011    COLONOSCOPY  2016    COLONOSCOPY N/A 05/08/2018    Procedure: COLONOSCOPY into ileum;  Surgeon: James Romeo MD;  " Location:  [facility];  Service: Gastroenterology    COLONOSCOPY N/A 03/04/2022    Procedure: COLONOSCOPY to anastomosis;  Surgeon: James Romeo MD;  Location:  [facility];  Service: Gastroenterology;  Laterality: N/A;  pre: hx of colorectal cancer   post: normal surgical anatomy     HEMICOLOECTOMY W/ ANASTOMOSIS Right 11/2011    Adenocarcinoma of cecum    HYSTERECTOMY  1999    LUNG LOBECTOMY      ANSELMO 08/2006 and RLL partial 09/2001 metastatic colon cancer     THYROIDECTOMY Bilateral 05/06/2019    Procedure: Left PARATHYROIDECTOMY AND TOTAL THYROIDECTOMY;  Surgeon: Maxi Daly MD;  Location:  [facility];  Service: ENT    TOTAL HIP ARTHROPLASTY Left 11/28/2022    Procedure: LEFT TOTAL HIP ARTHROPLASTY ANTERIOR;  Surgeon: Saul Vargas II, MD;  Location:  [facility];  Service: Orthopedics;  Laterality: Left;    TRACHEOSTOMY N/A 05/06/2019    Procedure: EVACUATION OF NECK  HEMATOMA;  Surgeon: Maxi Daly MD;  Location:  [facility];  Service: ENT     Prior to Admission medications    Medication Sig Start Date End Date Taking? Authorizing Provider   acetaminophen (TYLENOL) 500 MG tablet Take 1 tablet by mouth As Needed for Mild Pain.   Yes Arlyn Saleh MD   albuterol (PROVENTIL) (2.5 MG/3ML) 0.083% nebulizer solution Take 2.5 mg by nebulization Every 4 (Four) Hours As Needed for Wheezing. 5/14/22  Yes James Wall MD   albuterol sulfate  (90 Base) MCG/ACT inhaler Inhale 2 puffs Every 4 (Four) Hours As Needed for Wheezing.   Yes Arlyn Saleh MD   aspirin 81 MG EC tablet Take 1 tablet by mouth Every Night. HOLD PRIOR TO SURGERY PER MD INSTRUCTIONS   Yes Arlyn Saleh MD   losartan (COZAAR) 25 MG tablet TAKE 1/2 (ONE-HALF) TABLET BY MOUTH TWICE DAILY AS NEEDED. 2/14/24  Yes Veronica Deutsch APRN   pantoprazole (PROTONIX) 40 MG EC tablet Take 1 tablet by mouth Daily. 12/13/23  Yes James Romeo MD   polyethyl glycol-propyl glycol (SYSTANE) 0.4-0.3  % solution ophthalmic solution Administer 2 drops to both eyes Daily.   Yes Provider, MD Arlyn   simvastatin (ZOCOR) 20 MG tablet Take 1 tablet by mouth Every Night. 24  Yes Roger Beard MD   Stiolto Respimat 2.5-2.5 MCG/ACT aerosol solution inhaler Inhale 2 puffs Daily. 22  Yes ProviderArlyn MD   Synthroid 100 MCG tablet Take 1 tablet by mouth Daily. 21  Yes ProviderArlyn MD   vitamin D (ERGOCALCIFEROL) 1.25 MG (73396 UT) capsule capsule Take 1 capsule by mouth 1 (One) Time Per Week.     HOLD FOR SURGERY 1/10/22  Yes ProviderArlyn MD     Allergies   Allergen Reactions    Adhesive Tape Hives and Itching     BANDAIDS-TOLERATES PAPER TAPE    Amoxicillin Hives and Itching    Latex Other (See Comments)     Lips and nose swelled    Red Dye Nausea And Vomiting    Rosuvastatin Anaphylaxis    Shellfish-Derived Products Shortness Of Breath    Lactose Intolerance (Gi) Nausea And Vomiting    Pennsaid [Diclofenac Sodium] Dizziness    Prednisone Other (See Comments)     FACIAL SKIN FLUSHING WITH HIGH DOSES    Simvastatin Myalgia     Most Recent Immunizations   Administered Date(s) Administered    COVID-19 (PFIZER) BIVALENT 12+YRS 2023    COVID-19 (PFIZER) Purple Cap Monovalent 2021    COVID-19 F23 (PFIZER) 12YRS+ (COMIRNATY) 2023    FLUAD TRI 65YR+ 10/14/2019    Fluad Quad 65+ 2020    Fluzone High Dose =>65 Years ([employer] ONLY) 10/14/2019    Fluzone High-Dose 65+yrs 2021    Influenza, Unspecified 10/01/2022    Pneumococcal Conjugate 13-Valent (PCV13) 2017    Pneumococcal Polysaccharide (PPSV23) 2016    Tdap 2014     Social History     Tobacco Use    Smoking status: Former     Current packs/day: 0.00     Average packs/day: 1 pack/day for 23.0 years (23.0 ttl pk-yrs)     Types: Cigarettes     Start date: 1978     Quit date: 2001     Years since quittin.4     Passive exposure: Never    Smokeless tobacco: Never     Tobacco comments:     1ppd x20 yrs   Substance Use Topics    Alcohol use: No     Comment: Caffeine use: Tea 2 cups daily      Social History     Substance and Sexual Activity   Drug Use No       REVIEW OF SYSTEMS:  Head: negative for headache  Respiratory: negative for shortness of breath.   Cardiovascular: negative for chest pain.   Gastrointestinal: negative abdominal pain.   Neurological: negative for LOC  Psychiatric/Behavioral: negative for memory loss.   All other systems reviewed and are negative    VITALS: BP (!) 191/82 (BP Location: Right arm, Patient Position: Sitting)   Pulse 75   Temp 97.8 °F (36.6 °C) (Oral)   Resp 16   LMP  (LMP Unknown)   SpO2 98%  There is no height or weight on file to calculate BMI.    PHYSICAL EXAM:   CONSTITUTIONAL: A&Ox3, No acute distress  LUNGS: Equal chest rise, no shortness of air  CARDIOVASCULAR: palpable peripheral pulses  SKIN: no skin lesions in the area examined  LYMPH: no lymphadenopathy in the area examined  EXTREMITY: Hip  Pulses:  Brisk Capillary Refill  Sensation: Intact to Saphenous, Sural, Deep Peroneal, Superficial Peroneal, and Tibial Nerves and grossly throughout extremity  Motor: 5/5 EHL/FHL/TA/GS motor complexes    RADIOLOGY REVIEW:   No radiology results for the last 7 days    LABS:   Results for the past 24 hours: No results found for this or any previous visit (from the past 24 hour(s)).    IMPRESSION:  Patient is a 73 y.o. Not  or  female with end-stage osteoarthritis of the right hip    PLAN:   Surgery: Elective total hip arthroplasty  Consent: The risks and benefits of operative versus nonoperative treatment were discussed. The patient elected to undergo operative treatment of their hip. The risks discussed included but were not limited to blood clots, MI, stroke, other medical complications, infection, dislocation, fracture, damage to neurovascular structures, continued pain, hardware prominence, loss of range of motion, stiffness,  need for further procedures, and and risk of anesthesia. No guarantees were made   Disposition: Elective right Total Hip Arthroplasty today.    Saul Vargas II, MD  Orthopaedic Surgery  [facility]

## 2024-03-04 NOTE — THERAPY EVALUATION
"Patient Name: Faviola Issa  : 1950    MRN: [id number]                              Today's Date: 3/4/2024       Admit Date: 3/4/2024    Visit Dx: No diagnosis found.  Patient Active Problem List   Diagnosis    Well female exam with routine gynecological exam    Malignant neoplasm of sigmoid colon    Mixed hyperlipidemia    Status post parathyroidectomy    Postoperative hypothyroidism    Anxiety    Palpitations    Hypertension    Colon polyps    White coat syndrome with diagnosis of hypertension    Hip joint replacement status    Gastroesophageal reflux disease with esophagitis without hemorrhage     Past Medical History:   Diagnosis Date    Allergic     Arthritis     Asthma     Bilateral pulmonary embolism     provoked, after surgery , took 3 mos of OAC    Colon cancer     Colorectal cancer     s/p surgery (colostomy), radiation, and chemotherapy, with mets to lung s/p surgical resection    Colostomy in place     COPD (chronic obstructive pulmonary disease)     Enlarged thyroid gland     s/p total thyroidectomy     Eye exam normal     History of prior pregnancies     x4    Hx of radiation therapy     for colon cancer    Hyperlipidemia     Hyperparathyroidism     s/p removal of a single adenoma 2019 c/b bleeding/hematoma    Hypertension     \"white coat syndrome\"    Injury of back     Injury of neck     Left hip pain     Lung nodule     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  STATES DR POSADA AWARE OF.    Rash     RIGHT HIP STATES BEEN THERE SINCE LEFT HIP SURGERY AND DR. POSADA AWARE.    Well female exam with routine gynecological exam 10/31/2016     Past Surgical History:   Procedure Laterality Date    COLON SURGERY       and 2011    COLONOSCOPY  2016    COLONOSCOPY N/A 2018    " Procedure: COLONOSCOPY into ileum;  Surgeon: James Romeo MD;  [facility];  Service: Gastroenterology    COLONOSCOPY N/A 03/04/2022    Procedure: COLONOSCOPY to anastomosis;  Surgeon: James Romeo MD;  Location[facility];  Service: Gastroenterology;  Laterality: N/A;  pre: hx of colorectal cancer   post: normal surgical anatomy     HEMICOLOECTOMY W/ ANASTOMOSIS Right 11/2011    Adenocarcinoma of cecum    HYSTERECTOMY  1999    LUNG LOBECTOMY      ANSELMO 08/2006 and RLL partial 09/2001 metastatic colon cancer     THYROIDECTOMY Bilateral 05/06/2019    Procedure: Left PARATHYROIDECTOMY AND TOTAL THYROIDECTOMY;  Surgeon: Maxi Daly MD;  Location:  [facility];  Service: ENT    TOTAL HIP ARTHROPLASTY Left 11/28/2022    Procedure: LEFT TOTAL HIP ARTHROPLASTY ANTERIOR;  Surgeon: Saul Vargas II, MD;  Location:  [facility];  Service: Orthopedics;  Laterality: Left;    TRACHEOSTOMY N/A 05/06/2019    Procedure: EVACUATION OF NECK  HEMATOMA;  Surgeon: Maxi Daly MD;  Location[facility] MAIN OR;  Service: ENT      General Information       Row Name 03/04/24 1130          Physical Therapy Time and Intention    Document Type evaluation  -ZB     Mode of Treatment individual therapy;physical therapy  -ZB       Row Name 03/04/24 1130          General Information    Patient Profile Reviewed yes  -ZB     Prior Level of Function independent:;ADL's;all household mobility  primary use of spc but has rwx if needed  -ZB     Existing Precautions/Restrictions fall;hip, anterior  -ZB     Barriers to Rehab none identified  -ZB       Row Name 03/04/24 1130          Living Environment    People in Home spouse  -ZB       Row Name 03/04/24 1130          Home Main Entrance    Number of Stairs, Main Entrance three  2 steps, then small stoop  -ZB       Row Name 03/04/24 1130          Stairs Within Home, Primary    Number of Stairs, Within Home, Primary other (see comments)  full flight to bedroom but  has sofa couch on ground level if needed  -ZB       Row Name 03/04/24 1130          Cognition    Orientation Status (Cognition) oriented x 4  -ZB       Row Name 03/04/24 1130          Safety Issues, Functional Mobility    Impairments Affecting Function (Mobility) balance;range of motion (ROM);endurance/activity tolerance;strength;pain  -ZB     Comment, Safety Issues/Impairments (Mobility) gait belt and non skid socks donned  -ZB               User Key  (r) = Recorded By, (t) = Taken By, (c) = Cosigned By      Initials Name Provider Type    ZB Cristhian Santiago, ROSELIA Physical Therapist                   Mobility       Row Name 03/04/24 1132          Bed Mobility    Bed Mobility supine-sit;sit-supine;scooting/bridging  -ZB     Scooting/Bridging [place] (Bed Mobility) standby assist  -ZB     Supine-Sit [place] (Bed Mobility) minimum assist (75% patient effort);verbal cues  -ZB     Sit-Supine [place] (Bed Mobility) verbal cues;moderate assist (50% patient effort)  -ZB     Assistive Device (Bed Mobility) head of bed elevated;bed rails  -ZB       Row Name 03/04/24 1132          Bed-Chair Transfer    Bed-Chair [place] (Transfers) not tested  -ZB     Comment, (Bed-Chair Transfer) requested to return to bed due to nausea  -ZB       Row Name 03/04/24 1132          Sit-Stand Transfer    Sit-Stand [place] (Transfers) minimum assist (75% patient effort);verbal cues  -ZB     Assistive Device (Sit-Stand Transfers) walker, front-wheeled  -ZB       Row Name 03/04/24 1132          Gait/Stairs (Locomotion)    [place] Level (Gait) minimum assist (75% patient effort);verbal cues  -ZB     Assistive Device (Gait) walker, front-wheeled  -ZB     Patient was able to Ambulate yes  -ZB     Distance in Feet (Gait) 12'  -ZB     Deviations/Abnormal Patterns (Gait) right sided deviations;antalgic;kulwant decreased;stride length decreased  -ZB     Bilateral Gait Deviations forward flexed posture  -ZB     [place] Level  (Stairs) not tested  -ZB     Comment, (Gait/Stairs) pt overall steady with no buckling noted, limited by nausea this date  -ZB               User Key  (r) = Recorded By, (t) = Taken By, (c) = Cosigned By      Initials Name Provider Type    ZB Cristhian Santiago PT Physical Therapist                   Obj/Interventions       Row Name 03/04/24 1133          Range of Motion Comprehensive    Comment, General Range of Motion expected post-op R hip ROM deficits  -ZB       Row Name 03/04/24 1133          Strength Comprehensive (MMT)    Comment, General Manual Muscle Testing (MMT) Assessment expected post-op R LE MMT deficits  -ZB       Row Name 03/04/24 1133          Motor Skills    Motor Skills functional endurance  -ZB     Therapeutic Exercise other (see comments)  -ZB       Row Name 03/04/24 1133          Balance    Balance Assessment sitting static balance;sitting dynamic balance;standing static balance;standing dynamic balance  -ZB     Static Sitting Balance standby assist  -ZB     Dynamic Sitting Balance standby assist  -ZB     Position, Sitting Balance unsupported;sitting edge of bed  -ZB     Static Standing Balance standby assist  -ZB     Dynamic Standing Balance contact guard  -ZB     Position/Device Used, Standing Balance supported;walker, front-wheeled  -ZB     Balance Interventions sitting;standing;sit to stand;supported;static;dynamic  -ZB       Row Name 03/04/24 1133          Sensory Assessment (Somatosensory)    Sensory Assessment (Somatosensory) unable/difficult to assess  -ZB               User Key  (r) = Recorded By, (t) = Taken By, (c) = Cosigned By      Initials Name Provider Type    ZB Cristhian Santiago PT Physical Therapist                   Goals/Plan       Row Name 03/04/24 1142          Bed Mobility Goal 1 (PT)    Activity/Assistive Device (Bed Mobility Goal 1, PT) bed mobility activities, all  -ZB     [place] Level/Cues Needed (Bed Mobility Goal 1, PT) independent  -ZB     Time Frame (Bed Mobility  Goal 1, PT) short term goal (STG);3 days  -ZB       Row Name 03/04/24 1142          Transfer Goal 1 (PT)    Activity/Assistive Device (Transfer Goal 1, PT) sit-to-stand/stand-to-sit;bed-to-chair/chair-to-bed  -ZB     [place] Level/Cues Needed (Transfer Goal 1, PT) contact guard required  -ZB     Time Frame (Transfer Goal 1, PT) short term goal (STG);3 days  -ZB       Row Name 03/04/24 1142          Gait Training Goal 1 (PT)    Activity/Assistive Device (Gait Training Goal 1, PT) gait (walking locomotion)  -ZB     [place] Level (Gait Training Goal 1, PT) modified independence  -ZB     Distance (Gait Training Goal 1, PT) 150'  -ZB     Time Frame (Gait Training Goal 1, PT) short term goal (STG);3 days  -ZB       Row Name 03/04/24 1142          Stairs Goal 1 (PT)    Activity/Assistive Device (Stairs Goal 1, PT) stairs, all skills  -ZB     [place] Level/Cues Needed (Stairs Goal 1, PT) contact guard required  -ZB     Number of Stairs (Stairs Goal 1, PT) 8  -ZB     Time Frame (Stairs Goal 1, PT) short term goal (STG);3 days  -ZB       Row Name 03/04/24 1142          Therapy Assessment/Plan (PT)    Planned Therapy Interventions (PT) balance training;bed mobility training;gait training;home exercise program;stair training;ROM (range of motion);patient/family education;strengthening;transfer training  -ZB               User Key  (r) = Recorded By, (t) = Taken By, (c) = Cosigned By      Initials Name Provider Type    ZB Cristhian Santiago, PT Physical Therapist                   Clinical Impression       Row Name 03/04/24 1135          Pain    Pretreatment Pain Rating 8/10  -ZB     Posttreatment Pain Rating 8/10  -ZB     Pain Location - Side/Orientation Right  -ZB     Pain Location generalized  -ZB     Pain Location - hip  -ZB     Pain Intervention(s) Ambulation/increased activity;Repositioned;Rest;Cold applied  -ZB       Row Name 03/04/24 1135          Plan of Care Review    Plan of Care Reviewed With patient;family   -ZB     Outcome Evaluation Patient is a 74 y/o female evaluated by therapy POD0 s/p R anterior CÉSAR. The patient lives with her spouse in a multistory home with 2 VICENTE and reports (I) ADLs and mobility with use of spc at baseline. The bedroom is located up a flight of stairs but patient states that there is a sofa bed on the ground level if necessary. Patient has rwx at home. Today she performed CÉSAR protocol, supine > sit Genia, STS from EOB Genia, and ambulated 12' cga + rwx. The patient is steady with use of rwx demo'ing no LOB or buckling but is limited by nausea this date. Prior to STS pt did experience bout of nausea with emesis. Patient educated on exercise prescription, ambulation recommendations, ice usage, falls prevention, precautions and stair negotiation. Anticipate pt will be OK to DC home with assist as needed and  PT following trial of stair negotiation.  -ZB       Row Name 03/04/24 1135          Therapy Assessment/Plan (PT)    Rehab Potential (PT) good, to achieve stated therapy goals  -ZB     Criteria for Skilled Interventions Met (PT) yes  -ZB     Therapy Frequency (PT) 6 times/wk  -ZB       Row Name 03/04/24 1135          Vital Signs    Pre SpO2 (%) 99  -ZB     O2 Delivery Pre Treatment nasal cannula  -ZB     Intra SpO2 (%) 96  -ZB     O2 Delivery Intra Treatment room air  -ZB     Post SpO2 (%) 99  -ZB     O2 Delivery Post Treatment nasal cannula  -ZB     Pre Patient Position Supine  -ZB     Intra Patient Position Standing  -ZB     Post Patient Position Supine  -ZB       Row Name 03/04/24 1135          Positioning and Restraints    Pre-Treatment Position in bed  -ZB     Post Treatment Position bed  -ZB     In Bed notified nsg;fowlers;call light within reach;encouraged to call for assist;exit alarm on;with family/caregiver  -ZB               User Key  (r) = Recorded By, (t) = Taken By, (c) = Cosigned By      Initials Name Provider Type    ZB Cristhian Santiago, PT Physical Therapist                    Outcome Measures       Row Name 03/04/24 1142 03/04/24 0945       How much help from another person do you currently need...    Turning from your back to your side while in flat bed without using bedrails? 3  -ZB 3  -SH    Moving from lying on back to sitting on the side of a flat bed without bedrails? 3  -ZB 3  -SH    Moving to and from a bed to a chair (including a wheelchair)? 3  -ZB 3  -SH    Standing up from a chair using your arms (e.g., wheelchair, bedside chair)? 3  -ZB 3  -SH    Climbing 3-5 steps with a railing? 2  -ZB 3  -SH    To walk in hospital room? 3  -ZB 3  -SH    AM-PAC 6 Clicks Score (PT) 17  -ZB 18  -SH    Highest Level of Mobility Goal 5 --> Static standing  -ZB 6 --> Walk 10 steps or more  -SH      Row Name 03/04/24 1142          Functional Assessment    Outcome Measure Options AM-PAC 6 Clicks Basic Mobility (PT)  -               User Key  (r) = Recorded By, (t) = Taken By, (c) = Cosigned By      Initials Name Provider Type    Yaa Carvajal, RN Registered Nurse    Cristhian Arcos, ROSELIA Physical Therapist                                 Physical Therapy Education       Title: PT OT SLP Therapies (Done)       Topic: Physical Therapy (Done)       Point: Mobility training (Done)       Learning Progress Summary             Patient Acceptance, E,D, VU,DU by MONICA at 3/4/2024 1143                         Point: Home exercise program (Done)       Learning Progress Summary             Patient Acceptance, E,D, VU,DU by MONICA at 3/4/2024 1143                         Point: Body mechanics (Done)       Learning Progress Summary             Patient Acceptance, E,D, VU,DU by MONICA at 3/4/2024 1143                         Point: Precautions (Done)       Learning Progress Summary             Patient Acceptance, E,D, VU,DU by MONICA at 3/4/2024 1143                                         User Key       Initials Effective Dates Name Provider Type Eli ANDERSON 08/30/23 -  Cristhian Santiago, ROSELIA Physical Therapist PT                   PT Recommendation and Plan  Planned Therapy Interventions (PT): balance training, bed mobility training, gait training, home exercise program, stair training, ROM (range of motion), patient/family education, strengthening, transfer training  Plan of Care Reviewed With: patient, family  Outcome Evaluation: Patient is a 74 y/o female evaluated by therapy POD0 s/p R anterior CÉSAR. The patient lives with her spouse in a multistory home with 2 VICENTE and reports (I) ADLs and mobility with use of spc at baseline. The bedroom is located up a flight of stairs but patient states that there is a sofa bed on the ground level if necessary. Patient has rwx at home. Today she performed CÉSAR protocol, supine > sit Genia, STS from EOB Genia, and ambulated 12' cga + rwx. The patient is steady with use of rwx demo'ing no LOB or buckling but is limited by nausea this date. Prior to STS pt did experience bout of nausea with emesis. Patient educated on exercise prescription, ambulation recommendations, ice usage, falls prevention, precautions and stair negotiation. Anticipate pt will be OK to DC home with assist as needed and  PT following trial of stair negotiation.     Time Calculation:         PT Charges       Row Name 03/04/24 1143             Time Calculation    Start Time 1102  -ZB      Stop Time 1128  -ZB      Time Calculation (min) 26 min  -ZB      PT Received On 03/04/24  -ZB      PT - Next Appointment 03/05/24  -ZB      PT Goal Re-Cert Due Date 03/18/24  -ZB         Time Calculation- PT    Total Timed Code Minutes- PT 16 minute(s)  -ZB         Timed Charges    16925 - PT Therapeutic Activity Minutes 16  -ZB         Total Minutes    Timed Charges Total Minutes 16  -ZB       Total Minutes 16  -ZB                User Key  (r) = Recorded By, (t) = Taken By, (c) = Cosigned By      Initials Name Provider Type    Cristhian Arcos, PT Physical Therapist                  Therapy Charges for Today       Code Description  Service Date Service Provider Modifiers Qty    24551617222 HC PT THERAPEUTIC ACT EA 15 MIN 3/4/2024 Cristhian Santiago, PT GP 1    63663990108 HC PT EVAL LOW COMPLEXITY 3 3/4/2024 Cristhian Santiago, PT GP 1            PT G-Codes  Outcome Measure Options: AM-PAC 6 Clicks Basic Mobility (PT)  AM-PAC 6 Clicks Score (PT): 17  PT Discharge Summary  Anticipated Discharge Disposition (PT): home with assist, home with home health    Carolina Santiago, PT  3/4/2024

## 2024-03-04 NOTE — DISCHARGE INSTRUCTIONS
Total Hip  Discharge Instructions  Dr. MAGALY Sweet” Alicia II  [phone]    INCISION CARE  Wash your hands prior to dressing changes  MARU Wound VAC: Postoperatively you had a MARU Wound Vac placed on the incision. This was placed under sterile conditions in the operating room. It remains in place for 7 days postoperatively. After 7 days, the entire dressing must be removed, including all of the sticky adhesive. The dressing and battery pack provide gentle suction to the incision and provide several benefits over a traditional dressing:  It maintains the sterile environment of the OR and reduces the risk of infection  The suction removes unwanted buildup of blood/hematoma under the skin to reduce swelling  The suction also promotes fresh blood supply to the skin and soft tissue to speed up healing  The postoperative scar is reduced in size  Showering is permitted immediately after surgery, but the battery pack must be protected or removed during the shower.   After 7 days the MARU Wound Vac is removed. If there is no drainage, no dressing is required. If there is some scant drainage a dry bandage can be applied and changed daily until seen in the office or until the drainage stops.   No creams or ointments to the incisions until 4 weeks post op.  Do not touch or pick at the incision  Check incision every day and notify surgeon immediately if any of the following signs or symptoms are seen:  Increase in redness  Increase in swelling around the incision and of the entire extremity  Increase in pain  NEW drainage or oozing from the incision  Pulling apart of the edges of the incision  Increase in overall body temperature (greater than 100.4 degrees)  Zip-Line: your incision was closed with a state of the art device.   Is a non-invasive and easy to use wound closure device that replaces sutures, staples and glue for surgical incisions  It minimizes scarring and eliminating “railroad” marks that come with staples or  sutures  It makes removal as atraumatic as peeling off a bandage  Can be removed at home or by a physical therapist or nurse at 14 days postoperatively    ACTIVITIES  Exercises:  Physical therapy will begin immediately while in the hospital. Patients going to a nursing home will get therapy as part of their care at the SNU/SNF facility. Patients going home may also have a therapist come to the house to help them mobilize until they can safely get to an outpatient therapy facility.  Elevate the affected leg most of the day during the first week post operatively. Caution must be taken to avoid pillow placement directly under the heel of the leg, as this can cause pressure ulcers even with a soft pillow. All pillows and blankets should be placed underneath of the thigh and calf so that the heel is free-floating.  Use cold packs for 20-30 minutes approximately 5 times per day.  You should perform the daily stretching and strengthening exercises as taught by the therapist as often as possible. This can be done many times a day.  Full weight bearing is allowed after surgery. It will be sore/painful to put weight on the leg, but this will help the bone to heal and prevent complications such as pneumonia, bed sores and blood clots. Mobilization is vital to the recovery process.  Activities of Daily Living:  No tub baths, hot tubs, or swimming pools for 4 weeks.  May shower and let water run over the incision immediately after surgery. The battery pack of the MARU Wound Vac must be protected or removed while in the shower. After the MARU is removed 7 days after surgery showering is permitted as long as there is no drainage from the wound.     Restrictions  Weight: It is ok to allow full weight bearing after surgery. Weight on the leg actually quickens the recovery process. While it will be sore/painful to put weight on the leg, it is safe to do so. Hip replacement after hip fracture has a much slower recover process. It can  take months to heal fully from a hip fracture and patients even make some slow benefits up to a year afterwards.   Driving: Many patients have questions about when it is safe to return to driving. The answer is that this is extremely variable. It depends on the extent of the surgery, as well as how quickly you heal. Certainly left leg surgeries make returning to driving easier while right leg surgeries require more extensive rehabilitation before driving can be safe. Until you can press down on the brake hard, and are off of all narcotics, driving is not permitted. Your surgeon cannot “clear” you to return to driving, only you can make the decision when you feel it is safe.    Medications  Anticoagulants: After upper extremity surgery most patients do not require an anticoagulant unless you have another injury that will be keeping you from mobilizing. Lower extremity surgery typically does require use of an anticoagulation medicine.   IF YOU HAD LOWER EXTREMITY SURGERY AND ARE NOT DISCHARGED HOME WITH ANY ANTICOAGULANT MEDICINE YOU SHOULD TAKE ASPIRIN 325mg DAILY FOR 30 DAYS POSTOPERATIVELY.  If you are discharged home with an anticoagulant such as Aspirin, Xarelto, Eliquis, Coumadin, or Lovenox, follow these simple instructions:   Notify surgeon immediately if any adrian bleeding is noted in the urine, stool, emesis, or from the nose or the incision. Blood in the stool will often appear as black rather than red. Blood in urine may appear as pink. Blood in emesis may appear as brown/black like coffee grounds.  You will need to apply pressure for longer periods of time to any cuts or abrasions to stop bleeding  Avoid alcohol while taking anticoagulants  Most anticoagulants are to be taken for 30 days postoperatively. After this time, you may stop using them unless instructed otherwise.   If you were already taking an anticoagulant (commonly Aspirin, Coumadin, or Plavix) you will likely be resuming your normal dose  postoperatively and will be continuing that medication at the discretion of the prescribing physician.  Stool Softeners: You will be at greater risk of constipation after surgery due to being less mobile and the pain medications.  Take stool softeners as needed. Over the counter Colace 100 mg 1-2 capsules twice daily can be taken.  If stools become too loose or too frequent, please decreases the dosage or stop the stool softener.  If constipation occurs despite use of stool softeners, you are to continue the stool softeners and add a laxative (Milk of Magnesia 1 ounce daily as needed)  Drink plenty of fluids, and eat fruits and vegetables during your recovery time. Getting up and mobilizing will help the bowels to recover their regular function, as will weaning off of all narcotics when the pain becomes tolerable.  Pain Medications: Utilized after surgery are narcotics. This is some general information about these medications.  CLASSIFICATION: Pain medications are called Opioids and are narcotics  LEGALITIES: It is illegal to share narcotics with others  DRIVING: it is illegal to drive while under the influence of narcotics. Doing so is a DUI.  POTENTIAL SIDE EFFECTS: nausea, vomiting, itching, dizziness, drowsiness, dry mouth, constipation, and difficulty urinating.  POTENTIAL ADVERSE EFFECTS:  Opioid tolerance can develop with use of pain medications and this simply means that it requires more and more of the medication to control pain. However, this is seen more in patients that use opioids for longer periods of time.  Opioid dependence can develop with use of Opioids. People with opioid dependence will experience withdrawal symptoms upon cessation of the medication.  Opioid addiction can develop with use of Opioids. The incidence of this is very unlikely in patients who take the medications as ordered and stop the medications as instructed.  Opioid overdose can be dangerous, but is unlikely when the medication  is taken as ordered and stopped when ordered. It is important not to mix opioids with alcohol as this can lead to over sedation and respiratory difficulty.  DOSAGE:  After the initial surgical pain begins to resolve, you may begin to decrease the pain medication. By the end of a few weeks, you should be off of pain medications.  Refills will not be given by the office during evening hours, on weekends, or after 6 weeks post-op. You are responsible for weaning off of pain medication. You can increase the time between narcotic pills, taking one every 4 then 6 then 8 hours and so on.  To seek refills on pain medications during the initial 6-week post-operative period, you must call the office to request the refill. The office will then notify you when to  the prescription. DO NOT wait until you are out of the medication to request a refill. Prescriptions will not be filled over the weekend and depending on the schedule, it may take a couple days for the prescription to be available. Someone will have to pick the prescription in person at the office.    FOLLOW-UP VISITS  You will need to follow up in the office with your surgeon in 3 weeks, or as instructed elsewhere in your discharge paperwork. Please call this number [phone] to schedule this appointment. If you are going to an SNF/SNU facility, they will arrange for you to follow up in the office.  If you have any concerns or suspected complications prior to your follow up visit, please call the office. Do not wait until your appointment time if you suspect complications. These will need to be addressed in the office promptly.      Saul Vargas II, MD  Orthopaedic Surgery  [facility]

## 2024-03-04 NOTE — OP NOTE
Anterior Total Hip Operative Note  Dr. MAGALY Vargas II  [phone]    PATIENT NAME: Faviola Issa  MRN: [id number]  : 1950 AGE: 73 y.o. GENDER: female  DATE OF OPERATION: 3/4/2024  PREOPERATIVE DIAGNOSIS: End stage Osteoarthritis  POSTOPERATIVE DIAGNOSIS: Same  OPERATION PERFORMED: Right Anterior Total Hip Arthroplasty  SURGEON: Saul Vargas MD  Circulator: Lali Garcia RN  Radiology Technologist: Veronica Patel  Scrub Person: Luz Odonnell PCT  Vendor Representative: Enedelia Rodriguez  Orderly: Asael Jefferson  Assistant: Natalie Umana PA  ANESTHESIA: General  ASSISTANT: DEONNA Schwab. This case would not have been possible without another set of skilled surgical hands for retraction, use of instrumentation, and general assistance.  This assistance was vital to the success of the case.   ESTIMATED BLOOD LOSS: 300cc  SPONGE AND NEEDLE COUNT: Correct  INDICATIONS:  Arthritis: This patient was noted to have severe arthritis affecting the operative hip. They failed conservative management and elected to undergo total hip replacement. A discussion of operative versus nonoperative treatment was had. They elected to undergo anterior total hip arthroplasty. The risks of surgery were discussed and included the risk of anesthesia, infection, damage to neurovascular structures, implant loosening/failure, fracture, hardware prominence, dislocation, the need for further procedures, medical complications, and others. No guarantees were made. The patient wished to proceed with surgery and a surgical consent was signed.  COMPONENTS:   Acetabular Cup: Smith & Nephew R3 acetabular cup: 54 Outer Diameter  Cup Screws: Smith & Nephew 6.5 mm screws: No Screws Were Used  Smith & Nephew Neutral Acetabular Liner: Neutral  Smith & Nephew Polar Femoral Stem: Size 2  Smith & Nephew Oxinium Head: 36mm +0    PERTINENT FINDINGS: Arthritis: Endstage degenerative arthritis of the femoral head and  acetabulum.    DETAILS OF PROCEDURE:   The patient was met in the preoperative area. The site was marked. The consent and H&P were reviewed. The patient was then wheeled back to the operative suite underwent anesthesia. The [place] table boots were secured to the patients’ feet. The patient was moved onto the [place] table and secured in the supine position. The perineal post was inserted and the boots were secured into the leg holders. Surgical alcohol was used to thoroughly clean the operative area.     The hip and leg was then prepped in the normal sterile fashion, multiple layers of sterile drapes, and surgical space suits for the entire operative team. New outer gloves were used by all sterile surgical team members after final draping. The surgical incision was marked. A surgical timeout was performed.    A Modified Iyer-Rhoades anterior approach was used. Dissection was carried down to the fascia. The fascia was incised and the tensor fascia claudia muscle was retracted laterally and the sartorius medially. The lateral femoral circumflex vessels were identified and cauterized using bovie. The rectus femoris was retracted medially. A capsulotomy was then performed. The capsule was tagged with Ethibond for later repair. Retractors were placed on either side of the femoral neck and dissection was further carried down so that the lesser trochanter could be palpated and the superior rim of the acetabulum could be visualized.    The femoral neck cut was then made from  just proximal to the lesser trochanter medially headed towards the saddle laterally. Care was taken not to extend the cut into the lesser or greater trochanters. The head and neck segment were removed with a corkscrew. The acetabulum was then exposed. The labrum was removed using a kocher and scalpel and excess osteophytes were removed using an osteotome.    The acetabulum was progressively reamed, beginning with medialization and then finalizing the  position of the reamer to approximate the final cup position. Fluoroscopy was used to ensure proper placement of the reamer, including adequate medialization as well as appropriate abduction and anteversion. The real cup was then opened and inserted using fluoroscopy, ensuring good position in terms of abduction and anteversion.     No Screws: Initial press-fit fixation of the cup was very robust and no screws were required for supplemental fixation.    After thorough irrigation and ensuring that no soft tissue was entrapped within the cup, the real liner was snapped into place.    The hook was placed just distal to the greater trochanter. The femur was then externally rotated, extended and adducted under the well leg. Soft tissue releases were performed to gain exposure to the proximal femur. Capsule was released from the saddle and the lateral femur. Care was taken to preserve the short external rotator tendons. The capsule was also released along the medial femur. The hydraulic femoral lift was then used to better expose the femur. Further soft tissue releases were then performed, again ensuring preservation of the short external rotators.    The femur was machined with a cookie cutter osteotome and then a rasp was used to further lateralize the starting point. The sclerotic bone on the lateral shoulder of the femur was removed with a rongeour and curette as needed to protect the greater trochanter. Progressive broaches were inserted until adequate fill had been achieved. Using fluoroscopy, the femoral stem was visualized after trial reduction of the hip. The length and offset were compared to the non-operative hip. Trials of stem size and neck length were trialed until equal leg length and offset were obtained. Additionally hip stability was tested with internal and external rotation of the leg. The leg was stable with at least 90° of external rotation and there was no impingement at 60° of internal rotation.  "After implantation of the final stem and ball, the leg was once again brought into normal anatomic position and relocated. Final x-rays were taken with final implants noting good position of the stem and cup, and no visualized fracture.. The hip was stable upon reduction.    An analgesic cocktail was then injected about the hip as well as the surgical dissection area. The capsule was closed.  The fascia was then closed with a running stitch and the skin was closed in layers.  A sterile dressing was applied.    The patient was moved from the [facility] table to the [facility] where the boots were removed. The patient was taken to the recovery room in stable condition. There were no complications and the patient tolerated the procedure well.    R \"Catarino\" Alicia ARVIZU MD  Orthopaedic Surgery  [facility]  [phone]            "

## 2024-03-04 NOTE — PLAN OF CARE
Goal Outcome Evaluation:  Plan of Care Reviewed With: patient, family           Outcome Evaluation: Patient is a 72 y/o female evaluated by therapy POD0 s/p R anterior CÉSAR. The patient lives with her spouse in a multistory home with 2 VICENTE and reports (I) ADLs and mobility with use of spc at baseline. The bedroom is located up a flight of stairs but patient states that there is a sofa bed on the ground level if necessary. Patient has rwx at home. Today she performed CÉSAR protocol, supine > sit Genia, STS from EOB Genia, and ambulated 12' cga + rwx. The patient is steady with use of rwx demo'ing no LOB or buckling but is limited by nausea this date. Prior to STS pt did experience bout of nausea with emesis. Patient educated on exercise prescription, ambulation recommendations, ice usage, falls prevention, precautions and stair negotiation. Anticipate pt will be OK to ME home with assist as needed and  PT following trial of stair negotiation.      Anticipated Discharge Disposition (PT): home with assist, home with home health

## 2024-03-04 NOTE — ANESTHESIA POSTPROCEDURE EVALUATION
Patient: Faviola Issa    Procedure Summary       Date: 03/04/24 Room / Location:  [facility] [address] [place] [address] [facility]    Anesthesia Start: 0657 Anesthesia Stop: 0811    Procedure: TOTAL HIP ARTHROPLASTY ANTERIOR WITH HANA TABLE (Right: Hip) Diagnosis:     Surgeons: Saul Vargas II, MD Provider: Bashir Thomas MD    Anesthesia Type: general ASA Status: 3            Anesthesia Type: general    Vitals  Vitals Value Taken Time   /80 03/04/24 0845   Temp 36.5 °C (97.7 °F) 03/04/24 0810   Pulse 62 03/04/24 0852   Resp 16 03/04/24 0845   SpO2 100 % 03/04/24 0852   Vitals shown include unfiled device data.        Post Anesthesia Care and Evaluation    Patient location during evaluation: bedside  Patient participation: complete - patient participated  Level of consciousness: awake and alert  Pain management: adequate    Airway patency: patent  Anesthetic complications: No anesthetic complications  PONV Status: controlled  Cardiovascular status: blood pressure returned to baseline and acceptable  Respiratory status: acceptable  Hydration status: acceptable

## 2024-03-04 NOTE — ANESTHESIA PREPROCEDURE EVALUATION
Anesthesia Evaluation     Patient summary reviewed and Nursing notes reviewed   history of anesthetic complications:   NPO Solid Status: > 8 hours  NPO Liquid Status: > 2 hours           Airway   Mallampati: II  TM distance: >3 FB  Neck ROM: full  Dental      Pulmonary    (+) COPD,  Cardiovascular     ECG reviewed    (+) hypertension, hyperlipidemia      Neuro/Psych  GI/Hepatic/Renal/Endo    (+) obesity, thyroid problem hypothyroidism    Musculoskeletal     Abdominal    Substance History      OB/GYN          Other   arthritis,                       Anesthesia Plan    ASA 3     general     intravenous induction     Anesthetic plan, risks, benefits, and alternatives have been provided, discussed and informed consent has been obtained with: patient.        CODE STATUS:

## 2024-03-04 NOTE — PLAN OF CARE
Goal Outcome Evaluation:  Plan of Care Reviewed With: patient            POD0 R total hip, chanda,  asstx1, 2LO2, voided, A&Ox4, nausea resolved, plans to dc home in morning.

## 2024-03-04 NOTE — CASE MANAGEMENT/SOCIAL WORK
Discharge Planning Assessment  [facility]     Patient Name: Faviola Issa  MRN: [id number]  Today's Date: 3/4/2024    Admit Date: 3/4/2024    Plan[facility] & [facility]   Discharge Needs Assessment       Row Name 03/04/24 1236       Living Environment    People in Home spouse    Current Living Arrangements home    Primary Care Provided by self    Provides Primary Care For no one    Family Caregiver if Needed spouse;child(claude), adult    Quality of Family Relationships helpful;involved;supportive    Able to Return to Prior Arrangements yes       Resource/Environmental Concerns    Transportation Concerns none       Transition Planning    Patient/Family Anticipates Transition to home with family;home with help/services    Patient/Family Anticipated Services at Transition     Transportation Anticipated family or friend will provide       Discharge Needs Assessment    Readmission Within the Last 30 Days no previous admission in last 30 days    Equipment Currently Used at Home cane, straight;walker, rolling    Discharge Facility/Level of Care Needs home with home health    Provided Post Acute Provider List? N/A    N/A Provider List Comment Declined; requests [facility] .    Patient's Choice of Community Agency(s) [facility]                   Discharge Plan       Row Name 03/04/24 8672       Plan    Plan Home with family support & [facility] .    Patient/Family in Agreement with Plan yes    Plan Comments Spoke with the patient, her /Saul and son/Saul, verified current information and explained the role of the CCP. Patient lives with her /Saul and has family support. She's IADL and owns a walker & cane. She has no history with [facility]. Patient plans to d/c home with family support & [facility] . Referral sent in [facility]. Family plans to transport the patient home at d/c. No other needs identified. CCP will follow.                  Continued Care and Services - Admitted Since 3/4/2024    No  active coordination exists for this encounter.          Demographic Summary       Row Name 03/04/24 1235       General Information    Admission Type observation    Reason for Consult discharge planning    Preferred Language English       Contact Information    Permission Granted to Share Info With ;family/designee                   Functional Status       Row Name 03/04/24 1236       Functional Status    Usual Activity Tolerance good       Functional Status, IADL    Medications independent    Meal Preparation assistive equipment    Housekeeping assistive equipment    Laundry assistive equipment    Shopping assistive equipment       Mental Status    General Appearance WDL WDL       Mental Status Summary    Recent Changes in Mental Status/Cognitive Functioning no changes                   Psychosocial       Row Name 03/04/24 1236       Intellectual Performance WDL    Level of Consciousness Alert       Coping/Stress    Patient Personal Strengths able to adapt    Sources of Support spouse;adult child(claude)    Reaction to Health Status accepting    Understanding of Condition and Treatment adequate understanding of medical condition;adequate understanding of treatment       Developmental Stage (Eriksson's)    Developmental Stage Stage 8 (66 years-death/Late Adulthood) Integrity vs. Moreno BRISCOE RN

## 2024-03-04 NOTE — DISCHARGE PLACEMENT REQUEST
"Faviola Issa (73 y.o. Female)       Date of Birth   1950    Social Security Number       Address   [address] [place] [id number]    Home Phone   [phone]    MRN   [id number]       [facility]    Marital Status                               Admission Date   3/4/24    Admission Type   Elective    Admitting Provider   Saul Vargas II, MD    Attending Provider   Saul Vargas II, MD    Department, Room/Bed   [address], P876/1       Discharge Date       Discharge Disposition       Discharge Destination                                 Attending Provider: Saul Vargas II, MD    Allergies: Adhesive Tape, Amoxicillin, Latex, Red Dye, Rosuvastatin, Shellfish-derived Products, Lactose Intolerance (Gi), Pennsaid [Diclofenac Sodium], Prednisone, Simvastatin    Isolation: None   Infection: None   Code Status: Prior    Ht: 167.6 cm (66\")   Wt: 88.9 kg (196 lb)    Admission Cmt: None   Principal Problem: Hip joint replacement status [Z96.649]                   Active Insurance as of 3/4/2024       Primary Coverage       Payor Plan Insurance Group Employer/Plan Group    ANTHEM MEDICARE REPLACEMENT ANTHEM MEDICARE ADVANTAGE [id number]       Payor Plan Address Payor Plan Phone Number Payor Plan Fax Number Effective Dates    [address] [phone]  1/1/2021 - None Entered    [facility] [id number]         Subscriber Name Subscriber Birth Date Member ID       FAVIOLA ISSA 1950 [id number]                     Emergency Contacts        (Rel.) Home Phone Work Phone Mobile Phone    MaeganSaul (Spouse) [phone] -- [phone]              "

## 2024-03-05 ENCOUNTER — READMISSION MANAGEMENT (OUTPATIENT)
Dept: CALL CENTER | Facility: HOSPITAL | Age: 74
End: 2024-03-05
Payer: MEDICARE

## 2024-03-05 VITALS
SYSTOLIC BLOOD PRESSURE: 126 MMHG | HEART RATE: 67 BPM | OXYGEN SATURATION: 97 % | TEMPERATURE: 97.5 F | RESPIRATION RATE: 16 BRPM | DIASTOLIC BLOOD PRESSURE: 63 MMHG

## 2024-03-05 LAB
HCT VFR BLD AUTO: 33.3 % (ref 34–46.6)
HGB BLD-MCNC: 10.9 G/DL (ref 12–15.9)

## 2024-03-05 PROCEDURE — G0378 HOSPITAL OBSERVATION PER HR: HCPCS

## 2024-03-05 PROCEDURE — 85018 HEMOGLOBIN: CPT | Performed by: ORTHOPAEDIC SURGERY

## 2024-03-05 PROCEDURE — 97110 THERAPEUTIC EXERCISES: CPT

## 2024-03-05 PROCEDURE — 97530 THERAPEUTIC ACTIVITIES: CPT

## 2024-03-05 PROCEDURE — 25010000002 CEFAZOLIN IN DEXTROSE 2-4 GM/100ML-% SOLUTION: Performed by: ORTHOPAEDIC SURGERY

## 2024-03-05 PROCEDURE — 85014 HEMATOCRIT: CPT | Performed by: ORTHOPAEDIC SURGERY

## 2024-03-05 RX ORDER — ACETAMINOPHEN 325 MG/1
325 TABLET ORAL EVERY 6 HOURS PRN
Status: DISCONTINUED | OUTPATIENT
Start: 2024-03-05 | End: 2024-03-05 | Stop reason: HOSPADM

## 2024-03-05 RX ADMIN — LOSARTAN POTASSIUM 25 MG: 25 TABLET, FILM COATED ORAL at 07:39

## 2024-03-05 RX ADMIN — ASPIRIN 81 MG: 81 TABLET, COATED ORAL at 07:38

## 2024-03-05 RX ADMIN — ACETAMINOPHEN 325 MG: 325 TABLET, FILM COATED ORAL at 07:37

## 2024-03-05 RX ADMIN — CEFAZOLIN SODIUM 2000 MG: 2 INJECTION, SOLUTION INTRAVENOUS at 00:12

## 2024-03-05 RX ADMIN — LEVOTHYROXINE SODIUM 100 MCG: 100 TABLET ORAL at 06:18

## 2024-03-05 NOTE — DISCHARGE SUMMARY
Orthopaedic Discharge Summary  Dr. [facility]CKEN[facility]  [phone]    NAME: Faviola Issa PCP: Roger Beard MD   :  MRN: [id number]  [id number] LOS:  ADMIT: 0 days  3/4/2024   AGE/SEX: 73 y.o. female DC:  today             Admitting Diagnosis: Hip joint replacement status [Z96.649]    Surgery Performed: ID ARTHRP ACETBLR/PROX FEM PROSTC AGRFT/ALGRFT [95251] (TOTAL HIP ARTHROPLASTY ANTERIOR WITH HANA TABLE)    Discharge Medications:         Discharge Medications        New Medications        Instructions Start Date   ondansetron 4 MG tablet  Commonly known as: Zofran   4 mg, Oral, Every 6 Hours PRN      oxyCODONE-acetaminophen 5-325 MG per tablet  Commonly known as: PERCOCET   1 tablet, Oral, Every 4 Hours PRN             Changes to Medications        Instructions Start Date   aspirin 81 MG EC tablet  What changed:   when to take this  additional instructions   81 mg, Oral, Every 12 Hours             Continue These Medications        Instructions Start Date   acetaminophen 500 MG tablet  Commonly known as: TYLENOL   500 mg, Oral, As Needed      albuterol (2.5 MG/3ML) 0.083% nebulizer solution  Commonly known as: PROVENTIL   2.5 mg, Nebulization, Every 4 Hours PRN      albuterol sulfate  (90 Base) MCG/ACT inhaler  Commonly known as: PROVENTIL HFA;VENTOLIN HFA;PROAIR HFA   2 puffs, Inhalation, Every 4 Hours PRN      losartan 25 MG tablet  Commonly known as: COZAAR   TAKE 1/2 (ONE-HALF) TABLET BY MOUTH TWICE DAILY AS NEEDED.      pantoprazole 40 MG EC tablet  Commonly known as: PROTONIX   40 mg, Oral, Daily      polyethyl glycol-propyl glycol 0.4-0.3 % solution ophthalmic solution (artificial tears)  Commonly known as: SYSTANE   2 drops, Both Eyes, Daily      simvastatin 20 MG tablet  Commonly known as: ZOCOR   20 mg, Oral, Nightly      Synthroid 100 MCG tablet  Generic drug: levothyroxine   100 mcg, Oral, Daily      vitamin D 1.25 MG (87987 UT) capsule capsule  Commonly known as: ERGOCALCIFEROL    50,000 Units, Oral, Weekly, TUESDAYS  HOLD FOR SURGERY               Vitals:     Vitals:    03/04/24 1715 03/04/24 2240 03/05/24 0149 03/05/24 0557   BP: 171/71 159/68 128/68 126/63   BP Location: Right arm Left arm Right arm Right arm   Patient Position: Lying Lying Lying Lying   Pulse: 76 62 78 75   Resp: 16 18 16 16   Temp: 97.9 °F (36.6 °C) 98.1 °F (36.7 °C) 97.5 °F (36.4 °C) 97.6 °F (36.4 °C)   TempSrc: Oral Oral Oral Oral   SpO2: 94% 93% 95% 97%       Labs:      Admission on 03/04/2024   Component Date Value Ref Range Status    Glucose 03/04/2024 141 (H)  65 - 99 mg/dL Final    BUN 03/04/2024 12  8 - 23 mg/dL Final    Creatinine 03/04/2024 0.80  0.57 - 1.00 mg/dL Final    Sodium 03/04/2024 141  136 - 145 mmol/L Final    Potassium 03/04/2024 3.9  3.5 - 5.2 mmol/L Final    Chloride 03/04/2024 105  98 - 107 mmol/L Final    CO2 03/04/2024 24.2  22.0 - 29.0 mmol/L Final    Calcium 03/04/2024 8.2 (L)  8.6 - 10.5 mg/dL Final    BUN/Creatinine Ratio 03/04/2024 15.0  7.0 - 25.0 Final    Anion Gap 03/04/2024 11.8  5.0 - 15.0 mmol/L Final    eGFR 03/04/2024 78.4  >60.0 mL/min/1.73 Final    Hemoglobin 03/04/2024 12.5  12.0 - 15.9 g/dL Final    Hematocrit 03/04/2024 37.8  34.0 - 46.6 % Final    Hemoglobin 03/05/2024 10.9 (L)  12.0 - 15.9 g/dL Final    Hematocrit 03/05/2024 33.3 (L)  34.0 - 46.6 % Final        No results found.    Hospital Course:   73 y.o. female was admitted to [facility] to services of Saul Vargas II, MD with Hip joint replacement status [Z96.649] on 3/4/2024 and underwent VT ARTHRP ACETBLR/PROX FEM PROSTC AGRFT/ALGRFT [75267] (TOTAL HIP ARTHROPLASTY ANTERIOR WITH HANA TABLE). Post-operatively the patient transferred to the floor where the patient underwent mobilization therapy. Opioids were titrated to achieve appropriate pain management to allow for participation in mobilization exercises. Vital signs and laboratory values are now within safe parameters for discharge. The dressings  "and/or incision is intact without signs or symptoms of active infection. Operative extremity neurovascular status remains intact as compared to the preoperative exam. Appropriate education re: incision care, activity levels, medications, and follow up visits was completed and all questions were answered. The patient is now deemed stable for discharge.    HOME: The patient progressed well with physical therapy. There were cleared for discharge to home. The patietn was sent home in good condition}.       R \"Catarino\" Alicia ARVIZU MD  Orthopaedic Surgery  [facility]  [phone]                                               "

## 2024-03-05 NOTE — PLAN OF CARE
Goal Outcome Evaluation:  Plan of Care Reviewed With: patient, spouse        Progress: improving  Outcome Evaluation: Pt demonstrates excellent progress with strength and overall mobility, as evidenced by increased independence with mobility as well as tolerance of increased gait distance. Pt completed CÉSAR exercises with supervision. Pt able to stand and ambulate 150' x 2 trials with SBA and rwx. Performed stair negotiation with CGA 1 rail and also with rwx to simulate stairs to enter home and within home. Pt demos good awareness of all safety precautions and had no LOB or difficulty during PT session today. Plans to DC home later today with spouse and continue with  PT. No problems anticipated.      Anticipated Discharge Disposition (PT): home with assist, home with home health

## 2024-03-05 NOTE — THERAPY DISCHARGE NOTE
"Patient Name: Faviola Issa  : 1950    MRN: [id number]                              Today's Date: 3/5/2024       Admit Date: 3/4/2024    Visit Dx: No diagnosis found.  Patient Active Problem List   Diagnosis    Well female exam with routine gynecological exam    Malignant neoplasm of sigmoid colon    Mixed hyperlipidemia    Status post parathyroidectomy    Postoperative hypothyroidism    Anxiety    Palpitations    Hypertension    Colon polyps    White coat syndrome with diagnosis of hypertension    Hip joint replacement status    Gastroesophageal reflux disease with esophagitis without hemorrhage     Past Medical History:   Diagnosis Date    Allergic     Arthritis     Asthma     Bilateral pulmonary embolism     provoked, after surgery , took 3 mos of OAC    Colon cancer     Colorectal cancer     s/p surgery (colostomy), radiation, and chemotherapy, with mets to lung s/p surgical resection    Colostomy in place     COPD (chronic obstructive pulmonary disease)     Enlarged thyroid gland     s/p total thyroidectomy     Eye exam normal     History of prior pregnancies     x4    Hx of radiation therapy     for colon cancer    Hyperlipidemia     Hyperparathyroidism     s/p removal of a single adenoma 2019 c/b bleeding/hematoma    Hypertension     \"white coat syndrome\"    Injury of back     Injury of neck     Left hip pain     Lung nodule     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  STATES DR POSADA AWARE OF.    Rash     RIGHT HIP STATES BEEN THERE SINCE LEFT HIP SURGERY AND DR. POSADA AWARE.    Well female exam with routine gynecological exam 10/31/2016     Past Surgical History:   Procedure Laterality Date    COLON SURGERY       and 2011    COLONOSCOPY  2016    COLONOSCOPY N/A 2018    " Procedure: COLONOSCOPY into ileum;  Surgeon: James Romeo MD;  [facility];  Service: Gastroenterology    COLONOSCOPY N/A 03/04/2022    Procedure: COLONOSCOPY to anastomosis;  Surgeon: James Romeo MD;  [facility];  Service: Gastroenterology;  Laterality: N/A;  pre: hx of colorectal cancer   post: normal surgical anatomy     HEMICOLOECTOMY W/ ANASTOMOSIS Right 11/2011    Adenocarcinoma of cecum    HYSTERECTOMY  1999    LUNG LOBECTOMY      ANSELMO 08/2006 and RLL partial 09/2001 metastatic colon cancer     THYROIDECTOMY Bilateral 05/06/2019    Procedure: Left PARATHYROIDECTOMY AND TOTAL THYROIDECTOMY;  Surgeon: Maxi Daly MD;  Location:  [facility];  Service: ENT    TOTAL HIP ARTHROPLASTY Left 11/28/2022    Procedure: LEFT TOTAL HIP ARTHROPLASTY ANTERIOR;  Surgeon: Saul Vargas II, MD;  Location:  [facility];  Service: Orthopedics;  Laterality: Left;    TRACHEOSTOMY N/A 05/06/2019    Procedure: EVACUATION OF NECK  HEMATOMA;  Surgeon: Maxi Daly MD;  Location: [facility] MAIN OR;  Service: ENT      General Information       Row Name 03/05/24 0919          Physical Therapy Time and Intention    Document Type therapy note (daily note);discharge treatment  -EE     Mode of Treatment individual therapy;physical therapy  -EE       Row Name 03/05/24 0919          General Information    Existing Precautions/Restrictions fall;right;hip, anterior  -EE     Barriers to Rehab none identified  -EE       Row Name 03/05/24 0919          Cognition    Orientation Status (Cognition) oriented x 4  -EE       Row Name 03/05/24 0919          Safety Issues, Functional Mobility    Impairments Affecting Function (Mobility) endurance/activity tolerance;pain;strength;range of motion (ROM)  ongoing nausea 2/2 pain meds  -EE               User Key  (r) = Recorded By, (t) = Taken By, (c) = Cosigned By      Initials Name Provider Type    EE Destini Draper PT Physical Therapist                    Mobility       Row Name 03/05/24 0919          Bed Mobility    Comment, (Bed Mobility) not tested; pt up in chair  -EE       Row Name 03/05/24 0919          Sit-Stand Transfer    Sit-Stand [place] (Transfers) standby assist;verbal cues  -EE     Assistive Device (Sit-Stand Transfers) walker, front-wheeled  -EE     Comment, (Sit-Stand Transfer) STS x3 from chair  -EE       Row Name 03/05/24 0919          Gait/Stairs (Locomotion)    [place] Level (Gait) standby assist;verbal cues  -EE     Assistive Device (Gait) walker, front-wheeled  -EE     Distance in Feet (Gait) 150' x 2 trials  -EE     Deviations/Abnormal Patterns (Gait) right sided deviations;antalgic;kulwant decreased;stride length decreased  -EE     Bilateral Gait Deviations forward flexed posture  -EE     Right Sided Gait Deviations heel strike decreased;weight shift ability decreased  -EE     Handrail Location (Stairs) left side (ascending);right side (descending)  -EE     Number of Steps (Stairs) 4  -EE     Ascending Technique (Stairs) step-to-step  -EE     Descending Technique (Stairs) step-to-step  -EE     Stairs, Safety Issues weight-shifting ability decreased  -EE     Comment, (Gait/Stairs) Pt progressed to reciprocal gait pattern with vc's. Pt also negotiated 2 stairs with no rails, rwx, and CGA. Cues for sequencing on stairs. Spouse present to observe technique.  -EE               User Key  (r) = Recorded By, (t) = Taken By, (c) = Cosigned By      Initials Name Provider Type    EE Destini Draper PT Physical Therapist                   Obj/Interventions       Row Name 03/05/24 0921          Motor Skills    Therapeutic Exercise --  R CÉSAR protocol x 10 reps  -EE               User Key  (r) = Recorded By, (t) = Taken By, (c) = Cosigned By      Initials Name Provider Type    EE Destini Draper PT Physical Therapist                   Goals/Plan       Row Name 03/05/24 0923          Bed Mobility Goal 1 (PT)    Activity/Assistive Device (Bed Mobility  Goal 1, PT) bed mobility activities, all  -EE     [place] Level/Cues Needed (Bed Mobility Goal 1, PT) independent  -EE     Progress/Outcomes (Bed Mobility Goal 1, PT) other (see comments)  not observed  -EE       Row Name 03/05/24 0923          Transfer Goal 1 (PT)    Activity/Assistive Device (Transfer Goal 1, PT) sit-to-stand/stand-to-sit;bed-to-chair/chair-to-bed  -EE     [place] Level/Cues Needed (Transfer Goal 1, PT) contact guard required  -EE     Progress/Outcome (Transfer Goal 1, PT) goal met  -EE       Row Name 03/05/24 0923          Gait Training Goal 1 (PT)    Activity/Assistive Device (Gait Training Goal 1, PT) gait (walking locomotion)  -EE     [place] Level (Gait Training Goal 1, PT) modified independence  -EE     Distance (Gait Training Goal 1, PT) 150'  -EE     Progress/Outcome (Gait Training Goal 1, PT) goal partially met  -EE       Row Name 03/05/24 0923          Stairs Goal 1 (PT)    Activity/Assistive Device (Stairs Goal 1, PT) stairs, all skills  -EE     [place] Level/Cues Needed (Stairs Goal 1, PT) contact guard required  -EE     Number of Stairs (Stairs Goal 1, PT) 8  -EE     Progress/Outcome (Stairs Goal 1, PT) goal partially met  -EE               User Key  (r) = Recorded By, (t) = Taken By, (c) = Cosigned By      Initials Name Provider Type    EE Destini Draper, PT Physical Therapist                   Clinical Impression       Row Name 03/05/24 0921          Pain    Pretreatment Pain Rating 6/10  -EE     Posttreatment Pain Rating 8/10  -EE     Pain Location - Side/Orientation Right  -EE     Pain Location incisional  -EE     Pain Location - hip  -EE     Pain Intervention(s) Repositioned;Cold applied;Elevated  -EE       Row Name 03/05/24 0921          Plan of Care Review    Plan of Care Reviewed With patient;spouse  -EE     Progress improving  -EE     Outcome Evaluation Pt demonstrates excellent progress with strength and overall mobility, as evidenced by increased  independence with mobility as well as tolerance of increased gait distance. Pt completed CÉSAR exercises with supervision. Pt able to stand and ambulate 150' x 2 trials with SBA and rwx. Performed stair negotiation with CGA 1 rail and also with rwx to simulate stairs to enter home and within home. Pt demos good awareness of all safety precautions and had no LOB or difficulty during PT session today. Plans to DC home later today with spouse and continue with  PT. No problems anticipated.  -EE       Row Name 03/05/24 0921          Positioning and Restraints    Pre-Treatment Position sitting in chair/recliner  -EE     Post Treatment Position chair  -EE     In Chair reclined;call light within reach;encouraged to call for assist;notified nsg;legs elevated  -EE               User Key  (r) = Recorded By, (t) = Taken By, (c) = Cosigned By      Initials Name Provider Type    Destini Christine PT Physical Therapist                   Outcome Measures       Row Name 03/05/24 0923 03/05/24 0737       How much help from another person do you currently need...    Turning from your back to your side while in flat bed without using bedrails? 4  -EE 3  -SH    Moving from lying on back to sitting on the side of a flat bed without bedrails? 4  -EE 3  -SH    Moving to and from a bed to a chair (including a wheelchair)? 3  -EE 3  -SH    Standing up from a chair using your arms (e.g., wheelchair, bedside chair)? 3  -EE 3  -SH    Climbing 3-5 steps with a railing? 3  -EE 3  -SH    To walk in hospital room? 3  -EE 3  -SH    AM-PAC 6 Clicks Score (PT) 20  -EE 18  -SH    Highest Level of Mobility Goal 6 --> Walk 10 steps or more  -EE 6 --> Walk 10 steps or more  -SH              User Key  (r) = Recorded By, (t) = Taken By, (c) = Cosigned By      Initials Name Provider Type    Destini Christine PT Physical Therapist    Yaa Carvajal RN Registered Nurse                  Physical Therapy Education       Title: PT OT SLP Therapies (Done)        Topic: Physical Therapy (Done)       Point: Mobility training (Done)       Learning Progress Summary             Patient Acceptance, E,TB,D, VU,NR by EE at 3/5/2024 0924    Acceptance, E,D, VU,DU by ZB at 3/4/2024 1143                         Point: Home exercise program (Done)       Learning Progress Summary             Patient Acceptance, E,TB,D, VU,NR by EE at 3/5/2024 0924    Acceptance, E,D, VU,DU by ZB at 3/4/2024 1143                         Point: Body mechanics (Done)       Learning Progress Summary             Patient Acceptance, E,TB,D, VU,NR by EE at 3/5/2024 0924    Acceptance, E,D, VU,DU by ZB at 3/4/2024 1143                         Point: Precautions (Done)       Learning Progress Summary             Patient Acceptance, E,TB,D, VU,NR by EE at 3/5/2024 0924    Acceptance, E,D, VU,DU by ZB at 3/4/2024 1143                                         User Key       Initials Effective Dates Name Provider Type Discipline     06/16/21 -  Destini Draper, PT Physical Therapist PT    ZB 08/30/23 -  Cristhian Santiago, PT Physical Therapist PT                  PT Recommendation and Plan     Plan of Care Reviewed With: patient, spouse  Progress: improving  Outcome Evaluation: Pt demonstrates excellent progress with strength and overall mobility, as evidenced by increased independence with mobility as well as tolerance of increased gait distance. Pt completed CÉSAR exercises with supervision. Pt able to stand and ambulate 150' x 2 trials with SBA and rwx. Performed stair negotiation with CGA 1 rail and also with rwx to simulate stairs to enter home and within home. Pt demos good awareness of all safety precautions and had no LOB or difficulty during PT session today. Plans to DC home later today with spouse and continue with HH PT. No problems anticipated.     Time Calculation:         PT Charges       Row Name 03/05/24 0924             Time Calculation    Start Time 0847  -EE      Stop Time 0918  -EE      Time  Calculation (min) 31 min  -EE      PT Received On 03/05/24  -EE         Time Calculation- PT    Total Timed Code Minutes- PT 31 minute(s)  -EE         Timed Charges    32682 - PT Therapeutic Exercise Minutes 14  -EE      56582 - PT Therapeutic Activity Minutes 17  -EE         Total Minutes    Timed Charges Total Minutes 31  -EE       Total Minutes 31  -EE                User Key  (r) = Recorded By, (t) = Taken By, (c) = Cosigned By      Initials Name Provider Type    EE Destini Draper, PT Physical Therapist                  Therapy Charges for Today       Code Description Service Date Service Provider Modifiers Qty    [id number]  PT THER PROC EA 15 MIN 3/5/2024 Destini Draper, PT GP 1    80840346647 HC PT THERAPEUTIC ACT EA 15 MIN 3/5/2024 Destini Draper, PT GP 1            PT G-Codes  Outcome Measure Options: AM-PAC 6 Clicks Basic Mobility (PT)  AM-PAC 6 Clicks Score (PT): 20    PT Discharge Summary  Anticipated Discharge Disposition (PT): home with assist, home with home health    Destini Draper PT  3/5/2024

## 2024-03-05 NOTE — OUTREACH NOTE
Prep Survey      Flowsheet Row Responses   [facility] facility patient discharged from? [facility]   Is LACE score < 7 ? Yes   Eligibility [facility]   Date of Admission 03/04/24   Date of Discharge 03/05/24   Discharge Disposition Home or Self Care   Discharge diagnosis Hip joint replacement   Does the patient have one of the following disease processes/diagnoses(primary or secondary)? Total Joint Replacement   Does the patient have Home health ordered? Yes   What is the Home health agency?  [facility]    Is there a DME ordered? No   Prep survey completed? Yes            FLAVIA GRACE - Registered Nurse

## 2024-03-06 ENCOUNTER — TRANSITIONAL CARE MANAGEMENT TELEPHONE ENCOUNTER (OUTPATIENT)
Dept: CALL CENTER | Facility: HOSPITAL | Age: 74
End: 2024-03-06
Payer: MEDICARE

## 2024-03-06 NOTE — CASE MANAGEMENT/SOCIAL WORK
Case Management Discharge Note      Final Note: dc home with [facility]    Provided Post Acute Provider List?: N/A  N/A Provider List Comment: Declined; requests [facility].    Selected Continued Care - Discharged on 3/5/2024 Admission date: 3/4/2024 - Discharge disposition: Home or Self Care      Destination    No services have been selected for the patient.                Durable Medical Equipment    No services have been selected for the patient.                Dialysis/Infusion    No services have been selected for the patient.                Home Medical Care Coordination complete.      Service Provider Selected Services Address Phone Fax Patient Preferred    [facility] [address] [place] [id number] [phone] --              Therapy    No services have been selected for the patient.                Community Resources    No services have been selected for the patient.                Community & DME    No services have been selected for the patient.                    Transportation Services  Private: Car    Final Discharge Disposition Code: 01 - home or self-care

## 2024-03-06 NOTE — OUTREACH NOTE
Call Center TCM Note      Flowsheet Row Responses   [facility] patient discharged from? [place]   Does the patient have one of the following disease processes/diagnoses(primary or secondary)? Total Joint Replacement   Joint surgery performed? Hip   TCM attempt successful? Yes   Call start time 1425   Call end time 1427   Has the patient been back in either the hospital or Emergency Department since discharge? No   Discharge diagnosis Hip joint replacement   Does the patient have all medications related to this admission filled (includes all antibiotics, pain medications, etc.) Yes   Is the patient taking all medications as directed (includes completed medication regime)? Yes   Is the patient able to teach back alternate methods of pain control? Ice   Comments Declines HOSP DC FU appt at this time. PT has Post op with Ortho set.   Does the patient have an appointment with their PCP within 7-14 days of discharge? No   Nursing Interventions Patient declined scheduling/rescheduling appointment at this time, Patient desires to follow up with specialty only, Routed TCM call to PCP office   What is the Home health agency?  [facility]    Has home health visited the patient within 72 hours of discharge? Unsure   Psychosocial issues? No   Has the patient fallen since discharge? No   Did the patient receive a copy of their discharge instructions? Yes   Nursing interventions Reviewed instructions with patient   What is the patient's perception of their functional status since discharge? Improving   Is the patient able to teach back signs and symptoms of infection? Temp >100.4 for 24h or longer, Incisional drainage, Blisters around incision, Increased swelling or redness around incision (not associated with surgical edema), Shortness of breath or chest pain   Is the patient able to teach back how to prevent infection? Check incision daily, Eat well-balanced diet, Shower only as directed by surgeon, No tub baths, hot tub or  swimming   Is the patient able to teach back signs and symptoms of DVT? Redness in calf, Area hot to touch, Shortness of breath or chest pain, Swelling in calf, Severe pain in calf   Is the patient able to teach back home safety measures? Ability to shower   Is the patient/caregiver able to teach back the hierarchy of who to call/visit for symptoms/problems? PCP, Specialist, Home health nurse, Urgent Care, ED, 911 Yes   TCM call completed? Yes   Wrap up additional comments POST OP appt in April. Declines HOSP DC FU appt with PCP at this time.   Call end time 3090            Sumaya Grant RN    3/6/2024, 14:28 EST

## 2024-04-09 ENCOUNTER — OFFICE VISIT (OUTPATIENT)
Dept: INTERNAL MEDICINE | Facility: CLINIC | Age: 74
End: 2024-04-09
Payer: MEDICARE

## 2024-04-09 VITALS
HEIGHT: 66 IN | RESPIRATION RATE: 14 BRPM | TEMPERATURE: 98.3 F | BODY MASS INDEX: 31.68 KG/M2 | HEART RATE: 105 BPM | OXYGEN SATURATION: 97 % | DIASTOLIC BLOOD PRESSURE: 80 MMHG | WEIGHT: 197.1 LBS | SYSTOLIC BLOOD PRESSURE: 138 MMHG

## 2024-04-09 DIAGNOSIS — E78.2 MIXED HYPERLIPIDEMIA: ICD-10-CM

## 2024-04-09 DIAGNOSIS — J06.9 ACUTE URI: ICD-10-CM

## 2024-04-09 DIAGNOSIS — Z00.00 HEALTHCARE MAINTENANCE: ICD-10-CM

## 2024-04-09 DIAGNOSIS — R73.03 PREDIABETES: ICD-10-CM

## 2024-04-09 DIAGNOSIS — J01.00 ACUTE NON-RECURRENT MAXILLARY SINUSITIS: Primary | ICD-10-CM

## 2024-04-09 RX ORDER — ALBUTEROL SULFATE 90 UG/1
2 AEROSOL, METERED RESPIRATORY (INHALATION) EVERY 4 HOURS PRN
Qty: 18 G | Refills: 3 | Status: SHIPPED | OUTPATIENT
Start: 2024-04-09

## 2024-04-09 RX ORDER — AZITHROMYCIN 250 MG/1
TABLET, FILM COATED ORAL
Qty: 6 TABLET | Refills: 0 | Status: SHIPPED | OUTPATIENT
Start: 2024-04-09

## 2024-04-10 NOTE — PROGRESS NOTES
"Chief Complaint  Sinusitis    Subjective          Faviola Issa presents to [facility]  History of Present Illness  The patient is a 73-year-old female who presents for evaluation of sinusitis and bronchitis.    The patient's  was recently diagnosed with a cold at the [facility], which she has been experiencing since Friday. She has been self-medicating with salt water gargles, nasal spray, and Tylenol, albeit with limited success due to the high dosage of Tylenol. She reports hoarseness in her voice and a sensation of congestion in her chest. She occasionally experiences difficulty breathing, but her rescue inhaler has proven effective. She does not have a rescue inhaler currently.    Supplemental Information  She had bilateral hip surgery on 03/04/2024. She did not get any pain medication after her surgery. She takes pain medication, but it was making her sick. She was told that a lot of her pain was coming from her hip. The pain in her legs has resolved.    She is allergic to AMOXICILLIN, which causes a rash.    Objective   Vital Signs:   /80 (BP Location: Left arm, Patient Position: Sitting, Cuff Size: Adult)   Pulse 105   Temp 98.3 °F (36.8 °C) (Temporal)   Resp 14   Ht 167.6 cm (66\")   Wt 89.4 kg (197 lb 1.6 oz)   SpO2 97%   BMI 31.81 kg/m²     Physical Exam  Vitals and nursing note reviewed.   Constitutional:       Appearance: She is well-developed.   HENT:      Head: Normocephalic and atraumatic.   Cardiovascular:      Rate and Rhythm: Normal rate and regular rhythm.      Heart sounds: Normal heart sounds.   Pulmonary:      Effort: Pulmonary effort is normal.      Breath sounds: Normal breath sounds.   Musculoskeletal:      Cervical back: Normal range of motion and neck supple.   Neurological:      Mental Status: She is alert and oriented to person, place, and time.   Psychiatric:         Behavior: Behavior normal.         Physical Exam       Result " Review :                 Assessment and Plan    Diagnoses and all orders for this visit:    1. Acute non-recurrent maxillary sinusitis (Primary)  -     azithromycin (Zithromax) 250 MG tablet; Take 2 tablets the first day, then 1 tablet daily for 4 days.  Dispense: 6 tablet; Refill: 0  -     albuterol sulfate  (90 Base) MCG/ACT inhaler; Inhale 2 puffs Every 4 (Four) Hours As Needed for Wheezing.  Dispense: 18 g; Refill: 3    2. Acute URI    3. Healthcare maintenance    4. Mixed hyperlipidemia  -     CBC & Differential  -     Comprehensive Metabolic Panel  -     Lipid Panel With LDL / HDL Ratio    5. Prediabetes  -     Hemoglobin A1c  -     Thyroid Panel With TSH      Assessment & Plan  1. Sinusitis and bronchitis.  A prescription for a Z-Manjinder has been issued. Additionally, a prescription for albuterol has been provided.    Follow-up  The patient is scheduled for a wellness visit in the coming weeks.    Follow Up   No follow-ups on file.  Patient was given instructions and counseling regarding her condition or for health maintenance advice. Please see specific information pulled into the AVS if appropriate.           Patient or patient representative verbalized consent for the use of Ambient Listening during the visit with  Roger Beard MD for chart documentation. 4/10/2024  09:12 EDT

## 2024-04-26 LAB
ALBUMIN SERPL-MCNC: 4.2 G/DL (ref 3.5–5.2)
ALBUMIN/GLOB SERPL: 1.3 G/DL
ALP SERPL-CCNC: 106 U/L (ref 39–117)
ALT SERPL-CCNC: 8 U/L (ref 1–33)
AST SERPL-CCNC: 13 U/L (ref 1–32)
BASOPHILS # BLD AUTO: 0.04 10*3/MM3 (ref 0–0.2)
BASOPHILS NFR BLD AUTO: 0.7 % (ref 0–1.5)
BILIRUB SERPL-MCNC: 0.5 MG/DL (ref 0–1.2)
BUN SERPL-MCNC: 10 MG/DL (ref 8–23)
BUN/CREAT SERPL: 10.6 (ref 7–25)
CALCIUM SERPL-MCNC: 9.3 MG/DL (ref 8.6–10.5)
CHLORIDE SERPL-SCNC: 103 MMOL/L (ref 98–107)
CHOLEST SERPL-MCNC: 152 MG/DL (ref 0–200)
CO2 SERPL-SCNC: 26.6 MMOL/L (ref 22–29)
CREAT SERPL-MCNC: 0.94 MG/DL (ref 0.57–1)
EGFRCR SERPLBLD CKD-EPI 2021: 64.6 ML/MIN/1.73
EOSINOPHIL # BLD AUTO: 0.12 10*3/MM3 (ref 0–0.4)
EOSINOPHIL NFR BLD AUTO: 2 % (ref 0.3–6.2)
ERYTHROCYTE [DISTWIDTH] IN BLOOD BY AUTOMATED COUNT: 13.1 % (ref 12.3–15.4)
FT4I SERPL CALC-MCNC: 3 (ref 1.2–4.9)
GLOBULIN SER CALC-MCNC: 3.2 GM/DL
GLUCOSE SERPL-MCNC: 81 MG/DL (ref 65–99)
HBA1C MFR BLD: 5.2 % (ref 4.8–5.6)
HCT VFR BLD AUTO: 40.7 % (ref 34–46.6)
HDLC SERPL-MCNC: 71 MG/DL (ref 40–60)
HGB BLD-MCNC: 13.2 G/DL (ref 12–15.9)
IMM GRANULOCYTES # BLD AUTO: 0.01 10*3/MM3 (ref 0–0.05)
IMM GRANULOCYTES NFR BLD AUTO: 0.2 % (ref 0–0.5)
LDLC SERPL CALC-MCNC: 66 MG/DL (ref 0–100)
LDLC/HDLC SERPL: 0.92 {RATIO}
LYMPHOCYTES # BLD AUTO: 1.38 10*3/MM3 (ref 0.7–3.1)
LYMPHOCYTES NFR BLD AUTO: 23 % (ref 19.6–45.3)
MCH RBC QN AUTO: 28.6 PG (ref 26.6–33)
MCHC RBC AUTO-ENTMCNC: 32.4 G/DL (ref 31.5–35.7)
MCV RBC AUTO: 88.1 FL (ref 79–97)
MONOCYTES # BLD AUTO: 0.44 10*3/MM3 (ref 0.1–0.9)
MONOCYTES NFR BLD AUTO: 7.3 % (ref 5–12)
NEUTROPHILS # BLD AUTO: 4.02 10*3/MM3 (ref 1.7–7)
NEUTROPHILS NFR BLD AUTO: 66.8 % (ref 42.7–76)
NRBC BLD AUTO-RTO: 0 /100 WBC (ref 0–0.2)
PLATELET # BLD AUTO: 300 10*3/MM3 (ref 140–450)
POTASSIUM SERPL-SCNC: 4.4 MMOL/L (ref 3.5–5.2)
PROT SERPL-MCNC: 7.4 G/DL (ref 6–8.5)
RBC # BLD AUTO: 4.62 10*6/MM3 (ref 3.77–5.28)
SODIUM SERPL-SCNC: 139 MMOL/L (ref 136–145)
T3RU NFR SERPL: 32 % (ref 24–39)
T4 SERPL-MCNC: 9.5 UG/DL (ref 4.5–12)
TRIGL SERPL-MCNC: 80 MG/DL (ref 0–150)
TSH SERPL DL<=0.005 MIU/L-ACNC: 1.96 UIU/ML (ref 0.45–4.5)
VLDLC SERPL CALC-MCNC: 15 MG/DL (ref 5–40)
WBC # BLD AUTO: 6.01 10*3/MM3 (ref 3.4–10.8)

## 2024-05-01 LAB
ALBUMIN SERPL-MCNC: 4.2 G/DL (ref 3.5–5.2)
ALBUMIN/GLOB SERPL: 1.3 G/DL
ALP SERPL-CCNC: 106 U/L (ref 39–117)
ALT SERPL-CCNC: 8 U/L (ref 1–33)
AST SERPL-CCNC: 13 U/L (ref 1–32)
BASOPHILS # BLD AUTO: 0.04 10*3/MM3 (ref 0–0.2)
BASOPHILS NFR BLD AUTO: 0.7 % (ref 0–1.5)
BILIRUB SERPL-MCNC: 0.5 MG/DL (ref 0–1.2)
BUN SERPL-MCNC: 10 MG/DL (ref 8–23)
BUN/CREAT SERPL: 12.2 (ref 7–25)
CALCIUM SERPL-MCNC: 9.3 MG/DL (ref 8.6–10.5)
CHLORIDE SERPL-SCNC: 103 MMOL/L (ref 98–107)
CHOLEST SERPL-MCNC: 152 MG/DL (ref 0–200)
CO2 SERPL-SCNC: 26.6 MMOL/L (ref 22–29)
CREAT SERPL-MCNC: 0.82 MG/DL (ref 0.57–1)
EGFRCR SERPLBLD CKD-EPI 2021: 76.1 ML/MIN/1.73
EOSINOPHIL # BLD AUTO: 0.12 10*3/MM3 (ref 0–0.4)
EOSINOPHIL NFR BLD AUTO: 2 % (ref 0.3–6.2)
ERYTHROCYTE [DISTWIDTH] IN BLOOD BY AUTOMATED COUNT: 13.1 % (ref 12.3–15.4)
FT4I SERPL CALC-MCNC: 3 (ref 1.2–4.9)
GLOBULIN SER CALC-MCNC: 3.2 GM/DL
GLUCOSE SERPL-MCNC: 81 MG/DL (ref 65–99)
HBA1C MFR BLD: 5.2 % (ref 4.8–5.6)
HCT VFR BLD AUTO: 40.7 % (ref 34–46.6)
HDLC SERPL-MCNC: 71 MG/DL (ref 40–60)
HGB BLD-MCNC: 13.2 G/DL (ref 12–15.9)
IMM GRANULOCYTES # BLD AUTO: 0.01 10*3/MM3 (ref 0–0.05)
IMM GRANULOCYTES NFR BLD AUTO: 0.2 % (ref 0–0.5)
LDLC SERPL CALC-MCNC: 66 MG/DL (ref 0–100)
LDLC/HDLC SERPL: 0.92 {RATIO}
LYMPHOCYTES # BLD AUTO: 1.38 10*3/MM3 (ref 0.7–3.1)
LYMPHOCYTES NFR BLD AUTO: 23 % (ref 19.6–45.3)
MCH RBC QN AUTO: 28.6 PG (ref 26.6–33)
MCHC RBC AUTO-ENTMCNC: 32.4 G/DL (ref 31.5–35.7)
MCV RBC AUTO: 88.1 FL (ref 79–97)
MONOCYTES # BLD AUTO: 0.44 10*3/MM3 (ref 0.1–0.9)
MONOCYTES NFR BLD AUTO: 7.3 % (ref 5–12)
NEUTROPHILS # BLD AUTO: 4.02 10*3/MM3 (ref 1.7–7)
NEUTROPHILS NFR BLD AUTO: 66.8 % (ref 42.7–76)
NRBC BLD AUTO-RTO: 0 /100 WBC (ref 0–0.2)
PLATELET # BLD AUTO: 300 10*3/MM3 (ref 140–450)
POTASSIUM SERPL-SCNC: 4.4 MMOL/L (ref 3.5–5.2)
PROT SERPL-MCNC: 7.4 G/DL (ref 6–8.5)
RBC # BLD AUTO: 4.62 10*6/MM3 (ref 3.77–5.28)
SODIUM SERPL-SCNC: 139 MMOL/L (ref 136–145)
T3RU NFR SERPL: 32 % (ref 24–39)
T4 SERPL-MCNC: 9.5 UG/DL (ref 4.5–12)
TRIGL SERPL-MCNC: 80 MG/DL (ref 0–150)
TSH SERPL DL<=0.005 MIU/L-ACNC: 1.96 UIU/ML (ref 0.45–4.5)
VLDLC SERPL CALC-MCNC: 15 MG/DL (ref 5–40)
WBC # BLD AUTO: 6.01 10*3/MM3 (ref 3.4–10.8)

## 2024-05-06 RX ORDER — SIMVASTATIN 20 MG
20 TABLET ORAL NIGHTLY
Qty: 30 TABLET | Refills: 0 | Status: SHIPPED | OUTPATIENT
Start: 2024-05-06

## 2024-05-31 RX ORDER — SIMVASTATIN 20 MG
20 TABLET ORAL NIGHTLY
Qty: 30 TABLET | Refills: 0 | Status: SHIPPED | OUTPATIENT
Start: 2024-05-31

## 2024-07-30 ENCOUNTER — EXTERNAL PBMM DATA (OUTPATIENT)
Dept: PHARMACY | Facility: OTHER | Age: 74
End: 2024-07-30
Payer: MEDICARE

## 2024-08-07 ENCOUNTER — OFFICE VISIT (OUTPATIENT)
Dept: INTERNAL MEDICINE | Facility: CLINIC | Age: 74
End: 2024-08-07
Payer: MEDICARE

## 2024-08-07 VITALS
SYSTOLIC BLOOD PRESSURE: 136 MMHG | HEIGHT: 66 IN | DIASTOLIC BLOOD PRESSURE: 72 MMHG | BODY MASS INDEX: 31.66 KG/M2 | OXYGEN SATURATION: 97 % | WEIGHT: 197 LBS | HEART RATE: 80 BPM | TEMPERATURE: 96.3 F | RESPIRATION RATE: 16 BRPM

## 2024-08-07 DIAGNOSIS — Z00.00 HEALTHCARE MAINTENANCE: ICD-10-CM

## 2024-08-07 DIAGNOSIS — Z00.00 WELL WOMAN EXAM (NO GYNECOLOGICAL EXAM): Primary | ICD-10-CM

## 2024-08-07 DIAGNOSIS — R73.03 PREDIABETES: ICD-10-CM

## 2024-08-07 DIAGNOSIS — E78.5 HYPERLIPIDEMIA, UNSPECIFIED HYPERLIPIDEMIA TYPE: ICD-10-CM

## 2024-08-07 DIAGNOSIS — Z00.00 MEDICARE ANNUAL WELLNESS VISIT, SUBSEQUENT: ICD-10-CM

## 2024-08-07 DIAGNOSIS — I10 ESSENTIAL HYPERTENSION: ICD-10-CM

## 2024-08-07 PROCEDURE — 1159F MED LIST DOCD IN RCRD: CPT | Performed by: FAMILY MEDICINE

## 2024-08-07 PROCEDURE — 99214 OFFICE O/P EST MOD 30 MIN: CPT | Performed by: FAMILY MEDICINE

## 2024-08-07 PROCEDURE — G0439 PPPS, SUBSEQ VISIT: HCPCS | Performed by: FAMILY MEDICINE

## 2024-08-07 PROCEDURE — 3075F SYST BP GE 130 - 139MM HG: CPT | Performed by: FAMILY MEDICINE

## 2024-08-07 PROCEDURE — 1125F AMNT PAIN NOTED PAIN PRSNT: CPT | Performed by: FAMILY MEDICINE

## 2024-08-07 PROCEDURE — 1160F RVW MEDS BY RX/DR IN RCRD: CPT | Performed by: FAMILY MEDICINE

## 2024-08-07 PROCEDURE — 3078F DIAST BP <80 MM HG: CPT | Performed by: FAMILY MEDICINE

## 2024-08-07 PROCEDURE — 99397 PER PM REEVAL EST PAT 65+ YR: CPT | Performed by: FAMILY MEDICINE

## 2024-08-07 RX ORDER — PRAVASTATIN SODIUM 20 MG
20 TABLET ORAL DAILY
Qty: 90 TABLET | Refills: 3 | Status: SHIPPED | OUTPATIENT
Start: 2024-08-07

## 2024-08-07 RX ORDER — LOSARTAN POTASSIUM 25 MG/1
TABLET ORAL
Qty: 90 TABLET | Refills: 1 | Status: SHIPPED | OUTPATIENT
Start: 2024-08-07 | End: 2024-08-07 | Stop reason: SDUPTHER

## 2024-08-07 RX ORDER — LOSARTAN POTASSIUM 25 MG/1
25 TABLET ORAL DAILY
Qty: 90 TABLET | Refills: 1 | Status: SHIPPED | OUTPATIENT
Start: 2024-08-07

## 2024-08-07 NOTE — PROGRESS NOTES
"Chief Complaint  Medicare Wellness-subsequent    Subjective          Faviola Issa presents to [facility]  History of Present Illness  The patient is a 73-year-old female who presents for evaluation of multiple medical concerns.    She is currently grieving the recent loss of her .    She has been halving her simvastatin tablets in half to alleviate the discomfort in her legs, suspecting development of muscular dystrophy. Previous use of Crestor resulted in constipation.    She is on a daily regimen of 12.5 mg of losartan. Occasionally, she observes low blood pressure in the evenings.    Supplemental Information  Her hips are doing good from the surgery. She has given up the cane. She is taking her time and exercising only what she can. She is not taking Z-Manjinder, pain medication, or Zofran anymore.    Objective   Vital Signs:   /72   Pulse 80   Temp 96.3 °F (35.7 °C) (Temporal)   Resp 16   Ht 167.6 cm (65.98\")   Wt 89.4 kg (197 lb)   SpO2 97%   BMI 31.81 kg/m²     Physical Exam  Vitals and nursing note reviewed.   Constitutional:       Appearance: She is well-developed.   HENT:      Head: Normocephalic and atraumatic.   Musculoskeletal:      Cervical back: Normal range of motion and neck supple.   Neurological:      Mental Status: She is alert and oriented to person, place, and time.   Psychiatric:         Behavior: Behavior normal.         Physical Exam       Result Review :                 Assessment and Plan    Diagnoses and all orders for this visit:        Hyperlipidemia, unspecified hyperlipidemia type  -     pravastatin (Pravachol) 20 MG tablet; Take 1 tablet by mouth Daily.  Dispense: 90 tablet; Refill: 3  -     CBC & Differential  -     Comprehensive Metabolic Panel  -     Lipid Panel With LDL / HDL Ratio    Essential hypertension  -     losartan (COZAAR) 25 MG tablet; Take 1 tablet by mouth Daily. TAKE 1/2 (ONE-HALF) TABLET BY MOUTH TWICE DAILY AS " NEEDED.  Dispense: 90 tablet; Refill: 1  -     Comprehensive Metabolic Panel    Healthcare maintenance  -     CBC & Differential  -     Comprehensive Metabolic Panel  -     Hemoglobin A1c  -     Thyroid Panel With TSH  -     Lipid Panel With LDL / HDL Ratio    Prediabetes  -     Hemoglobin A1c    Other orders  -     Discontinue: losartan (COZAAR) 25 MG tablet; TAKE 1/2 (ONE-HALF) TABLET BY MOUTH TWICE DAILY AS NEEDED.  Dispense: 90 tablet; Refill: 1      Assessment & Plan  1. Hyperlipidemia.  Simvastatin 20 mg daily appears to be causing statin myopathy, causing pain and cramps. A switch to pravastatin 20 mg, which is the same equivalent to the current dosage, is recommended. If necessary, the dosage will be increased.    2. Hypertension.  Losartan 12.5 mg will be continued as needed.    Follow Up   No follow-ups on file.  Patient was given instructions and counseling regarding her condition or for health maintenance advice. Please see specific information pulled into the AVS if appropriate.           Patient or patient representative verbalized consent for the use of Ambient Listening during the visit with  Roger Beard MD for chart documentation. 8/7/2024  15:41 EDT

## 2024-08-07 NOTE — PROGRESS NOTES
Subjective   The ABCs of the Annual Wellness Visit  Medicare Wellness Visit      Faviola Issa is a 74 y.o. patient who presents for a Medicare Wellness Visit.    The following portions of the patient's history were reviewed and   updated as appropriate: allergies, current medications, past family history, past medical history, past social history, past surgical history, and problem list.    Compared to one year ago, the patient's physical   health is better.  Compared to one year ago, the patient's mental   health is better.    Recent Hospitalizations:  She was not admitted to the hospital during the last year.     Current Medical Providers:  Patient Care Team:  Roger Beard MD as PCP - General (Family Medicine)  James Romeo MD as Consulting Physician (Gastroenterology)  Ajith Leggett II, MD as Consulting Physician (Hematology and Oncology)  Compa Álvarez MD as Consulting Physician (Pulmonary Disease)  Nahum Benavides MD as Consulting Physician (Cardiology)    Outpatient Medications Prior to Visit   Medication Sig Dispense Refill    acetaminophen (TYLENOL) 500 MG tablet Take 1 tablet by mouth As Needed for Mild Pain.      albuterol sulfate  (90 Base) MCG/ACT inhaler Inhale 2 puffs Every 4 (Four) Hours As Needed for Wheezing. 18 g 3    pantoprazole (PROTONIX) 40 MG EC tablet Take 1 tablet by mouth Daily. 30 tablet 11    polyethyl glycol-propyl glycol (SYSTANE) 0.4-0.3 % solution ophthalmic solution Administer 2 drops to both eyes Daily.      Synthroid 100 MCG tablet Take 1 tablet by mouth Daily.      vitamin D (ERGOCALCIFEROL) 1.25 MG (83569 UT) capsule capsule Take 1 capsule by mouth 1 (One) Time Per Week. TUESDAYS    HOLD FOR SURGERY      azithromycin (Zithromax) 250 MG tablet Take 2 tablets the first day, then 1 tablet daily for 4 days. 6 tablet 0    losartan (COZAAR) 25 MG tablet TAKE 1/2 (ONE-HALF) TABLET BY MOUTH TWICE DAILY AS NEEDED. 90 tablet 1    simvastatin (ZOCOR) 20 MG tablet TAKE  "1 TABLET BY MOUTH ONCE DAILY AT NIGHT 30 tablet 0    albuterol (PROVENTIL) (2.5 MG/3ML) 0.083% nebulizer solution Take 2.5 mg by nebulization Every 4 (Four) Hours As Needed for Wheezing. 60 each 0    ondansetron (Zofran) 4 MG tablet Take 1 tablet by mouth Every 6 (Six) Hours As Needed for Nausea or Vomiting. 30 tablet 0    oxyCODONE-acetaminophen (PERCOCET) 5-325 MG per tablet Take 1 tablet by mouth Every 4 (Four) Hours As Needed for Severe Pain. 50 tablet 0     No facility-administered medications prior to visit.     No opioid medication identified on active medication list. I have reviewed chart for other potential  high risk medication/s and harmful drug interactions in the elderly.      Aspirin is not on active medication list.  Aspirin use is indicated based on review of current medical condition/s. Pros and cons of this therapy have been discussed with this patient. Benefits of this medication outweigh potential harm.  Patient has been instructed to start taking this medication..    Patient Active Problem List   Diagnosis    Well female exam with routine gynecological exam    Malignant neoplasm of sigmoid colon    Mixed hyperlipidemia    Status post parathyroidectomy    Postoperative hypothyroidism    Anxiety    Palpitations    Hypertension    Colon polyps    White coat syndrome with diagnosis of hypertension    Hip joint replacement status    Gastroesophageal reflux disease with esophagitis without hemorrhage     Advance Care Planning Advance Directive is not on file.  ACP discussion was held with the patient during this visit. Patient has an advance directive (not in EMR), copy requested.            Objective   Vitals:    08/07/24 1239   BP: 136/72   Pulse: 80   Resp: 16   Temp: 96.3 °F (35.7 °C)   TempSrc: Temporal   SpO2: 97%   Weight: 89.4 kg (197 lb)   Height: 167.6 cm (65.98\")       Estimated body mass index is 31.81 kg/m² as calculated from the following:    Height as of this encounter: 167.6 cm " "(65.98\").    Weight as of this encounter: 89.4 kg (197 lb).    BMI is >= 30 and <35. (Class 1 Obesity). The following options were offered after discussion;: exercise counseling/recommendations and nutrition counseling/recommendations       Does the patient have evidence of cognitive impairment? No                                                                                               Health  Risk Assessment    Smoking Status:  Social History     Tobacco Use   Smoking Status Former    Current packs/day: 0.00    Average packs/day: 1 pack/day for 23.0 years (23.0 ttl pk-yrs)    Types: Cigarettes    Start date: 1978    Quit date: 2001    Years since quittin.9    Passive exposure: Never   Smokeless Tobacco Never   Tobacco Comments    1ppd x20 yrs     Alcohol Consumption:  Social History     Substance and Sexual Activity   Alcohol Use No    Comment: Caffeine use: Tea 2 cups daily       Fall Risk Screen  STEADI Fall Risk Assessment was completed, and patient is at LOW risk for falls.Assessment completed on:2024    Depression Screenin/7/2024    12:41 PM   PHQ-2/PHQ-9 Depression Screening   Little Interest or Pleasure in Doing Things 0-->not at all   Feeling Down, Depressed or Hopeless 0-->not at all   PHQ-9: Brief Depression Severity Measure Score 0     Health Habits and Functional and Cognitive Screenin/31/2024     9:57 AM   Functional & Cognitive Status   Do you have difficulty preparing food and eating? No   Do you have difficulty bathing yourself, getting dressed or grooming yourself? No   Do you have difficulty using the toilet? No   Do you have difficulty moving around from place to place? No   Do you have trouble with steps or getting out of a bed or a chair? No   Current Diet Well Balanced Diet   Dental Exam Up to date   Eye Exam Not up to date   Exercise (times per week) 4 times per week   Current Exercises Include Home Fitness Gym;Light Weights;Stair Step Machine;Walking "   Do you need help using the phone?  No   Are you deaf or do you have serious difficulty hearing?  No   Do you need help to go to places out of walking distance? No   Do you need help shopping? No   Do you need help preparing meals?  No   Do you need help with housework?  No   Do you need help with laundry? No   Do you need help taking your medications? No   Do you need help managing money? No   Do you ever drive or ride in a car without wearing a seat belt? No   Have you felt unusual stress, anger or loneliness in the last month? Yes   Who do you live with? Child   If you need help, do you have trouble finding someone available to you? No   Have you been bothered in the last four weeks by sexual problems? No   Do you have difficulty concentrating, remembering or making decisions? No           Age-appropriate Screening Schedule:  Refer to the list below for future screening recommendations based on patient's age, sex and/or medical conditions. Orders for these recommended tests are listed in the plan section. The patient has been provided with a written plan.    Health Maintenance List  Health Maintenance   Topic Date Due    PAP SMEAR  10/31/2019    COVID-19 Vaccine (9 - 2023-24 season) 03/02/2024    ANNUAL WELLNESS VISIT  03/29/2024    BMI FOLLOWUP  03/29/2024    TDAP/TD VACCINES (2 - Td or Tdap) 05/09/2024    INFLUENZA VACCINE  08/01/2024    COLONOSCOPY  03/04/2025    LIPID PANEL  04/25/2025    DXA SCAN  01/02/2026    MAMMOGRAM  01/29/2026    Pneumococcal Vaccine 65+  Completed    HEPATITIS C SCREENING  Addressed    ZOSTER VACCINE  Discontinued    LUNG CANCER SCREENING  Discontinued                                                                                                                                                CMS Preventative Services Quick Reference  Risk Factors Identified During Encounter  None Identified    The above risks/problems have been discussed with the patient.  Pertinent information has  been shared with the patient in the After Visit Summary.  An After Visit Summary and PPPS were made available to the patient.    Follow Up:   Next Medicare Wellness visit to be scheduled in 1 year.     Assessment & Plan  Well woman exam (no gynecological exam)    Medicare annual wellness visit, subsequent    Hyperlipidemia, unspecified hyperlipidemia type     Essential hypertension       New Medications Ordered This Visit   Medications    pravastatin (Pravachol) 20 MG tablet     Sig: Take 1 tablet by mouth Daily.     Dispense:  90 tablet     Refill:  3    losartan (COZAAR) 25 MG tablet     Sig: Take 1 tablet by mouth Daily. TAKE 1/2 (ONE-HALF) TABLET BY MOUTH TWICE DAILY AS NEEDED.     Dispense:  90 tablet     Refill:  1          Follow Up:   No follow-ups on file.

## 2024-08-09 ENCOUNTER — APPOINTMENT (OUTPATIENT)
Dept: CT IMAGING | Facility: HOSPITAL | Age: 74
End: 2024-08-09
Payer: MEDICARE

## 2024-08-09 ENCOUNTER — HOSPITAL ENCOUNTER (EMERGENCY)
Facility: HOSPITAL | Age: 74
Discharge: HOME | End: 2024-08-09
Attending: EMERGENCY MEDICINE
Payer: MEDICARE

## 2024-08-09 VITALS
BODY MASS INDEX: 31.5 KG/M2 | HEIGHT: 66 IN | WEIGHT: 196 LBS | SYSTOLIC BLOOD PRESSURE: 183 MMHG | RESPIRATION RATE: 18 BRPM | OXYGEN SATURATION: 98 % | DIASTOLIC BLOOD PRESSURE: 86 MMHG | HEART RATE: 75 BPM | TEMPERATURE: 97.5 F

## 2024-08-09 DIAGNOSIS — K59.00 CONSTIPATION, UNSPECIFIED CONSTIPATION TYPE: Primary | ICD-10-CM

## 2024-08-09 LAB
ALBUMIN SERPL-MCNC: 4.3 G/DL (ref 3.5–5.2)
ALBUMIN/GLOB SERPL: 1.3 G/DL
ALP SERPL-CCNC: 107 U/L (ref 39–117)
ALT SERPL W P-5'-P-CCNC: 9 U/L (ref 1–33)
ANION GAP SERPL CALCULATED.3IONS-SCNC: 7.1 MMOL/L (ref 5–15)
AST SERPL-CCNC: 14 U/L (ref 1–32)
BASOPHILS # BLD AUTO: 0.06 10*3/MM3 (ref 0–0.2)
BASOPHILS NFR BLD AUTO: 0.6 % (ref 0–1.5)
BILIRUB SERPL-MCNC: 0.4 MG/DL (ref 0–1.2)
BUN SERPL-MCNC: 11 MG/DL (ref 8–23)
BUN/CREAT SERPL: 12.9 (ref 7–25)
CALCIUM SPEC-SCNC: 9.4 MG/DL (ref 8.6–10.5)
CHLORIDE SERPL-SCNC: 103 MMOL/L (ref 98–107)
CO2 SERPL-SCNC: 25.9 MMOL/L (ref 22–29)
CREAT SERPL-MCNC: 0.85 MG/DL (ref 0.57–1)
DEPRECATED RDW RBC AUTO: 45.9 FL (ref 37–54)
EGFRCR SERPLBLD CKD-EPI 2021: 72.4 ML/MIN/1.73
EOSINOPHIL # BLD AUTO: 0.04 10*3/MM3 (ref 0–0.4)
EOSINOPHIL NFR BLD AUTO: 0.4 % (ref 0.3–6.2)
ERYTHROCYTE [DISTWIDTH] IN BLOOD BY AUTOMATED COUNT: 14.8 % (ref 12.3–15.4)
GLOBULIN UR ELPH-MCNC: 3.4 GM/DL
GLUCOSE SERPL-MCNC: 100 MG/DL (ref 65–99)
HCT VFR BLD AUTO: 42.3 % (ref 34–46.6)
HGB BLD-MCNC: 13.9 G/DL (ref 12–15.9)
IMM GRANULOCYTES # BLD AUTO: 0.03 10*3/MM3 (ref 0–0.05)
IMM GRANULOCYTES NFR BLD AUTO: 0.3 % (ref 0–0.5)
LYMPHOCYTES # BLD AUTO: 0.9 10*3/MM3 (ref 0.7–3.1)
LYMPHOCYTES NFR BLD AUTO: 9.4 % (ref 19.6–45.3)
MCH RBC QN AUTO: 28.3 PG (ref 26.6–33)
MCHC RBC AUTO-ENTMCNC: 32.9 G/DL (ref 31.5–35.7)
MCV RBC AUTO: 86 FL (ref 79–97)
MONOCYTES # BLD AUTO: 0.47 10*3/MM3 (ref 0.1–0.9)
MONOCYTES NFR BLD AUTO: 4.9 % (ref 5–12)
NEUTROPHILS NFR BLD AUTO: 8.07 10*3/MM3 (ref 1.7–7)
NEUTROPHILS NFR BLD AUTO: 84.4 % (ref 42.7–76)
NRBC BLD AUTO-RTO: 0 /100 WBC (ref 0–0.2)
PLATELET # BLD AUTO: 288 10*3/MM3 (ref 140–450)
PMV BLD AUTO: 9.8 FL (ref 6–12)
POTASSIUM SERPL-SCNC: 4.2 MMOL/L (ref 3.5–5.2)
PROT SERPL-MCNC: 7.7 G/DL (ref 6–8.5)
RBC # BLD AUTO: 4.92 10*6/MM3 (ref 3.77–5.28)
SODIUM SERPL-SCNC: 136 MMOL/L (ref 136–145)
WBC NRBC COR # BLD AUTO: 9.57 10*3/MM3 (ref 3.4–10.8)

## 2024-08-09 PROCEDURE — 25510000001 IOPAMIDOL 61 % SOLUTION: Performed by: EMERGENCY MEDICINE

## 2024-08-09 PROCEDURE — 99285 EMERGENCY DEPT VISIT HI MDM: CPT

## 2024-08-09 PROCEDURE — 85025 COMPLETE CBC W/AUTO DIFF WBC: CPT | Performed by: PHYSICIAN ASSISTANT

## 2024-08-09 PROCEDURE — 80053 COMPREHEN METABOLIC PANEL: CPT | Performed by: PHYSICIAN ASSISTANT

## 2024-08-09 PROCEDURE — 74177 CT ABD & PELVIS W/CONTRAST: CPT

## 2024-08-09 PROCEDURE — 36415 COLL VENOUS BLD VENIPUNCTURE: CPT

## 2024-08-09 RX ORDER — POLYETHYLENE GLYCOL 3350 17 G/17G
POWDER, FOR SOLUTION ORAL
Qty: 100 EACH | Refills: 0 | Status: SHIPPED | OUTPATIENT
Start: 2024-08-09

## 2024-08-09 RX ADMIN — IOPAMIDOL 85 ML: 612 INJECTION, SOLUTION INTRAVENOUS at 19:21

## 2024-08-09 NOTE — ED PROVIDER NOTES
EMERGENCY DEPARTMENT ENCOUNTER      PCP: Roger Beard MD  Patient Care Team:  Roger Beard MD as PCP - General (Family Medicine)  James Romeo MD as Consulting Physician (Gastroenterology)  Ajith Leggett II, MD as Consulting Physician (Hematology and Oncology)  Compa Álvarez MD as Consulting Physician (Pulmonary Disease)  Nahum Benavides MD as Consulting Physician (Cardiology)   Independent Historians: Patient    HPI:  Chief Complaint: Constipation   A complete HPI/ROS/PMH/PSH/SH/FH are unobtainable due to: None    Chronic or social conditions impacting patient care (social determinants of health): None    Context: Faviola Issa is a 74 y.o. female w/ hx of colon cancer s/p colostomy, GERD, HL who presents to the ED c/o decreased output to colostomy bag over the past 5 days. Pt reports having small hard BM on Wednesday 8/7.  She took milk of magnesia at home with minimal relief. She denies any significant abdominal pain, fevers, nausea, or vomiting. She believes her statin therapy is the etiology of her constipation and her physician is changing her current prescribed medication to a different one.    Review of prior external notes and/or external test results outside of this encounter: CMP on 4/25/24 showed creatinine of 0.82, normal electrolytes, normal LFTs.      PAST MEDICAL HISTORY  Active Ambulatory Problems     Diagnosis Date Noted    Well female exam with routine gynecological exam 10/31/2016    Malignant neoplasm of sigmoid colon 10/31/2016    Mixed hyperlipidemia 02/24/2017    Status post parathyroidectomy 05/21/2019    Postoperative hypothyroidism 05/21/2019    Anxiety 10/02/2020    Palpitations 10/02/2020    Hypertension     Colon polyps 03/08/2021    White coat syndrome with diagnosis of hypertension 01/24/2022    Hip joint replacement status 11/28/2022    Gastroesophageal reflux disease with esophagitis without hemorrhage 12/13/2023     Resolved Ambulatory Problems     Diagnosis Date  Noted    History of lung cancer 02/24/2017    History of colon cancer 02/24/2017    LFT elevation 02/24/2017    Colon cancer 01/15/2018    Thyroid goiter 05/06/2019    Primary hyperparathyroidism 05/06/2019    Enlarged thyroid 05/06/2019    Bilateral pulmonary embolism 05/23/2019    Voice disturbance 05/23/2019    Elevated troponin 05/23/2019    Colon polyps 03/08/2021     Past Medical History:   Diagnosis Date    Allergic     Arthritis     Asthma     Cataract     Colorectal cancer     Colostomy in place     COPD (chronic obstructive pulmonary disease)     Deep vein thrombosis     Enlarged thyroid gland     Eye exam normal 2013    History of prior pregnancies     Hx of radiation therapy 2000    Hyperlipidemia     Hyperparathyroidism     Injury of back     Injury of neck     Left hip pain     Lung nodule     Lymphoma 1998    Pneumonia     PONV (postoperative nausea and vomiting)     Rash     Rash        The patient has started, but not completed, their COVID-19 vaccination series.    PAST SURGICAL HISTORY  Past Surgical History:   Procedure Laterality Date    COLON SURGERY      1999 and 11/2011    COLONOSCOPY  2016    COLONOSCOPY N/A 05/08/2018    Procedure: COLONOSCOPY into ileum;  Surgeon: James Romeo MD;  Location: [facility] ENDOSCOPY;  Service: Gastroenterology    COLONOSCOPY N/A 03/04/2022    Procedure: COLONOSCOPY to anastomosis;  Surgeon: James Romeo MD;  Location: [facility] ENDOSCOPY;  Service: Gastroenterology;  Laterality: N/A;  pre: hx of colorectal cancer   post: normal surgical anatomy     HEMICOLOECTOMY W/ ANASTOMOSIS Right 11/2011    Adenocarcinoma of cecum    HYSTERECTOMY  1999    JOINT REPLACEMENT      LUNG LOBECTOMY      ANSELMO 08/2006 and RLL partial 09/2001 metastatic colon cancer     THYROIDECTOMY Bilateral 05/06/2019    Procedure: Left PARATHYROIDECTOMY AND TOTAL THYROIDECTOMY;  Surgeon: Maxi Daly MD;  Location: [facility] OR [facility];  Service: ENT    TOTAL HIP ARTHROPLASTY Left 11/28/2022     "Procedure: LEFT TOTAL HIP ARTHROPLASTY ANTERIOR;  Surgeon: Saul Vargas II, MD;  Location: [facility] [place] [facility];  Service: Orthopedics;  Laterality: Left;    TOTAL HIP ARTHROPLASTY Right 2024    Procedure: TOTAL HIP ARTHROPLASTY ANTERIOR WITH HANA TABLE;  Surgeon: Saul Vargas II, MD;  Location:  [facility];  Service: Orthopedics;  Laterality: Right;    TRACHEOSTOMY N/A 2019    Procedure: EVACUATION OF NECK  HEMATOMA;  Surgeon: Maxi Daly MD;  Location: [facility] MAIN OR;  Service: ENT         FAMILY HISTORY  Family History   Problem Relation Age of Onset    Cancer Sister         \"FEMALE\"    Hypertension Father     Breast cancer Daughter 45    Deep vein thrombosis Niece     Heart attack Mother     Malig Hyperthermia Neg Hx          SOCIAL HISTORY  Social History     Socioeconomic History    Marital status:      Spouse name: SAUL    Number of children: 3    Years of education: College   Tobacco Use    Smoking status: Former     Current packs/day: 0.00     Average packs/day: 1 pack/day for 23.0 years (23.0 ttl pk-yrs)     Types: Cigarettes     Start date: 1978     Quit date: 2001     Years since quittin.9     Passive exposure: Never    Smokeless tobacco: Never    Tobacco comments:     1ppd x20 yrs   Vaping Use    Vaping status: Never Used   Substance and Sexual Activity    Alcohol use: No     Comment: Caffeine use: Tea 2 cups daily    Drug use: No    Sexual activity: Yes     Partners: Male     Birth control/protection: Post-menopausal, Surgical         ALLERGIES  Adhesive tape, Amoxicillin, Latex, Red dye, Rosuvastatin, Shellfish-derived products, Lactose intolerance (gi), Pennsaid [diclofenac sodium], Prednisone, and Simvastatin        REVIEW OF SYSTEMS  Review of Systems   Constitutional:  Negative for chills and fever.   Respiratory:  Negative for shortness of breath.    Cardiovascular:  Negative for chest pain.   Gastrointestinal:  Positive for " constipation. Negative for blood in stool, nausea and vomiting.   Genitourinary:  Negative for dysuria.        All systems reviewed and negative except for those discussed in HPI.       PHYSICAL EXAM    I have reviewed the triage vital signs and nursing notes.    ED Triage Vitals   Temp Heart Rate Resp BP SpO2   08/09/24 1714 08/09/24 1714 08/09/24 1714 08/09/24 1717 08/09/24 1714   97.5 °F (36.4 °C) 90 18 (!) 213/94 94 %      Temp src Heart Rate Source Patient Position BP Location FiO2 (%)   -- -- 08/09/24 1717 08/09/24 1717 --     Sitting Left arm        Physical Exam  GENERAL: alert, no acute distress  SKIN: Warm, dry  HENT: Normocephalic, atraumatic  EYES: no scleral icterus  CV: regular rhythm, regular rate  RESPIRATORY: normal effort, lungs clear  ABDOMEN: soft, colostomy bag with healthy appearing stoma, no output to bag, abdomen is nontender, nondistended  MUSCULOSKELETAL: no deformity  NEURO: alert, moves all extremities, follows commands          LAB RESULTS  Recent Results (from the past 24 hour(s))   Comprehensive Metabolic Panel    Collection Time: 08/09/24  5:48 PM    Specimen: Blood   Result Value Ref Range    Glucose 100 (H) 65 - 99 mg/dL    BUN 11 8 - 23 mg/dL    Creatinine 0.85 0.57 - 1.00 mg/dL    Sodium 136 136 - 145 mmol/L    Potassium 4.2 3.5 - 5.2 mmol/L    Chloride 103 98 - 107 mmol/L    CO2 25.9 22.0 - 29.0 mmol/L    Calcium 9.4 8.6 - 10.5 mg/dL    Total Protein 7.7 6.0 - 8.5 g/dL    Albumin 4.3 3.5 - 5.2 g/dL    ALT (SGPT) 9 1 - 33 U/L    AST (SGOT) 14 1 - 32 U/L    Alkaline Phosphatase 107 39 - 117 U/L    Total Bilirubin 0.4 0.0 - 1.2 mg/dL    Globulin 3.4 gm/dL    A/G Ratio 1.3 g/dL    BUN/Creatinine Ratio 12.9 7.0 - 25.0    Anion Gap 7.1 5.0 - 15.0 mmol/L    eGFR 72.4 >60.0 mL/min/1.73   CBC Auto Differential    Collection Time: 08/09/24  5:48 PM    Specimen: Blood   Result Value Ref Range    WBC 9.57 3.40 - 10.80 10*3/mm3    RBC 4.92 3.77 - 5.28 10*6/mm3    Hemoglobin 13.9 12.0 - 15.9  g/dL    Hematocrit 42.3 34.0 - 46.6 %    MCV 86.0 79.0 - 97.0 fL    MCH 28.3 26.6 - 33.0 pg    MCHC 32.9 31.5 - 35.7 g/dL    RDW 14.8 12.3 - 15.4 %    RDW-SD 45.9 37.0 - 54.0 fl    MPV 9.8 6.0 - 12.0 fL    Platelets 288 140 - 450 10*3/mm3    Neutrophil % 84.4 (H) 42.7 - 76.0 %    Lymphocyte % 9.4 (L) 19.6 - 45.3 %    Monocyte % 4.9 (L) 5.0 - 12.0 %    Eosinophil % 0.4 0.3 - 6.2 %    Basophil % 0.6 0.0 - 1.5 %    Immature Grans % 0.3 0.0 - 0.5 %    Neutrophils, Absolute 8.07 (H) 1.70 - 7.00 10*3/mm3    Lymphocytes, Absolute 0.90 0.70 - 3.10 10*3/mm3    Monocytes, Absolute 0.47 0.10 - 0.90 10*3/mm3    Eosinophils, Absolute 0.04 0.00 - 0.40 10*3/mm3    Basophils, Absolute 0.06 0.00 - 0.20 10*3/mm3    Immature Grans, Absolute 0.03 0.00 - 0.05 10*3/mm3    nRBC 0.0 0.0 - 0.2 /100 WBC       Ordered the above labs and independently reviewed and interpreted the results.        RADIOLOGY  CT Abdomen Pelvis With Contrast    Result Date: 8/9/2024  CT ABDOMEN PELVIS W CONTRAST-  INDICATIONS: Lack of colostomy output  TECHNIQUE: Radiation dose reduction techniques were utilized, including automated exposure control and exposure modulation based on body size. Enhanced ABDOMEN AND PELVIS CT  COMPARISON: 10/3/2023  FINDINGS:  Structures in the pelvis are partly obscured by attenuation artifact from bilateral hip arthroplasty hardware. Motion artifact limits assessment at the level of the abdomen.  Otherwise unremarkable appearance of the liver, spleen, adrenal glands, pancreas, kidneys, bladder.  No bowel obstruction or abnormal bowel thickening is identified. Partial colectomy changes are noted. Moderate colonic fecal retention is apparent. The left pelvic ostomy appears unremarkable. Small hiatal hernia is present.  No free intraperitoneal gas or free fluid.  Scattered small mesenteric and para-aortic lymph nodes are seen that appears similar to prior exam.  Abdominal aorta is not aneurysmal. Aortic and other arterial  calcifications are present.  The lung bases show small likely atelectasis and scarring.  Degenerative changes are seen in the spine. No acute fracture is identified.          1. No acute inflammatory process of bowel is identified, follow-up as indications persist. Moderate colonic fecal retention.  2. No obstructive uropathy.  This report was finalized on 8/9/2024 7:47 PM by Dr. Emory Reinoso M.D on Workstation: [employer]       I ordered the above noted radiological studies. Independently reviewed and interpreted by me.  See dictation for official radiology interpretation.      PROCEDURES    Procedures      MEDICATIONS GIVEN IN ER    Medications   iopamidol (ISOVUE-300) 61 % injection 100 mL (85 mL Intravenous Given by Other 8/9/24 1921)         PROGRESS, DATA ANALYSIS, CONSULTS, AND MEDICAL DECISION MAKING    All labs have been independently reviewed and interpreted by me.  All radiology studies have been independently reviewed and interpreted by me and discussed with radiologist dictating the report.   EKG's independently reviewed and interpreted by me.  Discussion below represents my analysis of pertinent findings related to patient's condition, differential diagnosis, treatment plan and final disposition.    Ddx: constipation, bowel obstruction, volvulus    ED Course as of 08/10/24 1033   Fri Aug 09, 2024   1814 WBC: 9.57 [DC]   1814 Hemoglobin: 13.9 [DC]   1858 Creatinine: 0.85 [DC]   1858 Sodium: 136 [DC]   1858 Potassium: 4.2 [DC]      ED Course User Index  [DC] Bharati Brito PA             AS OF 10:33 EDT VITALS:    BP - (!) 183/86  HR - 75  TEMP - 97.5 °F (36.4 °C)  O2 SATS - 98%        DIAGNOSIS  Final diagnoses:   Constipation, unspecified constipation type         DISPOSITION  ED Disposition       ED Disposition   Discharge    Condition   Stable    Comment   --                  Note Disclaimer: At [facility], we believe that sharing information builds trust and better relationships. You are  receiving this note because you recently visited [facility]. It is possible you will see health information before a provider has talked with you about it. This kind of information can be easy to misunderstand. To help you fully understand what it means for your health, we urge you to discuss this note with your provider.         Bharati Brito PA  08/10/24 1039

## 2024-08-10 NOTE — ED PROVIDER NOTES
MD ATTESTATION NOTE    SHARED VISIT: This visit was performed by BOTH a physician and an APC. The substantive portion of the medical decision making was performed by this attesting physician who made or approved the management plan and takes responsibility for patient management. All studies in the APC note (if performed) were independently interpreted by me.     The NANO and I have discussed this patient's history, physical exam, and treatment plan.  I have reviewed the documentation and affirm the documentation and agree with the treatment and plan.  The attached note describes my personal findings.      Independent Historians: Patient    A complete HPI/ROS/PMH/PSH/SH/FH are unobtainable due to: None    Chronic or social conditions impacting patient care (social determinants of health): None    Faviola Issa is a 74 y.o. female who presents to the ED c/o acute dec output from ostomy with some abdominal bloating but no pain, fever, nausea or vomiting.  Pt had small hard BM on 8/7.  She took milk of magnesia without relief.          On exam:  GENERAL: pleasant cooperativev and well appearing f, alert, no acute distress  SKIN: Warm, dry  HENT: Normocephalic, atraumatic  RESPIRATORY: relaxed breathing  ABDOMEN: soft, nontender, nondistended  NEURO: alert, moves all extremities, follows commands                                                             Labs  Recent Results (from the past 24 hour(s))   Comprehensive Metabolic Panel    Collection Time: 08/09/24  5:48 PM    Specimen: Blood   Result Value Ref Range    Glucose 100 (H) 65 - 99 mg/dL    BUN 11 8 - 23 mg/dL    Creatinine 0.85 0.57 - 1.00 mg/dL    Sodium 136 136 - 145 mmol/L    Potassium 4.2 3.5 - 5.2 mmol/L    Chloride 103 98 - 107 mmol/L    CO2 25.9 22.0 - 29.0 mmol/L    Calcium 9.4 8.6 - 10.5 mg/dL    Total Protein 7.7 6.0 - 8.5 g/dL    Albumin 4.3 3.5 - 5.2 g/dL    ALT (SGPT) 9 1 - 33 U/L    AST (SGOT) 14 1 - 32 U/L    Alkaline Phosphatase 107 39 - 117  U/L    Total Bilirubin 0.4 0.0 - 1.2 mg/dL    Globulin 3.4 gm/dL    A/G Ratio 1.3 g/dL    BUN/Creatinine Ratio 12.9 7.0 - 25.0    Anion Gap 7.1 5.0 - 15.0 mmol/L    eGFR 72.4 >60.0 mL/min/1.73   CBC Auto Differential    Collection Time: 08/09/24  5:48 PM    Specimen: Blood   Result Value Ref Range    WBC 9.57 3.40 - 10.80 10*3/mm3    RBC 4.92 3.77 - 5.28 10*6/mm3    Hemoglobin 13.9 12.0 - 15.9 g/dL    Hematocrit 42.3 34.0 - 46.6 %    MCV 86.0 79.0 - 97.0 fL    MCH 28.3 26.6 - 33.0 pg    MCHC 32.9 31.5 - 35.7 g/dL    RDW 14.8 12.3 - 15.4 %    RDW-SD 45.9 37.0 - 54.0 fl    MPV 9.8 6.0 - 12.0 fL    Platelets 288 140 - 450 10*3/mm3    Neutrophil % 84.4 (H) 42.7 - 76.0 %    Lymphocyte % 9.4 (L) 19.6 - 45.3 %    Monocyte % 4.9 (L) 5.0 - 12.0 %    Eosinophil % 0.4 0.3 - 6.2 %    Basophil % 0.6 0.0 - 1.5 %    Immature Grans % 0.3 0.0 - 0.5 %    Neutrophils, Absolute 8.07 (H) 1.70 - 7.00 10*3/mm3    Lymphocytes, Absolute 0.90 0.70 - 3.10 10*3/mm3    Monocytes, Absolute 0.47 0.10 - 0.90 10*3/mm3    Eosinophils, Absolute 0.04 0.00 - 0.40 10*3/mm3    Basophils, Absolute 0.06 0.00 - 0.20 10*3/mm3    Immature Grans, Absolute 0.03 0.00 - 0.05 10*3/mm3    nRBC 0.0 0.0 - 0.2 /100 WBC       Radiology  CT Abdomen Pelvis With Contrast    Result Date: 8/9/2024  CT ABDOMEN PELVIS W CONTRAST-  INDICATIONS: Lack of colostomy output  TECHNIQUE: Radiation dose reduction techniques were utilized, including automated exposure control and exposure modulation based on body size. Enhanced ABDOMEN AND PELVIS CT  COMPARISON: 10/3/2023  FINDINGS:  Structures in the pelvis are partly obscured by attenuation artifact from bilateral hip arthroplasty hardware. Motion artifact limits assessment at the level of the abdomen.  Otherwise unremarkable appearance of the liver, spleen, adrenal glands, pancreas, kidneys, bladder.  No bowel obstruction or abnormal bowel thickening is identified. Partial colectomy changes are noted. Moderate colonic fecal  retention is apparent. The left pelvic ostomy appears unremarkable. Small hiatal hernia is present.  No free intraperitoneal gas or free fluid.  Scattered small mesenteric and para-aortic lymph nodes are seen that appears similar to prior exam.  Abdominal aorta is not aneurysmal. Aortic and other arterial calcifications are present.  The lung bases show small likely atelectasis and scarring.  Degenerative changes are seen in the spine. No acute fracture is identified.          1. No acute inflammatory process of bowel is identified, follow-up as indications persist. Moderate colonic fecal retention.  2. No obstructive uropathy.  This report was finalized on 8/9/2024 7:47 PM by Dr. Emory Reinoso M.D on Workstation: [employer]       Medical Decision Making:  ED Course as of 08/10/24 0107   Fri Aug 09, 2024   1814 WBC: 9.57 [DC]   1814 Hemoglobin: 13.9 [DC]   1858 Creatinine: 0.85 [DC]   1858 Sodium: 136 [DC]   1858 Potassium: 4.2 [DC]      ED Course User Index  [DC] Bharati Brito PA       MDM: The differential diagnosis for this patient includes: appendicitis, pancreatitis, cholecystitis/biliary colic, PUD, gastritis, pneumonia, ureteral stone, sbo, hernia, colitis, diverticulitis, AAA, malignancy, gastroenteritis, food intolerances. Abdominal exam is without peritoneal signs. There is no evidence of acute abdomen at this time. The patient is well appearing. I have a low suspicion for vascular catastrophe, bowel obstruction or viscus perforation. This patient´s presentation is most consistent with constipation.      Procedures:  Procedures        PPE: I followed hospital protocols for proper PPE based on patient presentation including use of N95 mask for suspected infectious respiratory conditions.  Proper hand hygiene was performed both before and after the patient encounter.          Diagnosis  Final diagnoses:   Constipation, unspecified constipation type       Note Disclaimer: At [facility], we believe  that sharing information builds trust and better relationships. You are receiving this note because you recently visited [facility]. It is possible you will see health information before a provider has talked with you about it. This kind of information can be easy to misunderstand. To help you fully understand what it means for your health, we urge you to discuss this note with your provider.       Keiko Osborn MD  08/10/24 2516

## 2024-08-15 LAB
ALBUMIN SERPL-MCNC: 4.5 G/DL (ref 3.5–5.2)
ALBUMIN/GLOB SERPL: 1.5 G/DL
ALP SERPL-CCNC: 117 U/L (ref 39–117)
ALT SERPL-CCNC: 7 U/L (ref 1–33)
AST SERPL-CCNC: 13 U/L (ref 1–32)
BASOPHILS # BLD AUTO: 0.06 10*3/MM3 (ref 0–0.2)
BASOPHILS NFR BLD AUTO: 0.9 % (ref 0–1.5)
BILIRUB SERPL-MCNC: 0.5 MG/DL (ref 0–1.2)
BUN SERPL-MCNC: 10 MG/DL (ref 8–23)
BUN/CREAT SERPL: 12 (ref 7–25)
CALCIUM SERPL-MCNC: 9.3 MG/DL (ref 8.6–10.5)
CHLORIDE SERPL-SCNC: 104 MMOL/L (ref 98–107)
CHOLEST SERPL-MCNC: 164 MG/DL (ref 0–200)
CO2 SERPL-SCNC: 25.7 MMOL/L (ref 22–29)
CREAT SERPL-MCNC: 0.83 MG/DL (ref 0.57–1)
EGFRCR SERPLBLD CKD-EPI 2021: 74.5 ML/MIN/1.73
EOSINOPHIL # BLD AUTO: 0.12 10*3/MM3 (ref 0–0.4)
EOSINOPHIL NFR BLD AUTO: 1.8 % (ref 0.3–6.2)
ERYTHROCYTE [DISTWIDTH] IN BLOOD BY AUTOMATED COUNT: 14.6 % (ref 12.3–15.4)
FT4I SERPL CALC-MCNC: 3.4 (ref 1.2–4.9)
GLOBULIN SER CALC-MCNC: 3.1 GM/DL
GLUCOSE SERPL-MCNC: 84 MG/DL (ref 65–99)
HBA1C MFR BLD: 5.3 % (ref 4.8–5.6)
HCT VFR BLD AUTO: 43.5 % (ref 34–46.6)
HDLC SERPL-MCNC: 75 MG/DL (ref 40–60)
HGB BLD-MCNC: 14.2 G/DL (ref 12–15.9)
IMM GRANULOCYTES # BLD AUTO: 0.03 10*3/MM3 (ref 0–0.05)
IMM GRANULOCYTES NFR BLD AUTO: 0.4 % (ref 0–0.5)
LDLC SERPL CALC-MCNC: 74 MG/DL (ref 0–100)
LDLC/HDLC SERPL: 0.97 {RATIO}
LYMPHOCYTES # BLD AUTO: 1.42 10*3/MM3 (ref 0.7–3.1)
LYMPHOCYTES NFR BLD AUTO: 20.8 % (ref 19.6–45.3)
MCH RBC QN AUTO: 28.2 PG (ref 26.6–33)
MCHC RBC AUTO-ENTMCNC: 32.6 G/DL (ref 31.5–35.7)
MCV RBC AUTO: 86.5 FL (ref 79–97)
MONOCYTES # BLD AUTO: 0.51 10*3/MM3 (ref 0.1–0.9)
MONOCYTES NFR BLD AUTO: 7.5 % (ref 5–12)
NEUTROPHILS # BLD AUTO: 4.69 10*3/MM3 (ref 1.7–7)
NEUTROPHILS NFR BLD AUTO: 68.6 % (ref 42.7–76)
NRBC BLD AUTO-RTO: 0 /100 WBC (ref 0–0.2)
PLATELET # BLD AUTO: 312 10*3/MM3 (ref 140–450)
POTASSIUM SERPL-SCNC: 4.3 MMOL/L (ref 3.5–5.2)
PROT SERPL-MCNC: 7.6 G/DL (ref 6–8.5)
RBC # BLD AUTO: 5.03 10*6/MM3 (ref 3.77–5.28)
SODIUM SERPL-SCNC: 143 MMOL/L (ref 136–145)
T3RU NFR SERPL: 32 % (ref 24–39)
T4 SERPL-MCNC: 10.6 UG/DL (ref 4.5–12)
TRIGL SERPL-MCNC: 80 MG/DL (ref 0–150)
TSH SERPL DL<=0.005 MIU/L-ACNC: 2.4 UIU/ML (ref 0.45–4.5)
VLDLC SERPL CALC-MCNC: 15 MG/DL (ref 5–40)
WBC # BLD AUTO: 6.83 10*3/MM3 (ref 3.4–10.8)

## 2024-08-19 ENCOUNTER — TELEPHONE (OUTPATIENT)
Dept: INTERNAL MEDICINE | Facility: CLINIC | Age: 74
End: 2024-08-19

## 2024-08-19 ENCOUNTER — TELEMEDICINE (OUTPATIENT)
Dept: INTERNAL MEDICINE | Facility: CLINIC | Age: 74
End: 2024-08-19
Payer: MEDICARE

## 2024-08-19 DIAGNOSIS — U07.1 COVID-19 VIRUS DETECTED: Primary | ICD-10-CM

## 2024-08-19 PROCEDURE — 1125F AMNT PAIN NOTED PAIN PRSNT: CPT | Performed by: FAMILY MEDICINE

## 2024-08-19 PROCEDURE — 1159F MED LIST DOCD IN RCRD: CPT | Performed by: FAMILY MEDICINE

## 2024-08-19 PROCEDURE — 99213 OFFICE O/P EST LOW 20 MIN: CPT | Performed by: FAMILY MEDICINE

## 2024-08-19 PROCEDURE — 1160F RVW MEDS BY RX/DR IN RCRD: CPT | Performed by: FAMILY MEDICINE

## 2024-08-19 RX ORDER — ALBUTEROL SULFATE 0.83 MG/ML
2.5 SOLUTION RESPIRATORY (INHALATION) EVERY 4 HOURS PRN
Qty: 60 EACH | Refills: 0 | Status: SHIPPED | OUTPATIENT
Start: 2024-08-19

## 2024-08-31 NOTE — PROGRESS NOTES
"Chief Complaint  Covid-19 Home Monitoring Phone Call  Cc covid    This was an audio and video enabled telemedicine encounter.    Patient is at home, I am in the office.     Subjective        Faviola Issa presents to [facility]  History of Present Illness    Patient tested positive for COVID.  Patient states that she would like to take Paxlovid.  We did discuss with her that we can start it.  She does not have any other symptoms, she does have a cough, but no shortness of breath.    Objective   Vital Signs:  There were no vitals taken for this visit.  Estimated body mass index is 31.64 kg/m² as calculated from the following:    Height as of 8/9/24: 167.6 cm (66\").    Weight as of 8/9/24: 88.9 kg (196 lb).            Physical Exam  Vitals and nursing note reviewed.   Constitutional:       Appearance: She is well-developed.   HENT:      Head: Normocephalic and atraumatic.   Neurological:      Mental Status: She is alert and oriented to person, place, and time.   Psychiatric:         Behavior: Behavior normal.        Result Review :                Assessment and Plan   Diagnoses and all orders for this visit:    1. COVID-19 virus detected (Primary)  -     Nirmatrelvir & Ritonavir, 300mg/100mg, (PAXLOVID); Take 3 tablets by mouth 2 (Two) Times a Day.  Dispense: 30 tablet; Refill: 0  -     albuterol (PROVENTIL) (2.5 MG/3ML) 0.083% nebulizer solution; Take 2.5 mg by nebulization Every 4 (Four) Hours As Needed for Wheezing.  Dispense: 60 each; Refill: 0  -    Start Paxlovid as well as albuterol.  If signs symptoms worsen will need further evaluation done in the emergency room.  She does understand and agree with plan.  However she seems to be doing fairly stable currently.             Follow Up   No follow-ups on file.  Patient was given instructions and counseling regarding her condition or for health maintenance advice. Please see specific information pulled into the AVS if appropriate. "     Mode of Visit: Video  Location of patient: Home  Location of provider: [facility]  You have chosen to receive care through a telehealth visit.  The patient has signed the video visit consent form.  The visit included audio and video interaction. No technical issues occurred during this visit.

## 2024-09-04 ENCOUNTER — EXTERNAL PBMM DATA (OUTPATIENT)
Dept: PHARMACY | Facility: OTHER | Age: 74
End: 2024-09-04
Payer: MEDICARE

## 2024-09-26 ENCOUNTER — EXTERNAL PBMM DATA (OUTPATIENT)
Dept: PHARMACY | Facility: OTHER | Age: 74
End: 2024-09-26
Payer: MEDICARE

## 2024-10-24 ENCOUNTER — EXTERNAL PBMM DATA (OUTPATIENT)
Dept: PHARMACY | Facility: OTHER | Age: 74
End: 2024-10-24
Payer: MEDICARE

## 2024-10-31 ENCOUNTER — TELEPHONE (OUTPATIENT)
Dept: GASTROENTEROLOGY | Facility: CLINIC | Age: 74
End: 2024-10-31
Payer: MEDICARE

## 2024-10-31 ENCOUNTER — OFFICE VISIT (OUTPATIENT)
Dept: GASTROENTEROLOGY | Facility: CLINIC | Age: 74
End: 2024-10-31
Payer: MEDICARE

## 2024-10-31 VITALS
TEMPERATURE: 97.1 F | BODY MASS INDEX: 30.07 KG/M2 | HEART RATE: 78 BPM | HEIGHT: 67 IN | DIASTOLIC BLOOD PRESSURE: 72 MMHG | WEIGHT: 191.6 LBS | SYSTOLIC BLOOD PRESSURE: 130 MMHG

## 2024-10-31 DIAGNOSIS — R19.8 ALTERNATING CONSTIPATION AND DIARRHEA: ICD-10-CM

## 2024-10-31 DIAGNOSIS — R10.30 LOWER ABDOMINAL PAIN: ICD-10-CM

## 2024-10-31 DIAGNOSIS — K63.5 POLYP OF COLON, UNSPECIFIED PART OF COLON, UNSPECIFIED TYPE: Primary | ICD-10-CM

## 2024-10-31 DIAGNOSIS — K21.00 GASTROESOPHAGEAL REFLUX DISEASE WITH ESOPHAGITIS WITHOUT HEMORRHAGE: ICD-10-CM

## 2024-10-31 DIAGNOSIS — R11.0 NAUSEA: ICD-10-CM

## 2024-10-31 DIAGNOSIS — C18.7 MALIGNANT NEOPLASM OF SIGMOID COLON: ICD-10-CM

## 2024-10-31 PROCEDURE — 1160F RVW MEDS BY RX/DR IN RCRD: CPT | Performed by: INTERNAL MEDICINE

## 2024-10-31 PROCEDURE — 99214 OFFICE O/P EST MOD 30 MIN: CPT | Performed by: INTERNAL MEDICINE

## 2024-10-31 PROCEDURE — 3078F DIAST BP <80 MM HG: CPT | Performed by: INTERNAL MEDICINE

## 2024-10-31 PROCEDURE — 1159F MED LIST DOCD IN RCRD: CPT | Performed by: INTERNAL MEDICINE

## 2024-10-31 PROCEDURE — 3075F SYST BP GE 130 - 139MM HG: CPT | Performed by: INTERNAL MEDICINE

## 2024-10-31 NOTE — PROGRESS NOTES
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 In 2011 she had surgery to resect a cecal cancer. She has had two surgeries for bilateral lung cancers. She last had a colonoscopy thru her LLQ abdominal colostomy in 3/22 and I recommended a repeat colonoscopy in 3-5 yrs.        She had a PET/CT in 10/23 that showed uptake in the distal esophagus.        I last saw her in  when my assessment and plan was:  Assessment:  h/o rectal cancer with abdominalperoneal resection and colostomy placement in . In 2011 she had surgery to resect a cecal cancer. She last had a colonoscopy thru her LLQ abdominal colostomy in 3/22 and I recommended a repeat colonoscopy in 3-5 yrs.   History of 2 surgeries for bilateral lung cancers.  Abnormal distal esophagus on PET/CT  GERD        Recommendations:  EGD - I don't think that this was done.   Trial of Protonix 40 mg/day.        She developed constipation in . She thinks that it was from one of the cholesterol meds that she is on.   C/o acid reflux. C/o constipation and she thinks that it is from the statin that she is on. C/o lower abd discomfort x mos that is worse if she drinks a soda or tea. +nausea but no vomiting. +diarrhea worse with certain foods. No ostomy bleeding. No melena. She has lost 8 pounds (after her   ). No dysphagia.     Past Medical History:   Diagnosis Date    Allergic     Arthritis     Asthma     Bilateral pulmonary embolism     provoked, after surgery , took 3 mos of OAC    Cataract     Colon cancer     Colorectal cancer     s/p surgery (colostomy), radiation, and chemotherapy, with mets to lung s/p surgical resection    Colostomy in place     COPD (chronic obstructive pulmonary disease)     Deep vein thrombosis     Enlarged thyroid gland     s/p total thyroidectomy  "2019    Eye exam normal 2013    History of prior pregnancies     x4    Hx of radiation therapy 2000    for colon cancer    Hyperlipidemia     Hyperparathyroidism     s/p removal of a single adenoma 5/2019 c/b bleeding/hematoma    Hypertension     \"white coat syndrome\"    Injury of back     Injury of neck     Lactose intolerance     Left hip pain     Lung nodule     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  STATES DR POSADA AWARE OF.    Rash     RIGHT HIP STATES BEEN THERE SINCE LEFT HIP SURGERY AND DR. POSADA AWARE.    Well female exam with routine gynecological exam 10/31/2016       Past Surgical History:   Procedure Laterality Date    COLON SURGERY      1999 and 11/2011    COLONOSCOPY  2016    COLONOSCOPY N/A 05/08/2018    Procedure: COLONOSCOPY into ileum;  Surgeon: James Romeo MD;  [facility];  Service: Gastroenterology    COLONOSCOPY N/A 03/04/2022    Procedure: COLONOSCOPY to anastomosis;  Surgeon: James Romeo MD;  Location: [facility];  Service: Gastroenterology;  Laterality: N/A;  pre: hx of colorectal cancer   post: normal surgical anatomy     HEMICOLOECTOMY W/ ANASTOMOSIS Right 11/2011    Adenocarcinoma of cecum    HYSTERECTOMY  1999    JOINT REPLACEMENT      LUNG LOBECTOMY      ANSELMO 08/2006 and RLL partial 09/2001 metastatic colon cancer     THYROIDECTOMY Bilateral 05/06/2019    Procedure: Left PARATHYROIDECTOMY AND TOTAL THYROIDECTOMY;  Surgeon: Maxi Daly MD;  [facility]: [facility] OR [facility];  Service: ENT    TOTAL HIP ARTHROPLASTY Left 11/28/2022    Procedure: LEFT TOTAL HIP ARTHROPLASTY ANTERIOR;  Surgeon: Saul Posada II, MD;  Location: [facility] OR [facility];  Service: Orthopedics;  Laterality: Left;    TOTAL HIP ARTHROPLASTY Right 03/04/2024    Procedure: TOTAL HIP " ARTHROPLASTY ANTERIOR WITH HANA TABLE;  Surgeon: Saul Vargas II, MD;  Location: [facility];  Service: Orthopedics;  Laterality: Right;    TRACHEOSTOMY N/A 05/06/2019    Procedure: EVACUATION OF NECK  HEMATOMA;  Surgeon: Maxi Daly MD;  Location: [facility] MAIN OR;  Service: ENT         Current Outpatient Medications:     acetaminophen (TYLENOL) 500 MG tablet, Take 1 tablet by mouth As Needed for Mild Pain., Disp: , Rfl:     albuterol (PROVENTIL) (2.5 MG/3ML) 0.083% nebulizer solution, Take 2.5 mg by nebulization Every 4 (Four) Hours As Needed for Wheezing., Disp: 60 each, Rfl: 0    albuterol sulfate  (90 Base) MCG/ACT inhaler, Inhale 2 puffs Every 4 (Four) Hours As Needed for Wheezing., Disp: 18 g, Rfl: 3    losartan (COZAAR) 25 MG tablet, Take 1 tablet by mouth Daily. TAKE 1/2 (ONE-HALF) TABLET BY MOUTH TWICE DAILY AS NEEDED., Disp: 90 tablet, Rfl: 1    ondansetron (ZOFRAN) 4 MG tablet, Take 1 tablet by mouth As Needed for Nausea., Disp: , Rfl:     pantoprazole (PROTONIX) 40 MG EC tablet, Take 1 tablet by mouth Daily., Disp: 30 tablet, Rfl: 11    polyethyl glycol-propyl glycol (SYSTANE) 0.4-0.3 % solution ophthalmic solution, Administer 2 drops to both eyes Daily., Disp: , Rfl:     polyethylene glycol (MIRALAX) 17 g packet, Take twice daily until bowel movements are normal. Then decrease to once daily., Disp: 100 each, Rfl: 0    pravastatin (Pravachol) 20 MG tablet, Take 1 tablet by mouth Daily., Disp: 90 tablet, Rfl: 3    predniSONE (DELTASONE) 10 MG (21) dose pack, Take  by mouth Daily. Use as directed on package, Disp: 21 tablet, Rfl: 0    Stiolto Respimat 2.5-2.5 MCG/ACT aerosol solution inhaler, Inhale 2 puffs 2 (Two) Times a Day., Disp: , Rfl:     Synthroid 100 MCG tablet, Take 1 tablet by mouth Daily., Disp: , Rfl:     vitamin D (ERGOCALCIFEROL) 1.25 MG (09367 UT) capsule capsule, Take 1 capsule by mouth 1 (One) Time Per Week. TUESDAYS  HOLD FOR SURGERY, Disp: , Rfl:     Allergies  "  Allergen Reactions    Adhesive Tape Hives and Itching     BANDAIDS-TOLERATES PAPER TAPE    Amoxicillin Hives and Itching    Latex Other (See Comments)     Lips and nose swelled    Red Dye #40 (Allura Red) Nausea And Vomiting    Rosuvastatin Anaphylaxis    Shellfish-Derived Products Shortness Of Breath    Lactose Intolerance (Gi) Nausea And Vomiting    Pennsaid [Diclofenac Sodium] Dizziness    Prednisone Other (See Comments)     FACIAL SKIN FLUSHING WITH HIGH DOSES    Simvastatin Myalgia       Family History   Problem Relation Age of Onset    Cancer Sister         \"FEMALE\"    Hypertension Father     Breast cancer Daughter 45    Deep vein thrombosis Niece     Heart attack Mother     Malig Hyperthermia Neg Hx        Social History     Socioeconomic History    Marital status:      Spouse name: KEN    Number of children: 3    Years of education: College   Tobacco Use    Smoking status: Former     Current packs/day: 0.00     Average packs/day: 1 pack/day for 23.0 years (23.0 ttl pk-yrs)     Types: Cigarettes     Start date: 1978     Quit date: 2001     Years since quittin.1     Passive exposure: Never    Smokeless tobacco: Never    Tobacco comments:     1ppd x20 yrs   Vaping Use    Vaping status: Never Used   Substance and Sexual Activity    Alcohol use: No     Comment: Caffeine use: Tea 2 cups daily    Drug use: No    Sexual activity: Not Currently     Partners: Male     Birth control/protection: Post-menopausal, Surgical       Review of Systems   Gastrointestinal:  Positive for constipation. Negative for abdominal pain.   All other systems reviewed and are negative.    Pertinent positives and negatives documented in the HPI and all other systems reviewed and were found to be negative.  Vitals:    10/31/24 0950   BP: (!) 193/81   Pulse: 78   Temp: 97.1 °F (36.2 °C)       Physical Exam  Vitals reviewed.   Constitutional:       General: She is not in acute distress.     Appearance: Normal " appearance. She is well-developed. She is not diaphoretic.   HENT:      Head: Normocephalic and atraumatic. Hair is normal.      Right Ear: Hearing, tympanic membrane, ear canal and external ear normal. No decreased hearing noted. No drainage.      Left Ear: Hearing, tympanic membrane, ear canal and external ear normal. No decreased hearing noted.      Nose: Nose normal. No nasal deformity.      Mouth/Throat:      Mouth: Mucous membranes are moist.   Eyes:      General: Lids are normal.         Right eye: No discharge.         Left eye: No discharge.      Extraocular Movements: Extraocular movements intact.      Conjunctiva/sclera: Conjunctivae normal.      Pupils: Pupils are equal, round, and reactive to light.   Neck:      Thyroid: No thyromegaly.      Vascular: No JVD.      Trachea: No tracheal deviation.   Cardiovascular:      Rate and Rhythm: Normal rate and regular rhythm.      Pulses: Normal pulses.      Heart sounds: Normal heart sounds. No murmur heard.     No friction rub. No gallop.   Pulmonary:      Effort: Pulmonary effort is normal. No respiratory distress.      Breath sounds: Normal breath sounds. No wheezing or rales.   Chest:      Chest wall: No tenderness.   Abdominal:      General: Bowel sounds are normal. There is no distension.      Palpations: Abdomen is soft. There is no mass.      Tenderness: There is no abdominal tenderness. There is no guarding or rebound.      Hernia: No hernia is present.      Comments: Ostomy in LLQ, looks good.    Musculoskeletal:         General: No tenderness or deformity. Normal range of motion.      Cervical back: Normal range of motion and neck supple.   Lymphadenopathy:      Cervical: No cervical adenopathy.   Skin:     General: Skin is warm and dry.      Findings: No erythema or rash.   Neurological:      Mental Status: She is alert and oriented to person, place, and time.      Cranial Nerves: No cranial nerve deficit.      Motor: No abnormal muscle tone.       Coordination: Coordination normal.      Deep Tendon Reflexes: Reflexes are normal and symmetric. Reflexes normal.   Psychiatric:         Mood and Affect: Mood normal.         Behavior: Behavior normal.         Thought Content: Thought content normal.         Judgment: Judgment normal.         Diagnoses and all orders for this visit:    1. Polyp of colon, unspecified part of colon, unspecified type (Primary)  -     Amylase  -     Lipase  -     CBC & Differential  -     Comprehensive Metabolic Panel  -     Celiac Ab tTG DGP TIgA  -     TSH    2. Gastroesophageal reflux disease with esophagitis without hemorrhage  -     Amylase  -     Lipase  -     CBC & Differential  -     Comprehensive Metabolic Panel  -     Celiac Ab tTG DGP TIgA  -     TSH    3. Malignant neoplasm of sigmoid colon  -     Amylase  -     Lipase  -     CBC & Differential  -     Comprehensive Metabolic Panel  -     Celiac Ab tTG DGP TIgA  -     TSH    4. Nausea  -     Amylase  -     Lipase  -     CBC & Differential  -     Comprehensive Metabolic Panel  -     Celiac Ab tTG DGP TIgA  -     TSH    5. Alternating constipation and diarrhea  -     Amylase  -     Lipase  -     CBC & Differential  -     Comprehensive Metabolic Panel  -     Celiac Ab tTG DGP TIgA  -     TSH    6. Lower abdominal pain  -     Amylase  -     Lipase  -     CBC & Differential  -     Comprehensive Metabolic Panel  -     Celiac Ab tTG DGP TIgA  -     TSH      Assessment:  Constipation alternating with diarrhea  Nausea  Lower abd discomfort  Abnormal distal esophagus on PET/CT      Recommendations:  Continue the miralax daily  EGD and colonoscopy. She doesn't want to schedule now for fear of a complication?  Labs  Recheck BP  F/u 4 weeks.     Return in about 4 weeks (around 11/28/2024).    James Romeo MD  10/31/2024

## 2024-10-31 NOTE — TELEPHONE ENCOUNTER
PT SEEN IN THE OFFICE YOU WANT TO F/U IN 4 WEEKS IF YOU PROVIDE ME WITH A DATE TIME I WILL CALL AND CONFIRM WITH PT AFTERNOONS WORK BEST FOR HER -THANK YOU

## 2024-11-01 LAB
ALBUMIN SERPL-MCNC: 4.5 G/DL (ref 3.8–4.8)
ALP SERPL-CCNC: 110 IU/L (ref 44–121)
ALT SERPL-CCNC: 7 IU/L (ref 0–32)
AMYLASE SERPL-CCNC: 54 U/L (ref 31–110)
AST SERPL-CCNC: 14 IU/L (ref 0–40)
BASOPHILS # BLD AUTO: 0.1 X10E3/UL (ref 0–0.2)
BASOPHILS NFR BLD AUTO: 1 %
BILIRUB SERPL-MCNC: 0.5 MG/DL (ref 0–1.2)
BUN SERPL-MCNC: 9 MG/DL (ref 8–27)
BUN/CREAT SERPL: 11 (ref 12–28)
CALCIUM SERPL-MCNC: 9.3 MG/DL (ref 8.7–10.3)
CHLORIDE SERPL-SCNC: 104 MMOL/L (ref 96–106)
CO2 SERPL-SCNC: 25 MMOL/L (ref 20–29)
CREAT SERPL-MCNC: 0.84 MG/DL (ref 0.57–1)
EGFRCR SERPLBLD CKD-EPI 2021: 73 ML/MIN/1.73
EOSINOPHIL # BLD AUTO: 0 X10E3/UL (ref 0–0.4)
EOSINOPHIL NFR BLD AUTO: 1 %
ERYTHROCYTE [DISTWIDTH] IN BLOOD BY AUTOMATED COUNT: 13.1 % (ref 11.7–15.4)
GLIADIN PEPTIDE IGA SER-ACNC: 6 UNITS (ref 0–19)
GLIADIN PEPTIDE IGG SER-ACNC: 2 UNITS (ref 0–19)
GLOBULIN SER CALC-MCNC: 3.1 G/DL (ref 1.5–4.5)
GLUCOSE SERPL-MCNC: 82 MG/DL (ref 70–99)
HCT VFR BLD AUTO: 47.8 % (ref 34–46.6)
HGB BLD-MCNC: 14.5 G/DL (ref 11.1–15.9)
IGA SERPL-MCNC: 300 MG/DL (ref 64–422)
IMM GRANULOCYTES # BLD AUTO: 0 X10E3/UL (ref 0–0.1)
IMM GRANULOCYTES NFR BLD AUTO: 0 %
LIPASE SERPL-CCNC: 18 U/L (ref 14–85)
LYMPHOCYTES # BLD AUTO: 1.1 X10E3/UL (ref 0.7–3.1)
LYMPHOCYTES NFR BLD AUTO: 14 %
MCH RBC QN AUTO: 27.5 PG (ref 26.6–33)
MCHC RBC AUTO-ENTMCNC: 30.3 G/DL (ref 31.5–35.7)
MCV RBC AUTO: 91 FL (ref 79–97)
MONOCYTES # BLD AUTO: 0.4 X10E3/UL (ref 0.1–0.9)
MONOCYTES NFR BLD AUTO: 5 %
NEUTROPHILS # BLD AUTO: 6 X10E3/UL (ref 1.4–7)
NEUTROPHILS NFR BLD AUTO: 79 %
PLATELET # BLD AUTO: 303 X10E3/UL (ref 150–450)
POTASSIUM SERPL-SCNC: 4.9 MMOL/L (ref 3.5–5.2)
PROT SERPL-MCNC: 7.6 G/DL (ref 6–8.5)
RBC # BLD AUTO: 5.28 X10E6/UL (ref 3.77–5.28)
SODIUM SERPL-SCNC: 143 MMOL/L (ref 134–144)
TSH SERPL DL<=0.005 MIU/L-ACNC: 1.08 UIU/ML (ref 0.45–4.5)
TTG IGA SER-ACNC: <2 U/ML (ref 0–3)
TTG IGG SER-ACNC: <2 U/ML (ref 0–5)
WBC # BLD AUTO: 7.6 X10E3/UL (ref 3.4–10.8)

## 2024-11-04 NOTE — PROGRESS NOTES
11/04/24       Tell her that her lab work looks good.        Given that her lab work looks good, and she has constipation alternating with diarrhea, nausea, lower abdominal discomfort, and an abnormal distal esophagus on her PET CT scan, would she want to schedule an EGD and colonoscopy?  If she wants to talk about it in the office when I see her back in 4 weeks then that would be fine also?  Thx. kjh

## 2024-11-05 ENCOUNTER — PREP FOR SURGERY (OUTPATIENT)
Dept: OTHER | Facility: HOSPITAL | Age: 74
End: 2024-11-05
Payer: MEDICARE

## 2024-11-05 ENCOUNTER — TELEPHONE (OUTPATIENT)
Dept: GASTROENTEROLOGY | Facility: CLINIC | Age: 74
End: 2024-11-05
Payer: MEDICARE

## 2024-11-05 DIAGNOSIS — R19.7 DIARRHEA: ICD-10-CM

## 2024-11-05 DIAGNOSIS — R11.0 NAUSEA: ICD-10-CM

## 2024-11-05 DIAGNOSIS — R94.8 ABNORMAL GASTROINTESTINAL PET SCAN: ICD-10-CM

## 2024-11-05 DIAGNOSIS — R19.7 DIARRHEA OF PRESUMED INFECTIOUS ORIGIN: Primary | ICD-10-CM

## 2024-11-05 NOTE — TELEPHONE ENCOUNTER
Called pt and advised of Dr Romeo's note. Verb understanding.     Pt would like to proceed with egd and c/s.  Message sent to Dr Romeo for order.

## 2024-11-05 NOTE — TELEPHONE ENCOUNTER
----- Message from James Romeo sent at 11/4/2024  3:54 PM EST -----  11/04/24       Tell her that her lab work looks good.        Given that her lab work looks good, and she has constipation alternating with diarrhea, nausea, lower abdominal discomfort, and an abnormal distal esophagus on her PET CT scan, would she want to schedule an EGD and colonoscopy?  If she wants to talk about it in the office when I see her back in 4 weeks then that would be fine also?  Thx. kjh

## 2024-11-06 ENCOUNTER — TELEPHONE (OUTPATIENT)
Dept: GASTROENTEROLOGY | Facility: CLINIC | Age: 74
End: 2024-11-06
Payer: MEDICARE

## 2024-11-06 PROBLEM — R19.7 DIARRHEA: Status: ACTIVE | Noted: 2024-11-05

## 2024-11-06 PROBLEM — R11.0 NAUSEA: Status: ACTIVE | Noted: 2024-11-05

## 2024-11-06 PROBLEM — R94.8 ABNORMAL GASTROINTESTINAL PET SCAN: Status: ACTIVE | Noted: 2024-11-05

## 2024-11-06 NOTE — TELEPHONE ENCOUNTER
SY ROJO IN SCHEDULING PT SCHEDULED 11/15/2024 ARRIVING AT 0800AM MIRALAX /EGD PREP INFORMATION UPLOADED TO MY CHART OK FOR HUB TO RELAY

## 2024-11-06 NOTE — TELEPHONE ENCOUNTER
Hub staff attempted to follow warm transfer process and was unsuccessful     Caller: Faviola Issa    Relationship to patient: Self    Best call back number: [phone]     Patient is needing: PT RETURNING CALL TO OFFICE TO SCHEDULE PROCEDURE. PLEASE REACH BACK OUT TO PT.

## 2024-11-06 NOTE — TELEPHONE ENCOUNTER
SW PT ,PT REQUESTING EARLY ARRIVAL COMMUNICATED TO PT THERE ARE NO OPENINGS AND YOU DO NOT START UNTIL ALSO PLACED PT ON CANCELLATION LIST-PLEASE ADVISE THANK YOU

## 2024-11-06 NOTE — TELEPHONE ENCOUNTER
Hub staff attempted to follow warm transfer process and was unsuccessful     Caller: Faviola Issa    Relationship to patient: Self    Best call back number: [phone]     Patient is needing: PT RETURNING CALL TO OFFICE TO SCHEDULE PROCEDURE. PLEASE REACH BACK OUT TO PT.

## 2024-11-06 NOTE — TELEPHONE ENCOUNTER
SW PT ,PT REQUEST A  EARLY ARRIVAL NO SPOTS AVAILABLE COMMUNICATED I WOULD PLACE ON CANCELLATION WAITLIST AND CALL BACK IF SOMETHING BECOMES AVAILABLE,OK FOR HUB TO RELAY

## 2024-11-08 NOTE — TELEPHONE ENCOUNTER
I called pt to confirm  scope for 11/15/24.Pt has no questions. Pt is aware the arrival time is 8 am.

## 2024-11-12 ENCOUNTER — OFFICE VISIT (OUTPATIENT)
Dept: INTERNAL MEDICINE | Facility: CLINIC | Age: 74
End: 2024-11-12
Payer: MEDICARE

## 2024-11-12 VITALS
WEIGHT: 186.2 LBS | BODY MASS INDEX: 29.22 KG/M2 | DIASTOLIC BLOOD PRESSURE: 80 MMHG | HEIGHT: 67 IN | SYSTOLIC BLOOD PRESSURE: 150 MMHG | HEART RATE: 58 BPM | TEMPERATURE: 97.7 F | OXYGEN SATURATION: 97 %

## 2024-11-12 DIAGNOSIS — I10 ESSENTIAL HYPERTENSION: ICD-10-CM

## 2024-11-12 DIAGNOSIS — E78.5 HYPERLIPIDEMIA, UNSPECIFIED HYPERLIPIDEMIA TYPE: ICD-10-CM

## 2024-11-12 RX ORDER — PRAVASTATIN SODIUM 20 MG
20 TABLET ORAL DAILY
Qty: 90 TABLET | Refills: 3 | Status: SHIPPED | OUTPATIENT
Start: 2024-11-12

## 2024-11-12 RX ORDER — UMECLIDINIUM BROMIDE AND VILANTEROL TRIFENATATE 62.5; 25 UG/1; UG/1
1 POWDER RESPIRATORY (INHALATION)
COMMUNITY

## 2024-11-12 RX ORDER — LOSARTAN POTASSIUM 25 MG/1
25 TABLET ORAL DAILY
Qty: 90 TABLET | Refills: 1 | Status: SHIPPED | OUTPATIENT
Start: 2024-11-12

## 2024-11-14 ENCOUNTER — TELEPHONE (OUTPATIENT)
Dept: GASTROENTEROLOGY | Facility: CLINIC | Age: 74
End: 2024-11-14
Payer: MEDICARE

## 2024-11-14 NOTE — TELEPHONE ENCOUNTER
Hub staff attempted to follow warm transfer process and was unsuccessful     Caller: BOOM    Relationship to patient: SELF    Best call back number: [phone]      Patient is needing: INSTRUCTIONS FOR HER PREP. PLEASE LET HER KNOW.

## 2024-11-15 ENCOUNTER — HOSPITAL ENCOUNTER (OUTPATIENT)
Facility: HOSPITAL | Age: 74
Setting detail: HOSPITAL OUTPATIENT SURGERY
Discharge: HOME | End: 2024-11-15
Attending: INTERNAL MEDICINE | Admitting: INTERNAL MEDICINE
Payer: MEDICARE

## 2024-11-15 ENCOUNTER — ANESTHESIA (OUTPATIENT)
Dept: GASTROENTEROLOGY | Facility: HOSPITAL | Age: 74
End: 2024-11-15
Payer: MEDICARE

## 2024-11-15 ENCOUNTER — ANESTHESIA EVENT (OUTPATIENT)
Dept: GASTROENTEROLOGY | Facility: HOSPITAL | Age: 74
End: 2024-11-15
Payer: MEDICARE

## 2024-11-15 VITALS
OXYGEN SATURATION: 99 % | BODY MASS INDEX: 31.76 KG/M2 | HEART RATE: 61 BPM | RESPIRATION RATE: 17 BRPM | SYSTOLIC BLOOD PRESSURE: 147 MMHG | HEIGHT: 64 IN | TEMPERATURE: 97.9 F | DIASTOLIC BLOOD PRESSURE: 84 MMHG | WEIGHT: 186 LBS

## 2024-11-15 DIAGNOSIS — R11.0 NAUSEA: ICD-10-CM

## 2024-11-15 DIAGNOSIS — R94.8 ABNORMAL GASTROINTESTINAL PET SCAN: ICD-10-CM

## 2024-11-15 DIAGNOSIS — R19.7 DIARRHEA: ICD-10-CM

## 2024-11-15 PROCEDURE — 45380 COLONOSCOPY AND BIOPSY: CPT | Performed by: INTERNAL MEDICINE

## 2024-11-15 PROCEDURE — 88305 TISSUE EXAM BY PATHOLOGIST: CPT | Performed by: INTERNAL MEDICINE

## 2024-11-15 PROCEDURE — 25010000002 LIDOCAINE 2% SOLUTION: Performed by: NURSE ANESTHETIST, CERTIFIED REGISTERED

## 2024-11-15 PROCEDURE — 43239 EGD BIOPSY SINGLE/MULTIPLE: CPT | Performed by: INTERNAL MEDICINE

## 2024-11-15 PROCEDURE — 25010000002 PROPOFOL 10 MG/ML EMULSION: Performed by: NURSE ANESTHETIST, CERTIFIED REGISTERED

## 2024-11-15 PROCEDURE — 25010000002 PROPOFOL 1000 MG/100ML EMULSION: Performed by: NURSE ANESTHETIST, CERTIFIED REGISTERED

## 2024-11-15 RX ORDER — PROPOFOL 10 MG/ML
INJECTION, EMULSION INTRAVENOUS CONTINUOUS PRN
Status: DISCONTINUED | OUTPATIENT
Start: 2024-11-15 | End: 2024-11-15 | Stop reason: SURG

## 2024-11-15 RX ORDER — EPHEDRINE SULFATE 50 MG/ML
10 INJECTION, SOLUTION INTRAVENOUS ONCE AS NEEDED
Status: DISCONTINUED | OUTPATIENT
Start: 2024-11-15 | End: 2024-11-15 | Stop reason: HOSPADM

## 2024-11-15 RX ORDER — DROPERIDOL 2.5 MG/ML
0.62 INJECTION, SOLUTION INTRAMUSCULAR; INTRAVENOUS ONCE AS NEEDED
Status: DISCONTINUED | OUTPATIENT
Start: 2024-11-15 | End: 2024-11-15 | Stop reason: HOSPADM

## 2024-11-15 RX ORDER — LIDOCAINE HYDROCHLORIDE 20 MG/ML
INJECTION, SOLUTION INFILTRATION; PERINEURAL AS NEEDED
Status: DISCONTINUED | OUTPATIENT
Start: 2024-11-15 | End: 2024-11-15 | Stop reason: SURG

## 2024-11-15 RX ORDER — NALOXONE HCL 0.4 MG/ML
0.2 VIAL (ML) INJECTION AS NEEDED
Status: DISCONTINUED | OUTPATIENT
Start: 2024-11-15 | End: 2024-11-15 | Stop reason: HOSPADM

## 2024-11-15 RX ORDER — FLUMAZENIL 0.1 MG/ML
0.2 INJECTION INTRAVENOUS AS NEEDED
Status: DISCONTINUED | OUTPATIENT
Start: 2024-11-15 | End: 2024-11-15 | Stop reason: HOSPADM

## 2024-11-15 RX ORDER — ONDANSETRON 2 MG/ML
4 INJECTION INTRAMUSCULAR; INTRAVENOUS ONCE AS NEEDED
Status: DISCONTINUED | OUTPATIENT
Start: 2024-11-15 | End: 2024-11-15 | Stop reason: HOSPADM

## 2024-11-15 RX ORDER — PROPOFOL 10 MG/ML
VIAL (ML) INTRAVENOUS AS NEEDED
Status: DISCONTINUED | OUTPATIENT
Start: 2024-11-15 | End: 2024-11-15 | Stop reason: SURG

## 2024-11-15 RX ORDER — FENTANYL CITRATE 50 UG/ML
50 INJECTION, SOLUTION INTRAMUSCULAR; INTRAVENOUS
Status: DISCONTINUED | OUTPATIENT
Start: 2024-11-15 | End: 2024-11-15 | Stop reason: HOSPADM

## 2024-11-15 RX ORDER — LABETALOL HYDROCHLORIDE 5 MG/ML
10 INJECTION, SOLUTION INTRAVENOUS
Status: DISCONTINUED | OUTPATIENT
Start: 2024-11-15 | End: 2024-11-15 | Stop reason: HOSPADM

## 2024-11-15 RX ORDER — LIDOCAINE HYDROCHLORIDE 10 MG/ML
0.5 INJECTION, SOLUTION INFILTRATION; PERINEURAL ONCE AS NEEDED
Status: DISCONTINUED | OUTPATIENT
Start: 2024-11-15 | End: 2024-11-15 | Stop reason: HOSPADM

## 2024-11-15 RX ORDER — FENTANYL CITRATE 50 UG/ML
25 INJECTION, SOLUTION INTRAMUSCULAR; INTRAVENOUS
Status: DISCONTINUED | OUTPATIENT
Start: 2024-11-15 | End: 2024-11-15 | Stop reason: HOSPADM

## 2024-11-15 RX ORDER — SODIUM CHLORIDE 0.9 % (FLUSH) 0.9 %
10 SYRINGE (ML) INJECTION AS NEEDED
Status: DISCONTINUED | OUTPATIENT
Start: 2024-11-15 | End: 2024-11-15 | Stop reason: HOSPADM

## 2024-11-15 RX ORDER — DIPHENHYDRAMINE HYDROCHLORIDE 50 MG/ML
12.5 INJECTION INTRAMUSCULAR; INTRAVENOUS
Status: DISCONTINUED | OUTPATIENT
Start: 2024-11-15 | End: 2024-11-15 | Stop reason: HOSPADM

## 2024-11-15 RX ORDER — HYDRALAZINE HYDROCHLORIDE 20 MG/ML
10 INJECTION INTRAMUSCULAR; INTRAVENOUS
Status: DISCONTINUED | OUTPATIENT
Start: 2024-11-15 | End: 2024-11-15 | Stop reason: HOSPADM

## 2024-11-15 RX ADMIN — PROPOFOL 70 MG: 10 INJECTION, EMULSION INTRAVENOUS at 09:04

## 2024-11-15 RX ADMIN — PROPOFOL INJECTABLE EMULSION 140 MCG/KG/MIN: 10 INJECTION, EMULSION INTRAVENOUS at 09:04

## 2024-11-15 RX ADMIN — LIDOCAINE HYDROCHLORIDE 100 MG: 20 INJECTION, SOLUTION INFILTRATION; PERINEURAL at 09:04

## 2024-11-15 NOTE — ANESTHESIA POSTPROCEDURE EVALUATION
"Patient: Faviola Issa    Procedure Summary       Date: 11/15/24 [facility] / [facility]    Anesthesia Start: 0853 Anesthesia Stop: 0931    Procedures:       ESOPHAGOGASTRODUODENOSCOPY WITH BIOPSY (Esophagus)      COLONOSCOPY TO ANASTOMOSIS WITH BIOPSY Diagnosis:       Nausea      Diarrhea      Abnormal gastrointestinal PET scan      (Nausea [R11.0])      (Diarrhea [R19.7])      (Abnormal gastrointestinal PET scan [R94.8])    Surgeons: James Romeo MD Provider: Jaxon Graves MD    Anesthesia Type: MAC ASA Status: 3            Anesthesia Type: MAC    Vitals  Vitals Value Taken Time   /87 11/15/24 0942   Temp     Pulse 62 11/15/24 0951   Resp 16 11/15/24 0940   SpO2 99 % 11/15/24 0951   Vitals shown include unfiled device data.        Post Anesthesia Care and Evaluation    Patient location during evaluation: bedside  Pain management: adequate    Airway patency: patent  Anesthetic complications: No anesthetic complications    Cardiovascular status: acceptable  Respiratory status: acceptable  Hydration status: acceptable    Comments: */87 (BP Location: Left arm, Patient Position: Lying)   Pulse 71   Temp 36.6 °C (97.9 °F) (Oral)   Resp 16   Ht 162.6 cm (64\")   Wt 84.4 kg (186 lb)   LMP  (LMP Unknown)   SpO2 98%   BMI 31.93 kg/m²       "

## 2024-11-15 NOTE — DISCHARGE INSTRUCTIONS
For the next 24 hours patient needs to be with a responsible adult.    For 24 hours DO NOT drive, operate machinery, appliances, drink alcohol, make important decisions or sign legal documents.    Start with a light or bland diet if you are feeling sick to your stomach otherwise advance to regular diet as tolerated.    Follow recommendations on procedure report if provided by your doctor.    Call Dr Romeo for problems [phone]    Problems may include but not limited to: large amounts of bleeding, trouble breathing, repeated vomiting, severe unrelieved pain, fever or chills.

## 2024-11-15 NOTE — ANESTHESIA PREPROCEDURE EVALUATION
Anesthesia Evaluation     history of anesthetic complications:  PONV  NPO Solid Status: > 8 hours  NPO Liquid Status: > 2 hours           Airway   Mallampati: II  Neck ROM: full  no difficulty expected  Dental - normal exam     Pulmonary - normal exam   (+) pneumonia resolved , pulmonary embolism (bilateral--2019, resolved), a smoker Former, COPD, asthma,  (-) sleep apnea    ROS comment: Metastatic disease to lungs--s/p resection    PE comment: nonlabored  Cardiovascular - normal exam    Rhythm: regular  Rate: normal    (+) hypertension, DVT, hyperlipidemia  (-) valvular problems/murmurs, past MI, CAD, dysrhythmias, angina      Neuro/Psych  (+) psychiatric history Anxiety  (-) seizures, TIA, CVA  GI/Hepatic/Renal/Endo    (+) thyroid problem hypothyroidism  (-) GERD, liver disease, no renal disease, diabetes    Musculoskeletal     (+) arthralgias  Abdominal    Substance History      OB/GYN          Other   arthritis,   history of cancer (colon cancer, lymphoma)                  Anesthesia Plan    ASA 3     MAC       Anesthetic plan, risks, benefits, and alternatives have been provided, discussed and informed consent has been obtained with: patient.    CODE STATUS:

## 2024-11-17 NOTE — H&P
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 In 2011 she had surgery to resect a cecal cancer. She has had two surgeries for bilateral lung cancers. She last had a colonoscopy thru her LLQ abdominal colostomy in 3/22 and I recommended a repeat colonoscopy in 3-5 yrs.        She had a PET/CT in 10/23 that showed uptake in the distal esophagus.        I last saw her in  when my assessment and plan was:  Assessment:  h/o rectal cancer with abdominalperoneal resection and colostomy placement in . In 2011 she had surgery to resect a cecal cancer. She last had a colonoscopy thru her LLQ abdominal colostomy in 3/22 and I recommended a repeat colonoscopy in 3-5 yrs.   History of 2 surgeries for bilateral lung cancers.  Abnormal distal esophagus on PET/CT  GERD        Recommendations:  EGD - I don't think that this was done.   Trial of Protonix 40 mg/day.        She developed constipation in . She thinks that it was from one of the cholesterol meds that she is on.   C/o acid reflux. C/o constipation and she thinks that it is from the statin that she is on. C/o lower abd discomfort x mos that is worse if she drinks a soda or tea. +nausea but no vomiting. +diarrhea worse with certain foods. No ostomy bleeding. No melena. She has lost 8 pounds (after her   ). No dysphagia.      Medical History        Past Medical History:   Diagnosis Date    Allergic      Arthritis      Asthma      Bilateral pulmonary embolism       provoked, after surgery , took 3 mos of OAC    Cataract      Colon cancer      Colorectal cancer       s/p surgery (colostomy), radiation, and chemotherapy, with mets to lung s/p surgical resection    Colostomy in place      COPD (chronic obstructive pulmonary disease)      Deep vein thrombosis      Enlarged  "thyroid gland       s/p total thyroidectomy 2019    Eye exam normal 2013    History of prior pregnancies       x4    Hx of radiation therapy 2000     for colon cancer    Hyperlipidemia      Hyperparathyroidism       s/p removal of a single adenoma 5/2019 c/b bleeding/hematoma    Hypertension       \"white coat syndrome\"    Injury of back      Injury of neck      Lactose intolerance      Left hip pain      Lung nodule      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  STATES DR POSADA AWARE OF.    Rash       RIGHT HIP STATES BEEN THERE SINCE LEFT HIP SURGERY AND DR. POSADA AWARE.    Well female exam with routine gynecological exam 10/31/2016            Surgical History         Past Surgical History:   Procedure Laterality Date    COLON SURGERY         1999 and 11/2011    COLONOSCOPY   2016    COLONOSCOPY N/A 05/08/2018     Procedure: COLONOSCOPY into ileum;  Surgeon: James Romeo MD;  Location: [facility] ENDOSCOPY;  Service: Gastroenterology    COLONOSCOPY N/A 03/04/2022     Procedure: COLONOSCOPY to anastomosis;  Surgeon: James Romeo MD;  Location: [facility] ENDOSCOPY;  Service: Gastroenterology;  Laterality: N/A;  pre: hx of colorectal cancer   post: normal surgical anatomy     HEMICOLOECTOMY W/ ANASTOMOSIS Right 11/2011     Adenocarcinoma of cecum    HYSTERECTOMY   1999    JOINT REPLACEMENT        LUNG LOBECTOMY         ANSELMO 08/2006 and RLL partial 09/2001 metastatic colon cancer     THYROIDECTOMY Bilateral 05/06/2019     Procedure: Left PARATHYROIDECTOMY AND TOTAL THYROIDECTOMY;  Surgeon: Maxi Daly MD;  [place] [facility] [place] [facility] [place]: [place]    TOTAL HIP ARTHROPLASTY Left 11/28/2022     Procedure: LEFT TOTAL HIP ARTHROPLASTY ANTERIOR;  Surgeon: Saul Posada II, MD;  Location: [facility] OR [facility];  " Service: Orthopedics;  Laterality: Left;    TOTAL HIP ARTHROPLASTY Right 03/04/2024     Procedure: TOTAL HIP ARTHROPLASTY ANTERIOR WITH HANA TABLE;  Surgeon: Saul Vargas II, MD;  [place] [facility] [place] [facility] [place]  Service: [place]opedics;  Laterality: Right;    TRACHEOSTOMY N/A 05/06/2019     Procedure: EVACUATION OF NECK  HEMATOMA;  Surgeon: Maxi Daly MD;  Location: [facility] MAIN OR;  Service: ENT              Current Medications      Current Outpatient Medications:     acetaminophen (TYLENOL) 500 MG tablet, Take 1 tablet by mouth As Needed for Mild Pain., Disp: , Rfl:     albuterol (PROVENTIL) (2.5 MG/3ML) 0.083% nebulizer solution, Take 2.5 mg by nebulization Every 4 (Four) Hours As Needed for Wheezing., Disp: 60 each, Rfl: 0    albuterol sulfate  (90 Base) MCG/ACT inhaler, Inhale 2 puffs Every 4 (Four) Hours As Needed for Wheezing., Disp: 18 g, Rfl: 3    losartan (COZAAR) 25 MG tablet, Take 1 tablet by mouth Daily. TAKE 1/2 (ONE-HALF) TABLET BY MOUTH TWICE DAILY AS NEEDED., Disp: 90 tablet, Rfl: 1    ondansetron (ZOFRAN) 4 MG tablet, Take 1 tablet by mouth As Needed for Nausea., Disp: , Rfl:     pantoprazole (PROTONIX) 40 MG EC tablet, Take 1 tablet by mouth Daily., Disp: 30 tablet, Rfl: 11    polyethyl glycol-propyl glycol (SYSTANE) 0.4-0.3 % solution ophthalmic solution, Administer 2 drops to both eyes Daily., Disp: , Rfl:     polyethylene glycol (MIRALAX) 17 g packet, Take twice daily until bowel movements are normal. Then decrease to once daily., Disp: 100 each, Rfl: 0    pravastatin (Pravachol) 20 MG tablet, Take 1 tablet by mouth Daily., Disp: 90 tablet, Rfl: 3    predniSONE (DELTASONE) 10 MG (21) dose pack, Take  by mouth Daily. Use as directed on package, Disp: 21 tablet, Rfl: 0    Stiolto Respimat 2.5-2.5 MCG/ACT aerosol solution inhaler, Inhale 2 puffs 2 (Two) Times a Day., Disp: , Rfl:     Synthroid 100 MCG tablet, Take 1 tablet by mouth Daily., Disp: , Rfl:     vitamin D  "(ERGOCALCIFEROL) 1.25 MG (65380 UT) capsule capsule, Take 1 capsule by mouth 1 (One) Time Per Week.   HOLD FOR SURGERY, Disp: , Rfl:         Allergies         Allergies   Allergen Reactions    Adhesive Tape Hives and Itching       BANDAIDS-TOLERATES PAPER TAPE    Amoxicillin Hives and Itching    Latex Other (See Comments)       Lips and nose swelled    Red Dye #40 (Allura Red) Nausea And Vomiting    Rosuvastatin Anaphylaxis    Shellfish-Derived Products Shortness Of Breath    Lactose Intolerance (Gi) Nausea And Vomiting    Pennsaid [Diclofenac Sodium] Dizziness    Prednisone Other (See Comments)       FACIAL SKIN FLUSHING WITH HIGH DOSES    Simvastatin Myalgia                  Family History   Problem Relation Age of Onset    Cancer Sister           \"FEMALE\"    Hypertension Father      Breast cancer Daughter 45    Deep vein thrombosis Niece      Heart attack Mother      Malig Hyperthermia Neg Hx           Social History   Social History            Socioeconomic History    Marital status:        Spouse name: KEN    Number of children: 3    Years of education: College   Tobacco Use    Smoking status: Former       Current packs/day: 0.00       Average packs/day: 1 pack/day for 23.0 years (23.0 ttl pk-yrs)       Types: Cigarettes       Start date: 1978       Quit date: 2001       Years since quittin.1       Passive exposure: Never    Smokeless tobacco: Never    Tobacco comments:       1ppd x20 yrs   Vaping Use    Vaping status: Never Used   Substance and Sexual Activity    Alcohol use: No       Comment: Caffeine use: Tea 2 cups daily    Drug use: No    Sexual activity: Not Currently       Partners: Male       Birth control/protection: Post-menopausal, Surgical            Review of Systems   Gastrointestinal:  Positive for constipation. Negative for abdominal pain.   All other systems reviewed and are negative.     Pertinent positives and negatives documented in the HPI and all other " systems reviewed and were found to be negative.      Vitals:     10/31/24 0950   BP: (!) 193/81   Pulse: 78   Temp: 97.1 °F (36.2 °C)         Physical Exam  Vitals reviewed.   Constitutional:       General: She is not in acute distress.     Appearance: Normal appearance. She is well-developed. She is not diaphoretic.   HENT:      Head: Normocephalic and atraumatic. Hair is normal.      Right Ear: Hearing, tympanic membrane, ear canal and external ear normal. No decreased hearing noted. No drainage.      Left Ear: Hearing, tympanic membrane, ear canal and external ear normal. No decreased hearing noted.      Nose: Nose normal. No nasal deformity.      Mouth/Throat:      Mouth: Mucous membranes are moist.   Eyes:      General: Lids are normal.         Right eye: No discharge.         Left eye: No discharge.      Extraocular Movements: Extraocular movements intact.      Conjunctiva/sclera: Conjunctivae normal.      Pupils: Pupils are equal, round, and reactive to light.   Neck:      Thyroid: No thyromegaly.      Vascular: No JVD.      Trachea: No tracheal deviation.   Cardiovascular:      Rate and Rhythm: Normal rate and regular rhythm.      Pulses: Normal pulses.      Heart sounds: Normal heart sounds. No murmur heard.     No friction rub. No gallop.   Pulmonary:      Effort: Pulmonary effort is normal. No respiratory distress.      Breath sounds: Normal breath sounds. No wheezing or rales.   Chest:      Chest wall: No tenderness.   Abdominal:      General: Bowel sounds are normal. There is no distension.      Palpations: Abdomen is soft. There is no mass.      Tenderness: There is no abdominal tenderness. There is no guarding or rebound.      Hernia: No hernia is present.      Comments: Ostomy in LLQ, looks good.    Musculoskeletal:         General: No tenderness or deformity. Normal range of motion.      Cervical back: Normal range of motion and neck supple.   Lymphadenopathy:      Cervical: No cervical adenopathy.    Skin:     General: Skin is warm and dry.      Findings: No erythema or rash.   Neurological:      Mental Status: She is alert and oriented to person, place, and time.      Cranial Nerves: No cranial nerve deficit.      Motor: No abnormal muscle tone.      Coordination: Coordination normal.      Deep Tendon Reflexes: Reflexes are normal and symmetric. Reflexes normal.   Psychiatric:         Mood and Affect: Mood normal.         Behavior: Behavior normal.         Thought Content: Thought content normal.         Judgment: Judgment normal.            Diagnoses and all orders for this visit:     1. Polyp of colon, unspecified part of colon, unspecified type (Primary)  -     Amylase  -     Lipase  -     CBC & Differential  -     Comprehensive Metabolic Panel  -     Celiac Ab tTG DGP TIgA  -     TSH     2. Gastroesophageal reflux disease with esophagitis without hemorrhage  -     Amylase  -     Lipase  -     CBC & Differential  -     Comprehensive Metabolic Panel  -     Celiac Ab tTG DGP TIgA  -     TSH     3. Malignant neoplasm of sigmoid colon  -     Amylase  -     Lipase  -     CBC & Differential  -     Comprehensive Metabolic Panel  -     Celiac Ab tTG DGP TIgA  -     TSH     4. Nausea  -     Amylase  -     Lipase  -     CBC & Differential  -     Comprehensive Metabolic Panel  -     Celiac Ab tTG DGP TIgA  -     TSH     5. Alternating constipation and diarrhea  -     Amylase  -     Lipase  -     CBC & Differential  -     Comprehensive Metabolic Panel  -     Celiac Ab tTG DGP TIgA  -     TSH     6. Lower abdominal pain  -     Amylase  -     Lipase  -     CBC & Differential  -     Comprehensive Metabolic Panel  -     Celiac Ab tTG DGP TIgA  -     TSH        Assessment:  Constipation alternating with diarrhea  Nausea  Lower abd discomfort  Abnormal distal esophagus on PET/CT        Recommendations:  Continue the miralax daily  EGD and colonoscopy. She doesn't want to schedule now for fear of a  complication?  Labs  Recheck BP  F/u 4 weeks.      Return in about 4 weeks (around 11/28/2024).     11/15/24 - No change from the above H and P.   James Romeo MD

## 2024-11-18 LAB
CYTO UR: NORMAL
LAB AP CASE REPORT: NORMAL
PATH REPORT.FINAL DX SPEC: NORMAL
PATH REPORT.GROSS SPEC: NORMAL

## 2024-11-23 NOTE — PROGRESS NOTES
"Chief Complaint  Follow-up    Subjective          Faviola Issa presents to [facility]  History of Present Illness  The patient is a 74-year-old female who presents for evaluation of multiple medical concerns.    She received her influenza vaccine in 09/2024 at [facility]. That night, she experienced severe dizziness, nausea, and a sensation of the room spinning. She also had difficulty vomiting and broke out in a sweat. She spent most of the day in bed and sought medical attention a few days later. She was diagnosed with an ear infection and prescribed dicyclomine and a steroid pack, which provided some relief. However, she still experiences occasional morning nausea and lightheadedness.    She was referred to a gastroenterologist who recommended an endoscopy, but she declined. A CT scan revealed a hiatal hernia, and she was prescribed Protonix.    She also reported high blood pressure and has been taking losartan as needed. She expressed concern about the potential impact of losartan on her kidneys.    She has been cooking healthy meals and taking pravastatin 20 mg for cholesterol. She also mentioned that her protein and iron levels have been low since her hip surgery.    FAMILY HISTORY  Her mother and sister had hiatal hernia.    Objective   Vital Signs:   /80 (BP Location: Right arm, Patient Position: Sitting, Cuff Size: Adult)   Pulse 58   Temp 97.7 °F (36.5 °C) (Oral)   Ht 170.7 cm (67.21\")   Wt 84.5 kg (186 lb 3.2 oz)   SpO2 97%   BMI 28.99 kg/m²     Physical Exam  Vitals and nursing note reviewed.   Constitutional:       Appearance: She is well-developed.   HENT:      Head: Normocephalic and atraumatic.   Musculoskeletal:      Cervical back: Normal range of motion and neck supple.   Neurological:      Mental Status: She is alert and oriented to person, place, and time.   Psychiatric:         Behavior: Behavior normal.         Physical Exam       Result Review : "                 Assessment and Plan    Diagnoses and all orders for this visit:    1. Essential hypertension  -     losartan (COZAAR) 25 MG tablet; Take 1 tablet by mouth Daily. TAKE 1/2 (ONE-HALF) TABLET BY MOUTH TWICE DAILY AS NEEDED.  Dispense: 90 tablet; Refill: 1    2. Hyperlipidemia, unspecified hyperlipidemia type  -     pravastatin (Pravachol) 20 MG tablet; Take 1 tablet by mouth Daily.  Dispense: 90 tablet; Refill: 3      Assessment & Plan  1. Vertigo.  The vertigo experienced could be due to an ear infection, as both ears were affected. It is unlikely to be related to the influenza vaccine. No further blood work is needed as recent results are satisfactory.    2. Hiatal Hernia.  She reports experiencing GERD symptoms. She is currently on Protonix for management. She is advised to continue with Protonix as prescribed.    3. Hypertension.  She is advised to continue taking the full pill of losartan daily. Losartan is kidney protective and should not cause harm to her kidneys.    4. Hyperlipidemia.  She is advised to continue taking pravastatin 20 mg for cholesterol management.    5. Health Maintenance.  A colonoscopy is scheduled for this Friday.    Follow-up  Return in late January or early February for follow-up.    Follow Up   No follow-ups on file.  Patient was given instructions and counseling regarding her condition or for health maintenance advice. Please see specific information pulled into the AVS if appropriate.           Patient or patient representative verbalized consent for the use of Ambient Listening during the visit with  Roger Beard MD for chart documentation. 11/23/2024  09:17 EST

## 2024-12-02 ENCOUNTER — TELEPHONE (OUTPATIENT)
Dept: GASTROENTEROLOGY | Facility: CLINIC | Age: 74
End: 2024-12-02
Payer: MEDICARE

## 2024-12-02 NOTE — TELEPHONE ENCOUNTER
Hub staff attempted to follow warm transfer process and was unsuccessful     Caller: Faviola Issa    Relationship to patient: Self    Best call back number: [phone]    Patient is needing: PATIENT CALLING BACK ABOUT RESULTS. MISSED CALL. PLEASE REACH OUT TO ASSIST. THANK YOU.

## 2024-12-02 NOTE — TELEPHONE ENCOUNTER
Vm left for pt to call back  Results routed to the PCP via [facility]  Hm and cs recall placed for 11/15/27

## 2024-12-02 NOTE — TELEPHONE ENCOUNTER
----- Message from James Romeo sent at 11/27/2024  3:48 PM EST -----  11/27/24       Tell her that pathology from her recent EGD showed some inflammation of the stomach but no evidence of Helicobacter pylori.  The distal esophageal biopsies showed evidence of gastroesophageal reflux disease.       The colon biopsies came back normal.  I recommend that she have a repeat colonoscopy in 3 to 5 years.       Please send a copy of this report to her PCP.  Sharon orozco

## 2024-12-05 DIAGNOSIS — I10 ESSENTIAL HYPERTENSION: ICD-10-CM

## 2024-12-11 RX ORDER — LOSARTAN POTASSIUM 25 MG/1
TABLET ORAL
Qty: 40 TABLET | Refills: 0 | OUTPATIENT
Start: 2024-12-11

## 2024-12-20 ENCOUNTER — OFFICE VISIT (OUTPATIENT)
Dept: INTERNAL MEDICINE | Facility: CLINIC | Age: 74
End: 2024-12-20
Payer: MEDICARE

## 2024-12-20 VITALS
DIASTOLIC BLOOD PRESSURE: 85 MMHG | WEIGHT: 190.5 LBS | SYSTOLIC BLOOD PRESSURE: 140 MMHG | OXYGEN SATURATION: 97 % | HEART RATE: 84 BPM | HEIGHT: 64 IN | BODY MASS INDEX: 32.52 KG/M2

## 2024-12-20 DIAGNOSIS — I10 ESSENTIAL HYPERTENSION: ICD-10-CM

## 2024-12-20 DIAGNOSIS — F43.21 GRIEF: Primary | ICD-10-CM

## 2024-12-20 DIAGNOSIS — R42 VERTIGO: ICD-10-CM

## 2024-12-20 DIAGNOSIS — I10 PRIMARY HYPERTENSION: ICD-10-CM

## 2024-12-20 PROCEDURE — 1160F RVW MEDS BY RX/DR IN RCRD: CPT | Performed by: NURSE PRACTITIONER

## 2024-12-20 PROCEDURE — 3077F SYST BP >= 140 MM HG: CPT | Performed by: NURSE PRACTITIONER

## 2024-12-20 PROCEDURE — 3079F DIAST BP 80-89 MM HG: CPT | Performed by: NURSE PRACTITIONER

## 2024-12-20 PROCEDURE — 1126F AMNT PAIN NOTED NONE PRSNT: CPT | Performed by: NURSE PRACTITIONER

## 2024-12-20 PROCEDURE — 99214 OFFICE O/P EST MOD 30 MIN: CPT | Performed by: NURSE PRACTITIONER

## 2024-12-20 PROCEDURE — 1159F MED LIST DOCD IN RCRD: CPT | Performed by: NURSE PRACTITIONER

## 2024-12-20 RX ORDER — MECLIZINE HYDROCHLORIDE 25 MG/1
25 TABLET ORAL 3 TIMES DAILY PRN
Qty: 21 TABLET | Refills: 0 | Status: SHIPPED | OUTPATIENT
Start: 2024-12-20 | End: 2024-12-27

## 2024-12-20 NOTE — PROGRESS NOTES
Chief Complaint  Dizziness  HTN    Subjective        Faviola Issa presents to [facility]IMARY [facility]  History of Present Illness  History of Present Illness  The patient is a 74-year-old female who presents for evaluation of vertigo, hypertension, and allergies.    She experienced an initial episode of vertigo following an influenza vaccination. . The onset of symptoms was sudden, characterized by a sensation of the room spinning, accompanied by profuse sweating. She managed to reach her recliner and subsequently returned to bed. This incident led to the cancellation of a planned trip to [place]. She sought medical attention at an urgent care facility in October, where she was informed that her ears were inflamed. A recurrence of the vertigo was noted on the previous Sunday. She has been using a humidifier due to sinus issues but tested negative for COVID-19.     She suspects a potential underlying condition related to her ears, as she has never experienced such symptoms following an influenza vaccination. She has been monitoring her inhalers and reports a good appetite. She has also initiated weight loss efforts. She is not currently engaged in grief counseling. She has been managing her symptoms with meclizine 12.5 mg, which she takes once daily, but reports a dwindling supply. She has been performing exercises recommended by her daughter.    She typically experiences elevated blood pressure during clinic visits. She did not monitor her blood pressure this morning. She has a home blood pressure monitor and usually checks her readings in the morning and evening. She is under the care of a cardiologist, Dr. Nahum Benavides. She took her antihypertensive medication this morning.    She does not take any medications for allergies.    Supplemental Information  She hurt her leg at work and she did not report the incident.  A day later, she got to step out of the car and her leg went numb. It was a  "torn meniscus and a stress fracture. She went from there to boot heel.     SOCIAL HISTORY  She has been working with [email or web address] for 16 years. She has 3 children.    MEDICATIONS  Current: Meclizine, Cozaar    IMMUNIZATIONS  She received a flu shot in September.       Objective   Vital Signs:  /85   Pulse 84   Ht 162.6 cm (64.02\")   Wt 86.4 kg (190 lb 8 oz)   SpO2 97%   BMI 32.68 kg/m²   Estimated body mass index is 32.68 kg/m² as calculated from the following:    Height as of this encounter: 162.6 cm (64.02\").    Weight as of this encounter: 86.4 kg (190 lb 8 oz).         Physical Exam  Vitals and nursing note reviewed.   Constitutional:       Appearance: Normal appearance.   HENT:      Head: Normocephalic.      Right Ear: Tympanic membrane, ear canal and external ear normal. There is no impacted cerumen.      Left Ear: Tympanic membrane, ear canal and external ear normal. There is no impacted cerumen.      Nose: Nose normal.      Mouth/Throat:      Mouth: Mucous membranes are moist.   Eyes:      Pupils: Pupils are equal, round, and reactive to light.   Cardiovascular:      Rate and Rhythm: Normal rate and regular rhythm.      Pulses: Normal pulses.      Heart sounds: Normal heart sounds.      Comments: No peripheral edema  Pulmonary:      Effort: Pulmonary effort is normal. No respiratory distress.      Breath sounds: Normal breath sounds. No stridor. No wheezing, rhonchi or rales.      Comments: Denies shortness of breath  Chest:      Chest wall: No tenderness.   Musculoskeletal:         General: Normal range of motion.   Skin:     General: Skin is warm.      Capillary Refill: Capillary refill takes less than 2 seconds.   Neurological:      Mental Status: She is alert and oriented to person, place, and time.   Psychiatric:         Behavior: Behavior normal.        Physical Exam  Ears appear normal with no wax present.     Result Review :      Common labs          8/9/2024    17:48 8/14/2024    08:54 " 10/31/2024    11:11   Common Labs   Glucose 100  84  82    BUN 11  10  9    Creatinine 0.85  0.83  0.84    Sodium 136  143  143    Potassium 4.2  4.3  4.9    Chloride 103  104  104    Calcium 9.4  9.3  9.3    Total Protein  7.6  7.6    Albumin 4.3  4.5  4.5    Total Bilirubin 0.4  0.5  0.5    Alkaline Phosphatase 107  117  110    AST (SGOT) 14  13  14    ALT (SGPT) 9  7  7    WBC 9.57  6.83  7.6    Hemoglobin 13.9  14.2  14.5    Hematocrit 42.3  43.5  47.8    Platelets 288  312  303    Total Cholesterol  164     Triglycerides  80     HDL Cholesterol  75     LDL Cholesterol   74     Hemoglobin A1C  5.30         Results  UC with Velvet Lizama APRN (10/08/2024)               Assessment and Plan     Diagnoses and all orders for this visit:    1. Grief (Primary)    2. Vertigo    3. Essential hypertension    4. Primary hypertension    Other orders  -     meclizine (ANTIVERT) 25 MG tablet; Take 1 tablet by mouth 3 (Three) Times a Day As Needed for Dizziness for up to 7 days.  Dispense: 21 tablet; Refill: 0      Assessment & Plan  1. Vertigo.  A prescription for meclizine 25 mg, to be taken every 8 hours as needed, will be provided. She is advised to maintain adequate hydration and consider the use of an electrolyte supplement such as Ultima.    2. Hypertension.  Her blood pressure remains elevated during this visit. She is advised to monitor her blood pressure at home after a period of relaxation. If the reading exceeds 140/90, she may halve her current medication dosage. In the event of chest pain or shortness of breath, she should seek immediate medical attention at the nearest hospital.    3. Allergies.  She is advised to take Claritin 10 mg daily to help manage her symptoms.         I spent 35 minutes caring for Faviola on this date of service. This time includes time spent by me in the following activities:preparing for the visit, reviewing tests, obtaining and/or reviewing a separately obtained history, performing a  medically appropriate examination and/or evaluation , counseling and educating the patient/family/caregiver, ordering medications, tests, or procedures, referring and communicating with other health care professionals , documenting information in the medical record, independently interpreting results and communicating that information with the patient/family/caregiver, and care coordination  Follow Up     Return in about 4 weeks (around 1/17/2025) for Recheck.  Patient was given instructions and counseling regarding her condition or for health maintenance advice. Please see specific information pulled into the AVS if appropriate.     Patient or patient representative verbalized consent for the use of Ambient Listening during the visit with  IGGY Slade for chart documentation. 12/20/2024  10:48 EST

## 2025-01-02 ENCOUNTER — TELEPHONE (OUTPATIENT)
Dept: INTERNAL MEDICINE | Facility: CLINIC | Age: 75
End: 2025-01-02
Payer: MEDICARE

## 2025-01-02 NOTE — TELEPHONE ENCOUNTER
Offered pt appointment to come to office to be seen. She said she did not have a fever or sob but she is wheezing.  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  I offered her an appointment with Dr. Beard for tomorrow but she declined said she is babysitting.  I told her she needed to be seen in office to be evaluated.

## 2025-01-02 NOTE — TELEPHONE ENCOUNTER
I would see her in the office this afternoon.   Chirag has an opening tomorrow morning.   If she is wheezing, she needs to be evaluated. Veronica

## 2025-01-07 ENCOUNTER — TELEMEDICINE (OUTPATIENT)
Dept: INTERNAL MEDICINE | Facility: CLINIC | Age: 75
End: 2025-01-07
Payer: MEDICARE

## 2025-01-07 DIAGNOSIS — J01.00 ACUTE NON-RECURRENT MAXILLARY SINUSITIS: Primary | ICD-10-CM

## 2025-01-07 PROCEDURE — 99213 OFFICE O/P EST LOW 20 MIN: CPT | Performed by: FAMILY MEDICINE

## 2025-01-07 PROCEDURE — 1160F RVW MEDS BY RX/DR IN RCRD: CPT | Performed by: FAMILY MEDICINE

## 2025-01-07 PROCEDURE — 1126F AMNT PAIN NOTED NONE PRSNT: CPT | Performed by: FAMILY MEDICINE

## 2025-01-07 PROCEDURE — 1159F MED LIST DOCD IN RCRD: CPT | Performed by: FAMILY MEDICINE

## 2025-01-07 NOTE — PROGRESS NOTES
"Chief Complaint  No chief complaint on file.    Cc cough    This was an audio and video enabled telemedicine encounter.    I am in the office, patient is at home.     Subjective        Faviola Issa presents to [facility]  History of Present Illness    Patient also have sinus pain and sinus pressure.  Patient states that been going on for some time now.  Patient states that she would like to perhaps get on some medication to help this.  Patient states that she has tried several over-the-counter medicines without getting any better.  This been going on for 2 weeks.    Objective   Vital Signs:  There were no vitals taken for this visit.  Estimated body mass index is 32.68 kg/m² as calculated from the following:    Height as of 12/20/24: 162.6 cm (64.02\").    Weight as of 12/20/24: 86.4 kg (190 lb 8 oz).            Physical Exam  Vitals and nursing note reviewed.   Constitutional:       Appearance: She is well-developed.   HENT:      Head: Normocephalic and atraumatic.   Musculoskeletal:      Cervical back: Normal range of motion and neck supple.   Neurological:      Mental Status: She is alert and oriented to person, place, and time.   Psychiatric:         Behavior: Behavior normal.        Result Review :                Assessment and Plan   Diagnoses and all orders for this visit:    1. Acute non-recurrent maxillary sinusitis (Primary)  -     amoxicillin-clavulanate (AUGMENTIN) 875-125 MG per tablet; Take 1 tablet by mouth 2 (Two) Times a Day for 7 days.  Dispense: 14 tablet; Refill: 0      Patient does state that she is able to handle Augmentin despite a penicillin allergy listed in her follow-up.  We discussed about perhaps starting this, as well as continuing some over-the-counter regimen.       Follow Up   No follow-ups on file.  Patient was given instructions and counseling regarding her condition or for health maintenance advice. Please see specific information pulled into the " AVS if appropriate.     Mode of Visit: Video  Location of patient: -HOME-  Location of provider[place] [facility]  You have chosen to receive care through a telehealth visit.  The patient has signed the video visit consent form.  The visit included audio and video interaction. No technical issues occurred during this visit.

## 2025-01-13 ENCOUNTER — TRANSCRIBE ORDERS (OUTPATIENT)
Dept: ADMINISTRATIVE | Facility: HOSPITAL | Age: 75
End: 2025-01-13
Payer: MEDICARE

## 2025-01-13 DIAGNOSIS — Z12.31 ENCOUNTER FOR SCREENING MAMMOGRAM FOR MALIGNANT NEOPLASM OF BREAST: Primary | ICD-10-CM

## 2025-02-03 ENCOUNTER — HOSPITAL ENCOUNTER (OUTPATIENT)
Dept: CT IMAGING | Facility: HOSPITAL | Age: 75
Discharge: HOME | End: 2025-02-03
Admitting: INTERNAL MEDICINE
Payer: MEDICARE

## 2025-02-03 DIAGNOSIS — R91.1 LUNG NODULE: ICD-10-CM

## 2025-02-03 PROCEDURE — 71250 CT THORAX DX C-: CPT

## 2025-02-12 ENCOUNTER — OFFICE VISIT (OUTPATIENT)
Dept: INTERNAL MEDICINE | Facility: CLINIC | Age: 75
End: 2025-02-12
Payer: MEDICARE

## 2025-02-12 VITALS
BODY MASS INDEX: 31.76 KG/M2 | HEIGHT: 64 IN | OXYGEN SATURATION: 97 % | TEMPERATURE: 97.8 F | RESPIRATION RATE: 16 BRPM | DIASTOLIC BLOOD PRESSURE: 82 MMHG | WEIGHT: 186 LBS | SYSTOLIC BLOOD PRESSURE: 138 MMHG | HEART RATE: 64 BPM

## 2025-02-12 DIAGNOSIS — I10 ESSENTIAL HYPERTENSION: ICD-10-CM

## 2025-02-12 DIAGNOSIS — E03.9 HYPOTHYROIDISM, UNSPECIFIED TYPE: ICD-10-CM

## 2025-02-12 DIAGNOSIS — E55.9 VITAMIN D DEFICIENCY: ICD-10-CM

## 2025-02-12 DIAGNOSIS — E78.5 HYPERLIPIDEMIA, UNSPECIFIED HYPERLIPIDEMIA TYPE: Primary | ICD-10-CM

## 2025-02-12 PROCEDURE — 99214 OFFICE O/P EST MOD 30 MIN: CPT | Performed by: FAMILY MEDICINE

## 2025-02-12 PROCEDURE — 3075F SYST BP GE 130 - 139MM HG: CPT | Performed by: FAMILY MEDICINE

## 2025-02-12 PROCEDURE — G2211 COMPLEX E/M VISIT ADD ON: HCPCS | Performed by: FAMILY MEDICINE

## 2025-02-12 PROCEDURE — 3079F DIAST BP 80-89 MM HG: CPT | Performed by: FAMILY MEDICINE

## 2025-02-12 PROCEDURE — 1126F AMNT PAIN NOTED NONE PRSNT: CPT | Performed by: FAMILY MEDICINE

## 2025-02-12 PROCEDURE — 1160F RVW MEDS BY RX/DR IN RCRD: CPT | Performed by: FAMILY MEDICINE

## 2025-02-12 PROCEDURE — 1159F MED LIST DOCD IN RCRD: CPT | Performed by: FAMILY MEDICINE

## 2025-02-12 RX ORDER — BEMPEDOIC ACID 180 MG/1
1 TABLET, FILM COATED ORAL DAILY
Qty: 30 TABLET | Refills: 11 | Status: SHIPPED | OUTPATIENT
Start: 2025-02-12

## 2025-02-12 RX ORDER — LOSARTAN POTASSIUM 25 MG/1
25 TABLET ORAL DAILY
Qty: 90 TABLET | Refills: 1 | Status: SHIPPED | OUTPATIENT
Start: 2025-02-12

## 2025-02-14 NOTE — PROGRESS NOTES
"Chief Complaint  Hypertension    Subjective          Faviola Issa presents to [facility]  History of Present Illness  The patient is a 74-year-old female who presents for evaluation of hyperlipidemia, hypertension, hypothyroidism, and vitamin D deficiency.    She has been experiencing persistent nausea and dizziness upon awakening, which she attributes to her pravastatin medication taken at bedtime. She also reports associated diarrhea. These symptoms have led to a significant decrease in her appetite, particularly for breakfast. She discontinued the medication last week due to these adverse effects.    She has hypothyroidism and is taking Synthroid 100 mcg daily. Her ENT doctor, Dr. France Curtis, is writing her the Synthroid.    She is on losartan 25 mg daily for high blood pressure.    She is on a prescription for vitamin D but has to call her ENT doctor every time for refills as they are not being sent to her pharmacy. She wants to make sure she does not need it anymore. She is also concerned about her potassium levels.    Supplemental Information  She had a severe cold, which she caught from her daughters and granddaughter. She is feeling better now, but the antibiotic caused her to have diarrhea for days.    MEDICATIONS  Current: Losartan, Synthroid, vitamin D.  Discontinued: Pravastatin.    Objective   Vital Signs:   /82 (BP Location: Left arm, Patient Position: Sitting)   Pulse 64   Temp 97.8 °F (36.6 °C) (Oral)   Resp 16   Ht 162.6 cm (64.02\")   Wt 84.4 kg (186 lb)   SpO2 97%   BMI 31.91 kg/m²     Physical Exam  Vitals and nursing note reviewed.   Constitutional:       Appearance: She is well-developed.   HENT:      Head: Normocephalic and atraumatic.   Musculoskeletal:      Cervical back: Normal range of motion and neck supple.   Neurological:      Mental Status: She is alert and oriented to person, place, and time.   Psychiatric:         Behavior: Behavior " normal.         Physical Exam       Result Review :                 Assessment and Plan    Diagnoses and all orders for this visit:    1. Hyperlipidemia, unspecified hyperlipidemia type (Primary)  -     Bempedoic Acid (Nexletol) 180 MG tablet; Take 1 tablet by mouth Daily.  Dispense: 30 tablet; Refill: 11  -     Comprehensive Metabolic Panel  -     Lipid Panel With LDL / HDL Ratio    2. Essential hypertension  -     losartan (COZAAR) 25 MG tablet; Take 1 tablet by mouth Daily. (May TAKE 1/2 (ONE-HALF) TABLET BY MOUTH TWICE DAILY AS NEEDED.) Maximum 1 tablet per day.  Dispense: 90 tablet; Refill: 1  -     Comprehensive Metabolic Panel  -     CBC & Differential    3. Hypothyroidism, unspecified type  -     Thyroid Panel With TSH    4. Vitamin D deficiency  -     Vitamin D,25-Hydroxy      Assessment & Plan  1. Hyperlipidemia.  She reports nausea, dizziness, and diarrhea associated with pravastatin. Pravastatin will be discontinued. A prescription for Nexletol (bempedoic acid) will be issued, and samples will be provided once available to avoid out-of-pocket costs.    2. Hypertension.  She is currently taking losartan 25 mg daily. No changes to her hypertension management were discussed.    3. Hypothyroidism.  She is taking Synthroid 100 mcg daily, prescribed by her ENT, Dr. France Curtis. No changes to her thyroid management were discussed.    4. Vitamin D deficiency.  She is on a prescription for vitamin D but has to call her ENT doctor every time for refills as they are not being sent to her pharmacy. She wants to make sure she does not need it anymore. She is also concerned about her potassium levels. Blood work will be conducted today to assess her vitamin D and potassium levels.    Follow Up   No follow-ups on file.  Patient was given instructions and counseling regarding her condition or for health maintenance advice. Please see specific information pulled into the AVS if appropriate.           Patient or  patient representative verbalized consent for the use of Ambient Listening during the visit with  Roger Beard MD for chart documentation. 2/14/2025  14:46 EST

## 2025-02-18 LAB
25(OH)D3+25(OH)D2 SERPL-MCNC: 51.7 NG/ML (ref 30–100)
ALBUMIN SERPL-MCNC: 4.1 G/DL (ref 3.5–5.2)
ALBUMIN/GLOB SERPL: 1.2 G/DL
ALP SERPL-CCNC: 104 U/L (ref 39–117)
ALT SERPL-CCNC: 7 U/L (ref 1–33)
AST SERPL-CCNC: 16 U/L (ref 1–32)
BASOPHILS # BLD AUTO: 0.05 10*3/MM3 (ref 0–0.2)
BASOPHILS NFR BLD AUTO: 0.7 % (ref 0–1.5)
BILIRUB SERPL-MCNC: 0.4 MG/DL (ref 0–1.2)
BUN SERPL-MCNC: 11 MG/DL (ref 8–23)
BUN/CREAT SERPL: 13.9 (ref 7–25)
CALCIUM SERPL-MCNC: 9.3 MG/DL (ref 8.6–10.5)
CHLORIDE SERPL-SCNC: 102 MMOL/L (ref 98–107)
CHOLEST SERPL-MCNC: 216 MG/DL (ref 0–200)
CO2 SERPL-SCNC: 27.5 MMOL/L (ref 22–29)
CREAT SERPL-MCNC: 0.79 MG/DL (ref 0.57–1)
EGFRCR SERPLBLD CKD-EPI 2021: 79.1 ML/MIN/1.73
EOSINOPHIL # BLD AUTO: 0.08 10*3/MM3 (ref 0–0.4)
EOSINOPHIL NFR BLD AUTO: 1.1 % (ref 0.3–6.2)
ERYTHROCYTE [DISTWIDTH] IN BLOOD BY AUTOMATED COUNT: 13.2 % (ref 12.3–15.4)
FT4I SERPL CALC-MCNC: 3.3 (ref 1.2–4.9)
GLOBULIN SER CALC-MCNC: 3.4 GM/DL
GLUCOSE SERPL-MCNC: 77 MG/DL (ref 65–99)
HCT VFR BLD AUTO: 43.7 % (ref 34–46.6)
HDLC SERPL-MCNC: 68 MG/DL (ref 40–60)
HGB BLD-MCNC: 14.4 G/DL (ref 12–15.9)
IMM GRANULOCYTES # BLD AUTO: 0.02 10*3/MM3 (ref 0–0.05)
IMM GRANULOCYTES NFR BLD AUTO: 0.3 % (ref 0–0.5)
LDLC SERPL CALC-MCNC: 133 MG/DL (ref 0–100)
LDLC/HDLC SERPL: 1.93 {RATIO}
LYMPHOCYTES # BLD AUTO: 1.42 10*3/MM3 (ref 0.7–3.1)
LYMPHOCYTES NFR BLD AUTO: 20.4 % (ref 19.6–45.3)
MCH RBC QN AUTO: 28.6 PG (ref 26.6–33)
MCHC RBC AUTO-ENTMCNC: 33 G/DL (ref 31.5–35.7)
MCV RBC AUTO: 86.7 FL (ref 79–97)
MONOCYTES # BLD AUTO: 0.5 10*3/MM3 (ref 0.1–0.9)
MONOCYTES NFR BLD AUTO: 7.2 % (ref 5–12)
NEUTROPHILS # BLD AUTO: 4.9 10*3/MM3 (ref 1.7–7)
NEUTROPHILS NFR BLD AUTO: 70.3 % (ref 42.7–76)
NRBC BLD AUTO-RTO: 0 /100 WBC (ref 0–0.2)
PLATELET # BLD AUTO: 307 10*3/MM3 (ref 140–450)
POTASSIUM SERPL-SCNC: 4.3 MMOL/L (ref 3.5–5.2)
PROT SERPL-MCNC: 7.5 G/DL (ref 6–8.5)
RBC # BLD AUTO: 5.04 10*6/MM3 (ref 3.77–5.28)
SODIUM SERPL-SCNC: 140 MMOL/L (ref 136–145)
T3RU NFR SERPL: 30 % (ref 24–39)
T4 SERPL-MCNC: 10.9 UG/DL (ref 4.5–12)
TRIGL SERPL-MCNC: 83 MG/DL (ref 0–150)
TSH SERPL DL<=0.005 MIU/L-ACNC: 1.11 UIU/ML (ref 0.45–4.5)
VLDLC SERPL CALC-MCNC: 15 MG/DL (ref 5–40)
WBC # BLD AUTO: 6.97 10*3/MM3 (ref 3.4–10.8)

## 2025-02-20 ENCOUNTER — TRANSCRIBE ORDERS (OUTPATIENT)
Dept: ADMINISTRATIVE | Facility: HOSPITAL | Age: 75
End: 2025-02-20
Payer: MEDICARE

## 2025-02-20 DIAGNOSIS — R91.1 LUNG NODULE: Primary | ICD-10-CM

## 2025-02-26 ENCOUNTER — TRANSCRIBE ORDERS (OUTPATIENT)
Dept: ADMINISTRATIVE | Facility: HOSPITAL | Age: 75
End: 2025-02-26
Payer: MEDICARE

## 2025-02-26 ENCOUNTER — HOSPITAL ENCOUNTER (OUTPATIENT)
Dept: CARDIOLOGY | Facility: HOSPITAL | Age: 75
Discharge: HOME | End: 2025-02-26
Admitting: ORTHOPAEDIC SURGERY
Payer: MEDICARE

## 2025-02-26 DIAGNOSIS — R60.0 EDEMA OF LEFT LOWER EXTREMITY: ICD-10-CM

## 2025-02-26 DIAGNOSIS — M79.662 PAIN OF LEFT CALF: ICD-10-CM

## 2025-02-26 DIAGNOSIS — R60.0 EDEMA OF LEFT LOWER EXTREMITY: Primary | ICD-10-CM

## 2025-02-26 LAB
BH CV LOW VAS LEFT POPLITEAL SPONT: 1
BH CV LOWER VASCULAR LEFT COMMON FEMORAL AUGMENT: NORMAL
BH CV LOWER VASCULAR LEFT COMMON FEMORAL COMPETENT: NORMAL
BH CV LOWER VASCULAR LEFT COMMON FEMORAL COMPRESS: NORMAL
BH CV LOWER VASCULAR LEFT COMMON FEMORAL PHASIC: NORMAL
BH CV LOWER VASCULAR LEFT COMMON FEMORAL SPONT: NORMAL
BH CV LOWER VASCULAR LEFT DISTAL FEMORAL COMPRESS: NORMAL
BH CV LOWER VASCULAR LEFT GASTRONEMIUS COMPRESS: NORMAL
BH CV LOWER VASCULAR LEFT GREATER SAPH AK COMPRESS: NORMAL
BH CV LOWER VASCULAR LEFT GREATER SAPH BK COMPRESS: NORMAL
BH CV LOWER VASCULAR LEFT LESSER SAPH COMPRESS: NORMAL
BH CV LOWER VASCULAR LEFT MID FEMORAL AUGMENT: NORMAL
BH CV LOWER VASCULAR LEFT MID FEMORAL COMPETENT: NORMAL
BH CV LOWER VASCULAR LEFT MID FEMORAL COMPRESS: NORMAL
BH CV LOWER VASCULAR LEFT MID FEMORAL PHASIC: NORMAL
BH CV LOWER VASCULAR LEFT MID FEMORAL SPONT: NORMAL
BH CV LOWER VASCULAR LEFT PERONEAL COMPRESS: NORMAL
BH CV LOWER VASCULAR LEFT POPLITEAL AUGMENT: NORMAL
BH CV LOWER VASCULAR LEFT POPLITEAL COMPETENT: NORMAL
BH CV LOWER VASCULAR LEFT POPLITEAL COMPRESS: NORMAL
BH CV LOWER VASCULAR LEFT POPLITEAL PHASIC: NORMAL
BH CV LOWER VASCULAR LEFT POPLITEAL SPONT: NORMAL
BH CV LOWER VASCULAR LEFT POSTERIOR TIBIAL COMPRESS: NORMAL
BH CV LOWER VASCULAR LEFT PROFUNDA FEMORAL COMPRESS: NORMAL
BH CV LOWER VASCULAR LEFT PROXIMAL FEMORAL COMPRESS: NORMAL
BH CV LOWER VASCULAR LEFT SAPHENOFEMORAL JUNCTION COMPRESS: NORMAL
BH CV LOWER VASCULAR RIGHT COMMON FEMORAL AUGMENT: NORMAL
BH CV LOWER VASCULAR RIGHT COMMON FEMORAL COMPETENT: NORMAL
BH CV LOWER VASCULAR RIGHT COMMON FEMORAL COMPRESS: NORMAL
BH CV LOWER VASCULAR RIGHT COMMON FEMORAL PHASIC: NORMAL
BH CV LOWER VASCULAR RIGHT COMMON FEMORAL SPONT: NORMAL
BH CV POP FLUID COLLECT LEFT: 1
BH CV VAS PRELIMINARY FINDINGS SCRIPTING: 1

## 2025-02-26 PROCEDURE — 93971 EXTREMITY STUDY: CPT

## 2025-02-28 ENCOUNTER — HOSPITAL ENCOUNTER (OUTPATIENT)
Dept: MAMMOGRAPHY | Facility: HOSPITAL | Age: 75
Discharge: HOME | End: 2025-02-28
Admitting: FAMILY MEDICINE
Payer: MEDICARE

## 2025-02-28 DIAGNOSIS — Z12.31 ENCOUNTER FOR SCREENING MAMMOGRAM FOR MALIGNANT NEOPLASM OF BREAST: ICD-10-CM

## 2025-02-28 PROCEDURE — 77067 SCR MAMMO BI INCL CAD: CPT

## 2025-02-28 PROCEDURE — 77063 BREAST TOMOSYNTHESIS BI: CPT

## 2025-03-04 ENCOUNTER — TELEPHONE (OUTPATIENT)
Dept: INTERNAL MEDICINE | Facility: CLINIC | Age: 75
End: 2025-03-04

## 2025-03-04 NOTE — TELEPHONE ENCOUNTER
Caller: Faviola Issa    Relationship: Self    Best call back number: [phone]    What is the medical concern/diagnosis: ABNORMAL MAMMOGRAM     What specialty or service is being requested: ULTRA SOUND     What is the provider, practice or medical service name: [facility] DR. RIVERO [facility]     What is the office location:     What is the office phone number:     Any additional details: PATIENT GOT A LETTER IN THE MAIL STATING SHE NEEDS TO HAVE AN ULTRA SOUND DONE AFTER HER ABNORMAL MAMMOGRAM. PLEASE CALL TO CONFIRM THE LOCATION OF THE REFERRAL OFFICE PER PATIENT REQUEST.   PLEASE CALL

## 2025-03-06 NOTE — TELEPHONE ENCOUNTER
PATIENT CALLING STATING THAT NO ORDER HAS BEEN PLACED FOR THE ULTRASOUND SHE WOULD LIKE TO GET THIS ORDERED AS SOON AS POSSIBLE.

## 2025-03-07 DIAGNOSIS — R92.8 ABNORMAL MAMMOGRAM: Primary | ICD-10-CM

## 2025-03-07 NOTE — TELEPHONE ENCOUNTER
PATIENT IS CALLING BACK STATING THAT SHE HAS TO HAVE AN ORDER THAT STATES THAT DUE TO HAVING THE ABNORMAL MAMMOGRAM, SHE NOW HAS TO HAVE A DIAGNOSTIC MAMMOGRAM AND AN ULTRASOUND OF THE RIGHT BREAST.    PATIENT STATES THAT LETTER WAS SENT TO DR. BRENNER, AND THAT PATIENT SPOKE TO THE NURSE WHO TOLD HER THE ORDER WAS BEING SENT FOR HER TO HAVE THE DIAGNOSTIC MAMMO. AND ULTRASOUND.    PATIENT CALLED TO SCHEDULE THIS MORNING, AND WAS ADVISED THAT THIS ORDER HAS NEVER BEEN SENT FOR HER TO GET THIS DONE.      PATIENT STATES SHE IS WANTING TO GET THIS IMAGING DONE AS SOON AS POSSIBLE, AND WOULD LIKE THE ORDER SENT TODAY.

## 2025-03-14 ENCOUNTER — HOSPITAL ENCOUNTER (OUTPATIENT)
Dept: ULTRASOUND IMAGING | Facility: HOSPITAL | Age: 75
Discharge: HOME | End: 2025-03-14
Payer: MEDICARE

## 2025-03-14 ENCOUNTER — HOSPITAL ENCOUNTER (OUTPATIENT)
Dept: MAMMOGRAPHY | Facility: HOSPITAL | Age: 75
Discharge: HOME | End: 2025-03-14
Payer: MEDICARE

## 2025-03-14 DIAGNOSIS — R92.8 ABNORMAL MAMMOGRAM: ICD-10-CM

## 2025-03-14 PROCEDURE — 76642 ULTRASOUND BREAST LIMITED: CPT

## 2025-03-14 PROCEDURE — G0279 TOMOSYNTHESIS, MAMMO: HCPCS

## 2025-03-14 PROCEDURE — 77065 DX MAMMO INCL CAD UNI: CPT

## 2025-04-08 ENCOUNTER — TELEPHONE (OUTPATIENT)
Dept: INTERNAL MEDICINE | Facility: CLINIC | Age: 75
End: 2025-04-08

## 2025-04-08 NOTE — TELEPHONE ENCOUNTER
Hub staff attempted to follow warm transfer process and was unsuccessful     Caller: Faviola Issa    Relationship to patient: Self    Best call back number: [phone]     Patient is needing: PATIENT IS CALLING TO STATE HER FACE IS RED AND WARM AND ALSO SHE HAS SOME DIZZINESS.  THERE ARE NO AVAILABLE APPOINTMENTS TODAY AND   UNABLE TO WARM TRANSFER.    PLEASE ADVISE.

## 2025-04-09 DIAGNOSIS — E78.5 HYPERLIPIDEMIA, UNSPECIFIED HYPERLIPIDEMIA TYPE: ICD-10-CM

## 2025-04-10 ENCOUNTER — OFFICE VISIT (OUTPATIENT)
Dept: INTERNAL MEDICINE | Facility: CLINIC | Age: 75
End: 2025-04-10
Payer: MEDICARE

## 2025-04-10 VITALS
OXYGEN SATURATION: 100 % | WEIGHT: 187.2 LBS | TEMPERATURE: 97.7 F | BODY MASS INDEX: 31.96 KG/M2 | DIASTOLIC BLOOD PRESSURE: 90 MMHG | HEIGHT: 64 IN | SYSTOLIC BLOOD PRESSURE: 150 MMHG | HEART RATE: 54 BPM

## 2025-04-10 DIAGNOSIS — Z09 FOLLOW-UP EXAM: ICD-10-CM

## 2025-04-10 DIAGNOSIS — R21 BUTTERFLY RASH: ICD-10-CM

## 2025-04-10 DIAGNOSIS — R21 RASH: Primary | ICD-10-CM

## 2025-04-10 PROCEDURE — 1126F AMNT PAIN NOTED NONE PRSNT: CPT | Performed by: NURSE PRACTITIONER

## 2025-04-10 PROCEDURE — 1159F MED LIST DOCD IN RCRD: CPT | Performed by: NURSE PRACTITIONER

## 2025-04-10 PROCEDURE — 3080F DIAST BP >= 90 MM HG: CPT | Performed by: NURSE PRACTITIONER

## 2025-04-10 PROCEDURE — 99214 OFFICE O/P EST MOD 30 MIN: CPT | Performed by: NURSE PRACTITIONER

## 2025-04-10 PROCEDURE — 1160F RVW MEDS BY RX/DR IN RCRD: CPT | Performed by: NURSE PRACTITIONER

## 2025-04-10 PROCEDURE — 3077F SYST BP >= 140 MM HG: CPT | Performed by: NURSE PRACTITIONER

## 2025-04-10 NOTE — PROGRESS NOTES
"Chief Complaint  Rash and Dizziness    Subjective        Faviola Issa presents to [facility]  History of Present Illness  History of Present Illness      The patient presents for evaluation of facial redness, dizziness, and autoimmune disease.    She reports a persistent facial erythema that has been present for approximately 2 weeks, which she describes as rash-like in appearance. The condition appears to be improving, as observed in her mirror reflection. She has not introduced any new skincare products or creams. She was previously diagnosed with rosacea by a dermatologist 5 to 6 years ago, but at that time, the condition was not associated with heat.    She also experiences episodes of dizziness, which she attributes to vertigo. She recalls an incident where she received an influenza vaccine at [facility], after which she experienced a severe reaction characterized by room spinning and vomiting. She also reports occasional lightheadedness and sinus issues. She is scheduled to see Dr. Beard in 06/2025. She does not endorse any facial pressure.    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 She recently had a colonoscopy, which was normal. An endoscopy was performed after an MRI showed a hiatal hernia and GERD. She is currently taking pantoprazole and Synthroid. She has a history of colon cancer.    MEDICATIONS  Current: pantoprazole, Synthroid       Objective   Vital Signs:  /90   Pulse 54   Temp 97.7 °F (36.5 °C) (Oral)   Ht 162.6 cm (64.02\")   Wt 84.9 kg (187 lb 3.2 oz)   SpO2 100%   BMI 32.12 kg/m²   Estimated body mass index is 32.12 kg/m² as calculated from the following:    Height as of this encounter: 162.6 cm (64.02\").    Weight as of this encounter: 84.9 kg (187 lb 3.2 oz).       Physical Exam  Vitals and nursing note reviewed.   Constitutional:       Appearance: Normal appearance.   HENT:      Head: " Normocephalic.      Right Ear: Tympanic membrane, ear canal and external ear normal.      Left Ear: Tympanic membrane, ear canal and external ear normal.      Nose: Nose normal.      Mouth/Throat:      Mouth: Mucous membranes are moist.   Eyes:      Pupils: Pupils are equal, round, and reactive to light.   Cardiovascular:      Rate and Rhythm: Normal rate and regular rhythm.      Pulses: Normal pulses.      Heart sounds: Normal heart sounds.      Comments: No peripheral edema noted  Pulmonary:      Effort: Pulmonary effort is normal. No respiratory distress.      Breath sounds: Normal breath sounds. No stridor. No wheezing, rhonchi or rales.      Comments: Denies SOB  Chest:      Chest wall: No tenderness.   Musculoskeletal:         General: Normal range of motion.   Skin:     Capillary Refill: Capillary refill takes less than 2 seconds.      Findings: Erythema and rash present.      Comments: Rash x 2 weeks    Neurological:      Mental Status: She is alert and oriented to person, place, and time.   Psychiatric:         Behavior: Behavior normal.        Physical Exam  Ears appear normal with no wax present. Good reflexes noted in the ears.  There is a slight pinkish rash on the skin of the face.     Result Review :      Common labs          8/14/2024    08:54 10/31/2024    11:11 2/17/2025    09:48   Common Labs   Glucose 84  82  77    BUN 10  9  11    Creatinine 0.83  0.84  0.79    Sodium 143  143  140    Potassium 4.3  4.9  4.3    Chloride 104  104  102    Calcium 9.3  9.3  9.3    Albumin 4.5  4.5  4.1    Total Bilirubin 0.5  0.5  0.4    Alkaline Phosphatase 117  110  104    AST (SGOT) 13  14  16    ALT (SGPT) 7  7  7    WBC 6.83  7.6  6.97    Hemoglobin 14.2  14.5  14.4    Hematocrit 43.5  47.8  43.7    Platelets 312  303  307    Total Cholesterol 164   216    Triglycerides 80   83    HDL Cholesterol 75   68    LDL Cholesterol  74   133    Hemoglobin A1C 5.30          Results  Office Visit with Roger Beard  MD (02/12/2025)               Assessment and Plan        Rash    Orders:    Lupus Anticoagulant Reflex    NAHID With / DsDNA, RNP, Sjogrens A / B, Smith    Sedimentation Rate    C-reactive Protein    Butterfly rash    Orders:    Lupus Anticoagulant Reflex    NAHID With / DsDNA, RNP, Sjogrens A / B, Smith    Sedimentation Rate    C-reactive Protein    Follow-up exam            Assessment & Plan  1. Facial erythema.  The facial erythema has been present for approximately 2 weeks and appears to be improving. She has not introduced any new skincare products or creams. The condition is not severe at this time, but there is a slight pinkish hue observed on her face. Blood work will be conducted today to assess NHAID and sed rate levels to rule out any potential inflammation in the body. If inflammation is detected, a referral to a rheumatologist will be considered.    2. Dizziness.  She reports experiencing dizziness, which she associates with vertigo. She recalls an incident where she received an influenza vaccine at [facility], after which she experienced a severe reaction characterized by room spinning and vomiting. She also reports occasional lightheadedness and sinus issues. Her ears were examined and found to be clear with no wax or abnormalities.    3. Autoimmune disease.  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 She recently had a colonoscopy, which was normal. An endoscopy was performed after an MRI showed a hiatal hernia and GERD. She is currently taking pantoprazole and Synthroid.    PROCEDURE  Colonoscopy was performed recently and was normal. An endoscopy was performed after an MRI showed a hiatal hernia and GERD.         I spent 30 minutes caring for Faviola on this date of service. This time includes time spent by me in the following activities:preparing for the visit, reviewing tests, obtaining and/or reviewing a separately obtained history, performing a  medically appropriate examination and/or evaluation , counseling and educating the patient/family/caregiver, ordering medications, tests, or procedures, referring and communicating with other health care professionals , documenting information in the medical record, independently interpreting results and communicating that information with the patient/family/caregiver, and care coordination  Follow Up     Return for Next scheduled follow up.  Patient was given instructions and counseling regarding her condition or for health maintenance advice. Please see specific information pulled into the AVS if appropriate.     Patient or patient representative verbalized consent for the use of Ambient Listening during the visit with  IGGY Slade for chart documentation. 5/8/2025  07:33 EDT

## 2025-04-12 LAB
ANA SER QL: NEGATIVE
CRP SERPL-MCNC: 2 MG/L (ref 0–10)
ERYTHROCYTE [SEDIMENTATION RATE] IN BLOOD BY WESTERGREN METHOD: 54 MM/HR (ref 0–40)
LA 2 SCREEN W REFLEX-IMP: NORMAL
SCREEN APTT: 34.2 SEC (ref 0–43.5)
SCREEN DRVVT: 27.6 SEC (ref 0–47)

## 2025-04-18 ENCOUNTER — TELEMEDICINE (OUTPATIENT)
Dept: INTERNAL MEDICINE | Facility: CLINIC | Age: 75
End: 2025-04-18
Payer: MEDICARE

## 2025-04-18 DIAGNOSIS — R23.2 HOT FLASH DUE TO MEDICATION: Primary | ICD-10-CM

## 2025-04-18 DIAGNOSIS — T50.905A HOT FLASH DUE TO MEDICATION: Primary | ICD-10-CM

## 2025-04-18 DIAGNOSIS — E78.5 HYPERLIPIDEMIA, UNSPECIFIED HYPERLIPIDEMIA TYPE: ICD-10-CM

## 2025-04-18 RX ORDER — BEMPEDOIC ACID 180 MG/1
1 TABLET, FILM COATED ORAL DAILY
Qty: 30 TABLET | Refills: 11 | Status: SHIPPED | OUTPATIENT
Start: 2025-04-18

## 2025-04-27 NOTE — PROGRESS NOTES
"Chief Complaint  No chief complaint on file.    Subjective        Faviola Issa presents to [facility]  History of Present Illness    Patient notes that the hot flashes due to the steroid that she received have resolved.  Patient notes that she is feeling significantly better.    Patient does have hyperlipidemia.  She is currently taking Nexletol and seems to be tolerating the medicine well.    Objective   Vital Signs:  There were no vitals taken for this visit.  Estimated body mass index is 32.12 kg/m² as calculated from the following:    Height as of 4/10/25: 162.6 cm (64.02\").    Weight as of 4/10/25: 84.9 kg (187 lb 3.2 oz).            Physical Exam  Vitals and nursing note reviewed.   Constitutional:       Appearance: She is well-developed.   HENT:      Head: Normocephalic and atraumatic.   Musculoskeletal:      Cervical back: Normal range of motion and neck supple.   Neurological:      Mental Status: She is alert and oriented to person, place, and time.   Psychiatric:         Behavior: Behavior normal.        Result Review :                Assessment and Plan   Diagnoses and all orders for this visit:    1. Hot flash due to medication (Primary)        -     resoloved.    2. Hyperlipidemia, unspecified hyperlipidemia type  -     Bempedoic Acid (Nexletol) 180 MG tablet; Take 1 tablet by mouth Daily.  Dispense: 30 tablet; Refill: 11  -      continue nexletol.             Follow Up   No follow-ups on file.  Patient was given instructions and counseling regarding her condition or for health maintenance advice. Please see specific information pulled into the AVS if appropriate.     Mode of Visit: Video  Location of patient: -HOME-  Location of provider: +HOME+  You have chosen to receive care through a telehealth visit.  The patient has signed the video visit consent form.  The visit included audio and video interaction. No technical issues occurred during this visit.          "

## 2025-04-29 ENCOUNTER — TELEPHONE (OUTPATIENT)
Dept: CARDIOLOGY | Age: 75
End: 2025-04-29

## 2025-04-29 ENCOUNTER — PRIOR AUTHORIZATION (OUTPATIENT)
Dept: INTERNAL MEDICINE | Facility: CLINIC | Age: 75
End: 2025-04-29
Payer: MEDICARE

## 2025-04-29 NOTE — TELEPHONE ENCOUNTER
Nexletol 180MG tablets PA done on cover my meds, waiting on response.     (Key: [id number])  PA Case ID #: [id number]  Rx #: [id number]    Approved today by [employer] 2017  PA Case: [id number], Status: Approved, Coverage Starts on: 1/29/2025 12:00:00 AM, Coverage Ends on: 4/29/2026 12:00:00 AM.  Effective Date: 1/29/2025  Authorization Expiration Date: 4/29/2026

## 2025-04-29 NOTE — TELEPHONE ENCOUNTER
Caller: Faviola Issa    Relationship to patient: Self    Best call back number:[phone]    Chief complaint: RED FLUSHED FACE, HOT TO THE TOUCH, LIGHT HEADED    Type of visit: FU    Requested date: ASAP     If rescheduling, when is the original appointment: NA     Additional notes:PATIENT IS HAVING ISSUE WITH BLOOD PRESSURE MEDICATION LOSARTAN IS CAUSING HER TO HAVE A RED AND HOT FACE AND LIGHT HEADEDNESS. PATIENT WANTS TO BE SEEN BY DR PEREZ BUT IS WILLING TO SEE APRN. PLEASE CALL AND ADVISE.

## 2025-04-30 ENCOUNTER — TELEPHONE (OUTPATIENT)
Dept: CARDIOLOGY | Age: 75
End: 2025-04-30
Payer: MEDICARE

## 2025-04-30 ENCOUNTER — TELEPHONE (OUTPATIENT)
Dept: INTERNAL MEDICINE | Facility: CLINIC | Age: 75
End: 2025-04-30

## 2025-04-30 NOTE — TELEPHONE ENCOUNTER
"Dr. Benavides,    Pt called and said she would like you to manage her B/P meds instead of Dr. Beard. He had her on lisinopril and she did very well on that, but she said he stopped it because \"lisinopril is not good for  americans.\" So, she was started on losartan in its place.    When she takes losartan, she feels like she has no energy or motivation, she gets a fluttering in her chest, her face flushes and she turns very red. On Monday, she didn't take the losartan in order to see how she felt and she said she had a wonderful day. Then, she took the losartan on Tuesday and felt awful all day.    B/P is running 130s/80s on losartan. Do you have any recommendations for her?    Thank you,    Adeola Russell RN  [place] [facility] [place]  04/30/25 11:33 EDT    "

## 2025-04-30 NOTE — TELEPHONE ENCOUNTER
"Spoke with patient. Went over recommendations. Pt verbalized understanding. She is going to call Dr. Beard's office for now until the appt on 5-5-25.    Dr. Benavides,    She wanted me to tell you \"thank you\".    Thank you,    Adeola Russell RN  Triage [facility] [place]  04/30/25 16:10 EDT    "

## 2025-04-30 NOTE — TELEPHONE ENCOUNTER
Caller: Faviola Issa    Relationship: Self    Best call back number: [phone]     What is the best time to reach you: ANYTIME    Who are you requesting to speak with (clinical staff, provider,  specific staff member): CLINICAL    What was the call regarding: PATIENT CALLING STATING THAT THE REFILLS WAS NEVER STOPPED FOR PRAVASTATIN AND SHE RECEIVED A 90 DAY SUPPLY. PATIENT STATES THAT SHE DOESN'T WANT TO START THE NEW CHOLESTEROL MEDICATION DUE TO HAVING A 90 DAY SUPPLY OF PRAVASTATIN.    PATIENT WOULD LIKE TO KNOW IF SHE CAN GET AN APPOINTMENT WITH  TO DISCUSS THE MEDICATON.    Is it okay if the provider responds through MyChart: NO

## 2025-04-30 NOTE — TELEPHONE ENCOUNTER
We haven't seen her in over one year so she'll need to be seen before we can make any recs. She has an appt with us early next week.    priya

## 2025-05-02 NOTE — TELEPHONE ENCOUNTER
Spoke to pt, offered to schedule an appt to discuss  her medication concerns. Pt states she wants me to let you know that she does not want to start the new cholesterol medication. I verbalized understanding.

## 2025-05-05 ENCOUNTER — OFFICE VISIT (OUTPATIENT)
Dept: CARDIOLOGY | Age: 75
End: 2025-05-05
Payer: MEDICARE

## 2025-05-05 VITALS
SYSTOLIC BLOOD PRESSURE: 140 MMHG | HEIGHT: 64 IN | OXYGEN SATURATION: 97 % | BODY MASS INDEX: 32.4 KG/M2 | HEART RATE: 59 BPM | WEIGHT: 189.8 LBS | DIASTOLIC BLOOD PRESSURE: 88 MMHG

## 2025-05-05 DIAGNOSIS — R42 LIGHTHEADEDNESS: ICD-10-CM

## 2025-05-05 DIAGNOSIS — I10 PRIMARY HYPERTENSION: Primary | ICD-10-CM

## 2025-05-05 PROBLEM — R00.2 PALPITATIONS: Status: RESOLVED | Noted: 2020-10-02 | Resolved: 2025-05-05

## 2025-05-05 PROCEDURE — 1160F RVW MEDS BY RX/DR IN RCRD: CPT | Performed by: NURSE PRACTITIONER

## 2025-05-05 PROCEDURE — 93000 ELECTROCARDIOGRAM COMPLETE: CPT | Performed by: NURSE PRACTITIONER

## 2025-05-05 PROCEDURE — 1159F MED LIST DOCD IN RCRD: CPT | Performed by: NURSE PRACTITIONER

## 2025-05-05 PROCEDURE — 99214 OFFICE O/P EST MOD 30 MIN: CPT | Performed by: NURSE PRACTITIONER

## 2025-05-05 PROCEDURE — 3079F DIAST BP 80-89 MM HG: CPT | Performed by: NURSE PRACTITIONER

## 2025-05-05 PROCEDURE — 3077F SYST BP >= 140 MM HG: CPT | Performed by: NURSE PRACTITIONER

## 2025-05-05 RX ORDER — AMLODIPINE BESYLATE 5 MG/1
5 TABLET ORAL DAILY
Qty: 30 TABLET | Refills: 1 | Status: SHIPPED | OUTPATIENT
Start: 2025-05-05

## 2025-05-05 RX ORDER — CLINDAMYCIN HYDROCHLORIDE 300 MG/1
1 CAPSULE ORAL EVERY 6 HOURS
COMMUNITY
Start: 2025-03-21

## 2025-05-05 RX ORDER — MECLIZINE HYDROCHLORIDE 25 MG/1
25 TABLET ORAL AS NEEDED
COMMUNITY

## 2025-05-05 RX ORDER — PRAVASTATIN SODIUM 20 MG
1 TABLET ORAL DAILY
COMMUNITY
Start: 2025-04-26

## 2025-05-05 NOTE — PROGRESS NOTES
Date of Office Visit: 2025  Encounter Provider: IGGY Andersen  Place of Service: [facility] CARDIOLOGY  Patient Name: Faviola Issa  :1950  Primary Cardiologist: Dr. Nahum Benavides    Chief Complaint   Patient presents with    Hypertension   :     HPI: Faviola Issa is a 74 y.o. female who presents today for evaluation.  She is a new patient to me and have reviewed her medical records.    She has known hypertension, hyperlipidemia, and hyperparathyroidism.  In , she had a pulmonary embolism after thyroid surgery. She has reported palpitations in the past.    In 2023, she reported chest spasms.  Lexiscan myocardial perfusion study was normal.  She was recommended follow-up with GI.    She recently contacted our office with concerns over facial flushing and lightheadedness.  She thinks it may be from the losartan because she held it one day and she felt great.  She has been on the losartan since at least .    She presents today with her granddaughter accompanying her.  She says she has been having lightheadedness and flushing for the past 3 weeks.  When she held the losartan for a day she felt great.  She reports dyspnea with exertion when going up steps.  She denies chest pain, palpitations, edema, syncope, or bleeding.  Blood pressure runs 170/80s first thing in the morning and is better after dinner running 130-150s/60s.  She keeps herself well-hydrated.      Past Medical History:   Diagnosis Date    Abnormal gastrointestinal PET scan 2024    Allergic     Arthritis     Asthma     Bilateral pulmonary embolism     provoked, after surgery , took 3 mos of OAC    Cataract     Colon cancer     Colorectal cancer     s/p surgery (colostomy), radiation, and chemotherapy, with mets to lung s/p surgical resection    Colostomy in place     COPD (chronic obstructive pulmonary disease)     Deep vein thrombosis     Enlarged thyroid gland     s/p  "total thyroidectomy 2019    Eye exam normal 2013    History of prior pregnancies     x4    Hx of radiation therapy 2000    for colon cancer    Hyperlipidemia     Hyperparathyroidism     s/p removal of a single adenoma 5/2019 c/b bleeding/hematoma    Hypertension     \"white coat syndrome\"    Injury of back     Injury of neck     Lactose intolerance     Left hip pain     Lung nodule     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  STATES DR POSADA AWARE OF.    Rash     RIGHT HIP STATES BEEN THERE SINCE LEFT HIP SURGERY AND DR. POSADA AWARE.    Well female exam with routine gynecological exam 10/31/2016       Past Surgical History:   Procedure Laterality Date    COLON SURGERY      1999 and 11/2011    COLONOSCOPY  2016    COLONOSCOPY N/A 05/08/2018    Procedure: COLONOSCOPY into ileum;  Surgeon: James Romeo MD;  Location: [facility] ENDOSCOPY;  Service: Gastroenterology    COLONOSCOPY N/A 03/04/2022    Procedure: COLONOSCOPY to anastomosis;  Surgeon: James Romeo MD;  Location: [facility] ENDOSCOPY;  Service: Gastroenterology;  Laterality: N/A;  pre: hx of colorectal cancer   post: normal surgical anatomy     COLONOSCOPY N/A 11/15/2024    Procedure: COLONOSCOPY TO ANASTOMOSIS WITH BIOPSY;  Surgeon: James Romeo MD;  Location: [facility] ENDOSCOPY;  Service: Gastroenterology;  Laterality: N/A;  NAUSEA; DIARRHEA; ABNORMAL CT SCAN  POST: s/p right and left colectomy    ENDOSCOPY N/A 11/15/2024    Procedure: ESOPHAGOGASTRODUODENOSCOPY WITH BIOPSY;  Surgeon: James Romeo MD;  Location: [facility] ENDOSCOPY;  Service: Gastroenterology;  Laterality: N/A;  NAUSEA; DIARRHEA; ABNORMAL CT SCAN  POST: GASTRIC EROSION; GASTRITIS    HEMICOLOECTOMY W/ ANASTOMOSIS Right 11/2011    Adenocarcinoma of cecum    HYSTERECTOMY  1999    JOINT " "REPLACEMENT      LUNG LOBECTOMY      ANSELMO 2006 and RLL partial 2001 metastatic colon cancer     THYROIDECTOMY Bilateral 2019    Procedure: Left PARATHYROIDECTOMY AND TOTAL THYROIDECTOMY;  Surgeon: Maxi Daly MD;  [facility];  Service: ENT    TOTAL HIP ARTHROPLASTY Left 2022    Procedure: LEFT TOTAL HIP ARTHROPLASTY ANTERIOR;  Surgeon: Ken Vargas II, MD;  [facility];  Service: Orthopedics;  Laterality: Left;    TOTAL HIP ARTHROPLASTY Right 2024    Procedure: TOTAL HIP ARTHROPLASTY ANTERIOR WITH HANA TABLE;  Surgeon: Ken Vargas II, MD;  Location:  [facility];  Service: Orthopedics;  Laterality: Right;    TRACHEOSTOMY N/A 2019    Procedure: EVACUATION OF NECK  HEMATOMA;  Surgeon: Maxi Daly MD;  Location: [facility] MAIN OR;  Service: ENT       Social History     Socioeconomic History    Marital status:      Spouse name: KEN    Number of children: 3    Years of education: College   Tobacco Use    Smoking status: Former     Current packs/day: 0.00     Average packs/day: 1 pack/day for 23.0 years (23.0 ttl pk-yrs)     Types: Cigarettes     Start date: 1978     Quit date: 2001     Years since quittin.6     Passive exposure: Never    Smokeless tobacco: Never    Tobacco comments:     1ppd x20 yrs   Vaping Use    Vaping status: Never Used   Substance and Sexual Activity    Alcohol use: No     Comment: Caffeine use: Tea 2 cups daily    Drug use: No    Sexual activity: Not Currently     Partners: Male     Birth control/protection: Post-menopausal, Surgical       Family History   Problem Relation Age of Onset    Cancer Sister         \"FEMALE\"    Hypertension Father     Breast cancer Daughter 45    Deep vein thrombosis Niece     Heart attack Mother     Malig Hyperthermia Neg Hx        The following portion of the patient's history were reviewed and updated as appropriate: past medical history, past surgical " history, past social history, past family history, allergies, current medications, and problem list.    Review of Systems   Constitutional: Negative.   Cardiovascular:  Positive for dyspnea on exertion (steps). Negative for leg swelling.   Respiratory: Negative.     Neurological:  Positive for light-headedness.       Allergies   Allergen Reactions    Adhesive Tape Hives and Itching     BANDAIDS-TOLERATES PAPER TAPE    Amoxicillin Hives and Itching    Latex Other (See Comments)     Lips and nose swelled    Red Dye #40 (Allura Red) Nausea And Vomiting    Rosuvastatin Anaphylaxis    Shellfish-Derived Products Shortness Of Breath    Lactose Intolerance (Gi) Nausea And Vomiting    Prednisone Other (See Comments)     FACIAL SKIN FLUSHING WITH HIGH DOSES    Simvastatin Myalgia         Current Outpatient Medications:     acetaminophen (TYLENOL) 500 MG tablet, Take 1 tablet by mouth As Needed for Mild Pain., Disp: , Rfl:     albuterol (PROVENTIL) (2.5 MG/3ML) 0.083% nebulizer solution, Take 2.5 mg by nebulization Every 4 (Four) Hours As Needed for Wheezing., Disp: 60 each, Rfl: 0    albuterol sulfate  (90 Base) MCG/ACT inhaler, Inhale 2 puffs Every 4 (Four) Hours As Needed for Wheezing., Disp: 18 g, Rfl: 3    losartan (COZAAR) 25 MG tablet, Take 1 tablet by mouth Daily. (May TAKE 1/2 (ONE-HALF) TABLET BY MOUTH TWICE DAILY AS NEEDED.) Maximum 1 tablet per day., Disp: 90 tablet, Rfl: 1    meclizine (ANTIVERT) 25 MG tablet, Take 1 tablet by mouth As Needed., Disp: , Rfl:     ondansetron (ZOFRAN) 4 MG tablet, Take 1 tablet by mouth As Needed for Nausea., Disp: , Rfl:     pantoprazole (PROTONIX) 40 MG EC tablet, Take 1 tablet by mouth Daily., Disp: 30 tablet, Rfl: 11    polyethyl glycol-propyl glycol (SYSTANE) 0.4-0.3 % solution ophthalmic solution, Administer 2 drops to both eyes Daily., Disp: , Rfl:     polyethylene glycol (MIRALAX) 17 g packet, Take twice daily until bowel movements are normal. Then decrease to once  "daily., Disp: 100 each, Rfl: 0    pravastatin (PRAVACHOL) 20 MG tablet, Take 1 tablet by mouth Daily., Disp: , Rfl:     Synthroid 100 MCG tablet, Take 1 tablet by mouth Daily., Disp: , Rfl:     Umeclidinium-Vilanterol (Anoro Ellipta) 62.5-25 MCG/ACT aerosol powder  inhaler, Inhale 1 puff Daily., Disp: , Rfl:     vitamin D (ERGOCALCIFEROL) 1.25 MG (98475 UT) capsule capsule, Take 1 capsule by mouth 1 (One) Time Per Week. TUESDAYS  HOLD FOR SURGERY, Disp: , Rfl:     amLODIPine (NORVASC) 5 MG tablet, Take 1 tablet by mouth Daily. Stop losartan, Disp: 30 tablet, Rfl: 1    Bempedoic Acid (Nexletol) 180 MG tablet, Take 1 tablet by mouth Daily. (Patient not taking: Reported on 5/5/2025), Disp: 30 tablet, Rfl: 11    clindamycin (CLEOCIN) 300 MG capsule, Take 1 capsule by mouth Every 6 (Six) Hours. (Patient not taking: Reported on 5/5/2025), Disp: , Rfl:          Objective:     Vitals:    05/05/25 1534   BP: 140/88   BP Location: Left arm   Patient Position: Sitting   Cuff Size: Adult   Pulse: 59   SpO2: 97%   Weight: 86.1 kg (189 lb 12.8 oz)   Height: 162.6 cm (64.02\")     Body mass index is 32.56 kg/m².    PHYSICAL EXAM:    Vitals Reviewed.   General Appearance: No acute distress, well developed and well nourished. Obese.   Eyes: Glasses.   HENT: No hearing loss noted.    Respiratory: No signs of respiratory distress. Respiration rhythm and depth normal.  Clear to auscultation.   Cardiovascular:  Jugular Venous Pressure: Normal  Heart Rate and Rhythm: Normal, Heart Sounds: Normal S1 and S2. No S3 or S4 noted.  Murmurs: No murmurs noted. No rubs, thrills, or gallops.   Lower Extremities: No edema noted.  Musculoskeletal: Normal movement of extremities.  Skin: General appearance normal.    Psychiatric: Patient alert and oriented to person, place, and time. Speech and behavior appropriate. Normal mood and affect.       ECG 12 Lead    Date/Time: 5/5/2025 3:42 PM  Performed by: Veronica Deutsch APRN    Authorized by: Get, " IGGY Brooks  Comparison: compared with previous ECG from 12/5/2023  Similar to previous ECG  Rhythm: sinus rhythm  Rate: normal  BPM: 59  Conduction: conduction normal  ST Segments: ST segments normal  T Waves: T waves normal  QRS axis: normal  Other findings: left ventricular hypertrophy    Clinical impression: non-specific ECG            Assessment:       Diagnosis Plan   1. Primary hypertension        2. Lightheadedness               Plan:       She has been on losartan since at least 2019.  She has been experiencing lightheadedness and flushing over the past 3 weeks. One day she held her losartan and felt great.  She felt possibly the losartan was contributing to this.  I have low she has been on the medication for so long.  I wonder if she is just experiencing more elevated blood pressure readings.    I recommended stopping the losartan to see if her symptoms resolve and starting amlodipine 5 mg 1 tablet daily.  Potential adverse effects discussed.  She will follow-up with me in 2 to 3 weeks.    As always, it has been a pleasure to participate in your patient's care. Thank you.         Sincerely,         IGGY Fine  [facility] Cardiology      Dictated utilizing Dragon Dictation

## 2025-05-14 ENCOUNTER — TELEPHONE (OUTPATIENT)
Dept: CARDIOLOGY | Age: 75
End: 2025-05-14
Payer: MEDICARE

## 2025-05-14 NOTE — TELEPHONE ENCOUNTER
Caller: Faviola Issa    Relationship to patient: Self    Best call back number: [phone]    Type of visit: FOLLOW UP    If rescheduling, when is the original appointment: 5/22/25    Additional notes: PT IS CALLING TO SE IF SHE CAN SCHEDULE A TELEHEALTH APPT ON A DIFFERENT DAY IN THE MORNING. IF SHE CAN'T DO TELEHEALTH SHE ASKED FOR AN AFTERNOON APPT PLEASE CALL PT

## 2025-05-30 ENCOUNTER — OFFICE VISIT (OUTPATIENT)
Dept: CARDIOLOGY | Age: 75
End: 2025-05-30
Payer: MEDICARE

## 2025-05-30 VITALS
HEART RATE: 66 BPM | BODY MASS INDEX: 32.17 KG/M2 | OXYGEN SATURATION: 96 % | HEIGHT: 64 IN | DIASTOLIC BLOOD PRESSURE: 78 MMHG | SYSTOLIC BLOOD PRESSURE: 148 MMHG | WEIGHT: 188.4 LBS

## 2025-05-30 DIAGNOSIS — I51.7 LEFT VENTRICULAR HYPERTROPHY: Primary | ICD-10-CM

## 2025-05-30 DIAGNOSIS — I10 WHITE COAT SYNDROME WITH DIAGNOSIS OF HYPERTENSION: ICD-10-CM

## 2025-05-30 NOTE — PROGRESS NOTES
Date of Office Visit: 2025  Encounter Provider: IGGY Andersen  Place of Service: [facility] CARDIOLOGY  Patient Name: Faviola Issa  :1950  Primary Cardiologist: Dr. Nahum Benavides    Chief Complaint   Patient presents with    Follow-up    Primary hypertension   :     HPI: Faviola Issa is a 75 y.o. female who presents today for cardiac follow-up visit.  I have reviewed her medical records.    She has known hypertension, hyperlipidemia, and hyperparathyroidism.  In , she had a pulmonary embolism after thyroid surgery. She has reported palpitations in the past.    In 2023, she reported chest spasms. Lexiscan myocardial perfusion study was normal and she was recommended follow-up with GI.    Earlier this month, I saw her for lightheadedness and flushing.  She had held her losartan for 1 day and felt great.  Her blood pressure was elevated. She reported dyspnea with exertion with steps.  I stopped losartan and added amlodipine 5 mg 1 tablet daily.    She presents today with her granddaughter accompanying her.  She is feeling so much better.  The flushing, nausea, and dizziness have resolved off losartan.  Blood pressures at home are running 101-130s/60s.  She has  whitecoat syndrome and blood pressure is always elevated in the office.  She denies chest pain, shortness of breath, palpitations, edema, dizziness, or syncope.      Past Medical History:   Diagnosis Date    Abnormal gastrointestinal PET scan 2024    Allergic     Arthritis     Asthma     Bilateral pulmonary embolism     provoked, after surgery , took 3 mos of OAC    Cataract     Colon cancer     Colorectal cancer     s/p surgery (colostomy), radiation, and chemotherapy, with mets to lung s/p surgical resection    Colostomy in place     COPD (chronic obstructive pulmonary disease)     Deep vein thrombosis     Enlarged thyroid gland     s/p total thyroidectomy 2019    Eye exam normal  "2013    History of prior pregnancies     x4    Hx of radiation therapy 2000    for colon cancer    Hyperlipidemia     Hyperparathyroidism     s/p removal of a single adenoma 5/2019 c/b bleeding/hematoma    Hypertension     \"white coat syndrome\"    Injury of back     Injury of neck     Lactose intolerance     Left hip pain     Lung nodule     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  STATES DR POSADA AWARE OF.    Rash     RIGHT HIP STATES BEEN THERE SINCE LEFT HIP SURGERY AND DR. POSADA AWARE.    Well female exam with routine gynecological exam 10/31/2016       Past Surgical History:   Procedure Laterality Date    COLON SURGERY      1999 and 11/2011    COLONOSCOPY  2016    COLONOSCOPY N/A 05/08/2018    Procedure: COLONOSCOPY into ileum;  Surgeon: James Romeo MD;  [facility] END[facility];  Service: Gastroenterology    COLONOSCOPY N/A 03/04/2022    Procedure: COLONOSCOPY to anastomosis;  Surgeon: James Romeo MD;  Location: [facility] ENDOSCOPY;  Service: Gastroenterology;  Laterality: N/A;  pre: hx of colorectal cancer   post: normal surgical anatomy     COLONOSCOPY N/A 11/15/2024    Procedure: COLONOSCOPY TO ANASTOMOSIS WITH BIOPSY;  Surgeon: James Romeo MD;  Location: [facility] ENDOSCOPY;  Service: Gastroenterology;  Laterality: N/A;  NAUSEA; DIARRHEA; ABNORMAL CT SCAN  POST: s/p right and left colectomy    ENDOSCOPY N/A 11/15/2024    Procedure: ESOPHAGOGASTRODUODENOSCOPY WITH BIOPSY;  Surgeon: James Romeo MD;  Location: [facility] ENDOSCOPY;  Service: Gastroenterology;  Laterality: N/A;  NAUSEA; DIARRHEA; ABNORMAL CT SCAN  POST: GASTRIC EROSION; GASTRITIS    HEMICOLOECTOMY W/ ANASTOMOSIS Right 11/2011    Adenocarcinoma of cecum    HYSTERECTOMY  1999    JOINT REPLACEMENT      LUNG LOBECTOMY      ANSELMO 08/2006 and " "RLL partial 2001 metastatic colon cancer     THYROIDECTOMY Bilateral 2019    Procedure: Left PARATHYROIDECTOMY AND TOTAL THYROIDECTOMY;  Surgeon: Maxi Daly MD;  [facility]: [facility];  Service: ENT    TOTAL HIP ARTHROPLASTY Left 2022    Procedure: LEFT TOTAL HIP ARTHROPLASTY ANTERIOR;  Surgeon: Saul Vargas II, MD;  [facility];  Service: Orthopedics;  Laterality: Left;    TOTAL HIP ARTHROPLASTY Right 2024    Procedure: TOTAL HIP ARTHROPLASTY ANTERIOR WITH HANA TABLE;  Surgeon: Saul Vargas II, MD;  Location: [facility];  Service: Orthopedics;  Laterality: Right;    TRACHEOSTOMY N/A 2019    Procedure: EVACUATION OF NECK  HEMATOMA;  Surgeon: Maxi Daly MD;  Location: [facility] MAIN OR;  Service: ENT       Social History     Socioeconomic History    Marital status:      Spouse name: SAUL    Number of children: 3    Years of education: College   Tobacco Use    Smoking status: Former     Current packs/day: 0.00     Average packs/day: 1 pack/day for 23.0 years (23.0 ttl pk-yrs)     Types: Cigarettes     Start date: 1978     Quit date: 2001     Years since quittin.7     Passive exposure: Never    Smokeless tobacco: Never    Tobacco comments:     1ppd x20 yrs   Vaping Use    Vaping status: Never Used   Substance and Sexual Activity    Alcohol use: No     Comment: Caffeine use: Tea 2 cups daily    Drug use: No    Sexual activity: Not Currently     Partners: Male     Birth control/protection: Post-menopausal, Surgical       Family History   Problem Relation Age of Onset    Cancer Sister         \"FEMALE\"    Hypertension Father     Arthritis Father     Hyperlipidemia Father     Breast cancer Daughter 45    Deep vein thrombosis Niece     Heart attack Mother     Malig Hyperthermia Neg Hx        The following portion of the patient's history were reviewed and updated as appropriate: past medical history, past surgical history, " past social history, past family history, allergies, current medications, and problem list.    Review of Systems   Constitutional: Negative.   Cardiovascular: Negative.  Negative for dyspnea on exertion (steps) and leg swelling.   Respiratory: Negative.     Musculoskeletal:  Positive for joint pain.   Neurological: Negative.  Negative for light-headedness.       Allergies   Allergen Reactions    Adhesive Tape Hives and Itching     BANDAIDS-TOLERATES PAPER TAPE    Amoxicillin Hives and Itching    Latex Other (See Comments)     Lips and nose swelled    Red Dye #40 (Allura Red) Nausea And Vomiting    Rosuvastatin Anaphylaxis    Shellfish-Derived Products Shortness Of Breath    Losartan Other (See Comments)     Flushing, nausea, dizziness     Lactose Intolerance (Gi) Nausea And Vomiting    Prednisone Other (See Comments)     FACIAL SKIN FLUSHING WITH HIGH DOSES    Simvastatin Myalgia         Current Outpatient Medications:     albuterol (PROVENTIL) (2.5 MG/3ML) 0.083% nebulizer solution, Take 2.5 mg by nebulization Every 4 (Four) Hours As Needed for Wheezing., Disp: 60 each, Rfl: 0    albuterol sulfate  (90 Base) MCG/ACT inhaler, Inhale 2 puffs Every 4 (Four) Hours As Needed for Wheezing., Disp: 18 g, Rfl: 3    amLODIPine (NORVASC) 5 MG tablet, Take 1 tablet by mouth Daily. Stop losartan, Disp: 30 tablet, Rfl: 1    meclizine (ANTIVERT) 25 MG tablet, Take 1 tablet by mouth As Needed., Disp: , Rfl:     ondansetron (ZOFRAN) 4 MG tablet, Take 1 tablet by mouth As Needed for Nausea., Disp: , Rfl:     pantoprazole (PROTONIX) 40 MG EC tablet, Take 1 tablet by mouth Daily., Disp: 30 tablet, Rfl: 11    polyethyl glycol-propyl glycol (SYSTANE) 0.4-0.3 % solution ophthalmic solution, Administer 2 drops to both eyes Daily., Disp: , Rfl:     polyethylene glycol (MIRALAX) 17 g packet, Take twice daily until bowel movements are normal. Then decrease to once daily., Disp: 100 each, Rfl: 0    pravastatin (PRAVACHOL) 20 MG tablet,  "Take 1 tablet by mouth Daily., Disp: , Rfl:     Synthroid 100 MCG tablet, Take 1 tablet by mouth Daily., Disp: , Rfl:     Umeclidinium-Vilanterol (Anoro Ellipta) 62.5-25 MCG/ACT aerosol powder  inhaler, Inhale 1 puff Daily., Disp: , Rfl:     vitamin D (ERGOCALCIFEROL) 1.25 MG (55981 UT) capsule capsule, Take 1 capsule by mouth 1 (One) Time Per Week. TUESDAYS  HOLD FOR SURGERY, Disp: , Rfl:          Objective:     Vitals:    05/30/25 1519 05/30/25 1543   BP: 170/80 148/78   BP Location: Left arm    Patient Position: Sitting    Cuff Size: Adult    Pulse: 66    SpO2: 96%    Weight: 85.5 kg (188 lb 6.4 oz)    Height: 162.6 cm (64.02\")      Body mass index is 32.32 kg/m².    PHYSICAL EXAM:    Vitals Reviewed.   General Appearance: No acute distress, well developed and well nourished. Obese.   Eyes: Glasses.   HENT: No hearing loss noted.    Respiratory: No signs of respiratory distress. Respiration rhythm and depth normal.  Clear to auscultation.   Cardiovascular:  Jugular Venous Pressure: Normal  Heart Rate and Rhythm: Normal, Heart Sounds: Normal S1 and S2. No S3 or S4 noted.  Murmurs: No murmurs noted. No rubs, thrills, or gallops.   Lower Extremities: No edema noted.  Musculoskeletal: Normal movement of extremities.  Skin: General appearance normal.    Psychiatric: Patient alert and oriented to person, place, and time. Speech and behavior appropriate. Normal mood and affect.       ECG 12 Lead    Date/Time: 5/30/2025 3:26 PM  Performed by: Veronica Deutsch APRN    Authorized by: Veronica Deutsch APRN  Comparison: compared with previous ECG from 5/5/2025  Similar to previous ECG  Rhythm: sinus rhythm  Rate: normal  BPM: 66  Conduction: conduction normal  T Waves: T waves normal  QRS axis: normal  Other findings: non-specific ST-T wave changes and left ventricular hypertrophy    Clinical impression: non-specific ECG            Assessment:       Diagnosis Plan   1. Left ventricular hypertrophy  Adult Transthoracic Echo " Complete w/ Color, Spectral and Contrast if Necessary Per Protocol    ECG 12 Lead      2. White coat syndrome with diagnosis of hypertension                 Plan:       She presents today for follow-up visit.  She has whitecoat syndrome with a diagnosis of hypertension.  She feels so much better off the losartan so that will be discontinued indefinitely.  Continue amlodipine 5 mg daily.  Blood pressures at home have been well-controlled.  She will continue to monitor blood pressures at home and call with concerns.  Her EKG is suggestive of left ventricular hypertrophy and we will check an echocardiogram.  She will follow-up with Dr. Benavides in 1 year.    As always, it has been a pleasure to participate in your patient's care. Thank you.         Sincerely,         IGGY Fine  [facility] Cardiology      Dictated utilizing Dragon Dictation

## 2025-06-09 ENCOUNTER — HOSPITAL ENCOUNTER (OUTPATIENT)
Dept: CARDIOLOGY | Facility: HOSPITAL | Age: 75
Discharge: HOME | End: 2025-06-09
Admitting: NURSE PRACTITIONER
Payer: MEDICARE

## 2025-06-09 VITALS
SYSTOLIC BLOOD PRESSURE: 120 MMHG | HEIGHT: 64 IN | HEART RATE: 60 BPM | BODY MASS INDEX: 32.1 KG/M2 | WEIGHT: 188 LBS | DIASTOLIC BLOOD PRESSURE: 62 MMHG

## 2025-06-09 DIAGNOSIS — I51.7 LEFT VENTRICULAR HYPERTROPHY: ICD-10-CM

## 2025-06-09 LAB
AORTIC ARCH: 2.6 CM
AORTIC DIMENSIONLESS INDEX: 0.89 (DI)
ASCENDING AORTA: 2.9 CM
AV MEAN PRESS GRAD SYS DOP V1V2: 4 MMHG
AV VMAX SYS DOP: 130 CM/SEC
BH CV ECHO MEAS - ACS: 1.67 CM
BH CV ECHO MEAS - AO MAX PG: 6.8 MMHG
BH CV ECHO MEAS - AO ROOT AREA (BSA CORRECTED): 1.5 CM2
BH CV ECHO MEAS - AO ROOT DIAM: 2.9 CM
BH CV ECHO MEAS - AO V2 VTI: 28.7 CM
BH CV ECHO MEAS - AVA(I,D): 3 CM2
BH CV ECHO MEAS - EDV(CUBED): 117.6 ML
BH CV ECHO MEAS - EDV(MOD-SP2): 79 ML
BH CV ECHO MEAS - EDV(MOD-SP4): 81 ML
BH CV ECHO MEAS - EF(MOD-SP2): 63.3 %
BH CV ECHO MEAS - EF(MOD-SP4): 64.2 %
BH CV ECHO MEAS - ESV(CUBED): 31.9 ML
BH CV ECHO MEAS - ESV(MOD-SP2): 29 ML
BH CV ECHO MEAS - ESV(MOD-SP4): 29 ML
BH CV ECHO MEAS - FS: 35.3 %
BH CV ECHO MEAS - IVS/LVPW: 0.89 CM
BH CV ECHO MEAS - IVSD: 0.8 CM
BH CV ECHO MEAS - LAT PEAK E' VEL: 12.1 CM/SEC
BH CV ECHO MEAS - LV DIASTOLIC VOL/BSA (35-75): 42.5 CM2
BH CV ECHO MEAS - LV MASS(C)D: 141.9 GRAMS
BH CV ECHO MEAS - LV MAX PG: 5.3 MMHG
BH CV ECHO MEAS - LV MEAN PG: 3 MMHG
BH CV ECHO MEAS - LV SYSTOLIC VOL/BSA (12-30): 15.2 CM2
BH CV ECHO MEAS - LV V1 MAX: 115 CM/SEC
BH CV ECHO MEAS - LV V1 VTI: 25.6 CM
BH CV ECHO MEAS - LVIDD: 4.9 CM
BH CV ECHO MEAS - LVIDS: 3.2 CM
BH CV ECHO MEAS - LVOT AREA: 3.4 CM2
BH CV ECHO MEAS - LVOT DIAM: 2.09 CM
BH CV ECHO MEAS - LVPWD: 0.9 CM
BH CV ECHO MEAS - MED PEAK E' VEL: 10.4 CM/SEC
BH CV ECHO MEAS - MV A DUR: 0.14 SEC
BH CV ECHO MEAS - MV A MAX VEL: 70.3 CM/SEC
BH CV ECHO MEAS - MV DEC SLOPE: 431.5 CM/SEC2
BH CV ECHO MEAS - MV DEC TIME: 0.24 SEC
BH CV ECHO MEAS - MV E MAX VEL: 75.8 CM/SEC
BH CV ECHO MEAS - MV E/A: 1.08
BH CV ECHO MEAS - MV MAX PG: 3.2 MMHG
BH CV ECHO MEAS - MV MEAN PG: 1.5 MMHG
BH CV ECHO MEAS - MV P1/2T: 59.8 MSEC
BH CV ECHO MEAS - MV V2 VTI: 23.1 CM
BH CV ECHO MEAS - MVA(P1/2T): 3.7 CM2
BH CV ECHO MEAS - MVA(VTI): 3.8 CM2
BH CV ECHO MEAS - PA ACC TIME: 0.18 SEC
BH CV ECHO MEAS - PA V2 MAX: 89.6 CM/SEC
BH CV ECHO MEAS - PULM A REVS DUR: 0.16 SEC
BH CV ECHO MEAS - PULM A REVS VEL: 27.4 CM/SEC
BH CV ECHO MEAS - PULM DIAS VEL: 33.4 CM/SEC
BH CV ECHO MEAS - PULM S/D: 0.88
BH CV ECHO MEAS - PULM SYS VEL: 29.6 CM/SEC
BH CV ECHO MEAS - QP/QS: 0.49
BH CV ECHO MEAS - RAP SYSTOLE: 3 MMHG
BH CV ECHO MEAS - RV MAX PG: 1.34 MMHG
BH CV ECHO MEAS - RV V1 MAX: 57.8 CM/SEC
BH CV ECHO MEAS - RV V1 VTI: 13.2 CM
BH CV ECHO MEAS - RVOT DIAM: 2.04 CM
BH CV ECHO MEAS - RVSP: 23.6 MMHG
BH CV ECHO MEAS - SUP REN AO DIAM: 2.6 CM
BH CV ECHO MEAS - SV(LVOT): 87.5 ML
BH CV ECHO MEAS - SV(MOD-SP2): 50 ML
BH CV ECHO MEAS - SV(MOD-SP4): 52 ML
BH CV ECHO MEAS - SV(RVOT): 43.1 ML
BH CV ECHO MEAS - SVI(LVOT): 45.9 ML/M2
BH CV ECHO MEAS - SVI(MOD-SP2): 26.2 ML/M2
BH CV ECHO MEAS - SVI(MOD-SP4): 27.3 ML/M2
BH CV ECHO MEAS - TAPSE (>1.6): 2.6 CM
BH CV ECHO MEAS - TR MAX PG: 20.6 MMHG
BH CV ECHO MEAS - TR MAX VEL: 226.7 CM/SEC
BH CV ECHO MEASUREMENTS AVERAGE E/E' RATIO: 6.74
BH CV XLRA - RV BASE: 3.3 CM
BH CV XLRA - RV LENGTH: 7.5 CM
BH CV XLRA - RV MID: 2.8 CM
BH CV XLRA - TDI S': 11.7 CM/SEC
LEFT ATRIUM VOLUME INDEX: 30.7 ML/M2
LV EF BIPLANE MOD: 60.6 %
SINUS: 2.9 CM
STJ: 2.1 CM

## 2025-06-09 PROCEDURE — 93306 TTE W/DOPPLER COMPLETE: CPT | Performed by: INTERNAL MEDICINE

## 2025-06-09 PROCEDURE — 93306 TTE W/DOPPLER COMPLETE: CPT

## 2025-07-07 RX ORDER — AMLODIPINE BESYLATE 5 MG/1
TABLET ORAL
Qty: 30 TABLET | Refills: 0 | Status: SHIPPED | OUTPATIENT
Start: 2025-07-07

## 2025-07-21 ENCOUNTER — TELEPHONE (OUTPATIENT)
Dept: GASTROENTEROLOGY | Facility: CLINIC | Age: 75
End: 2025-07-21
Payer: MEDICARE

## 2025-07-21 DIAGNOSIS — K21.00 GASTROESOPHAGEAL REFLUX DISEASE WITH ESOPHAGITIS WITHOUT HEMORRHAGE: ICD-10-CM

## 2025-07-21 NOTE — TELEPHONE ENCOUNTER
Caller: Faviola Issa    Relationship: Self    Best call back number: [phone]    Requested Prescriptions:   pantoprazole (PROTONIX) 40 MG EC tablet      Pharmacy where request should be sent: [facility]     Last office visit with prescribing clinician: Visit date not found   Last telemedicine visit with prescribing clinician: Visit date not found   Next office visit with prescribing clinician: Visit date not found     Additional details provided by patient: PT IS CALLING TO GET HER MEDICATION REFILLED. SHE IS CURRENTLY OUT. PT WAS A PT OF DR PATEL. PT STATED [facility] IS NEEDING US TO SEND SOMETHING TO THEM REGARDING HER REFILL.    Does the patient have less than a 3 day supply:  [x] Yes  [] No    Would you like a call back once the refill request has been completed: [x] Yes [] No    If the office needs to give you a call back, can they leave a voicemail: [x] Yes [] No    Richar Skaggs Rep   07/21/25 15:10 EDT

## 2025-07-22 RX ORDER — PANTOPRAZOLE SODIUM 40 MG/1
40 TABLET, DELAYED RELEASE ORAL DAILY
Qty: 30 TABLET | Refills: 3 | Status: SHIPPED | OUTPATIENT
Start: 2025-07-22 | End: 2025-07-24 | Stop reason: SDUPTHER

## 2025-07-22 NOTE — TELEPHONE ENCOUNTER
Ok for the hub to relay and schedule.  Left vm for the pt to call back and schedule a yearly follow up appt.  This is an old [facility] pt and can schedule with any of our APC.

## 2025-07-24 ENCOUNTER — OFFICE VISIT (OUTPATIENT)
Dept: GASTROENTEROLOGY | Facility: CLINIC | Age: 75
End: 2025-07-24
Payer: MEDICARE

## 2025-07-24 VITALS
WEIGHT: 188.6 LBS | HEART RATE: 73 BPM | HEIGHT: 67 IN | DIASTOLIC BLOOD PRESSURE: 84 MMHG | TEMPERATURE: 98 F | SYSTOLIC BLOOD PRESSURE: 174 MMHG | BODY MASS INDEX: 29.6 KG/M2

## 2025-07-24 DIAGNOSIS — C34.90 MALIGNANT NEOPLASM OF LUNG, UNSPECIFIED LATERALITY, UNSPECIFIED PART OF LUNG: ICD-10-CM

## 2025-07-24 DIAGNOSIS — K63.5 POLYP OF COLON, UNSPECIFIED PART OF COLON, UNSPECIFIED TYPE: ICD-10-CM

## 2025-07-24 DIAGNOSIS — C18.7 MALIGNANT NEOPLASM OF SIGMOID COLON: Primary | ICD-10-CM

## 2025-07-24 DIAGNOSIS — K21.00 GASTROESOPHAGEAL REFLUX DISEASE WITH ESOPHAGITIS WITHOUT HEMORRHAGE: ICD-10-CM

## 2025-07-24 PROCEDURE — 1160F RVW MEDS BY RX/DR IN RCRD: CPT

## 2025-07-24 PROCEDURE — 99213 OFFICE O/P EST LOW 20 MIN: CPT

## 2025-07-24 PROCEDURE — 1159F MED LIST DOCD IN RCRD: CPT

## 2025-07-24 PROCEDURE — 3079F DIAST BP 80-89 MM HG: CPT

## 2025-07-24 PROCEDURE — 3077F SYST BP >= 140 MM HG: CPT

## 2025-07-24 RX ORDER — PANTOPRAZOLE SODIUM 40 MG/1
40 TABLET, DELAYED RELEASE ORAL DAILY
Qty: 90 TABLET | Refills: 0 | Status: SHIPPED | OUTPATIENT
Start: 2025-07-24

## 2025-07-24 NOTE — PROGRESS NOTES
"Chief Complaint  Heartburn    Subjective          History of Present Illness    Faviola Issa is a  75 y.o. female presents for follow-up.  She previously followed with Dr. Romeo and is new to me.  She will be following with Dr. Mart going forward.    She has a history of rectal cancer with abdominal perineal resection and colostomy placement in 1999.  In 2011 she had surgery to resect a cecal cancer.  She has had 2 surgeries for bilateral lung cancer.    Uses pantoprazole as needed.  States reflux is well-controlled this way.  No difficulty swallowing.  No abdominal pain or nausea.  No black or bloody stool.  She reports that she has been paying close attention to which foods make her reflux worse.    Upper GI Endoscopy (11/15/2024 08:56) irregular Z-line, erythematous mucosa in the stomach, erosive gastropathy with no bleeding and no stigmata of recent bleeding, no gross lesions in the entire examined duodenum.  Continue Protonix daily.  Path showed some inflammation of stomach but no evidence of H. pylori.  Distal esophageal biopsy showed evidence of GERD.    Colonoscopy (11/15/2024 08:57) left abdominal colostomy looked at felt normal.  Colonoscope was advanced to anastomosis of terminal ileum small bowel to ascending colon.  Anastomosis looked normal.  Otherwise normal exam.  Colon biopsies came back normal.  Recommend repeat colonoscopy 3 to 5 years.    COLONOSCOPY (03/04/2022 08:22) through left lower quadrant abdominal colostomy.  Recommended repeat colonoscopy 3 to 5 years.    Objective   Vital Signs:   /84   Pulse 73   Temp 98 °F (36.7 °C)   Ht 170.2 cm (67\")   Wt 85.5 kg (188 lb 9.6 oz)   BMI 29.54 kg/m²       Physical Exam  Constitutional:       General: She is not in acute distress.     Appearance: Normal appearance.   Eyes:      General: No scleral icterus.  Pulmonary:      Effort: Pulmonary effort is normal.   Abdominal:      General: Abdomen is flat. There is no distension.      " Tenderness: There is no abdominal tenderness. There is no guarding.   Skin:     Coloration: Skin is not jaundiced.   Neurological:      General: No focal deficit present.      Mental Status: She is alert and oriented to person, place, and time.   Psychiatric:         Mood and Affect: Mood normal.         Behavior: Behavior normal.          Result Review :   The following data was reviewed by: Jes Wilder PA-C on 07/24/2025:  CMP          8/14/2024    08:54 10/31/2024    11:11 2/17/2025    09:48   CMP   Glucose 84  82  77    BUN 10  9  11    Creatinine 0.83  0.84  0.79    EGFR 74.5  73  79.1    Sodium 143  143  140    Potassium 4.3  4.9  4.3    Chloride 104  104  102    Calcium 9.3  9.3  9.3    Total Protein 7.6  7.6  7.5    Albumin 4.5  4.5  4.1    Globulin 3.1  3.1  3.4    Total Bilirubin 0.5  0.5  0.4    Alkaline Phosphatase 117  110  104    AST (SGOT) 13  14  16    ALT (SGPT) 7  7  7    Albumin/Globulin Ratio 1.5   1.2    BUN/Creatinine Ratio 12.0  11  13.9      CBC          8/14/2024    08:54 10/31/2024    11:11 2/17/2025    09:48   CBC   WBC 6.83  7.6  6.97    RBC 5.03  5.28  5.04    Hemoglobin 14.2  14.5  14.4    Hematocrit 43.5  47.8  43.7    MCV 86.5  91  86.7    MCH 28.2  27.5  28.6    MCHC 32.6  30.3  33.0    RDW 14.6  13.1  13.2    Platelets 312  303  307              Assessment:   Diagnoses and all orders for this visit:    1. Malignant neoplasm of sigmoid colon (Primary)    2. Gastroesophageal reflux disease with esophagitis without hemorrhage  -     pantoprazole (PROTONIX) 40 MG EC tablet; Take 1 tablet by mouth Daily.  Dispense: 90 tablet; Refill: 0    3. Polyp of colon, unspecified part of colon, unspecified type    4. Malignant neoplasm of lung, unspecified laterality, unspecified part of lung          Plan:   - Reviewed recent lab work  -Continue pantoprazole as needed.  Patient provided with GERD handout and diet recommendations  -Not currently having issues with bowel movements.  No black  or bloody stool  -Due for repeat screening colonoscopy 3 to 5 years.      Follow Up   Return in about 1 year (around 7/24/2026), or if symptoms worsen or fail to improve.    Dragon dictation used throughout this note.         Jes Wilder PA-C  [facility]  [address] [place] [address]  Office: [phone]

## 2025-08-08 ENCOUNTER — OFFICE VISIT (OUTPATIENT)
Dept: INTERNAL MEDICINE | Facility: CLINIC | Age: 75
End: 2025-08-08
Payer: MEDICARE

## 2025-08-08 VITALS
BODY MASS INDEX: 29.76 KG/M2 | SYSTOLIC BLOOD PRESSURE: 150 MMHG | HEIGHT: 67 IN | DIASTOLIC BLOOD PRESSURE: 80 MMHG | TEMPERATURE: 97.9 F | WEIGHT: 189.6 LBS | HEART RATE: 67 BPM | OXYGEN SATURATION: 98 %

## 2025-08-08 DIAGNOSIS — Z00.00 HEALTHCARE MAINTENANCE: ICD-10-CM

## 2025-08-08 DIAGNOSIS — E78.5 HYPERLIPIDEMIA, UNSPECIFIED HYPERLIPIDEMIA TYPE: ICD-10-CM

## 2025-08-08 DIAGNOSIS — I10 ESSENTIAL HYPERTENSION: ICD-10-CM

## 2025-08-08 DIAGNOSIS — Z00.00 MEDICARE ANNUAL WELLNESS VISIT, SUBSEQUENT: ICD-10-CM

## 2025-08-08 DIAGNOSIS — Z00.00 WELL WOMAN EXAM (NO GYNECOLOGICAL EXAM): Primary | ICD-10-CM

## 2025-08-08 DIAGNOSIS — R73.03 PREDIABETES: ICD-10-CM

## 2025-08-08 DIAGNOSIS — E03.9 HYPOTHYROIDISM, UNSPECIFIED TYPE: ICD-10-CM

## 2025-08-08 DIAGNOSIS — K21.00 GASTROESOPHAGEAL REFLUX DISEASE WITH ESOPHAGITIS WITHOUT HEMORRHAGE: ICD-10-CM

## 2025-08-08 RX ORDER — AMLODIPINE BESYLATE 5 MG/1
TABLET ORAL
Qty: 30 TABLET | Refills: 0 | OUTPATIENT
Start: 2025-08-08

## 2025-08-08 RX ORDER — AMLODIPINE BESYLATE 5 MG/1
5 TABLET ORAL DAILY
Qty: 90 TABLET | Refills: 3 | Status: SHIPPED | OUTPATIENT
Start: 2025-08-08

## 2025-08-08 RX ORDER — PANTOPRAZOLE SODIUM 40 MG/1
40 TABLET, DELAYED RELEASE ORAL DAILY
Qty: 90 TABLET | Refills: 3 | Status: SHIPPED | OUTPATIENT
Start: 2025-08-08

## 2025-08-08 RX ORDER — LEVOTHYROXINE SODIUM 100 MCG
100 TABLET ORAL DAILY
Qty: 90 TABLET | Refills: 3 | Status: SHIPPED | OUTPATIENT
Start: 2025-08-08

## 2025-08-15 LAB
ALBUMIN SERPL-MCNC: 4.3 G/DL (ref 3.5–5.2)
ALBUMIN/GLOB SERPL: 1.3 G/DL
ALP SERPL-CCNC: 98 U/L (ref 39–117)
ALT SERPL-CCNC: 6 U/L (ref 1–33)
AST SERPL-CCNC: 16 U/L (ref 1–32)
BASOPHILS # BLD AUTO: 0.05 10*3/MM3 (ref 0–0.2)
BASOPHILS NFR BLD AUTO: 0.7 % (ref 0–1.5)
BILIRUB SERPL-MCNC: 0.6 MG/DL (ref 0–1.2)
BUN SERPL-MCNC: 9 MG/DL (ref 8–23)
BUN/CREAT SERPL: 11 (ref 7–25)
CALCIUM SERPL-MCNC: 9.4 MG/DL (ref 8.6–10.5)
CHLORIDE SERPL-SCNC: 103 MMOL/L (ref 98–107)
CHOLEST SERPL-MCNC: 215 MG/DL (ref 0–200)
CO2 SERPL-SCNC: 26.7 MMOL/L (ref 22–29)
CREAT SERPL-MCNC: 0.82 MG/DL (ref 0.57–1)
EGFRCR SERPLBLD CKD-EPI 2021: 75.2 ML/MIN/1.73
EOSINOPHIL # BLD AUTO: 0.06 10*3/MM3 (ref 0–0.4)
EOSINOPHIL NFR BLD AUTO: 0.8 % (ref 0.3–6.2)
ERYTHROCYTE [DISTWIDTH] IN BLOOD BY AUTOMATED COUNT: 13.4 % (ref 12.3–15.4)
FT4I SERPL CALC-MCNC: 2.8 (ref 1.2–4.9)
GLOBULIN SER CALC-MCNC: 3.2 GM/DL
GLUCOSE SERPL-MCNC: 84 MG/DL (ref 65–99)
HBA1C MFR BLD: 5.3 % (ref 4.8–5.6)
HCT VFR BLD AUTO: 43.2 % (ref 34–46.6)
HDLC SERPL-MCNC: 77 MG/DL (ref 40–60)
HGB BLD-MCNC: 14.2 G/DL (ref 12–15.9)
IMM GRANULOCYTES # BLD AUTO: 0.02 10*3/MM3 (ref 0–0.05)
IMM GRANULOCYTES NFR BLD AUTO: 0.3 % (ref 0–0.5)
LDLC SERPL CALC-MCNC: 122 MG/DL (ref 0–100)
LDLC/HDLC SERPL: 1.55 {RATIO}
LYMPHOCYTES # BLD AUTO: 1.41 10*3/MM3 (ref 0.7–3.1)
LYMPHOCYTES NFR BLD AUTO: 19.9 % (ref 19.6–45.3)
MCH RBC QN AUTO: 29 PG (ref 26.6–33)
MCHC RBC AUTO-ENTMCNC: 32.9 G/DL (ref 31.5–35.7)
MCV RBC AUTO: 88.3 FL (ref 79–97)
MONOCYTES # BLD AUTO: 0.48 10*3/MM3 (ref 0.1–0.9)
MONOCYTES NFR BLD AUTO: 6.8 % (ref 5–12)
NEUTROPHILS # BLD AUTO: 5.05 10*3/MM3 (ref 1.7–7)
NEUTROPHILS NFR BLD AUTO: 71.5 % (ref 42.7–76)
NRBC BLD AUTO-RTO: 0 /100 WBC (ref 0–0.2)
PLATELET # BLD AUTO: 305 10*3/MM3 (ref 140–450)
POTASSIUM SERPL-SCNC: 4.3 MMOL/L (ref 3.5–5.2)
PROT SERPL-MCNC: 7.5 G/DL (ref 6–8.5)
RBC # BLD AUTO: 4.89 10*6/MM3 (ref 3.77–5.28)
SODIUM SERPL-SCNC: 142 MMOL/L (ref 136–145)
T3RU NFR SERPL: 29 % (ref 24–39)
T4 SERPL-MCNC: 9.6 UG/DL (ref 4.5–12)
TRIGL SERPL-MCNC: 92 MG/DL (ref 0–150)
TSH SERPL DL<=0.005 MIU/L-ACNC: 1.68 UIU/ML (ref 0.45–4.5)
VLDLC SERPL CALC-MCNC: 16 MG/DL (ref 5–40)
WBC # BLD AUTO: 7.07 10*3/MM3 (ref 3.4–10.8)

## (undated) DEVICE — GLV SURG BIOGEL LTX PF 8

## (undated) DEVICE — 3M™ IOBAN™ 2 ANTIMICROBIAL INCISE DRAPE 6640EZ: Brand: IOBAN™ 2

## (undated) DEVICE — NEEDLE, QUINCKE, 20GX3.5": Brand: MEDLINE

## (undated) DEVICE — CANNULA,ADULT,SOFT-TOUCH,7'TUBE,UC: Brand: PENDING

## (undated) DEVICE — 3M™ STERI-STRIP™ REINFORCED ADHESIVE SKIN CLOSURES, R1547, 1/2 IN X 4 IN (12 MM X 100 MM), 6 STRIPS/ENVELOPE: Brand: 3M™ STERI-STRIP™

## (undated) DEVICE — APPL CHLORAPREP W/TINT 26ML ORNG

## (undated) DEVICE — MAT FLR ABSORBENT LG 4FT 10 2.5FT

## (undated) DEVICE — TUBING, SUCTION, 1/4" X 10', STRAIGHT: Brand: MEDLINE

## (undated) DEVICE — PENCL E/S FTSWTCH HOLSTR SS 10FT

## (undated) DEVICE — LN SMPL CO2 SHTRM SD STREAM W/M LUER

## (undated) DEVICE — GLV SURG BIOGEL LTX PF 7

## (undated) DEVICE — TBG PENCL TELESCP MEGADYNE SMOKE EVAC 10FT

## (undated) DEVICE — PK ANT HIP 40

## (undated) DEVICE — SENSR O2 OXIMAX FNGR A/ 18IN NONSTR

## (undated) DEVICE — SUT ETHIB 2 CV V37 MS/4 30IN MX69G

## (undated) DEVICE — RECIPROCATING BLADE HEAVY DUTY LONG, OFFSET  (77.6 X 0.77 X 11.2MM)

## (undated) DEVICE — THE TORRENT IRRIGATION SCOPE CONNECTOR IS USED WITH THE TORRENT IRRIGATION TUBING TO PROVIDE IRRIGATION FLUIDS SUCH AS STERILE WATER DURING GASTROINTESTINAL ENDOSCOPIC PROCEDURES WHEN USED IN CONJUNCTION WITH AN IRRIGATION PUMP (OR ELECTROSURGICAL UNIT).: Brand: TORRENT

## (undated) DEVICE — SUT SILK 4/0 TIES 18IN A183H

## (undated) DEVICE — SOL NACL 0.9PCT 100ML SGL

## (undated) DEVICE — PROBE 8225101 5PK STD PRASS FL TIP ROHS

## (undated) DEVICE — ADAPT CLN BIOGUARD AIR/H2O DISP

## (undated) DEVICE — DRSNG TELFA PAD NONADH STR 1S 3X4IN

## (undated) DEVICE — GLV SURG SENSICARE PI MIC PF SZ7 LF STRL

## (undated) DEVICE — DRN WND JP RND W TROC SIL 10F 1/8IN

## (undated) DEVICE — SUT VIC 3/0 SH CR8 18IN J864D

## (undated) DEVICE — FRCP BX RADJAW4 NDL 2.8 240CM LG OG BX40

## (undated) DEVICE — APPL CHLORAPREP HI/LITE 26ML ORNG

## (undated) DEVICE — CONTAINER,SPECIMEN,OR STERILE,4OZ: Brand: MEDLINE

## (undated) DEVICE — KT ORCA ORCAPOD DISP STRL

## (undated) DEVICE — GLV SURG SIGNATURE ESSENTIAL PF LTX SZ8.5

## (undated) DEVICE — ZIP 16 SURGICAL SKIN CLOSURE DEVICE, PSA: Brand: ZIP 16 SURGICAL SKIN CLOSURE DEVICE

## (undated) DEVICE — MSK PROC CURAPLEX O2 2/ADAPT 7FT

## (undated) DEVICE — DRAPE,REIN 53X77,STERILE: Brand: MEDLINE

## (undated) DEVICE — SUT PROLN 5/0 PS2 18IN 8686G

## (undated) DEVICE — PATIENT RETURN ELECTRODE, SINGLE-USE, CONTACT QUALITY MONITORING, ADULT, WITH 9FT CORD, FOR PATIENTS WEIGING OVER 33LBS. (15KG): Brand: MEGADYNE

## (undated) DEVICE — IRRIGATOR BULB ASEPTO 60CC STRL

## (undated) DEVICE — NDL HYPO PRECISIONGLIDE REG 25G 1 1/2

## (undated) DEVICE — THE STERILE LIGHT HANDLE COVER IS USED WITH STERIS SURGICAL LIGHTING AND VISUALIZATION SYSTEMS.

## (undated) DEVICE — GAUZE,SPONGE,4"X4",16PLY,XRAY,STRL,LF: Brand: MEDLINE

## (undated) DEVICE — SOL ISO/ALC 70PCT 4OZ

## (undated) DEVICE — DRN WND JP RND W TROC SIL 15F 3/16IN

## (undated) DEVICE — CANN O2 ETCO2 FITS ALL CONN CO2 SMPL A/ 7IN DISP LF

## (undated) DEVICE — EMG TUBE 8229707 NIM TRIVANTAGE 7.0MM ID: Brand: NIM TRIVANTAGE™

## (undated) DEVICE — SUT SILK 2/0 FS BLK 18IN 685G

## (undated) DEVICE — DRSNG SURESITE WNDW 4X4.5

## (undated) DEVICE — SOL ISO/ALC RUB 70PCT 4OZ

## (undated) DEVICE — GLV SURG PREMIERPRO ORTHO LTX PF SZ8.5 BRN

## (undated) DEVICE — HARMONIC FOCUS SHEARS 9CM LENGTH + ADAPTIVE TISSUE TECHNOLOGY FOR USE WITH BLUE HAND PIECE ONLY: Brand: HARMONIC FOCUS

## (undated) DEVICE — DRSNG BURN ACTICOAT FLEX 7 1X24IN

## (undated) DEVICE — BLCK/BITE BLOX W/DENTL/RIM W/STRAP 54F

## (undated) DEVICE — ADHS LIQ MASTISOL 2/3ML

## (undated) DEVICE — JACKSON-PRATT 100CC BULB RESERVOIR: Brand: CARDINAL HEALTH

## (undated) DEVICE — LOU MINOR PROCEDURE: Brand: MEDLINE INDUSTRIES, INC.

## (undated) DEVICE — DISPOSABLE BIPOLAR FORCEPS 4" (10.2CM) JEWELERS, STRAIGHT 0.4MM TIP AND 12 FT. (3.6M) CABLE: Brand: KIRWAN

## (undated) DEVICE — SHEET, DRAPE, SPLIT, STERILE: Brand: MEDLINE

## (undated) DEVICE — 3M™ STERI-DRAPE™ INSTRUMENT POUCH 1018: Brand: STERI-DRAPE™

## (undated) DEVICE — TRAP FLD MINIVAC MEGADYNE 100ML

## (undated) DEVICE — S/M FLEXIBLE ALEXIS ORTHOPAEDIC PROTECTOR: Brand: ALEXIS® ORTHOPAEDIC PROTECTOR

## (undated) DEVICE — Device: Brand: DEFENDO AIR/WATER/SUCTION AND BIOPSY VALVE

## (undated) DEVICE — WOUND RETRACTOR AND PROTECTOR: Brand: ALEXIS WOUND PROTECTOR-RETRACTOR

## (undated) DEVICE — TOWEL,OR,DSP,ST,BLUE,STD,4/PK,20PK/CS: Brand: MEDLINE

## (undated) DEVICE — 450 ML BOTTLE OF 0.05% CHLORHEXIDINE GLUCONATE IN 99.95% STERILE WATER FOR IRRIGATION, USP AND APPLICATOR.: Brand: IRRISEPT ANTIMICROBIAL WOUND LAVAGE

## (undated) DEVICE — WEREWOLF FASTSEAL 6.0 HEMOSTASIS WAND: Brand: FASTSEAL 6.0 HEMOSTASIS WAND

## (undated) DEVICE — ELECTRD NDL EZ CLN MOD 2.75IN

## (undated) DEVICE — SPNG DISECTOR KTNER XRAY COTN 1/4X9/16IN PK/5

## (undated) DEVICE — SUT SILK 3/0 TIES 18IN A184H

## (undated) DEVICE — PK ENT 40

## (undated) DEVICE — SUT SILK 2/0 TIES 18IN A185H

## (undated) DEVICE — SPNG GZ WOVN 4X4IN 12PLY 10/BX STRL

## (undated) DEVICE — MAGNETIC DRAPE: Brand: DEVON